# Patient Record
Sex: MALE | Race: WHITE | NOT HISPANIC OR LATINO | Employment: OTHER | URBAN - METROPOLITAN AREA
[De-identification: names, ages, dates, MRNs, and addresses within clinical notes are randomized per-mention and may not be internally consistent; named-entity substitution may affect disease eponyms.]

---

## 2017-07-05 ENCOUNTER — HOSPITAL ENCOUNTER (INPATIENT)
Facility: HOSPITAL | Age: 52
LOS: 6 days | Discharge: HOME | DRG: 885 | End: 2017-07-11
Attending: PSYCHIATRY & NEUROLOGY | Admitting: PSYCHIATRY & NEUROLOGY
Payer: MEDICARE

## 2017-07-05 DIAGNOSIS — R45.851 SUICIDAL IDEATION: Primary | ICD-10-CM

## 2017-07-05 DIAGNOSIS — F31.62 BIPOLAR DISORDER, CURRENT EPISODE MIXED, MODERATE (HCC): ICD-10-CM

## 2017-07-05 DIAGNOSIS — K21.9 GERD (GASTROESOPHAGEAL REFLUX DISEASE): ICD-10-CM

## 2017-07-05 DIAGNOSIS — F41.9 ANXIETY: ICD-10-CM

## 2017-07-05 DIAGNOSIS — R45.850 HOMICIDAL IDEATION: ICD-10-CM

## 2017-07-05 DIAGNOSIS — G47.00 INSOMNIA: ICD-10-CM

## 2017-07-05 DIAGNOSIS — E78.1 HYPERTRIGLYCERIDEMIA: ICD-10-CM

## 2017-07-05 LAB
AMPHETAMINES SERPL QL SCN: NEGATIVE
ANION GAP SERPL CALCULATED.3IONS-SCNC: 10 MMOL/L (ref 4–13)
APAP SERPL-MCNC: <2 UG/ML (ref 10–30)
BARBITURATES UR QL: NEGATIVE
BASOPHILS # BLD AUTO: 0.06 THOUSANDS/ΜL (ref 0–0.1)
BASOPHILS NFR BLD AUTO: 1 % (ref 0–1)
BENZODIAZ UR QL: NEGATIVE
BUN SERPL-MCNC: 14 MG/DL (ref 5–25)
CALCIUM SERPL-MCNC: 9 MG/DL (ref 8.3–10.1)
CHLORIDE SERPL-SCNC: 108 MMOL/L (ref 100–108)
CO2 SERPL-SCNC: 26 MMOL/L (ref 21–32)
COCAINE UR QL: NEGATIVE
CREAT SERPL-MCNC: 0.93 MG/DL (ref 0.6–1.3)
EOSINOPHIL # BLD AUTO: 0.28 THOUSAND/ΜL (ref 0–0.61)
EOSINOPHIL NFR BLD AUTO: 3 % (ref 0–6)
ERYTHROCYTE [DISTWIDTH] IN BLOOD BY AUTOMATED COUNT: 13.7 % (ref 11.6–15.1)
ETHANOL EXG-MCNC: 0 MG/DL
ETHANOL SERPL-MCNC: <3 MG/DL (ref 0–3)
GFR SERPL CREATININE-BSD FRML MDRD: >60 ML/MIN/1.73SQ M
GLUCOSE SERPL-MCNC: 137 MG/DL (ref 65–140)
HCT VFR BLD AUTO: 44.3 % (ref 36.5–49.3)
HGB BLD-MCNC: 14.5 G/DL (ref 12–17)
LYMPHOCYTES # BLD AUTO: 2.05 THOUSANDS/ΜL (ref 0.6–4.47)
LYMPHOCYTES NFR BLD AUTO: 19 % (ref 14–44)
MCH RBC QN AUTO: 30.7 PG (ref 26.8–34.3)
MCHC RBC AUTO-ENTMCNC: 32.7 G/DL (ref 31.4–37.4)
MCV RBC AUTO: 94 FL (ref 82–98)
METHADONE UR QL: NEGATIVE
MONOCYTES # BLD AUTO: 0.64 THOUSAND/ΜL (ref 0.17–1.22)
MONOCYTES NFR BLD AUTO: 6 % (ref 4–12)
NEUTROPHILS # BLD AUTO: 7.92 THOUSANDS/ΜL (ref 1.85–7.62)
NEUTS SEG NFR BLD AUTO: 71 % (ref 43–75)
OPIATES UR QL SCN: NEGATIVE
PCP UR QL: NEGATIVE
PLATELET # BLD AUTO: 309 THOUSANDS/UL (ref 149–390)
PMV BLD AUTO: 9.8 FL (ref 8.9–12.7)
POTASSIUM SERPL-SCNC: 3.8 MMOL/L (ref 3.5–5.3)
RBC # BLD AUTO: 4.72 MILLION/UL (ref 3.88–5.62)
SALICYLATES SERPL-MCNC: <3 MG/DL (ref 3–20)
SODIUM SERPL-SCNC: 144 MMOL/L (ref 136–145)
THC UR QL: NEGATIVE
WBC # BLD AUTO: 10.95 THOUSAND/UL (ref 4.31–10.16)

## 2017-07-05 PROCEDURE — 80329 ANALGESICS NON-OPIOID 1 OR 2: CPT | Performed by: PHYSICIAN ASSISTANT

## 2017-07-05 PROCEDURE — 36415 COLL VENOUS BLD VENIPUNCTURE: CPT | Performed by: PHYSICIAN ASSISTANT

## 2017-07-05 PROCEDURE — 85025 COMPLETE CBC W/AUTO DIFF WBC: CPT | Performed by: PHYSICIAN ASSISTANT

## 2017-07-05 PROCEDURE — 80307 DRUG TEST PRSMV CHEM ANLYZR: CPT | Performed by: PHYSICIAN ASSISTANT

## 2017-07-05 PROCEDURE — 82075 ASSAY OF BREATH ETHANOL: CPT | Performed by: PHYSICIAN ASSISTANT

## 2017-07-05 PROCEDURE — 99285 EMERGENCY DEPT VISIT HI MDM: CPT

## 2017-07-05 PROCEDURE — 80320 DRUG SCREEN QUANTALCOHOLS: CPT | Performed by: PHYSICIAN ASSISTANT

## 2017-07-05 PROCEDURE — 80048 BASIC METABOLIC PNL TOTAL CA: CPT | Performed by: PHYSICIAN ASSISTANT

## 2017-07-05 RX ORDER — MAGNESIUM HYDROXIDE/ALUMINUM HYDROXICE/SIMETHICONE 120; 1200; 1200 MG/30ML; MG/30ML; MG/30ML
30 SUSPENSION ORAL EVERY 4 HOURS PRN
Status: DISCONTINUED | OUTPATIENT
Start: 2017-07-05 | End: 2017-07-11 | Stop reason: HOSPADM

## 2017-07-05 RX ORDER — BENZTROPINE MESYLATE 1 MG/1
1 TABLET ORAL EVERY 6 HOURS PRN
Status: DISCONTINUED | OUTPATIENT
Start: 2017-07-05 | End: 2017-07-11 | Stop reason: HOSPADM

## 2017-07-05 RX ORDER — TRAZODONE HYDROCHLORIDE 100 MG/1
100 TABLET ORAL
COMMUNITY
End: 2017-07-11 | Stop reason: HOSPADM

## 2017-07-05 RX ORDER — OLANZAPINE 10 MG/1
10 INJECTION, POWDER, LYOPHILIZED, FOR SOLUTION INTRAMUSCULAR 2 TIMES DAILY PRN
Status: DISCONTINUED | OUTPATIENT
Start: 2017-07-05 | End: 2017-07-11 | Stop reason: HOSPADM

## 2017-07-05 RX ORDER — OLANZAPINE 10 MG/1
10 TABLET ORAL
Status: DISCONTINUED | OUTPATIENT
Start: 2017-07-05 | End: 2017-07-11 | Stop reason: HOSPADM

## 2017-07-05 RX ORDER — LORAZEPAM 1 MG/1
1 TABLET ORAL EVERY 4 HOURS PRN
Status: DISCONTINUED | OUTPATIENT
Start: 2017-07-05 | End: 2017-07-11 | Stop reason: HOSPADM

## 2017-07-05 RX ORDER — TRAZODONE HYDROCHLORIDE 50 MG/1
50 TABLET ORAL
Status: DISCONTINUED | OUTPATIENT
Start: 2017-07-05 | End: 2017-07-11 | Stop reason: HOSPADM

## 2017-07-05 RX ORDER — ACETAMINOPHEN 325 MG/1
650 TABLET ORAL EVERY 6 HOURS PRN
Status: DISCONTINUED | OUTPATIENT
Start: 2017-07-05 | End: 2017-07-11 | Stop reason: HOSPADM

## 2017-07-05 RX ORDER — OLANZAPINE 7.5 MG/1
7.5 TABLET ORAL
COMMUNITY
End: 2017-07-11 | Stop reason: HOSPADM

## 2017-07-05 RX ORDER — BENZTROPINE MESYLATE 1 MG/ML
1 INJECTION INTRAMUSCULAR; INTRAVENOUS EVERY 6 HOURS PRN
Status: DISCONTINUED | OUTPATIENT
Start: 2017-07-05 | End: 2017-07-11 | Stop reason: HOSPADM

## 2017-07-05 RX ORDER — RISPERIDONE 1 MG/1
1 TABLET, ORALLY DISINTEGRATING ORAL
Status: DISCONTINUED | OUTPATIENT
Start: 2017-07-05 | End: 2017-07-11 | Stop reason: HOSPADM

## 2017-07-05 RX ADMIN — OLANZAPINE 10 MG: 10 TABLET, FILM COATED ORAL at 22:23

## 2017-07-06 LAB
ALBUMIN SERPL BCP-MCNC: 3.2 G/DL (ref 3.5–5)
ALP SERPL-CCNC: 54 U/L (ref 46–116)
ALT SERPL W P-5'-P-CCNC: 15 U/L (ref 12–78)
ANION GAP SERPL CALCULATED.3IONS-SCNC: 8 MMOL/L (ref 4–13)
AST SERPL W P-5'-P-CCNC: 14 U/L (ref 5–45)
BILIRUB SERPL-MCNC: 1 MG/DL (ref 0.2–1)
BUN SERPL-MCNC: 9 MG/DL (ref 5–25)
CALCIUM SERPL-MCNC: 8.8 MG/DL (ref 8.3–10.1)
CHLORIDE SERPL-SCNC: 108 MMOL/L (ref 100–108)
CO2 SERPL-SCNC: 26 MMOL/L (ref 21–32)
CREAT SERPL-MCNC: 0.82 MG/DL (ref 0.6–1.3)
GFR SERPL CREATININE-BSD FRML MDRD: >60 ML/MIN/1.73SQ M
GLUCOSE SERPL-MCNC: 90 MG/DL (ref 65–140)
POTASSIUM SERPL-SCNC: 4.2 MMOL/L (ref 3.5–5.3)
PROT SERPL-MCNC: 6.5 G/DL (ref 6.4–8.2)
SODIUM SERPL-SCNC: 142 MMOL/L (ref 136–145)

## 2017-07-06 PROCEDURE — 80053 COMPREHEN METABOLIC PANEL: CPT | Performed by: PSYCHIATRY & NEUROLOGY

## 2017-07-06 RX ORDER — PANTOPRAZOLE SODIUM 40 MG/1
40 TABLET, DELAYED RELEASE ORAL DAILY
Status: DISCONTINUED | OUTPATIENT
Start: 2017-07-06 | End: 2017-07-11 | Stop reason: HOSPADM

## 2017-07-06 RX ORDER — DIPHENHYDRAMINE HCL 25 MG
25 TABLET ORAL EVERY 6 HOURS PRN
Status: DISCONTINUED | OUTPATIENT
Start: 2017-07-06 | End: 2017-07-11 | Stop reason: HOSPADM

## 2017-07-06 RX ORDER — ASPIRIN 81 MG/1
81 TABLET ORAL DAILY
Status: DISCONTINUED | OUTPATIENT
Start: 2017-07-06 | End: 2017-07-11 | Stop reason: HOSPADM

## 2017-07-06 RX ORDER — LAMOTRIGINE 100 MG/1
300 TABLET ORAL DAILY
Status: DISCONTINUED | OUTPATIENT
Start: 2017-07-06 | End: 2017-07-11 | Stop reason: HOSPADM

## 2017-07-06 RX ORDER — TRAZODONE HYDROCHLORIDE 100 MG/1
100 TABLET ORAL
Status: DISCONTINUED | OUTPATIENT
Start: 2017-07-06 | End: 2017-07-07

## 2017-07-06 RX ORDER — ATORVASTATIN CALCIUM 20 MG/1
20 TABLET, FILM COATED ORAL DAILY
Status: DISCONTINUED | OUTPATIENT
Start: 2017-07-06 | End: 2017-07-11 | Stop reason: HOSPADM

## 2017-07-06 RX ORDER — CLONAZEPAM 0.5 MG/1
0.5 TABLET ORAL 3 TIMES DAILY
Status: DISCONTINUED | OUTPATIENT
Start: 2017-07-06 | End: 2017-07-11 | Stop reason: HOSPADM

## 2017-07-06 RX ADMIN — OLANZAPINE 10 MG: 10 TABLET, FILM COATED ORAL at 20:55

## 2017-07-06 RX ADMIN — CLONAZEPAM 0.5 MG: 0.5 TABLET ORAL at 20:54

## 2017-07-06 RX ADMIN — CLONAZEPAM 0.5 MG: 0.5 TABLET ORAL at 17:48

## 2017-07-06 RX ADMIN — PANTOPRAZOLE SODIUM 40 MG: 40 TABLET, DELAYED RELEASE ORAL at 13:35

## 2017-07-06 RX ADMIN — LAMOTRIGINE 300 MG: 100 TABLET ORAL at 13:35

## 2017-07-06 RX ADMIN — TRAZODONE HYDROCHLORIDE 100 MG: 100 TABLET ORAL at 20:56

## 2017-07-06 RX ADMIN — ASPIRIN 81 MG: 81 TABLET, COATED ORAL at 13:35

## 2017-07-06 RX ADMIN — CLONAZEPAM 0.5 MG: 0.5 TABLET ORAL at 13:35

## 2017-07-06 RX ADMIN — BISACODYL 5 MG: 5 TABLET, COATED ORAL at 20:53

## 2017-07-06 RX ADMIN — ATORVASTATIN CALCIUM 20 MG: 20 TABLET, FILM COATED ORAL at 13:35

## 2017-07-07 RX ORDER — DOCUSATE SODIUM 100 MG/1
100 CAPSULE, LIQUID FILLED ORAL 2 TIMES DAILY
Status: DISCONTINUED | OUTPATIENT
Start: 2017-07-07 | End: 2017-07-11 | Stop reason: HOSPADM

## 2017-07-07 RX ORDER — TRAZODONE HYDROCHLORIDE 100 MG/1
200 TABLET ORAL
Status: DISCONTINUED | OUTPATIENT
Start: 2017-07-07 | End: 2017-07-11 | Stop reason: HOSPADM

## 2017-07-07 RX ADMIN — LAMOTRIGINE 300 MG: 100 TABLET ORAL at 08:27

## 2017-07-07 RX ADMIN — CLONAZEPAM 0.5 MG: 0.5 TABLET ORAL at 08:27

## 2017-07-07 RX ADMIN — ATORVASTATIN CALCIUM 20 MG: 20 TABLET, FILM COATED ORAL at 08:27

## 2017-07-07 RX ADMIN — OLANZAPINE 10 MG: 10 TABLET, FILM COATED ORAL at 21:03

## 2017-07-07 RX ADMIN — PANTOPRAZOLE SODIUM 40 MG: 40 TABLET, DELAYED RELEASE ORAL at 08:27

## 2017-07-07 RX ADMIN — DOCUSATE SODIUM 100 MG: 100 CAPSULE, LIQUID FILLED ORAL at 17:21

## 2017-07-07 RX ADMIN — ASPIRIN 81 MG: 81 TABLET, COATED ORAL at 08:27

## 2017-07-07 RX ADMIN — CLONAZEPAM 0.5 MG: 0.5 TABLET ORAL at 21:03

## 2017-07-07 RX ADMIN — CLONAZEPAM 0.5 MG: 0.5 TABLET ORAL at 17:21

## 2017-07-07 RX ADMIN — DOCUSATE SODIUM 100 MG: 100 CAPSULE, LIQUID FILLED ORAL at 14:31

## 2017-07-07 RX ADMIN — TRAZODONE HYDROCHLORIDE 200 MG: 100 TABLET ORAL at 21:03

## 2017-07-08 PROCEDURE — 0HBRXZZ EXCISION OF TOE NAIL, EXTERNAL APPROACH: ICD-10-PCS | Performed by: PODIATRIST

## 2017-07-08 RX ADMIN — ATORVASTATIN CALCIUM 20 MG: 20 TABLET, FILM COATED ORAL at 08:57

## 2017-07-08 RX ADMIN — PANTOPRAZOLE SODIUM 40 MG: 40 TABLET, DELAYED RELEASE ORAL at 08:57

## 2017-07-08 RX ADMIN — CLONAZEPAM 0.5 MG: 0.5 TABLET ORAL at 21:15

## 2017-07-08 RX ADMIN — LAMOTRIGINE 300 MG: 100 TABLET ORAL at 08:57

## 2017-07-08 RX ADMIN — CLONAZEPAM 0.5 MG: 0.5 TABLET ORAL at 16:02

## 2017-07-08 RX ADMIN — DOCUSATE SODIUM 100 MG: 100 CAPSULE, LIQUID FILLED ORAL at 17:09

## 2017-07-08 RX ADMIN — OLANZAPINE 10 MG: 10 TABLET, FILM COATED ORAL at 21:15

## 2017-07-08 RX ADMIN — ASPIRIN 81 MG: 81 TABLET, COATED ORAL at 08:57

## 2017-07-08 RX ADMIN — CLONAZEPAM 0.5 MG: 0.5 TABLET ORAL at 08:57

## 2017-07-08 RX ADMIN — TRAZODONE HYDROCHLORIDE 200 MG: 100 TABLET ORAL at 21:14

## 2017-07-08 RX ADMIN — DOCUSATE SODIUM 100 MG: 100 CAPSULE, LIQUID FILLED ORAL at 08:57

## 2017-07-09 RX ADMIN — CLONAZEPAM 0.5 MG: 0.5 TABLET ORAL at 16:06

## 2017-07-09 RX ADMIN — ATORVASTATIN CALCIUM 20 MG: 20 TABLET, FILM COATED ORAL at 08:46

## 2017-07-09 RX ADMIN — ASPIRIN 81 MG: 81 TABLET, COATED ORAL at 08:46

## 2017-07-09 RX ADMIN — LAMOTRIGINE 300 MG: 100 TABLET ORAL at 08:46

## 2017-07-09 RX ADMIN — CLONAZEPAM 0.5 MG: 0.5 TABLET ORAL at 21:01

## 2017-07-09 RX ADMIN — TRAZODONE HYDROCHLORIDE 200 MG: 100 TABLET ORAL at 21:02

## 2017-07-09 RX ADMIN — DOCUSATE SODIUM 100 MG: 100 CAPSULE, LIQUID FILLED ORAL at 08:46

## 2017-07-09 RX ADMIN — OLANZAPINE 10 MG: 10 TABLET, FILM COATED ORAL at 21:01

## 2017-07-09 RX ADMIN — DOCUSATE SODIUM 100 MG: 100 CAPSULE, LIQUID FILLED ORAL at 17:29

## 2017-07-09 RX ADMIN — PANTOPRAZOLE SODIUM 40 MG: 40 TABLET, DELAYED RELEASE ORAL at 08:46

## 2017-07-09 RX ADMIN — CLONAZEPAM 0.5 MG: 0.5 TABLET ORAL at 08:46

## 2017-07-10 RX ADMIN — ATORVASTATIN CALCIUM 20 MG: 20 TABLET, FILM COATED ORAL at 09:08

## 2017-07-10 RX ADMIN — TRAZODONE HYDROCHLORIDE 200 MG: 100 TABLET ORAL at 21:01

## 2017-07-10 RX ADMIN — ASPIRIN 81 MG: 81 TABLET, COATED ORAL at 09:09

## 2017-07-10 RX ADMIN — CLONAZEPAM 0.5 MG: 0.5 TABLET ORAL at 15:59

## 2017-07-10 RX ADMIN — DOCUSATE SODIUM 100 MG: 100 CAPSULE, LIQUID FILLED ORAL at 17:38

## 2017-07-10 RX ADMIN — CLONAZEPAM 0.5 MG: 0.5 TABLET ORAL at 09:09

## 2017-07-10 RX ADMIN — OLANZAPINE 10 MG: 10 TABLET, FILM COATED ORAL at 21:01

## 2017-07-10 RX ADMIN — LAMOTRIGINE 300 MG: 100 TABLET ORAL at 09:09

## 2017-07-10 RX ADMIN — DOCUSATE SODIUM 100 MG: 100 CAPSULE, LIQUID FILLED ORAL at 09:09

## 2017-07-10 RX ADMIN — CLONAZEPAM 0.5 MG: 0.5 TABLET ORAL at 21:01

## 2017-07-10 RX ADMIN — PANTOPRAZOLE SODIUM 40 MG: 40 TABLET, DELAYED RELEASE ORAL at 09:09

## 2017-07-11 VITALS
SYSTOLIC BLOOD PRESSURE: 102 MMHG | TEMPERATURE: 97.3 F | BODY MASS INDEX: 23.15 KG/M2 | OXYGEN SATURATION: 99 % | HEIGHT: 71 IN | WEIGHT: 165.34 LBS | RESPIRATION RATE: 17 BRPM | DIASTOLIC BLOOD PRESSURE: 63 MMHG | HEART RATE: 83 BPM

## 2017-07-11 RX ORDER — CLONAZEPAM 0.5 MG/1
0.5 TABLET ORAL 3 TIMES DAILY
Qty: 21 TABLET | Refills: 0 | Status: SHIPPED | OUTPATIENT
Start: 2017-07-11 | End: 2017-07-24 | Stop reason: ALTCHOICE

## 2017-07-11 RX ORDER — PANTOPRAZOLE SODIUM 40 MG/1
40 TABLET, DELAYED RELEASE ORAL DAILY
Qty: 30 TABLET | Refills: 0 | Status: SHIPPED | OUTPATIENT
Start: 2017-07-11 | End: 2017-07-24 | Stop reason: ALTCHOICE

## 2017-07-11 RX ORDER — ATORVASTATIN CALCIUM 20 MG/1
20 TABLET, FILM COATED ORAL
Qty: 30 TABLET | Refills: 0 | Status: SHIPPED | OUTPATIENT
Start: 2017-07-11 | End: 2017-07-24 | Stop reason: ALTCHOICE

## 2017-07-11 RX ORDER — LAMOTRIGINE 150 MG/1
300 TABLET ORAL DAILY
Qty: 14 TABLET | Refills: 0 | Status: SHIPPED | OUTPATIENT
Start: 2017-07-11 | End: 2018-12-19 | Stop reason: HOSPADM

## 2017-07-11 RX ORDER — OLANZAPINE 10 MG/1
10 TABLET ORAL
Qty: 7 TABLET | Refills: 0 | Status: SHIPPED | OUTPATIENT
Start: 2017-07-11 | End: 2018-12-19 | Stop reason: HOSPADM

## 2017-07-11 RX ORDER — TRAZODONE HYDROCHLORIDE 100 MG/1
200 TABLET ORAL
Qty: 14 TABLET | Refills: 0 | Status: SHIPPED | OUTPATIENT
Start: 2017-07-11 | End: 2018-12-05

## 2017-07-11 RX ADMIN — PANTOPRAZOLE SODIUM 40 MG: 40 TABLET, DELAYED RELEASE ORAL at 08:47

## 2017-07-11 RX ADMIN — LAMOTRIGINE 300 MG: 100 TABLET ORAL at 08:47

## 2017-07-11 RX ADMIN — ASPIRIN 81 MG: 81 TABLET, COATED ORAL at 08:47

## 2017-07-11 RX ADMIN — CLONAZEPAM 0.5 MG: 0.5 TABLET ORAL at 08:48

## 2017-07-11 RX ADMIN — ATORVASTATIN CALCIUM 20 MG: 20 TABLET, FILM COATED ORAL at 08:47

## 2017-07-11 RX ADMIN — DOCUSATE SODIUM 100 MG: 100 CAPSULE, LIQUID FILLED ORAL at 08:48

## 2017-07-12 ENCOUNTER — HOSPITAL ENCOUNTER (INPATIENT)
Facility: HOSPITAL | Age: 52
LOS: 1 days | DRG: 885 | End: 2017-07-13
Attending: PSYCHIATRY & NEUROLOGY | Admitting: PSYCHIATRY & NEUROLOGY
Payer: MEDICARE

## 2017-07-12 DIAGNOSIS — F32.A DEPRESSION: Primary | ICD-10-CM

## 2017-07-12 DIAGNOSIS — R45.851 SUICIDAL THOUGHTS: ICD-10-CM

## 2017-07-12 LAB
ALBUMIN SERPL BCP-MCNC: 3.7 G/DL (ref 3.5–5)
ALP SERPL-CCNC: 74 U/L (ref 46–116)
ALT SERPL W P-5'-P-CCNC: 19 U/L (ref 12–78)
AMPHETAMINES SERPL QL SCN: NEGATIVE
ANION GAP SERPL CALCULATED.3IONS-SCNC: 8 MMOL/L (ref 4–13)
AST SERPL W P-5'-P-CCNC: 16 U/L (ref 5–45)
BARBITURATES UR QL: NEGATIVE
BASOPHILS # BLD AUTO: 0.06 THOUSANDS/ΜL (ref 0–0.1)
BASOPHILS NFR BLD AUTO: 1 % (ref 0–1)
BENZODIAZ UR QL: NEGATIVE
BILIRUB SERPL-MCNC: 0.6 MG/DL (ref 0.2–1)
BUN SERPL-MCNC: 16 MG/DL (ref 5–25)
CALCIUM SERPL-MCNC: 8.7 MG/DL (ref 8.3–10.1)
CHLORIDE SERPL-SCNC: 105 MMOL/L (ref 100–108)
CO2 SERPL-SCNC: 29 MMOL/L (ref 21–32)
COCAINE UR QL: NEGATIVE
CREAT SERPL-MCNC: 1.13 MG/DL (ref 0.6–1.3)
EOSINOPHIL # BLD AUTO: 0.14 THOUSAND/ΜL (ref 0–0.61)
EOSINOPHIL NFR BLD AUTO: 2 % (ref 0–6)
ERYTHROCYTE [DISTWIDTH] IN BLOOD BY AUTOMATED COUNT: 13.2 % (ref 11.6–15.1)
ETHANOL EXG-MCNC: 0 MG/DL
GFR SERPL CREATININE-BSD FRML MDRD: >60 ML/MIN/1.73SQ M
GLUCOSE SERPL-MCNC: 109 MG/DL (ref 65–140)
HCT VFR BLD AUTO: 44.9 % (ref 36.5–49.3)
HGB BLD-MCNC: 14.8 G/DL (ref 12–17)
LYMPHOCYTES # BLD AUTO: 1.9 THOUSANDS/ΜL (ref 0.6–4.47)
LYMPHOCYTES NFR BLD AUTO: 22 % (ref 14–44)
MCH RBC QN AUTO: 31.4 PG (ref 26.8–34.3)
MCHC RBC AUTO-ENTMCNC: 33 G/DL (ref 31.4–37.4)
MCV RBC AUTO: 95 FL (ref 82–98)
METHADONE UR QL: NEGATIVE
MONOCYTES # BLD AUTO: 0.54 THOUSAND/ΜL (ref 0.17–1.22)
MONOCYTES NFR BLD AUTO: 6 % (ref 4–12)
NEUTROPHILS # BLD AUTO: 6.21 THOUSANDS/ΜL (ref 1.85–7.62)
NEUTS SEG NFR BLD AUTO: 69 % (ref 43–75)
OPIATES UR QL SCN: NEGATIVE
PCP UR QL: NEGATIVE
PLATELET # BLD AUTO: 249 THOUSANDS/UL (ref 149–390)
PMV BLD AUTO: 9.6 FL (ref 8.9–12.7)
POTASSIUM SERPL-SCNC: 3.8 MMOL/L (ref 3.5–5.3)
PROT SERPL-MCNC: 7.3 G/DL (ref 6.4–8.2)
RBC # BLD AUTO: 4.72 MILLION/UL (ref 3.88–5.62)
SODIUM SERPL-SCNC: 142 MMOL/L (ref 136–145)
THC UR QL: NEGATIVE
WBC # BLD AUTO: 8.85 THOUSAND/UL (ref 4.31–10.16)

## 2017-07-12 PROCEDURE — 36415 COLL VENOUS BLD VENIPUNCTURE: CPT | Performed by: PHYSICIAN ASSISTANT

## 2017-07-12 PROCEDURE — 99285 EMERGENCY DEPT VISIT HI MDM: CPT

## 2017-07-12 PROCEDURE — 80307 DRUG TEST PRSMV CHEM ANLYZR: CPT | Performed by: PHYSICIAN ASSISTANT

## 2017-07-12 PROCEDURE — 80053 COMPREHEN METABOLIC PANEL: CPT | Performed by: PHYSICIAN ASSISTANT

## 2017-07-12 PROCEDURE — 85025 COMPLETE CBC W/AUTO DIFF WBC: CPT | Performed by: PHYSICIAN ASSISTANT

## 2017-07-12 PROCEDURE — 82075 ASSAY OF BREATH ETHANOL: CPT | Performed by: PHYSICIAN ASSISTANT

## 2017-07-12 RX ORDER — CLONAZEPAM 0.5 MG/1
0.5 TABLET ORAL 3 TIMES DAILY
Status: DISCONTINUED | OUTPATIENT
Start: 2017-07-12 | End: 2017-07-13 | Stop reason: HOSPADM

## 2017-07-12 RX ORDER — ACETAMINOPHEN 325 MG/1
650 TABLET ORAL EVERY 6 HOURS PRN
Status: DISCONTINUED | OUTPATIENT
Start: 2017-07-12 | End: 2017-07-13 | Stop reason: HOSPADM

## 2017-07-12 RX ORDER — RISPERIDONE 1 MG/1
1 TABLET, ORALLY DISINTEGRATING ORAL
Status: DISCONTINUED | OUTPATIENT
Start: 2017-07-12 | End: 2017-07-13 | Stop reason: HOSPADM

## 2017-07-12 RX ORDER — ATORVASTATIN CALCIUM 20 MG/1
20 TABLET, FILM COATED ORAL
Status: DISCONTINUED | OUTPATIENT
Start: 2017-07-13 | End: 2017-07-13 | Stop reason: HOSPADM

## 2017-07-12 RX ORDER — OLANZAPINE 10 MG/1
10 TABLET ORAL
Status: DISCONTINUED | OUTPATIENT
Start: 2017-07-12 | End: 2017-07-13 | Stop reason: HOSPADM

## 2017-07-12 RX ORDER — LORAZEPAM 1 MG/1
1 TABLET ORAL EVERY 4 HOURS PRN
Status: DISCONTINUED | OUTPATIENT
Start: 2017-07-12 | End: 2017-07-13 | Stop reason: HOSPADM

## 2017-07-12 RX ORDER — MAGNESIUM HYDROXIDE/ALUMINUM HYDROXICE/SIMETHICONE 120; 1200; 1200 MG/30ML; MG/30ML; MG/30ML
30 SUSPENSION ORAL EVERY 4 HOURS PRN
Status: DISCONTINUED | OUTPATIENT
Start: 2017-07-12 | End: 2017-07-13 | Stop reason: HOSPADM

## 2017-07-12 RX ORDER — LAMOTRIGINE 100 MG/1
300 TABLET ORAL DAILY
Status: DISCONTINUED | OUTPATIENT
Start: 2017-07-13 | End: 2017-07-13 | Stop reason: HOSPADM

## 2017-07-12 RX ORDER — ASPIRIN 81 MG/1
81 TABLET ORAL DAILY
Status: DISCONTINUED | OUTPATIENT
Start: 2017-07-13 | End: 2017-07-13 | Stop reason: HOSPADM

## 2017-07-12 RX ORDER — BENZTROPINE MESYLATE 1 MG/1
1 TABLET ORAL EVERY 6 HOURS PRN
Status: DISCONTINUED | OUTPATIENT
Start: 2017-07-12 | End: 2017-07-13 | Stop reason: HOSPADM

## 2017-07-12 RX ORDER — BENZTROPINE MESYLATE 1 MG/ML
1 INJECTION INTRAMUSCULAR; INTRAVENOUS EVERY 6 HOURS PRN
Status: DISCONTINUED | OUTPATIENT
Start: 2017-07-12 | End: 2017-07-13 | Stop reason: HOSPADM

## 2017-07-12 RX ORDER — TRAZODONE HYDROCHLORIDE 50 MG/1
50 TABLET ORAL
Status: DISCONTINUED | OUTPATIENT
Start: 2017-07-12 | End: 2017-07-13 | Stop reason: HOSPADM

## 2017-07-12 RX ORDER — PANTOPRAZOLE SODIUM 40 MG/1
40 TABLET, DELAYED RELEASE ORAL DAILY
Status: DISCONTINUED | OUTPATIENT
Start: 2017-07-13 | End: 2017-07-13 | Stop reason: HOSPADM

## 2017-07-12 RX ORDER — OLANZAPINE 10 MG/1
10 INJECTION, POWDER, LYOPHILIZED, FOR SOLUTION INTRAMUSCULAR 2 TIMES DAILY PRN
Status: DISCONTINUED | OUTPATIENT
Start: 2017-07-12 | End: 2017-07-13 | Stop reason: HOSPADM

## 2017-07-12 RX ORDER — DOCUSATE SODIUM 100 MG/1
100 CAPSULE, LIQUID FILLED ORAL 2 TIMES DAILY
Status: DISCONTINUED | OUTPATIENT
Start: 2017-07-12 | End: 2017-07-13 | Stop reason: HOSPADM

## 2017-07-12 RX ADMIN — CLONAZEPAM 0.5 MG: 0.5 TABLET ORAL at 21:27

## 2017-07-12 RX ADMIN — DOCUSATE SODIUM 100 MG: 100 CAPSULE, LIQUID FILLED ORAL at 18:52

## 2017-07-12 RX ADMIN — TRAZODONE HYDROCHLORIDE 175 MG: 150 TABLET ORAL at 21:27

## 2017-07-12 RX ADMIN — LORAZEPAM 1 MG: 1 TABLET ORAL at 18:52

## 2017-07-12 RX ADMIN — OLANZAPINE 10 MG: 10 TABLET, FILM COATED ORAL at 21:27

## 2017-07-13 VITALS
SYSTOLIC BLOOD PRESSURE: 106 MMHG | HEIGHT: 71 IN | OXYGEN SATURATION: 98 % | RESPIRATION RATE: 17 BRPM | BODY MASS INDEX: 23.36 KG/M2 | WEIGHT: 166.89 LBS | HEART RATE: 84 BPM | TEMPERATURE: 97.3 F | DIASTOLIC BLOOD PRESSURE: 74 MMHG

## 2017-07-13 PROBLEM — F31.60 BIPOLAR DISORDER, CURRENT EPISODE MIXED (HCC): Chronic | Status: ACTIVE | Noted: 2017-07-13

## 2017-07-13 LAB
ALBUMIN SERPL BCP-MCNC: 3.2 G/DL (ref 3.5–5)
ALP SERPL-CCNC: 58 U/L (ref 46–116)
ALT SERPL W P-5'-P-CCNC: 16 U/L (ref 12–78)
ANION GAP SERPL CALCULATED.3IONS-SCNC: 6 MMOL/L (ref 4–13)
AST SERPL W P-5'-P-CCNC: 14 U/L (ref 5–45)
ATRIAL RATE: 70 BPM
BILIRUB SERPL-MCNC: 0.8 MG/DL (ref 0.2–1)
BUN SERPL-MCNC: 12 MG/DL (ref 5–25)
CALCIUM SERPL-MCNC: 8.8 MG/DL (ref 8.3–10.1)
CHLORIDE SERPL-SCNC: 108 MMOL/L (ref 100–108)
CO2 SERPL-SCNC: 29 MMOL/L (ref 21–32)
CREAT SERPL-MCNC: 0.92 MG/DL (ref 0.6–1.3)
GFR SERPL CREATININE-BSD FRML MDRD: >60 ML/MIN/1.73SQ M
GLUCOSE P FAST SERPL-MCNC: 86 MG/DL (ref 65–99)
GLUCOSE SERPL-MCNC: 86 MG/DL (ref 65–140)
P AXIS: 70 DEGREES
POTASSIUM SERPL-SCNC: 4.3 MMOL/L (ref 3.5–5.3)
PR INTERVAL: 148 MS
PROT SERPL-MCNC: 6.4 G/DL (ref 6.4–8.2)
QRS AXIS: 45 DEGREES
QRSD INTERVAL: 92 MS
QT INTERVAL: 400 MS
QTC INTERVAL: 432 MS
SODIUM SERPL-SCNC: 143 MMOL/L (ref 136–145)
T WAVE AXIS: 82 DEGREES
VENTRICULAR RATE: 70 BPM

## 2017-07-13 PROCEDURE — 93005 ELECTROCARDIOGRAM TRACING: CPT | Performed by: PHYSICIAN ASSISTANT

## 2017-07-13 PROCEDURE — 80053 COMPREHEN METABOLIC PANEL: CPT | Performed by: PSYCHIATRY & NEUROLOGY

## 2017-07-13 RX ORDER — TRAZODONE HYDROCHLORIDE 100 MG/1
200 TABLET ORAL
Status: DISCONTINUED | OUTPATIENT
Start: 2017-07-13 | End: 2017-07-13 | Stop reason: HOSPADM

## 2017-07-13 RX ADMIN — ATORVASTATIN CALCIUM 20 MG: 20 TABLET, FILM COATED ORAL at 08:57

## 2017-07-13 RX ADMIN — CLONAZEPAM 0.5 MG: 0.5 TABLET ORAL at 08:57

## 2017-07-13 RX ADMIN — PANTOPRAZOLE SODIUM 40 MG: 40 TABLET, DELAYED RELEASE ORAL at 08:57

## 2017-07-13 RX ADMIN — ASPIRIN 81 MG: 81 TABLET, COATED ORAL at 08:57

## 2017-07-13 RX ADMIN — LORAZEPAM 1 MG: 1 TABLET ORAL at 14:47

## 2017-07-13 RX ADMIN — DOCUSATE SODIUM 100 MG: 100 CAPSULE, LIQUID FILLED ORAL at 17:45

## 2017-07-13 RX ADMIN — LAMOTRIGINE 300 MG: 100 TABLET ORAL at 08:57

## 2017-07-13 RX ADMIN — CLONAZEPAM 0.5 MG: 0.5 TABLET ORAL at 16:03

## 2017-07-13 RX ADMIN — DOCUSATE SODIUM 100 MG: 100 CAPSULE, LIQUID FILLED ORAL at 08:57

## 2017-07-24 ENCOUNTER — HOSPITAL ENCOUNTER (EMERGENCY)
Facility: HOSPITAL | Age: 52
End: 2017-07-25
Attending: EMERGENCY MEDICINE | Admitting: EMERGENCY MEDICINE
Payer: MEDICARE

## 2017-07-24 DIAGNOSIS — R45.851 SUICIDAL IDEATION: Primary | ICD-10-CM

## 2017-07-24 LAB
AMPHETAMINES SERPL QL SCN: NEGATIVE
ANION GAP SERPL CALCULATED.3IONS-SCNC: 11 MMOL/L (ref 4–13)
APAP SERPL-MCNC: <3 UG/ML (ref 10–30)
BARBITURATES UR QL: NEGATIVE
BASOPHILS # BLD AUTO: 0.03 THOUSANDS/ΜL (ref 0–0.1)
BASOPHILS NFR BLD AUTO: 0 % (ref 0–1)
BENZODIAZ UR QL: NEGATIVE
BUN SERPL-MCNC: 8 MG/DL (ref 5–25)
CALCIUM SERPL-MCNC: 8.9 MG/DL (ref 8.3–10.1)
CHLORIDE SERPL-SCNC: 104 MMOL/L (ref 100–108)
CO2 SERPL-SCNC: 23 MMOL/L (ref 21–32)
COCAINE UR QL: NEGATIVE
CREAT SERPL-MCNC: 0.8 MG/DL (ref 0.6–1.3)
EOSINOPHIL # BLD AUTO: 0.05 THOUSAND/ΜL (ref 0–0.61)
EOSINOPHIL NFR BLD AUTO: 1 % (ref 0–6)
ERYTHROCYTE [DISTWIDTH] IN BLOOD BY AUTOMATED COUNT: 13.4 % (ref 11.6–15.1)
ETHANOL EXG-MCNC: 0 MG/DL
ETHANOL SERPL-MCNC: <3 MG/DL (ref 0–3)
GFR SERPL CREATININE-BSD FRML MDRD: 103 ML/MIN/1.73SQ M
GLUCOSE SERPL-MCNC: 112 MG/DL (ref 65–140)
HCT VFR BLD AUTO: 45.4 % (ref 36.5–49.3)
HGB BLD-MCNC: 14.9 G/DL (ref 12–17)
LYMPHOCYTES # BLD AUTO: 1.29 THOUSANDS/ΜL (ref 0.6–4.47)
LYMPHOCYTES NFR BLD AUTO: 16 % (ref 14–44)
MCH RBC QN AUTO: 30.4 PG (ref 26.8–34.3)
MCHC RBC AUTO-ENTMCNC: 32.8 G/DL (ref 31.4–37.4)
MCV RBC AUTO: 93 FL (ref 82–98)
METHADONE UR QL: NEGATIVE
MONOCYTES # BLD AUTO: 0.6 THOUSAND/ΜL (ref 0.17–1.22)
MONOCYTES NFR BLD AUTO: 7 % (ref 4–12)
NEUTROPHILS # BLD AUTO: 6.36 THOUSANDS/ΜL (ref 1.85–7.62)
NEUTS SEG NFR BLD AUTO: 76 % (ref 43–75)
OPIATES UR QL SCN: NEGATIVE
PCP UR QL: NEGATIVE
PLATELET # BLD AUTO: 281 THOUSANDS/UL (ref 149–390)
PMV BLD AUTO: 10.1 FL (ref 8.9–12.7)
POTASSIUM SERPL-SCNC: 4 MMOL/L (ref 3.5–5.3)
RBC # BLD AUTO: 4.9 MILLION/UL (ref 3.88–5.62)
SALICYLATES SERPL-MCNC: <3 MG/DL (ref 3–20)
SODIUM SERPL-SCNC: 138 MMOL/L (ref 136–145)
THC UR QL: NEGATIVE
WBC # BLD AUTO: 8.33 THOUSAND/UL (ref 4.31–10.16)

## 2017-07-24 PROCEDURE — 80307 DRUG TEST PRSMV CHEM ANLYZR: CPT | Performed by: PHYSICIAN ASSISTANT

## 2017-07-24 PROCEDURE — 80048 BASIC METABOLIC PNL TOTAL CA: CPT | Performed by: PHYSICIAN ASSISTANT

## 2017-07-24 PROCEDURE — 36415 COLL VENOUS BLD VENIPUNCTURE: CPT | Performed by: PHYSICIAN ASSISTANT

## 2017-07-24 PROCEDURE — 82075 ASSAY OF BREATH ETHANOL: CPT | Performed by: PHYSICIAN ASSISTANT

## 2017-07-24 PROCEDURE — 85025 COMPLETE CBC W/AUTO DIFF WBC: CPT | Performed by: PHYSICIAN ASSISTANT

## 2017-07-24 PROCEDURE — 80320 DRUG SCREEN QUANTALCOHOLS: CPT | Performed by: PHYSICIAN ASSISTANT

## 2017-07-24 PROCEDURE — 80329 ANALGESICS NON-OPIOID 1 OR 2: CPT | Performed by: PHYSICIAN ASSISTANT

## 2017-07-24 RX ORDER — DOCUSATE SODIUM 100 MG/1
100 CAPSULE, LIQUID FILLED ORAL 3 TIMES DAILY
Status: ON HOLD | COMMUNITY
End: 2018-12-18

## 2017-07-24 RX ORDER — LORAZEPAM 1 MG/1
1 TABLET ORAL EVERY 6 HOURS PRN
COMMUNITY
End: 2018-12-19 | Stop reason: HOSPADM

## 2017-07-25 VITALS
TEMPERATURE: 97.3 F | OXYGEN SATURATION: 99 % | HEIGHT: 71 IN | HEART RATE: 71 BPM | WEIGHT: 170 LBS | RESPIRATION RATE: 20 BRPM | SYSTOLIC BLOOD PRESSURE: 159 MMHG | BODY MASS INDEX: 23.8 KG/M2 | DIASTOLIC BLOOD PRESSURE: 98 MMHG

## 2017-07-25 LAB
SPECIMEN SOURCE: NORMAL
TROPONIN I BLD-MCNC: 0 NG/ML (ref 0–0.08)

## 2017-07-25 PROCEDURE — 93005 ELECTROCARDIOGRAM TRACING: CPT | Performed by: EMERGENCY MEDICINE

## 2017-07-25 PROCEDURE — 99285 EMERGENCY DEPT VISIT HI MDM: CPT

## 2017-07-25 PROCEDURE — 84484 ASSAY OF TROPONIN QUANT: CPT

## 2017-07-25 RX ORDER — LORAZEPAM 1 MG/1
1 TABLET ORAL ONCE
Status: COMPLETED | OUTPATIENT
Start: 2017-07-25 | End: 2017-07-25

## 2017-07-25 RX ADMIN — LORAZEPAM 1 MG: 1 TABLET ORAL at 18:36

## 2017-07-26 LAB
ATRIAL RATE: 61 BPM
P AXIS: 72 DEGREES
PR INTERVAL: 142 MS
QRS AXIS: 268 DEGREES
QRSD INTERVAL: 90 MS
QT INTERVAL: 446 MS
QTC INTERVAL: 448 MS
T WAVE AXIS: 88 DEGREES
VENTRICULAR RATE: 61 BPM

## 2018-12-05 ENCOUNTER — HOSPITAL ENCOUNTER (INPATIENT)
Facility: HOSPITAL | Age: 53
LOS: 13 days | Discharge: HOME | DRG: 885 | End: 2018-12-19
Attending: EMERGENCY MEDICINE | Admitting: PSYCHIATRY & NEUROLOGY
Payer: MEDICARE

## 2018-12-05 DIAGNOSIS — F31.4 BIPOLAR 1 DISORDER, DEPRESSED, SEVERE (HCC): Primary | Chronic | ICD-10-CM

## 2018-12-05 DIAGNOSIS — E78.49 OTHER HYPERLIPIDEMIA: ICD-10-CM

## 2018-12-05 DIAGNOSIS — F31.4 BIPOLAR DISORDER, CURRENT EPISODE DEPRESSED, SEVERE, WITHOUT PSYCHOTIC FEATURES (HCC): ICD-10-CM

## 2018-12-05 DIAGNOSIS — K59.00 CONSTIPATION: ICD-10-CM

## 2018-12-05 DIAGNOSIS — R45.851 SUICIDAL IDEATIONS: ICD-10-CM

## 2018-12-05 LAB
AMPHETAMINES SERPL QL SCN: NEGATIVE
ANION GAP SERPL CALCULATED.3IONS-SCNC: 6 MMOL/L (ref 5–14)
BARBITURATES UR QL: NEGATIVE
BASOPHILS # BLD AUTO: 0.1 THOUSANDS/ΜL (ref 0–0.1)
BASOPHILS NFR BLD AUTO: 1 % (ref 0–1)
BENZODIAZ UR QL: NEGATIVE
BUN SERPL-MCNC: 11 MG/DL (ref 5–25)
CALCIUM SERPL-MCNC: 9.1 MG/DL (ref 8.4–10.2)
CHLORIDE SERPL-SCNC: 105 MMOL/L (ref 97–108)
CO2 SERPL-SCNC: 28 MMOL/L (ref 22–30)
COCAINE UR QL: NEGATIVE
CREAT SERPL-MCNC: 0.75 MG/DL (ref 0.7–1.5)
EOSINOPHIL # BLD AUTO: 0.1 THOUSAND/ΜL (ref 0–0.4)
EOSINOPHIL NFR BLD AUTO: 2 % (ref 0–6)
ERYTHROCYTE [DISTWIDTH] IN BLOOD BY AUTOMATED COUNT: 13.4 %
ETHANOL SERPL-MCNC: <10 MG/DL (ref 0–10)
GFR SERPL CREATININE-BSD FRML MDRD: 104 ML/MIN/1.73SQ M
GLUCOSE SERPL-MCNC: 118 MG/DL (ref 70–99)
HCT VFR BLD AUTO: 43 % (ref 41–53)
HGB BLD-MCNC: 14 G/DL (ref 13.5–17.5)
LYMPHOCYTES # BLD AUTO: 1.4 THOUSANDS/ΜL (ref 0.5–4)
LYMPHOCYTES NFR BLD AUTO: 20 % (ref 20–50)
MCH RBC QN AUTO: 31.5 PG (ref 26–34)
MCHC RBC AUTO-ENTMCNC: 32.5 G/DL (ref 31–36)
MCV RBC AUTO: 97 FL (ref 80–100)
METHADONE UR QL: NEGATIVE
MONOCYTES # BLD AUTO: 0.5 THOUSAND/ΜL (ref 0.2–0.9)
MONOCYTES NFR BLD AUTO: 7 % (ref 1–10)
NEUTROPHILS # BLD AUTO: 4.7 THOUSANDS/ΜL (ref 1.8–7.8)
NEUTS SEG NFR BLD AUTO: 70 % (ref 45–65)
OPIATES UR QL SCN: NEGATIVE
PCP UR QL: NEGATIVE
PLATELET # BLD AUTO: 274 THOUSANDS/UL (ref 150–450)
PMV BLD AUTO: 8.3 FL (ref 8.9–12.7)
POTASSIUM SERPL-SCNC: 4.3 MMOL/L (ref 3.6–5)
RBC # BLD AUTO: 4.44 MILLION/UL (ref 4.5–5.9)
SODIUM SERPL-SCNC: 139 MMOL/L (ref 137–147)
THC UR QL: NEGATIVE
WBC # BLD AUTO: 6.8 THOUSAND/UL (ref 4.5–11)

## 2018-12-05 PROCEDURE — 85025 COMPLETE CBC W/AUTO DIFF WBC: CPT | Performed by: EMERGENCY MEDICINE

## 2018-12-05 PROCEDURE — 80320 DRUG SCREEN QUANTALCOHOLS: CPT | Performed by: EMERGENCY MEDICINE

## 2018-12-05 PROCEDURE — 36415 COLL VENOUS BLD VENIPUNCTURE: CPT | Performed by: EMERGENCY MEDICINE

## 2018-12-05 PROCEDURE — 80307 DRUG TEST PRSMV CHEM ANLYZR: CPT | Performed by: EMERGENCY MEDICINE

## 2018-12-05 PROCEDURE — 99285 EMERGENCY DEPT VISIT HI MDM: CPT

## 2018-12-05 PROCEDURE — 80048 BASIC METABOLIC PNL TOTAL CA: CPT | Performed by: EMERGENCY MEDICINE

## 2018-12-05 RX ORDER — MELATONIN
1000 DAILY
Status: ON HOLD | COMMUNITY
End: 2020-08-12 | Stop reason: ALTCHOICE

## 2018-12-05 RX ORDER — NICOTINE 21 MG/24HR
21 PATCH, TRANSDERMAL 24 HOURS TRANSDERMAL ONCE
Status: DISCONTINUED | OUTPATIENT
Start: 2018-12-05 | End: 2018-12-06 | Stop reason: SDUPTHER

## 2018-12-05 RX ORDER — OLANZAPINE 5 MG/1
5 TABLET ORAL DAILY
COMMUNITY
End: 2018-12-19 | Stop reason: HOSPADM

## 2018-12-05 RX ORDER — BENZTROPINE MESYLATE 0.5 MG/1
0.5 TABLET ORAL 2 TIMES DAILY
COMMUNITY
End: 2018-12-19 | Stop reason: HOSPADM

## 2018-12-05 RX ORDER — LORAZEPAM 0.5 MG/1
1 TABLET ORAL ONCE
Status: COMPLETED | OUTPATIENT
Start: 2018-12-05 | End: 2018-12-05

## 2018-12-05 RX ORDER — ATORVASTATIN CALCIUM 20 MG/1
80 TABLET, FILM COATED ORAL
Status: ON HOLD | COMMUNITY
End: 2018-12-18

## 2018-12-05 RX ORDER — CLONAZEPAM 1 MG/1
1 TABLET ORAL
COMMUNITY
End: 2018-12-19 | Stop reason: HOSPADM

## 2018-12-05 RX ORDER — BUPROPION HYDROCHLORIDE 150 MG/1
300 TABLET ORAL EVERY MORNING
COMMUNITY
End: 2018-12-19 | Stop reason: HOSPADM

## 2018-12-05 RX ORDER — CLONAZEPAM 0.5 MG/1
0.5 TABLET ORAL 2 TIMES DAILY
Status: ON HOLD | COMMUNITY
End: 2018-12-18

## 2018-12-05 RX ORDER — SERTRALINE HYDROCHLORIDE 100 MG/1
50 TABLET, FILM COATED ORAL DAILY
COMMUNITY
End: 2018-12-19 | Stop reason: HOSPADM

## 2018-12-05 RX ORDER — MIRTAZAPINE 45 MG/1
45 TABLET, FILM COATED ORAL
COMMUNITY
End: 2018-12-19 | Stop reason: HOSPADM

## 2018-12-05 RX ADMIN — LORAZEPAM 1 MG: 0.5 TABLET ORAL at 23:52

## 2018-12-05 RX ADMIN — NICOTINE 21 MG: 21 PATCH, EXTENDED RELEASE TRANSDERMAL at 23:53

## 2018-12-05 NOTE — ED PROVIDER NOTES
History  Chief Complaint   Patient presents with    Psychiatric Evaluation     Patient reports having SI on Sunday and was going to walk in front of a car on [address] -  stopped him  Also was having HI towards others living in the facility he lives in  Patient denies SI HI [facility] at this time  Reports med change approx 2 weeks ago, does not recall what med  Reports he's "a little" depressed  A history of  Patient is a 16-year-old male with depression and bipolar who presents with a 3 day history of variable depression with SI the patient had earlier this week  Patient did have a plan to step in front of traffic  Patient has history of previous attempts  Denies any homicidal ideations no auditory/visual hallucinations  Asking today to sinus up in the hospital to have his meds adjusted  States his last admission was a year ago  Cannot cite any specific triggers for his recent depression  States he is compliant with medications denies any self medication with illicit drugs or alcohol  Prior to Admission Medications   Prescriptions Last Dose Informant Patient Reported? Taking?    Aspirin (ASPIR-81 PO)   Yes Yes   Sig: Take 81 mg by mouth   LORazepam (ATIVAN) 1 mg tablet   Yes No   Sig: Take 1 mg by mouth every 6 (six) hours as needed for anxiety     OLANZapine (ZyPREXA) 10 mg tablet   No No   Sig: Take 1 tablet by mouth daily at bedtime   Patient taking differently: Take 15 mg by mouth daily at bedtime     OLANZapine (ZyPREXA) 5 mg tablet   Yes Yes   Sig: Take 5 mg by mouth daily   atorvastatin (LIPITOR) 20 mg tablet   Yes No   Sig: Take 80 mg by mouth   benztropine (COGENTIN) 0 5 mg tablet   Yes Yes   Sig: Take 0 5 mg by mouth 2 (two) times a day   buPROPion (WELLBUTRIN XL) 150 mg 24 hr tablet   Yes Yes   Sig: Take 300 mg by mouth every morning   cholecalciferol (VITAMIN D3) 1,000 units tablet   Yes Yes   Sig: Take 1,000 Units by mouth daily   clonazePAM (KlonoPIN) 0 5 mg tablet   Yes Yes   Sig: Take 0 5 mg by mouth 2 (two) times a day   clonazePAM (KlonoPIN) 1 mg tablet   Yes Yes   Sig: Take 1 mg by mouth daily at bedtime   docusate sodium (COLACE) 100 mg capsule   Yes No   Sig: Take 100 mg by mouth 3 (three) times a day   lamoTRIgine (LaMICtal) 150 MG tablet   No No   Sig: Take 2 tablets by mouth daily At 9AM   Patient taking differently: Take 150 mg by mouth 2 (two) times a day At 9AM    mirtazapine (REMERON) 45 MG tablet   Yes Yes   Sig: Take 45 mg by mouth daily at bedtime   sertraline (ZOLOFT) 100 mg tablet   Yes Yes   Sig: Take 50 mg by mouth daily      Facility-Administered Medications: None       Past Medical History:   Diagnosis Date    Anxiety     Bipolar 1 disorder (HCC)     Chronic pain disorder     [facility]     Depression     Myocardial infarction ([facility] )     Psychiatric illness     [facility]2015    CVA x3  Pt states he is unaware of when he had first two CVA    Suicide attempt [facility])        History reviewed  No pertinent surgical history      Family History   Problem Relation Age of Onset    No Known Problems Mother     No Known Problems Father     No Known Problems Sister     No Known Problems Brother     No Known Problems Maternal Aunt     No Known Problems Paternal Aunt     No Known Problems Maternal Uncle     No Known Problems Paternal Uncle     No Known Problems Maternal Grandfather     No Known Problems Maternal Grandmother     No Known Problems Paternal Grandfather     No Known Problems Paternal Grandmother     No Known Problems Cousin     ADD / ADHD Neg Hx     Alcohol abuse Neg Hx     Anxiety disorder Neg Hx     Bipolar disorder Neg Hx     Completed Suicide  Neg Hx     Dementia Neg Hx     Depression Neg Hx     Drug abuse Neg Hx     OCD Neg Hx     Psychiatric Illness Neg Hx     Psychosis Neg Hx     Schizoaffective Disorder  Neg Hx     Schizophrenia Neg Hx     Self-Injury Neg Hx     Suicide Attempts Neg Hx      I have reviewed and agree with the history as documented  Social History   Substance Use Topics    Smoking status: Current Every Day Smoker     Packs/day: 0 50     Years: 15 00     Types: Cigarettes    Smokeless tobacco: Current User      Comment: Wishes to quit    Alcohol use No      Comment: pt denies        Review of Systems   Constitutional: Negative  HENT: Negative  Eyes: Negative  Respiratory: Negative  Negative for shortness of breath  Cardiovascular: Negative  Negative for chest pain  Gastrointestinal: Negative  Negative for abdominal pain, diarrhea, nausea and vomiting  Endocrine: Negative  Genitourinary: Negative  Musculoskeletal: Negative  Skin: Negative  Allergic/Immunologic: Negative  Neurological: Negative  Hematological: Negative  Psychiatric/Behavioral: Positive for dysphoric mood, sleep disturbance and suicidal ideas  Negative for behavioral problems and decreased concentration  All other systems reviewed and are negative  Physical Exam  Physical Exam   Constitutional: He is oriented to person, place, and time  He appears well-developed and well-nourished  HENT:   Head: Normocephalic and atraumatic  Right Ear: External ear normal    Left Ear: External ear normal    Nose: Nose normal    Mouth/Throat: Oropharynx is clear and moist    Eyes: Pupils are equal, round, and reactive to light  Conjunctivae and EOM are normal    Neck: Normal range of motion  Neck supple  Cardiovascular: Normal rate, regular rhythm, normal heart sounds and intact distal pulses  Pulmonary/Chest: Effort normal and breath sounds normal    Abdominal: Soft  Bowel sounds are normal    Musculoskeletal: Normal range of motion  Neurological: He is alert and oriented to person, place, and time  Skin: Skin is warm and dry  Capillary refill takes less than 2 seconds  Psychiatric: His speech is normal  Judgment normal  His affect is blunt  He is slowed   He is not actively hallucinating  Cognition and memory are impaired  He exhibits a depressed mood  He expresses suicidal ideation  He expresses suicidal plans  He is inattentive  Nursing note and vitals reviewed        Vital Signs  ED Triage Vitals [12/05/18 1519]   Temperature Pulse Respirations Blood Pressure SpO2   97 7 °F (36 5 °C) 74 18 116/81 95 %      Temp Source Heart Rate Source Patient Position - Orthostatic VS BP Location FiO2 (%)   Tympanic Monitor Sitting Left arm --      Pain Score       No Pain           Vitals:    12/06/18 2133 12/07/18 0737 12/07/18 1500 12/07/18 2102   BP: 117/80 128/67 142/91 138/92   Pulse: 75 70 91 92   Patient Position - Orthostatic VS: Sitting Lying Lying Lying       Visual Acuity      ED Medications  Medications   LORazepam (ATIVAN) tablet 1 mg (1 mg Oral Given 12/7/18 1723)   LORazepam (ATIVAN) 2 mg/mL injection 2 mg (not administered)   traZODone (DESYREL) tablet 50 mg (not administered)   nicotine (NICODERM CQ) 21 mg/24 hr TD 24 hr patch 1 patch (1 patch Transdermal Not Given 12/7/18 1724)   magnesium hydroxide (MILK OF MAGNESIA) 400 mg/5 mL oral suspension 30 mL (not administered)   aluminum-magnesium hydroxide-simethicone (MYLANTA) 200-200-20 mg/5 mL oral suspension 30 mL (not administered)   acetaminophen (TYLENOL) tablet 650 mg (not administered)   benztropine (COGENTIN) tablet 1 mg (not administered)   benztropine (COGENTIN) injection 1 mg (not administered)   OLANZapine (ZyPREXA) tablet 5 mg (not administered)   OLANZapine (ZyPREXA) IM injection 5 mg (not administered)   aspirin (ECOTRIN LOW STRENGTH) EC tablet 81 mg (81 mg Oral Given 12/7/18 0955)   atorvastatin (LIPITOR) tablet 20 mg (20 mg Oral Given 12/7/18 1723)   acetaminophen (TYLENOL) tablet 975 mg (not administered)   acetaminophen (TYLENOL) tablet 650 mg (not administered)   lithium carbonate (LITHOBID) CR tablet 300 mg (300 mg Oral Given 12/7/18 2046)   LORazepam (ATIVAN) tablet 1 mg (1 mg Oral Given 12/5/18 2352) LORazepam (ATIVAN) tablet 1 5 mg (1 5 mg Oral Given 12/7/18 1324)       Diagnostic Studies  Results Reviewed     Procedure Component Value Units Date/Time    Rapid drug screen, urine [[id number]]  (Normal) Collected:  12/05/18 1550    Lab Status:  Final result Specimen:  Urine from Urine, Clean Catch Updated:  12/05/18 1630     Amph/Meth UR Negative     Barbiturate Ur Negative     Benzodiazepine Urine Negative     Cocaine Urine Negative     Methadone Urine Negative     Opiate Urine Negative     PCP Ur Negative     THC Urine Negative    Narrative:         FOR MEDICAL PURPOSES ONLY  IF CONFIRMATION NEEDED PLEASE CONTACT THE LAB WITHIN 5 DAYS      Drug Screen Cutoff Levels:  AMPHETAMINE/METHAMPHETAMINES  1000 ng/mL  BARBITURATES     200 ng/mL  BENZODIAZEPINES     200 ng/mL  COCAINE      300 ng/mL  METHADONE      300 ng/mL  OPIATES      300 ng/mL  PHENCYCLIDINE     25 ng/mL  THC       50 ng/mL    CBC and differential [[id number]]  (Abnormal) Collected:  12/05/18 1550    Lab Status:  Final result Specimen:  Blood from Arm, Right Updated:  12/05/18 1624     WBC 6 80 Thousand/uL      RBC 4 44 (L) Million/uL      Hemoglobin 14 0 g/dL      Hematocrit 43 0 %      MCV 97 fL      MCH 31 5 pg      MCHC 32 5 g/dL      RDW 13 4 %      MPV 8 3 (L) fL      Platelets 224 Thousands/uL      Neutrophils Relative 70 (H) %      Lymphocytes Relative 20 %      Monocytes Relative 7 %      Eosinophils Relative 2 %      Basophils Relative 1 %      Neutrophils Absolute 4 70 Thousands/µL      Lymphocytes Absolute 1 40 Thousands/µL      Monocytes Absolute 0 50 Thousand/µL      Eosinophils Absolute 0 10 Thousand/µL      Basophils Absolute 0 10 Thousands/µL     Ethanol [[id number]]  (Normal) Collected:  12/05/18 1550    Lab Status:  Final result Specimen:  Blood from Arm, Right Updated:  12/05/18 1613     Ethanol Lvl <10 mg/dL     Basic metabolic panel [[id number]]  (Abnormal) Collected:  12/05/18 1550    Lab Status:  Final result Specimen:  Blood from Arm, Right Updated:  12/05/18 1613     Sodium 139 mmol/L      Potassium 4 3 mmol/L      Chloride 105 mmol/L      CO2 28 mmol/L      ANION GAP 6 mmol/L      BUN 11 mg/dL      Creatinine 0 75 mg/dL      Glucose 118 (H) mg/dL      Calcium 9 1 mg/dL      eGFR 104 ml/min/1 73sq m     Narrative:         National Kidney Disease Education Program recommendations are as follows:  GFR calculation is accurate only with a steady state creatinine  Chronic Kidney disease less than 60 ml/min/1 73 sq  meters  Kidney failure less than 15 ml/min/1 73 sq  meters  No orders to display              Procedures  Procedures       Phone Contacts  ED Phone Contact    ED Course                               MDM  CritCare Time    Disposition  Final diagnoses:   Bipolar disorder, current episode depressed, severe, without psychotic features ([facility] )     Time reflects when diagnosis was documented in both MDM as applicable and the Disposition within this note     Time User Action Codes Description Comment    12/5/2018  4:46 PM Elsie Sharma Add [F31 4] Bipolar disorder, current episode depressed, severe, without psychotic features ([facility] )     12/6/2018 12:53 AM Meryl MUSTAFA Add [R45 851] Suicidal ideations     12/6/2018 12:53 AM Tawanna Centeno Modify [X26 829] Suicidal ideations       ED Disposition     ED Disposition Condition Comment    Transfer to Another  Medical [place] should be transferred out to [facility] [place] OBRIEN Documentation      Most Recent Value   Accepting Physician  Dr Ivan Martin Name, Karen Carver   Sending MD  Dr Ilana Rock      RN Documentation      [facility] [address] [facility] Name, Karen Carver      Follow-up Information    None         Current Discharge Medication List      CONTINUE these medications which have NOT CHANGED    Details   Aspirin (ASPIR-81 PO) Take 81 mg by mouth benztropine (COGENTIN) 0 5 mg tablet Take 0 5 mg by mouth 2 (two) times a day      buPROPion (WELLBUTRIN XL) 150 mg 24 hr tablet Take 300 mg by mouth every morning      cholecalciferol (VITAMIN D3) 1,000 units tablet Take 1,000 Units by mouth daily      !! clonazePAM (KlonoPIN) 0 5 mg tablet Take 0 5 mg by mouth 2 (two) times a day      !! clonazePAM (KlonoPIN) 1 mg tablet Take 1 mg by mouth daily at bedtime      mirtazapine (REMERON) 45 MG tablet Take 45 mg by mouth daily at bedtime      !! OLANZapine (ZyPREXA) 5 mg tablet Take 5 mg by mouth daily      sertraline (ZOLOFT) 100 mg tablet Take 50 mg by mouth daily      atorvastatin (LIPITOR) 20 mg tablet Take 80 mg by mouth      docusate sodium (COLACE) 100 mg capsule Take 100 mg by mouth 3 (three) times a day      lamoTRIgine (LaMICtal) 150 MG tablet Take 2 tablets by mouth daily At 9AM  Qty: 14 tablet, Refills: 0    Associated Diagnoses: Bipolar disorder, current episode mixed, moderate (HCC)      LORazepam (ATIVAN) 1 mg tablet Take 1 mg by mouth every 6 (six) hours as needed for anxiety        !! OLANZapine (ZyPREXA) 10 mg tablet Take 1 tablet by mouth daily at bedtime  Qty: 7 tablet, Refills: 0    Associated Diagnoses: Bipolar disorder, current episode mixed, moderate (HCC)       ! ! - Potential duplicate medications found  Please discuss with provider  No discharge procedures on file      ED Provider  Electronically Signed by           Kiran Jones MD  12/08/18 8750

## 2018-12-06 PROBLEM — R45.851 SUICIDAL IDEATIONS: Status: ACTIVE | Noted: 2018-12-06

## 2018-12-06 PROBLEM — F31.9 AFFECTIVE PSYCHOSIS, BIPOLAR (HCC): Status: ACTIVE | Noted: 2018-12-06

## 2018-12-06 PROBLEM — F32.9 MAJOR DEPRESSION: Status: ACTIVE | Noted: 2018-12-06

## 2018-12-06 PROBLEM — F31.4 BIPOLAR 1 DISORDER, DEPRESSED, SEVERE (HCC): Chronic | Status: ACTIVE | Noted: 2017-07-13

## 2018-12-06 PROCEDURE — 99252 IP/OBS CONSLTJ NEW/EST SF 35: CPT | Performed by: INTERNAL MEDICINE

## 2018-12-06 PROCEDURE — 99223 1ST HOSP IP/OBS HIGH 75: CPT | Performed by: PSYCHIATRY & NEUROLOGY

## 2018-12-06 RX ORDER — BENZTROPINE MESYLATE 1 MG/1
1 TABLET ORAL EVERY 6 HOURS PRN
Status: DISCONTINUED | OUTPATIENT
Start: 2018-12-06 | End: 2018-12-19 | Stop reason: HOSPADM

## 2018-12-06 RX ORDER — BENZTROPINE MESYLATE 1 MG/ML
1 INJECTION INTRAMUSCULAR; INTRAVENOUS EVERY 6 HOURS PRN
Status: DISCONTINUED | OUTPATIENT
Start: 2018-12-06 | End: 2018-12-19 | Stop reason: HOSPADM

## 2018-12-06 RX ORDER — OLANZAPINE 10 MG/1
5 INJECTION, POWDER, LYOPHILIZED, FOR SOLUTION INTRAMUSCULAR EVERY 6 HOURS PRN
Status: DISCONTINUED | OUTPATIENT
Start: 2018-12-06 | End: 2018-12-19 | Stop reason: HOSPADM

## 2018-12-06 RX ORDER — LORAZEPAM 1 MG/1
1 TABLET ORAL EVERY 4 HOURS PRN
Status: DISCONTINUED | OUTPATIENT
Start: 2018-12-06 | End: 2018-12-19 | Stop reason: HOSPADM

## 2018-12-06 RX ORDER — ACETAMINOPHEN 325 MG/1
650 TABLET ORAL EVERY 4 HOURS PRN
Status: DISCONTINUED | OUTPATIENT
Start: 2018-12-06 | End: 2018-12-19 | Stop reason: HOSPADM

## 2018-12-06 RX ORDER — MAGNESIUM HYDROXIDE/ALUMINUM HYDROXICE/SIMETHICONE 120; 1200; 1200 MG/30ML; MG/30ML; MG/30ML
30 SUSPENSION ORAL EVERY 4 HOURS PRN
Status: DISCONTINUED | OUTPATIENT
Start: 2018-12-06 | End: 2018-12-19 | Stop reason: HOSPADM

## 2018-12-06 RX ORDER — OLANZAPINE 5 MG/1
5 TABLET ORAL EVERY 6 HOURS PRN
Status: DISCONTINUED | OUTPATIENT
Start: 2018-12-06 | End: 2018-12-19 | Stop reason: HOSPADM

## 2018-12-06 RX ORDER — ATORVASTATIN CALCIUM 20 MG/1
20 TABLET, FILM COATED ORAL
Status: DISCONTINUED | OUTPATIENT
Start: 2018-12-06 | End: 2018-12-19 | Stop reason: HOSPADM

## 2018-12-06 RX ORDER — ACETAMINOPHEN 325 MG/1
975 TABLET ORAL EVERY 6 HOURS PRN
Status: DISCONTINUED | OUTPATIENT
Start: 2018-12-06 | End: 2018-12-19 | Stop reason: HOSPADM

## 2018-12-06 RX ORDER — LORAZEPAM 2 MG/ML
2 INJECTION INTRAMUSCULAR EVERY 4 HOURS PRN
Status: DISCONTINUED | OUTPATIENT
Start: 2018-12-06 | End: 2018-12-19 | Stop reason: HOSPADM

## 2018-12-06 RX ORDER — ACETAMINOPHEN 325 MG/1
650 TABLET ORAL EVERY 6 HOURS PRN
Status: DISCONTINUED | OUTPATIENT
Start: 2018-12-06 | End: 2018-12-19 | Stop reason: HOSPADM

## 2018-12-06 RX ORDER — TRAZODONE HYDROCHLORIDE 50 MG/1
50 TABLET ORAL
Status: DISCONTINUED | OUTPATIENT
Start: 2018-12-06 | End: 2018-12-19 | Stop reason: HOSPADM

## 2018-12-06 RX ORDER — NICOTINE 21 MG/24HR
1 PATCH, TRANSDERMAL 24 HOURS TRANSDERMAL DAILY
Status: DISCONTINUED | OUTPATIENT
Start: 2018-12-06 | End: 2018-12-09

## 2018-12-06 RX ORDER — ASPIRIN 81 MG/1
81 TABLET ORAL DAILY
Status: DISCONTINUED | OUTPATIENT
Start: 2018-12-06 | End: 2018-12-19 | Stop reason: HOSPADM

## 2018-12-06 RX ORDER — LITHIUM CARBONATE 300 MG/1
300 TABLET, FILM COATED, EXTENDED RELEASE ORAL EVERY 12 HOURS SCHEDULED
Status: DISCONTINUED | OUTPATIENT
Start: 2018-12-06 | End: 2018-12-08

## 2018-12-06 RX ADMIN — LITHIUM CARBONATE EXTENDED-RELEASE TABLET 300 MG: 300 TABLET, FILM COATED, EXTENDED RELEASE ORAL at 12:21

## 2018-12-06 RX ADMIN — NICOTINE 1 PATCH: 21 PATCH, EXTENDED RELEASE TRANSDERMAL at 08:55

## 2018-12-06 RX ADMIN — ATORVASTATIN CALCIUM 20 MG: 20 TABLET, FILM COATED ORAL at 16:44

## 2018-12-06 RX ADMIN — LITHIUM CARBONATE EXTENDED-RELEASE TABLET 300 MG: 300 TABLET, FILM COATED, EXTENDED RELEASE ORAL at 21:20

## 2018-12-06 RX ADMIN — ASPIRIN 81 MG: 81 TABLET, COATED ORAL at 08:55

## 2018-12-06 NOTE — ED CARE HANDOFF
Emergency Department Sign Out Note        Sign out and transfer of care from Dr Waqar Bolivar  See Separate Emergency Department note  The patient, Latasha Dyer, was evaluated by the previous provider for psychiatric evaluation  Workup Completed:  Physical exam and laboratory studies    ED Course / Workup Pending (followup): This is a 68-year-old gentleman with a history of depression and bipolar disorder who has a three day history of worsening depression along with suicidal ideation  Current plan was to step in front of moving vehicles  Patient has reported that he is compliant with medications and denies alcohol or illicit drug use  His physical exam was positive for dysphoric mood, sleep disturbance and suicidal ideation  His affect was blunted and slowed  Patient has been fully evaluated by Dr Waqar Bolivar and has been medically cleared for crisis evaluation  Procedures  MDM  CritCare Time      Disposition  Final diagnoses:   Bipolar disorder, current episode depressed, severe, without psychotic features ([facility] )     Time reflects when diagnosis was documented in both MDM as applicable and the Disposition within this note     Time User Action Codes Description Comment    12/5/2018  4:46 PM Loreta Moralez [F31 4] Bipolar disorder, current episode depressed, severe, without psychotic features [facility])       ED Disposition     None      MD Documentation      Most Recent Value   Sending MD Dr Andi John    None       Patient's Medications   Discharge Prescriptions    No medications on file     No discharge procedures on file         ED Provider  Electronically Signed by     Carlos Corral DO  12/05/18 8448

## 2018-12-06 NOTE — CONSULTS
ConsultDominick Franco 1965, 46 y o  male MRN: [id number]    Unit/Bed#: [facility] 871-09 Encounter: [id number]    Primary Care Provider: Anayeli Wray PA-C   Date and time admitted to hospital: 12/5/2018  3:14 PM      Consults    Affective psychosis, bipolar ([facility] )   Assessment & Plan    Resume psychotropic medications     Major depression   Assessment & Plan    Resume psychotropic medication  Patient admitted to inpatient psych  Continue care per primary team     * Suicidal ideations   Assessment & Plan    Maintain suicide precautions       No active medical issues  Will sign off at this time and will be available p r n  Marco Padilla Please call us for any additional questions  Total Time for Visit, including Counseling / Coordination of Care: 20 minutes  Greater than 50% of this total time spent on direct patient counseling and coordination of care  Reason for consultation:   Medical management    History of Present Illness:    Bunny Lebron is a 46 y o  male who is admitted to inpatient psych for depression and suicidal ideations  Patient has a history of strokes in the past with no residual deficits which he does not remember when they occurred  Patient denies any chest pain, shortness of breath, nausea, vomiting, diarrhea  Review of Systems:    12 point review of systems is grossly negative  Past Medical and Surgical History:     Past Medical History:   Diagnosis Date    Anxiety     Bipolar 1 disorder ([facility] )     Chronic pain disorder     CVA (cerebral vascular accident) ([facility] )     Depression     Psychiatric illness     Stroke ([place] [address] ) 12/2015    CVA x3  Pt states he is unaware of when he had first two CVA    Suicide attempt [facility])        History reviewed  No pertinent surgical history  Meds/Allergies:    Prior to Admission medications    Medication Sig Start Date End Date Taking?  Authorizing Provider   Aspirin (ASPIR-81 PO) Take 81 mg by mouth 9/19/16  Yes Historical Provider, MD garcíatropine (COGENTIN) 0 5 mg tablet Take 0 5 mg by mouth 2 (two) times a day   Yes Historical Provider, MD   buPROPion (WELLBUTRIN XL) 150 mg 24 hr tablet Take 300 mg by mouth every morning   Yes Historical Provider, MD   cholecalciferol (VITAMIN D3) 1,000 units tablet Take 1,000 Units by mouth daily   Yes Historical Provider, MD   clonazePAM (KlonoPIN) 0 5 mg tablet Take 0 5 mg by mouth 2 (two) times a day   Yes Historical Provider, MD   clonazePAM (KlonoPIN) 1 mg tablet Take 1 mg by mouth daily at bedtime   Yes Historical Provider, MD   mirtazapine (REMERON) 45 MG tablet Take 45 mg by mouth daily at bedtime   Yes Historical Provider, MD   OLANZapine (ZyPREXA) 5 mg tablet Take 5 mg by mouth daily   Yes Historical Provider, MD   sertraline (ZOLOFT) 100 mg tablet Take 50 mg by mouth daily   Yes Historical Provider, MD   atorvastatin (LIPITOR) 20 mg tablet Take 80 mg by mouth    Historical Provider, MD   docusate sodium (COLACE) 100 mg capsule Take 100 mg by mouth 3 (three) times a day    Historical Provider, MD   lamoTRIgine (LaMICtal) 150 MG tablet Take 2 tablets by mouth daily At 9AM  Patient taking differently: Take 150 mg by mouth 2 (two) times a day At [facility]  7/11/17   Fei Ascencio   LORazepam (ATIVAN) 1 mg tablet Take 1 mg by mouth every 6 (six) hours as needed for anxiety      Historical Provider, MD   OLANZapine (ZyPREXA) 10 mg tablet Take 1 tablet by mouth daily at bedtime  Patient taking differently: Take 15 mg by mouth daily at bedtime   7/11/17   Jeff Dubon     I have reviewed home medications using allscripts  Allergies: Allergies   Allergen Reactions    Haldol [Haloperidol] Other (See Comments)     Muscle/jaw tightness  Difficulty swallowing    Prolixin [Fluphenazine] Other (See Comments)     Muscle/jaw tightness  Difficulty swallowing      Thorazine [Chlorpromazine] Other (See Comments)     Muscle/jaw tightness  Difficulty swallowing           Social History:     Marital Status: Single     Substance Use History:   History   Alcohol Use No     History   Smoking Status    Current Every Day Smoker    Packs/day: 0 50    Years: 15 00    Types: Cigarettes   Smokeless Tobacco    Not on file     Comment: Does not wish to quit     History   Drug Use No     Comment: pt denies       Family History:    History reviewed  No pertinent family history  Physical Exam:     Vitals:   Blood Pressure: 118/73 (12/06/18 0100)  Pulse: 65 (12/06/18 0100)  Temperature: 97 8 °F (36 6 °C) (12/06/18 0100)  Temp Source: Oral (12/06/18 0100)  Respirations: 18 (12/06/18 0100)  Height: 5' 11" (180 3 cm) (12/06/18 0100)  Weight - Scale: 79 5 kg (175 lb 4 3 oz) (12/06/18 0100)  SpO2: 100 % (12/05/18 2348)    General:  No apparent distress  Comfortable  HEENT:  EOMI  Mucous membranes moist   Cardiovascular:  S1-S2  Pulmonary: Clear to auscultation b/l  No rales, rhonchi   Abdomen: soft, NT/ND  BS +ve  Extremities: FROM  No cyanosis, edema  Skin: warm, dry, intact  Neurological: Alert and oriented x 3  No focal deficits        Additional Data:     Lab Results: I have personally reviewed pertinent reports  Results from last 7 days  Lab Units 12/05/18  1550   WBC Thousand/uL 6 80   HEMOGLOBIN g/dL 14 0   HEMATOCRIT % 43 0   PLATELETS Thousands/uL 274   NEUTROS PCT % 70*   LYMPHS PCT % 20   MONOS PCT % 7   EOS PCT % 2       Results from last 7 days  Lab Units 12/05/18  1550   POTASSIUM mmol/L 4 3   CHLORIDE mmol/L 105   CO2 mmol/L 28   BUN mg/dL 11   CREATININE mg/dL 0 75   CALCIUM mg/dL 9 1           Imaging: I have personally reviewed pertinent reports  Allscripts / Epic Records Reviewed: Yes     ** Please Note: This note has been constructed using a voice recognition system   **

## 2018-12-06 NOTE — PLAN OF CARE
Problem: Ineffective Coping  Goal: Participates in unit activities  Interventions:  - Provide therapeutic environment   - Provide required programming   - Redirect inappropriate behaviors    Outcome: Progressing  Pt too fatigued to engage in self assessment interview this morning  Remained in bed to catch up on sleep all morning yet was cooperative to complete interview from his bed this afternoon  Pt reports that he was depressed due to all of the people where he lives and hopes to one day have a job at a fast food place and live in a group home with just a small amount of people

## 2018-12-06 NOTE — CMS CERTIFICATION NOTE
Certification: Based upon physical, mental and social evaluations, I certify that inpatient psychiatric services are medically necessary for this patient for a duration of 7 midnights for the treatment of bipolar depression  Available alternative community resources do not meet the patient's mental health care needs  I further attest that an established written individualized plan of care has been implemented and is outlined in the patient's medical records
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
## 2018-12-06 NOTE — ED NOTES
Pt given snack and gingerale which he tolerated well  Pt made comfortable with no other needs at this time  All safety precautions are in place       Faith Beltran  12/05/18 1973

## 2018-12-06 NOTE — CONSULTS
Telepsychiatry Telephone Admission Note    I was consulted by phone to review the case of this 49yo man who was medically cleared and admitted for suicidal ideation  PMH significant for s/p CVA, pain  Allergy to Haldol, Prolixin, Thorazine  AVSS, reassuring admission labs  UDS/BAL negative  Impression:  Depressive disorder, unspecified    Plan:   --Admit to psychiatry  --Suicide precautions  --Routine admission orders placed

## 2018-12-06 NOTE — ED NOTES
Pt presented to the ED from the [address] today due to worsening SI since Sunday  Pt reported SI that he left the personal care facility in order to run into traffic on [address]  Pt was found and brought back to facility  Pt reports the SI has continued since then, but is unable to identify a stressor to current episode, but depression has been worsening over the past year  Pt was being isolative and did not tell anyone about the SI until Sunday  Pt is becoming more forgetful and losing track of time, which is a new onset  Pt denies HI/AH/VH at this time  Pt has been med compliant, but feels that an adjustment is needed at this time  Pt reported his wife took his children away 12 years ago to [place], which caused his depression initially  Pt has a support system including his siblings and children  He has been at [facility] ~2 months  Pt attends all of his scheduled OP appointments  At times Pt has disorganized thoughts and will talk about different topics from different dates/times    Pt is willing to sign a 201 for inpatient treatment due to no longer feeling safe in the community

## 2018-12-06 NOTE — PROGRESS NOTES
Pt-201- presented to ED from [place] with SI/HI which has worsened since Sunday  On Sunday pt was found by police on side of [address] standing in rain water  Pt had thoughts of running in front of cars to kill himself  He had eloped from [facility] and was returned to home  Pt reported to ED staff that he had HI towards other residents at the home  Reports to writer that there are" too many people there", and denied threatening any one in particular  Reports long history of depression-12 yrs ago wife left him for another man and moved to [place] with their children  Reports depression recently following a change in his medications, but cannot recall which one  Denies SI/HI/AH/VH presently and is able to contract for safety while on unit  Pt was pleasant, cooperative, A&Ox4 during interview  Admissions  in multiple psych units  Was in [facility] in July    [facility] notified of need for H&P

## 2018-12-06 NOTE — H&P
Psychiatric Evaluation - Behavioral Health     Identification Data:Eleazar Benítez 46 y o  male MRN: [id number]  Unit/Bed#: [place] [id number] Encounter: [id number]    Chief Complaint:   Problems with sleep and being unhappy about the way his life is  History of present illness:    Patient is a 46years old gentleman who was found by the police near a major highway, contemplating suicide by walking into traffic  Apparently he got out of his group home because he was happy with some of his peers  He had been thinking about this for several weeks prior to this admission  He had had problems with sleep in the form of difficulty falling asleep but did not report any changes in his appetite or weight  He had low energy and had lost interest in doing things  He did not report any manic symptoms, guilt feelings or hopelessness  Psychiatric Review Of Systems:  Change in sleep: yes  Appetite changes: no  Weight changes: no  Change in energy/anergy: yes  Change in interest/pleasure/anhedonia: yes  Somatic symptoms: no  Anxiety/panic: no  Manic symptoms: no  Guilt feelings:no  Hopeless: no  Self injurious behavior/risky behavior: yes, poor safety awareness and walking in a dangerous traffic zone  Historical Information     Past Psychiatric History:     Patient has a long history of psychiatric illness diagnosed with bipolar disorder  He has had a number of suicide attempts including overdosing and self electrocution and carbon monoxide poisoning  He has had several hospitalizations including [facility] in [address] [place] [address]   Two admissions in 2016 three months apart and two admissions in July 2017 one week apart at [facility]   He endorses several episodes of hyperactivity, elevated mood decreased need for sleep and a retic behavior resulting in hospitalization  He reports history of good response to lithium therapy    He had a course of [address] during one of his depressive episodes which was ineffective  Substance Abuse History: There is no documented or reported history of substance abuse except for marijuana approximately two years ago  Family Psychiatric History:     He reports that his sister has been diagnosed with bipolar disorder  Social History:  Developmental:  Uneventful  Education: high school diploma/GED  Marital history:   Living arrangement, social support:  He lives in a group home  Occupational History: on permanent disability-[employer] peace time , honorable discharge  Access to firearms: no    Traumatic History:   Abuse:none is reported  Other Traumatic Events:  Divorce and separation from his children    Past Medical History:   Diagnosis Date    Anxiety     Bipolar 1 disorder ([facility] )     Chronic pain disorder     CVA (cerebral vascular accident) ([facility] )     Depression     Myocardial infarction ([facility] )     Psychiatric illness     Stroke ([facility] ) 12/2015    CVA x3  Pt states he is unaware of when he had first two CVA    Suicide attempt [facility])        Medical Review Of Systems:  Pertinent items are noted in HPI  Meds/Allergies   all current active meds have been reviewed  Allergies   Allergen Reactions    Haldol [Haloperidol] Other (See Comments)     Muscle/jaw tightness  Difficulty swallowing    Prolixin [Fluphenazine] Other (See Comments)     Muscle/jaw tightness  Difficulty swallowing      Thorazine [Chlorpromazine] Other (See Comments)     Muscle/jaw tightness  Difficulty swallowing  Objective      Mental Status Evaluation:  Appearance:  {Good eye contact and disheveled   Behavior:  calm, cooperative and friendly   Speech:   Language Normal rate and Normal volume  No overt abnormality   Mood:  depressed   Affect:    Thought process blunted and constricted  Goal directed and coherent   Associations: Tightly connected   Thought Content:  Does not verbalize delusional material   Perceptual Disturbances: Denies hallucinations and does not appear to be responding to internal stimuli   Risk Potential: No suicidal or homicidal ideation   Orientation  Oriented x 3   Memory Not tested at this time   Attention/Concentration attention span appeared shorter than expected for age   Rascon Keas of knowledge Diminished   Insight:  Good insight   Judgment: Limited   Gait/Station: normal gait/station and normal balance   Motor Activity: No abnormal movement noted         Lab Results: I have personally reviewed pertinent lab results  Imaging Studies:  No recent data-MRI of brain 9/2017  Chronic infarcts in bilateral frontal and parietal lobes, medial left occipital  lobe, and left cerebellar hemisphere  EKG, Pathology, and Other Studies:   No recent studies  Code Status:Full code    Patient Strengths/Assets: ability for insight, cooperative    Patient Barriers/Limitations: difficulty adapting, limited family ties    Assessment/Plan     Principal Problem:    Bipolar 1 disorder, depressed, severe ([facility] [facility] )    Plan:  Renal function appears to be normal   Due to history of good response to lithium, we will start the patient on lithium  Risks, benefits and possible side effects of Medications:   Risks, benefits, and possible side effects of medications explained to patient and patient verbalizes understanding

## 2018-12-06 NOTE — PROGRESS NOTES
Pt calm, pleasant and cooperative with care  Pt denies all s/s and is medication compliant  Pt stated he wanted to sleep in since he was admitted to the unit in the middle of the night  He did not attend am group  Will continue to monitor

## 2018-12-06 NOTE — TREATMENT PLAN
TREATMENT PLAN REVIEW - [address] 46 y o  1965 male MRN: [id number]    [address] Room / Bed: Justina Rodriguez/[facility] 954-62 Encounter: [id number]          Admit Date/Time:  12/5/2018  3:14 PM    Treatment Team: Attending Provider: Kathy Garcia MD; Registered Nurse: Elder Salazar RN; Patient Care Technician: Malika Grady; Patient Care Technician: Shelby Ramirez;  : Celestina Mcclelland; Registered Nurse: Kina Lloyd, JULIANO; Nurse Practitioner: ERNIE Myers; Occupational Therapy Assistant: MARLA Suarez    Diagnosis: Principal Problem:    Bipolar 1 disorder, depressed, severe ([facility] )      Patient Strengths/Assets: ability for insight, cooperative    Patient Barriers/Limitations: lack of financial means, lack of social/family support    Short Term Goals: decrease in depressive symptoms    Long Term Goals: improvement in depression    Progress Towards Goals: starting psychiatric medications as prescribed    Recommended Treatment: medication management, patient medication education, group therapy, milieu therapy, continued Behavioral Health psychiatric evaluation/assessment process    Treatment Frequency: daily medication monitoring, group and milieu therapy daily, monitoring through interdisciplinary rounds, monitoring through weekly patient care conferences    Expected Discharge Date:  7    Discharge Plan: return to previous living arrangement    Treatment Plan Created/Updated By: Kathy Garcia MD

## 2018-12-06 NOTE — PLAN OF CARE
ANXIETY     Will report anxiety at manageable levels Progressing        BEHAVIOR     Pt/Family maintain appropriate behavior and adhere to behavioral management agreement, if implemented Progressing        DEPRESSION     Will be euthymic at discharge Progressing        SELF HARM/SUICIDALITY     Will have no self-injury during hospital stay Progressing        SUBSTANCE USE/ABUSE     By discharge, will develop insight into their chemical dependency and sustain motivation to continue in recovery Progressing

## 2018-12-06 NOTE — CASE MANAGEMENT
Spoke with Pt's sister, Claribel Bauer [phone] regarding pt's admission and tx  She wishes that he would be transferred to [facility] because he's connected there  CM stated that pt was offered that option because the [facility] has an available bed but Pt declined and wants to remain at [facility] for inpatient tx  CM will ask pt again to see if he reconsiders since the [facility] is closer for his family to come and visit him  Claribel Bauer states that Pt has had several strokes and hasn't been seen by a neurologist  She feels that he needs to have a neurology consult  Pt has a son in [place] but he's not a "good person" according to Claribel Bauer  Claribel Bauer is pt's only real support and she has health problems that she's dealing with so she's not able to get around much like she used to

## 2018-12-06 NOTE — ED NOTES
Patient is accepted at [facility]  Patient is accepted by Dr Laura Perez per Ty Welch    Patient may go to the floor after report is given  Nurse report is to be called to [phone] prior to patient transfer

## 2018-12-07 PROCEDURE — 99232 SBSQ HOSP IP/OBS MODERATE 35: CPT | Performed by: NURSE PRACTITIONER

## 2018-12-07 RX ADMIN — LORAZEPAM 1.5 MG: 1 TABLET ORAL at 13:24

## 2018-12-07 RX ADMIN — LITHIUM CARBONATE EXTENDED-RELEASE TABLET 300 MG: 300 TABLET, FILM COATED, EXTENDED RELEASE ORAL at 09:56

## 2018-12-07 RX ADMIN — LORAZEPAM 1 MG: 1 TABLET ORAL at 09:57

## 2018-12-07 RX ADMIN — LITHIUM CARBONATE EXTENDED-RELEASE TABLET 300 MG: 300 TABLET, FILM COATED, EXTENDED RELEASE ORAL at 20:46

## 2018-12-07 RX ADMIN — LORAZEPAM 1 MG: 1 TABLET ORAL at 17:23

## 2018-12-07 RX ADMIN — ASPIRIN 81 MG: 81 TABLET, COATED ORAL at 09:55

## 2018-12-07 RX ADMIN — ATORVASTATIN CALCIUM 20 MG: 20 TABLET, FILM COATED ORAL at 17:23

## 2018-12-07 NOTE — CASE MANAGEMENT
Attempted to meet with pt to have him sign VA letter stating that he's declining a transfer to [facility]  Pt was sitting on the floor in the seclusion room, legs tucked up and rocking slightly  CM asked how he was doing and feeling and he said he just needed some quiet time  CM will follow-up at a later time with [place] letter       Pt was out of seclusion room and able to sign VA form and IMM letter around 3pm

## 2018-12-07 NOTE — PROGRESS NOTES
Progress Note - Behavioral Health   Verenice Restrepo 46 y o  male MRN: [id number]  Unit/Bed#[facility] 948-28 Encounter: [id number]    The patient was seen for continuing care and reviewed with treatment team   Staff reports that the patient has been cooperative with care and medication compliant  This morning the patient complained of increased anxiety  He was given p r n  Ativan  The patient reports his depression and anxiety are currently both 8/10  He felt that he was not able to get out of his bed today because he said "everything feels worse" today  He said he has been having suicidal thoughts at times  He had a plan to elope from the unit and walk down the stairs but he did not know what he would do after that  He does contract for safety on the unit, he will tell his nurses if he continues to have these thoughts and that he will not hurt himself or try to leave the unit  No HI  He stated he did not sleep at all last night due to racing thoughts  He said his appetite was decreased and he skipped breakfast this morning  He is tolerating medications with no side effects  Mental Status Evaluation:  Appearance:  Good eye contact and disheveled   Behavior:  calm and cooperative   Mood:  anxious and depressed   Affect: constricted   Speech: Sparse   Thought Process:  Goal directed and coherent   Thought Content:  Does not verbalize delusional material   Perceptual Disturbances: Denies hallucinations and does not appear to be responding to internal stimuli   Risk Potential: Suicidal Ideations without plan   Attention/Concentration [place] than expected   Orientation:   Oriented x3   Gait/Station: Not observed   Motor Activity: No abnormal movement noted     Progress Toward Goals:  No change    Assessment/Plan    Principal Problem:    Bipolar 1 disorder, depressed, severe (HCC)      Recommended Treatment:      Continue lithium 300 mg q 12 hours  We will check a level Monday      Continue with pharmacotherapy, group therapy, milieu therapy and occupational therapy  The patient will be maintained on the following medications:    Current Facility-Administered Medications:  acetaminophen 650 mg Oral Q6H PRN Prashant Later, MD   acetaminophen 650 mg Oral Q4H PRN ERNIE Miles   acetaminophen 975 mg Oral Q6H PRN ERNIE Miles   aluminum-magnesium hydroxide-simethicone 30 mL Oral Q4H PRN Prashant Later, MD   aspirin 81 mg Oral Daily Dasha Jacobs MD   atorvastatin 20 mg Oral Daily With Dinner Dasha Plunkett MD   benztropine 1 mg Intramuscular Q6H PRN Prashant Later, MD   benztropine 1 mg Oral Q6H PRN Prashant Later, MD   lithium carbonate 300 mg Oral Q12H [address] Marlena Kim MD   LORazepam 2 mg Intramuscular Q4H PRN Prashant Later, MD   LORazepam 1 mg Oral Q4H PRN Prashant Later, MD   magnesium hydroxide 30 mL Oral Daily PRN Prashant Later, MD   nicotine 1 patch Transdermal Daily Prashant Later, MD   OLANZapine 5 mg Intramuscular Q6H PRN Prashant Later, MD   OLANZapine 5 mg Oral Q6H PRN Prashant Later, MD   traZODone 50 mg Oral HS PRN Prashant Later, MD       Risks, benefits and possible side effects of Medications:   Patient does not verbalize understanding at this time and will require further explanation

## 2018-12-07 NOTE — PLAN OF CARE
ANXIETY     Will report anxiety at manageable levels Progressing        BEHAVIOR     Pt/Family maintain appropriate behavior and adhere to behavioral management agreement, if implemented Progressing        DEPRESSION     Will be euthymic at discharge Progressing        Ineffective Coping     Participates in unit activities Progressing        SELF HARM/SUICIDALITY     Will have no self-injury during hospital stay Progressing        SUBSTANCE USE/ABUSE     By discharge, will develop insight into their chemical dependency and sustain motivation to continue in recovery Progressing

## 2018-12-07 NOTE — CASE MANAGEMENT
Pt approached CM in the case and asked if he could go into a quiet room because he didn't feel well  When asked what he meant by not feeling well, he said he felt like he was going to come out of his skin  Pt was visually anxious and fidgety  CM connected with a MHT to coordinate seclusion room observation  Pt went back to his room and laid in bed under the covers  When CM approached pt in his room and asked if he still wanted to go to the quiet room, he said no and said that he would be okay  He said that he just needed some alone time  CM offered glass of water or assistance from nurse and he declined both

## 2018-12-07 NOTE — PLAN OF CARE
Problem: Ineffective Coping  Goal: Participates in unit activities  Interventions:  - Provide therapeutic environment   - Provide required programming   - Redirect inappropriate behaviors    Outcome: Not Progressing  Refusing to join groups,requesting to be isolated

## 2018-12-07 NOTE — PROGRESS NOTES
Pt reports minimal effectiveness from PRN Ativan  Pt continues to rest in bed  Will continue to monitor

## 2018-12-07 NOTE — PROGRESS NOTES
Pt cooperative with care and medication compliant  Out for meal and attended evening group  Denies all sx  Will continue to monitor

## 2018-12-07 NOTE — CASE MANAGEMENT
Left  for pt's sister, Tiarra [phone] stating that pt has chosen to remain at [facility] instead of transferring to LT[facility], [facility] gave Tiarra the number for the patient phones in the hallway as well as visiting hours/info so she can speak/visit with her brother if she'd like to

## 2018-12-07 NOTE — PROGRESS NOTES
Pt compliant with treatment and medication  Pt seclusive to room and asked to spend time in unlocked seclusion room for "quiet time "  Pt reports moderate to severe anxiety but can contract for safety, currently denies SI and HI  Will continue to monitor

## 2018-12-08 LAB — LITHIUM SERPL-SCNC: 0.4 MMOL/L (ref 0.6–1.2)

## 2018-12-08 PROCEDURE — 80178 ASSAY OF LITHIUM: CPT | Performed by: PSYCHIATRY & NEUROLOGY

## 2018-12-08 PROCEDURE — 99232 SBSQ HOSP IP/OBS MODERATE 35: CPT | Performed by: PSYCHIATRY & NEUROLOGY

## 2018-12-08 RX ORDER — LITHIUM CARBONATE 450 MG
450 TABLET, EXTENDED RELEASE ORAL EVERY 12 HOURS SCHEDULED
Status: DISCONTINUED | OUTPATIENT
Start: 2018-12-08 | End: 2018-12-19 | Stop reason: HOSPADM

## 2018-12-08 RX ADMIN — LITHIUM CARBONATE 450 MG: 450 TABLET, EXTENDED RELEASE ORAL at 21:38

## 2018-12-08 RX ADMIN — LITHIUM CARBONATE EXTENDED-RELEASE TABLET 300 MG: 300 TABLET, FILM COATED, EXTENDED RELEASE ORAL at 09:09

## 2018-12-08 RX ADMIN — ASPIRIN 81 MG: 81 TABLET, COATED ORAL at 09:09

## 2018-12-08 RX ADMIN — ATORVASTATIN CALCIUM 20 MG: 20 TABLET, FILM COATED ORAL at 17:28

## 2018-12-08 NOTE — PROGRESS NOTES
Pt withdrawn to his ropom all evening  Medication compliant  Pt reports not feeling stable and is waiting for medications to take effect  Remained calm and cooperative  reports anxiety is managable at present  Denies SI/HI at present and is able to contract for safety  Denied need for PRN medication

## 2018-12-08 NOTE — PROGRESS NOTES
Call from [address] at [employer]  Asked to speak to Janae ÁLVAREZ  Told her that Donte Castro would be in Monday

## 2018-12-08 NOTE — PROGRESS NOTES
Progress Note - [facility] 46 y o  male MRN: [id number]  Unit/Bed#Arsenio [facility] 549-76 Encounter: [id number]    The patient was seen for continuing care and reviewed with treatment team   Does not offer any complaints and reports that his mood has improved since he has been hospitalized  Lithium level is 0 4  Has been sleeping and eating well    Mental Status Evaluation:  Appearance:  Good eye contact and disheveled   Behavior:  calm, cooperative and friendly   Mood:  improving   Thought Content:  Does not verbalize delusional material   Perceptual Disturbances: Denies hallucinations and does not appear to be responding to internal stimuli   Risk Potential: No suicidal or homicidal ideation   Orientation:            Assessment/Plan    Principal Problem:    Bipolar 1 disorder, depressed, severe ([facility] [address] )      Recommended Treatment:  The patient reports that his lithium maintenance dose has always been 900 mg daily  We will increase lithium and recheck the level in a few days  Continue with pharmacotherapy, group therapy, milieu therapy and occupational therapy  The patient will be maintained on the following medications:    Current Facility-Administered Medications:  acetaminophen 650 mg Oral Q6H PRN Henry Olivo MD   acetaminophen 650 mg Oral Q4H PRN ERNIE Steele   acetaminophen 975 mg Oral Q6H PRN ERNIE Steele   aluminum-magnesium hydroxide-simethicone 30 mL Oral Q4H PRN Henry Olivo MD   aspirin 81 mg Oral Daily Dasha Fischer MD   atorvastatin 20 mg Oral Daily With Dinner Dasha Plunkett MD   benztropine 1 mg Intramuscular Q6H PRN Henry Olivo MD   benztropine 1 mg Oral Q6H PRN Henry Olivo MD   lithium carbonate 450 mg Oral Q12H [facility] MD Yvette   LORazepam 2 mg Intramuscular Q4H PRN Henry Olivo MD   LORazepam 1 mg Oral Q4H PRN Henry Olivo MD   magnesium hydroxide 30 mL Oral Daily PRN Henry Olivo MD   nicotine 1 patch Transdermal Daily Henry Olivo MD OLANZapine 5 mg Intramuscular Q6H PRN Sarai Johnson MD   OLANZapine 5 mg Oral Q6H PRN Sarai Johnson MD   traZODone 50 mg Oral HS PRN Sarai Johnson MD

## 2018-12-08 NOTE — PROGRESS NOTES
Patient reported to this writer having some anxiety and depression  He states that he is worried about his children  He denies SI and expresses optimism about his treatment plan  Patient is calm and cooperative  Patient has been mostly withdrawn to his room  Patient is disheveled and malodorous but agreed to shower after dinner

## 2018-12-09 PROCEDURE — 99232 SBSQ HOSP IP/OBS MODERATE 35: CPT | Performed by: PSYCHIATRY & NEUROLOGY

## 2018-12-09 RX ADMIN — ASPIRIN 81 MG: 81 TABLET, COATED ORAL at 09:02

## 2018-12-09 RX ADMIN — LITHIUM CARBONATE 450 MG: 450 TABLET, EXTENDED RELEASE ORAL at 09:02

## 2018-12-09 RX ADMIN — BENZTROPINE MESYLATE 1 MG: 1 TABLET ORAL at 13:38

## 2018-12-09 RX ADMIN — OLANZAPINE 5 MG: 5 TABLET, FILM COATED ORAL at 13:38

## 2018-12-09 RX ADMIN — LITHIUM CARBONATE 450 MG: 450 TABLET, EXTENDED RELEASE ORAL at 22:07

## 2018-12-09 RX ADMIN — LORAZEPAM 1 MG: 1 TABLET ORAL at 14:49

## 2018-12-09 RX ADMIN — ATORVASTATIN CALCIUM 20 MG: 20 TABLET, FILM COATED ORAL at 17:28

## 2018-12-09 NOTE — PROGRESS NOTES
Patient is asking to go to the "quiet room" he states "I feel like I am going to freak out'"  Patient is pacing, body is tense and breathing heavily  Patient was offered PRN medication, assistance in relaxation, talking with staff members and refused  Patient states that he feels like he is going to elope, he states he feels angry  Patient states that he wants to hurt himself, and was unable to contract for safety  Patient was encouraged to take PRN PO Zyprexa and PRN PO Cogentin  Patient agreed to take these medications  A staff member was assigned to observe the patient continually

## 2018-12-09 NOTE — RESTRAINT FACE TO FACE
Restraint Face to Face   Radha Bell 46 y o  male MRN: [id number]  Unit/Bed#: Claudeen Cullens 417-01 Encounter: [id number]      Physical Evaluation:patient is anxious and has thoughts of running away and putting himself in oncoming traffic and dying  denies any chest pain or sob  Purpose for Restraints/ Seclusion High risk for self harm and flight  Patient's reaction to the interventioncalming  Patient's medical conditionstable  Patient's Behavioral conditionimproving  Restraints to be Continued  Patient is in room seclusion presently and wants to stay there as it is calming for him

## 2018-12-09 NOTE — PROGRESS NOTES
Pt was calm and cooperative with care  Pt was medication compliant  Pt denied all symptoms  Pt remained seclusive to his room most of the day, only coming out for meals  Will continue to monitor

## 2018-12-09 NOTE — PROGRESS NOTES
Patient remains in seclusion reports limited effectiveness of PRN PO Zyprexa and PRN PO Cogentin  Patient was given PRN Ativan

## 2018-12-09 NOTE — PROGRESS NOTES
Despite receiving PRN Zyprexa the patient continued to escalate  Pacing, tense pushing on doors hovering near fire exits  Threatening to elope to harm himself  This behavior continued to escalate despite the presence of two security staff members  Patient was placed in locked seclusion he was somewhat cooperative with this but at one point began to push past security

## 2018-12-09 NOTE — PLAN OF CARE
ANXIETY     Will report anxiety at manageable levels Not Progressing        DEPRESSION     Will be euthymic at discharge Not Progressing        Patient is depressed and anxious, has not eaten today and is withdrawn to his room     BEHAVIOR     Pt/Family maintain appropriate behavior and adhere to behavioral management agreement, if implemented Progressing        SELF HARM/SUICIDALITY     Will have no self-injury during hospital stay Progressing        SUBSTANCE USE/ABUSE     By discharge, will develop insight into their chemical dependency and sustain motivation to continue in recovery Progressing

## 2018-12-09 NOTE — PROGRESS NOTES
Patient was continual reassessed and gradually appeared calmer often this patient would ask to remain in seclusion  Patient just reassessed and requested to leave and return to his room  Patient was allowed to return to his room he is being monitored on continual observation
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
## 2018-12-09 NOTE — PROGRESS NOTES
Progress Note - Behavioral Health   Robel Blackwood 46 y o  male MRN: [id number]  Unit/Bed#Elmira Steiner 166-32 Encounter: [id number]    The patient was seen for continuing care and reviewed with treatment team   Offers no complaints  He has remained compliant with medications  Last night he had some anxiety but does not know what he was anxious about but he feels better this morning  Adequate sleep and appetite    Mental Status Evaluation:  Appearance:  Good eye contact and disheveled   Behavior:  calm and cooperative   Mood:  improving   Thought Content:  Does not verbalize delusional material   Perceptual Disturbances: Denies hallucinations and does not appear to be responding to internal stimuli   Risk Potential: No suicidal or homicidal ideation   Orientation:            Assessment/Plan    Principal Problem:    Bipolar 1 disorder, depressed, severe ([facility] [address] )      Recommended Treatment: We will check lithium level in two days Continue with pharmacotherapy, group therapy, milieu therapy and occupational therapy  The patient will be maintained on the following medications:    Current Facility-Administered Medications:  acetaminophen 650 mg Oral Q6H PRN Sharon Farmer MD   acetaminophen 650 mg Oral Q4H PRN ERNIE Glover   acetaminophen 975 mg Oral Q6H PRN ERNIE Glover   aluminum-magnesium hydroxide-simethicone 30 mL Oral Q4H PRN Sharon Farmer MD   aspirin 81 mg Oral Daily Dasha uW MD   atorvastatin 20 mg Oral Daily With Dinner Dasha Plunkett MD   benztropine 1 mg Intramuscular Q6H PRN Sharon Farmer MD   benztropine 1 mg Oral Q6H PRN Sharon Farmer MD   lithium carbonate 450 mg Oral Q12H [address] Laz Fall MD   LORazepam 2 mg Intramuscular Q4H PRN Sharon Farmer MD   LORazepam 1 mg Oral Q4H PRN Sharon Farmer MD   magnesium hydroxide 30 mL Oral Daily PRN Sharon Farmer MD   nicotine 1 patch Transdermal Daily Sharon Farmer MD   OLANZapine 5 mg Intramuscular Q6H PRN Sharon Farmer MD   OLANZapine 5 mg Oral Q6H PRN Norberto Stephens MD   traZODone 50 mg Oral HS PRN Norberto Stephens MD

## 2018-12-09 NOTE — PROGRESS NOTES
Pt reports some anxiety but appears overall calm and cooperative  He has been OOB at times but seclusive to self  Denies SI currently  Medication compliant as well  Will monitor

## 2018-12-10 PROCEDURE — 99232 SBSQ HOSP IP/OBS MODERATE 35: CPT | Performed by: PSYCHIATRY & NEUROLOGY

## 2018-12-10 RX ADMIN — ATORVASTATIN CALCIUM 20 MG: 20 TABLET, FILM COATED ORAL at 17:45

## 2018-12-10 RX ADMIN — LITHIUM CARBONATE 450 MG: 450 TABLET, EXTENDED RELEASE ORAL at 09:03

## 2018-12-10 RX ADMIN — ASPIRIN 81 MG: 81 TABLET, COATED ORAL at 09:03

## 2018-12-10 RX ADMIN — LITHIUM CARBONATE 450 MG: 450 TABLET, EXTENDED RELEASE ORAL at 21:43

## 2018-12-10 NOTE — PROGRESS NOTES
Progress Note - [facility] 46 y o  male MRN: [id number]  Unit/Bed#Benson Puente 829-67 Encounter: [id number]    The patient was seen for continuing care and reviewed with treatment team   Last night the patient attempted to leave the unit and was uncooperative and required placement in seclusion  He was pacing and appeared to be angry and agitated and was talking about eloping and harming himself  He requested to be placed in seclusion  P r n  Doses of Zyprexa were not initially effective but he later calmed down  Mental Status Evaluation:  Appearance:  Disheveled with marginal hygiene   Behavior:   Calm and cooperative but superficial   Mood:  Euthymic with a blunted affect   Thought Content:  Does not verbalize delusional material   Perceptual Disturbances: Denies hallucinations and does not appear to be responding to internal stimuli   Risk Potential: No suicidal or homicidal ideation   Orientation:            Assessment/Plan    Principal Problem:    Bipolar 1 disorder, depressed, severe ([facility] [address] )      Recommended Treatment: We will check a lithium level in a few days Continue with pharmacotherapy, group therapy, milieu therapy and occupational therapy  The patient will be maintained on the following medications:    Current Facility-Administered Medications:  acetaminophen 650 mg Oral Q6H PRN Trung Paez MD   acetaminophen 650 mg Oral Q4H PRN Pal Alert, CRNP   acetaminophen 975 mg Oral Q6H PRN Pal Alert, CRNP   aluminum-magnesium hydroxide-simethicone 30 mL Oral Q4H PRN Trung Paez MD   aspirin 81 mg Oral Daily Dasha Rosa MD   atorvastatin 20 mg Oral Daily With Dinner Dasha Plunkett MD   benztropine 1 mg Intramuscular Q6H PRN Trung Paez MD   benztropine 1 mg Oral Q6H PRN Trung Paez MD   lithium carbonate 450 mg Oral Q12H [facility], MD   LORazepam 2 mg Intramuscular Q4H PRN Trung Paez MD   LORazepam 1 mg Oral Q4H PRN Trung Paez MD   magnesium hydroxide 30 mL Oral Daily PRN Zeeshan Saeed MD   OLANZapine 5 mg Intramuscular Q6H PRN Zeeshan Saeed MD   OLANZapine 5 mg Oral Q6H PRN Zeeshan Saeed MD   traZODone 50 mg Oral HS PRN Zeeshan Saeed MD

## 2018-12-10 NOTE — PLAN OF CARE
ANXIETY     Will report anxiety at manageable levels Not Progressing        DEPRESSION     Will be euthymic at discharge Not Progressing          BEHAVIOR     Pt/Family maintain appropriate behavior and adhere to behavioral management agreement, if implemented Progressing        SELF HARM/SUICIDALITY     Will have no self-injury during hospital stay Progressing        SUBSTANCE USE/ABUSE     By discharge, will develop insight into their chemical dependency and sustain motivation to continue in recovery Progressing

## 2018-12-10 NOTE — PROGRESS NOTES
Pt appearing calmer this evening and more pleasant  Still has c/o of anxiety but has not verbalized a desire to elope unit or thoughts of SI  Currently denies SI  He was maintained on 1:1 since change of shift on a continual basis  He was medication compliant @   Will monitor

## 2018-12-10 NOTE — PROGRESS NOTES
Progress Note - [facility] 46 y o  male MRN: [id number]  Unit/Bed#Sharad Martins 570-61 Encounter: [id number]    The patient was seen for continuing care and reviewed with treatment team  Staff reports patient was having thoughts to elope over the weekend d/t becoming upset and having increased anxiety  Patient requested to be placed in seclusion room and given PRN medications over the weekend to assist with reducing his anxiety  Patient has been in his bed the entire morning with his blanket pulled over his head  He was pleasant and cooperative upon approach  He denies any depression or anxiety at this time but appears mildly anxious  Patient reports his energy level and appetite as decreased  He stated his appetite is more decreased in the evenings but did not elaborate further  When asked if he thought it was from his anxiety, patient stated "maybe"  Patient did state he ate breakfast this morning  He currently denies any pain or medication side effects  Denies any SI, HI, AVs  Denies any thoughts to elope at this time  Patient reported good sleep  Mental Status Evaluation:  Appearance:  Good eye contact and disheveled   Behavior:  calm and cooperative   Mood:  anxious and dysphoric   Affect: constricted   Speech: Normal rate and Normal volume   Thought Process:  Goal directed and coherent   Thought Content:  Does not verbalize delusional material   Perceptual Disturbances: Denies hallucinations and does not appear to be responding to internal stimuli   Risk Potential: No suicidal or homicidal ideation   Attention/Concentration [place] than expected   Orientation: Oriented x3   Gait/Station: Not observed   Motor Activity: No abnormal movement noted     Progress Toward Goals: No change    Assessment/Plan    Principal Problem:    Bipolar 1 disorder, depressed, severe ([facility] [address] )      Recommended Treatment: Continue with pharmacotherapy, group therapy, milieu therapy and occupational therapy  The patient will be maintained on the following medications:    Current Facility-Administered Medications:  acetaminophen 650 mg Oral Q6H PRN Mary Carlin MD   acetaminophen 650 mg Oral Q4H PRN Helane Corpus, CRNP   acetaminophen 975 mg Oral Q6H PRN Helane Corpus, CRNP   aluminum-magnesium hydroxide-simethicone 30 mL Oral Q4H PRN Mary Carlin MD   aspirin 81 mg Oral Daily Dasha Lee MD   atorvastatin 20 mg Oral Daily With Dinner Dasha Plunkett MD   benztropine 1 mg Intramuscular Q6H PRN Mary Carlin MD   benztropine 1 mg Oral Q6H PRN Mary Carlin MD   lithium carbonate 450 mg Oral Q12H [address] Petr Esparza MD   LORazepam 2 mg Intramuscular Q4H PRN Mary Carlin MD   LORazepam 1 mg Oral Q4H PRN Mary Carlin MD   magnesium hydroxide 30 mL Oral Daily PRN Mary Carlin MD   OLANZapine 5 mg Intramuscular Q6H PRN Mary Carlin MD   OLANZapine 5 mg Oral Q6H PRN Mary Carlin MD   traZODone 50 mg Oral HS PRN Mary Carlin MD       Risks, benefits and possible side effects of Medications:   Patient does not verbalize understanding at this time and will require further explanation

## 2018-12-10 NOTE — PLAN OF CARE
Problem: Ineffective Coping  Goal: Participates in unit activities  Interventions:  - Provide therapeutic environment   - Provide required programming   - Redirect inappropriate behaviors    Outcome: Not Progressing  Isolative not attending groups

## 2018-12-11 PROBLEM — I25.2 HISTORY OF MI (MYOCARDIAL INFARCTION): Status: ACTIVE | Noted: 2018-12-11

## 2018-12-11 PROBLEM — E78.49 OTHER HYPERLIPIDEMIA: Status: ACTIVE | Noted: 2018-12-11

## 2018-12-11 PROBLEM — Z00.8 ENCOUNTER FOR MEDICAL ASSESSMENT: Status: ACTIVE | Noted: 2018-12-11

## 2018-12-11 PROBLEM — Z86.73 HISTORY OF CVA (CEREBROVASCULAR ACCIDENT): Status: ACTIVE | Noted: 2018-12-11

## 2018-12-11 PROCEDURE — 99232 SBSQ HOSP IP/OBS MODERATE 35: CPT | Performed by: NURSE PRACTITIONER

## 2018-12-11 PROCEDURE — 99231 SBSQ HOSP IP/OBS SF/LOW 25: CPT | Performed by: NURSE PRACTITIONER

## 2018-12-11 RX ADMIN — ENOXAPARIN SODIUM 40 MG: 40 INJECTION SUBCUTANEOUS at 18:07

## 2018-12-11 RX ADMIN — ASPIRIN 81 MG: 81 TABLET, COATED ORAL at 10:01

## 2018-12-11 RX ADMIN — BENZTROPINE MESYLATE 1 MG: 1 TABLET ORAL at 18:08

## 2018-12-11 RX ADMIN — ATORVASTATIN CALCIUM 20 MG: 20 TABLET, FILM COATED ORAL at 18:07

## 2018-12-11 RX ADMIN — LITHIUM CARBONATE 450 MG: 450 TABLET, EXTENDED RELEASE ORAL at 10:01

## 2018-12-11 RX ADMIN — LITHIUM CARBONATE 450 MG: 450 TABLET, EXTENDED RELEASE ORAL at 21:32

## 2018-12-11 NOTE — CASE MANAGEMENT
Spoke with Francoise Hartmann, pt's care coordinator @[facility] [phone] regarding pt's tx and d/c plan  Transport might be able to be arranged by them depending on the day  They will need scripts for all psych meds and a signed med list as well the day before pt's d/c

## 2018-12-11 NOTE — PROGRESS NOTES
Progress Note Katia Number [id number], 46 y o  male MRN: [id number]    Unit/Bed#: [facility] 775-54 Encounter: [id number]    Primary Care Provider: Rossana Urbina PA-C   Date and time admitted to hospital: 12/5/2018  3:14 PM        * Bipolar 1 disorder, depressed, severe ([address] )   Assessment & Plan    Management per psych  He was in his bed sleeping this morning, stating he did not want to get up for breakfast  He denied all medical complaints  Encounter for medical assessment   Assessment & Plan    Pt with multiple CVA's and MI in the past  Labs and vital signs reviewed  Will check TSH, Vitamin D, and lipid panel in the AM given depression and cardiac hx  He currently denies all medical complaints  Will remain available as needed  Other hyperlipidemia   Assessment & Plan    Continue statin and check lipid panel in AM       History of CVA (cerebrovascular accident)   Assessment & Plan    Unsure of deficits, as pt is sleepy and not willing to wake up and cooperate with exam  Will continue aspirin and lipitor  Will check lipid panel in AM       History of MI (myocardial infarction)   Assessment & Plan    VS have been stable  He is on aspirin and statin  No C/O CP or SOB  Will check EKG as last one appears to be in July  VTE Pharmacologic Prophylaxis:   Pharmacologic: Enoxaparin (Lovenox)  Mechanical VTE Prophylaxis in Place: No    Patient Centered Rounds: I have performed bedside rounds with nursing staff today  Discussions with Specialists or Other Care Team Provider:     Education and Discussions with Family / Patient: Patient not willing to have discussion  Answered questions and asked if he could go back to sleep  Time Spent for Care: 20 minutes  More than 50% of total time spent on counseling and coordination of care as described above      Current Length of Stay: 5 day(s)    Current Patient Status: Inpatient Psych   Certification Statement: The patient will continue to require additional inpatient hospital stay due to psychiatric illness    Discharge Plan: Per primary team when stable  Code Status: Level 1 - Full Code      Subjective:   Patient denies chest pain, shortness of breath, nausea, vomiting, diarrhea, constipation, dysuria, dizziness, and lightheadedness  States he just wants to sleep  Objective:     Vitals:   Temp (24hrs), Av 7 °F (36 5 °C), Min:97 °F (36 1 °C), Max:98 2 °F (36 8 °C)    Temp:  [97 °F (36 1 °C)-98 2 °F (36 8 °C)] 97 °F (36 1 °C)  HR:  [57-79] 57  Resp:  [16-18] 18  BP: (101-119)/(66-81) 104/66  SpO2:  [96 %-100 %] 96 %  Body mass index is 24 44 kg/m²  Input and Output Summary (last 24 hours):     No intake or output data in the 24 hours ending 18 1457    Physical Exam:     Physical Exam   Constitutional: He appears well-developed and well-nourished  No distress  HENT:   Head: Normocephalic and atraumatic  Eyes: Right eye exhibits no discharge  Left eye exhibits no discharge  Neck: No JVD present  Cardiovascular: Normal rate, regular rhythm, normal heart sounds and intact distal pulses  Exam reveals no gallop and no friction rub  No murmur heard  Pulmonary/Chest: Effort normal and breath sounds normal  No stridor  No respiratory distress  He has no wheezes  He has no rales  Abdominal: Soft  Bowel sounds are normal  He exhibits no distension  There is no tenderness  There is no rebound and no guarding  Musculoskeletal: He exhibits no edema  Neurological: He is alert  Person and place - followed commands, speech clear  No facial asymmetry  Skin: Skin is warm and dry  He is not diaphoretic     Psychiatric:   Flat affect - sleepy         Additional Data:     Labs:      Results from last 7 days  Lab Units 18  1550   WBC Thousand/uL 6 80   HEMOGLOBIN g/dL 14 0   HEMATOCRIT % 43 0   PLATELETS Thousands/uL 274   NEUTROS PCT % 70*   LYMPHS PCT % 20   MONOS PCT % 7   EOS PCT % 2       Results from last 7 days  Lab Units 12/05/18  1550   SODIUM mmol/L 139   POTASSIUM mmol/L 4 3   CHLORIDE mmol/L 105   CO2 mmol/L 28   BUN mg/dL 11   CREATININE mg/dL 0 75   ANION GAP mmol/L 6   CALCIUM mg/dL 9 1   GLUCOSE RANDOM mg/dL 118*                           * I Have Reviewed All Lab Data Listed Above  * Additional Pertinent Lab Tests Reviewed: Most recent labs reviewed    Imaging:    Imaging Reports Reviewed Today Include: none  Imaging Personally Reviewed by Myself Includes:  none    Recent Cultures (last 7 days):           Last 24 Hours Medication List:     Current Facility-Administered Medications:  acetaminophen 650 mg Oral Q6H PRN Kirk Kruse MD   acetaminophen 650 mg Oral Q4H PRN ERNIE Myers   acetaminophen 975 mg Oral Q6H PRN ERNIE Myers   aluminum-magnesium hydroxide-simethicone 30 mL Oral Q4H PRN Kirk Kruse MD   aspirin 81 mg Oral Daily Bilal RAINER Goodman MD   atorvastatin 20 mg Oral Daily With Dinner Dasha Plunkett MD   benztropine 1 mg Intramuscular Q6H PRCARRIE Kruse MD   benztropine 1 mg Oral Q6H PRCARRIE Kruse MD   lithium carbonate 450 mg Oral Q12H [address] Kathy Garcia MD   LORazepam 2 mg Intramuscular Q4H PRCARRIE Kruse MD   LORazepam 1 mg Oral Q4H PRN Kirk Kruse MD   magnesium hydroxide 30 mL Oral Daily PRN Kirk Kruse MD   OLANZapine 5 mg Intramuscular Q6H PRCARRIE Kruse MD   OLANZapine 5 mg Oral Q6H PRCARRIE Kruse MD   traZODone 50 mg Oral HS PRCARRIE Kruse MD        Today, Patient Was Seen By: Frazier Gosselin, CRNP

## 2018-12-11 NOTE — PROGRESS NOTES
Pt quiet and seclusive to self  OOB at times in dayroom or ambulating about the unit  Reports depression and and anxiety as 50/50  Currently denies SI  Pt cooperative and medication compliant  Does appear mildly anxious  Will monitor

## 2018-12-11 NOTE — PLAN OF CARE
Problem: Ineffective Coping  Goal: Participates in unit activities  Interventions:  - Provide therapeutic environment   - Provide required programming   - Redirect inappropriate behaviors    Outcome: Not Progressing  Not participating in any groups today  Isolative to his room

## 2018-12-11 NOTE — ASSESSMENT & PLAN NOTE
VS have been stable  He is on aspirin and statin  No C/O CP or SOB  Will check EKG as last one appears to be in July

## 2018-12-11 NOTE — DISCHARGE INSTR - OTHER ORDERS
You are being discharged to: [facility], [address] [place] [address]    Triggers you have identified during your hospitalization that led to your suicidal ideation and distressed mood, etc  include: current housing and desire for employment  Coping skills you have identified during your hospitalization include: taking walks  If you are unable to deal with your distressed mood alone, please contact your Care Coordinator, Radha Bee, at [facility], [phone]  If that is not effective and you continue to have suicidal ideation or a distressed mood, please contact 911 and go to the nearest [address] [place] [address] Crisis Intervention: [phone]  *National Suicide Prevention Lifeline:  8-570.875.9428  *Peer Support Talk Line (Seven days a week, 1:00 PM  9:00 PM) Call: 945.716.1658 or Text: [address] on [address] [facility] ([facility]) [employer]: 742.579.7499/Website: www octavia org  *Substance Abuse and [address] [facility]) [employer], which is a confidential, free, 24-hour-a-day, 365-day-a-year, information service for individuals and family members facing mental health and/or substance use disorders  This service provides referrals to local treatment facilities, support groups, and community-based organizations  Callers can also order free publications and other information  Call 4-286.687.5981/Website: www [facility] gov  *[facility] 2-1-1: This is a toll free, confidential, 24-hour-a-day service which connects you to a community  in your area who can help you find services and resources that are available to you locally and provide critical services that can improve and save lives    Call: 211  /Website: [email or web address]://berlinfotopedia[employer]sein [place]/

## 2018-12-11 NOTE — ASSESSMENT & PLAN NOTE
Unsure of deficits, as pt is sleepy and not willing to wake up and cooperate with exam  Will continue aspirin and lipitor   Will check lipid panel in AM

## 2018-12-11 NOTE — PROGRESS NOTES
Pt has been seclusive to self this shift, out for lunch and dinner but resorts back to his room  Pt received PRN Cogentin @ 1800 for hand tremors  Pt has been medication compliant and cooperative with care  Will continue to monitor

## 2018-12-11 NOTE — ASSESSMENT & PLAN NOTE
Management per psych  He was in his bed sleeping this morning, stating he did not want to get up for breakfast  He denied all medical complaints

## 2018-12-11 NOTE — ASSESSMENT & PLAN NOTE
Pt with multiple CVA's and MI in the past  Labs and vital signs reviewed  Will check TSH, Vitamin D, and lipid panel in the AM given depression and cardiac hx  He currently denies all medical complaints  Will remain available as needed

## 2018-12-12 LAB
CHOLEST SERPL-MCNC: 137 MG/DL
HDLC SERPL-MCNC: 43 MG/DL (ref 40–59)
LDLC SERPL CALC-MCNC: 73 MG/DL
NONHDLC SERPL-MCNC: 94 MG/DL
PLATELET # BLD AUTO: 338 THOUSANDS/UL (ref 150–450)
TRIGL SERPL-MCNC: 107 MG/DL
TROPONIN I SERPL-MCNC: 0.02 NG/ML (ref 0–0.03)
TSH SERPL DL<=0.05 MIU/L-ACNC: 1.54 UIU/ML (ref 0.47–4.68)

## 2018-12-12 PROCEDURE — 93005 ELECTROCARDIOGRAM TRACING: CPT

## 2018-12-12 PROCEDURE — 84484 ASSAY OF TROPONIN QUANT: CPT | Performed by: INTERNAL MEDICINE

## 2018-12-12 PROCEDURE — 99232 SBSQ HOSP IP/OBS MODERATE 35: CPT | Performed by: NURSE PRACTITIONER

## 2018-12-12 PROCEDURE — 80061 LIPID PANEL: CPT | Performed by: NURSE PRACTITIONER

## 2018-12-12 PROCEDURE — 84443 ASSAY THYROID STIM HORMONE: CPT | Performed by: NURSE PRACTITIONER

## 2018-12-12 PROCEDURE — 82306 VITAMIN D 25 HYDROXY: CPT | Performed by: NURSE PRACTITIONER

## 2018-12-12 PROCEDURE — 85049 AUTOMATED PLATELET COUNT: CPT | Performed by: NURSE PRACTITIONER

## 2018-12-12 RX ORDER — CLONAZEPAM 0.5 MG/1
0.5 TABLET ORAL 2 TIMES DAILY
Status: DISCONTINUED | OUTPATIENT
Start: 2018-12-12 | End: 2018-12-19 | Stop reason: HOSPADM

## 2018-12-12 RX ADMIN — LORAZEPAM 1 MG: 1 TABLET ORAL at 15:51

## 2018-12-12 RX ADMIN — ENOXAPARIN SODIUM 40 MG: 40 INJECTION SUBCUTANEOUS at 09:20

## 2018-12-12 RX ADMIN — LITHIUM CARBONATE 450 MG: 450 TABLET, EXTENDED RELEASE ORAL at 21:26

## 2018-12-12 RX ADMIN — ATORVASTATIN CALCIUM 20 MG: 20 TABLET, FILM COATED ORAL at 17:18

## 2018-12-12 RX ADMIN — ASPIRIN 81 MG: 81 TABLET, COATED ORAL at 09:20

## 2018-12-12 RX ADMIN — LITHIUM CARBONATE 450 MG: 450 TABLET, EXTENDED RELEASE ORAL at 09:20

## 2018-12-12 RX ADMIN — CLONAZEPAM 0.5 MG: 0.5 TABLET ORAL at 17:18

## 2018-12-12 NOTE — PLAN OF CARE
Problem: Ineffective Coping  Goal: Participates in unit activities  Interventions:  - Provide therapeutic environment   - Provide required programming   - Redirect inappropriate behaviors    Outcome: Not Progressing  Pt has been too anxious to engage in structured groups

## 2018-12-12 NOTE — PROGRESS NOTES
Patient calm and cooperative, seclusive to his room this shift  Compliant with medications  Patient reports some depression, denies anxiety, SI and HI  Will continue to monitor

## 2018-12-12 NOTE — PROGRESS NOTES
Pt is calm, cooperative with care and medication compliant  Pt denies all s/s including SI and HI  Pt is seclusive to self and room  Pt did come out for meal  Will continue to monitor

## 2018-12-12 NOTE — PROGRESS NOTES
Progress Note - Behavioral Health   Ranjit Ramírez 46 y o  male MRN: [id number]  Unit/Bed#Juan Alfaro 766-39 Encounter: [id number]    The patient was seen for continuing care and reviewed with treatment team  Patient has been isolative and in his bed the majority of the day  Patient stated he is experiencing increased anxiety today d/t the "momo screaming in the case"  Patient reported poor sleep d/t "the noise in the case all morning"  He rates his depression as 7/10 with moderate anxiety noted  Patient stated he is feeling more depressed today d/t the holiday season and missing his children  Stated he has not seen them in about 8 years d/t conflict w/their mother  He reported a low energy level d/t not receiving adequate sleep  Denies any SI, HI, AVs at this time  Denies any pain  Patient noted to have a resting tremor and was placed on Klonopin 0 5mg, BID  Mental Status Evaluation:  Appearance:  Marginal/poor hygiene, Good eye contact and disheveled   Behavior:  cooperative   Mood:  anxious and depressed   Affect: constricted   Speech: Normal rate and Normal volume   Thought Process:  Circumstantial   Thought Content:  Does not verbalize delusional material   Perceptual Disturbances: Denies hallucinations and does not appear to be responding to internal stimuli   Risk Potential: No suicidal or homicidal ideation   Attention/Concentration attention span appeared shorter than expected for age   Orientation:   Oriented to person, place   Gait/Station: Not observed   Motor Activity: Resting tremor, generalized     Progress Toward Goals: No change    Assessment/Plan    Principal Problem:    Bipolar 1 disorder, depressed, severe (HCC)  Active Problems:    Encounter for medical assessment    History of MI (myocardial infarction)    History of CVA (cerebrovascular accident)    Other hyperlipidemia      Recommended Treatment: Continue with pharmacotherapy, group therapy, milieu therapy and occupational therapy  Klonopin 0 5mg, BID added for resting tremors    The patient will be maintained on the following medications:    Current Facility-Administered Medications:  acetaminophen 650 mg Oral Q6H PRN Fletcher Peabody, MD   acetaminophen 650 mg Oral Q4H PRN ERNIE Nielson   acetaminophen 975 mg Oral Q6H PRN Guillory EricERNIE   aluminum-magnesium hydroxide-simethicone 30 mL Oral Q4H PRN Fletcher Peabody, MD   aspirin 81 mg Oral Daily Dasha Yanes MD   atorvastatin 20 mg Oral Daily With Dinner Dasha Plunkett MD   benztropine 1 mg Intramuscular Q6H PRN Fletcher Peabody, MD   benztropine 1 mg Oral Q6H PRN Fletcher Peabody, MD   clonazePAM 0 5 mg Oral BID ERNIE Buitrago   enoxaparin 40 mg Subcutaneous Q24H [address] Mariela CiminiERNIE lopes   lithium carbonate 450 mg Oral Q12H [address] Dalila Devine MD   LORazepam 2 mg Intramuscular Q4H PRN Fletcher Peabody, MD   LORazepam 1 mg Oral Q4H PRN Fletcher Peabody, MD   magnesium hydroxide 30 mL Oral Daily PRN Fletcher Peabody, MD   OLANZapine 5 mg Intramuscular Q6H PRN Fletcher Peabody, MD   OLANZapine 5 mg Oral Q6H PRN Fletcher Peabody, MD   traZODone 50 mg Oral HS PRN Fletcher Peabody, MD       Risks, benefits and possible side effects of Medications:   Patient does not verbalize understanding at this time and will require further explanation         ERNIE Buitrago

## 2018-12-13 LAB
25(OH)D3 SERPL-MCNC: 32.5 NG/ML (ref 30–100)
ATRIAL RATE: 66 BPM
LITHIUM SERPL-SCNC: 0.7 MMOL/L (ref 0.6–1.2)
P AXIS: 67 DEGREES
PR INTERVAL: 156 MS
QRS AXIS: 27 DEGREES
QRSD INTERVAL: 84 MS
QT INTERVAL: 426 MS
QTC INTERVAL: 446 MS
T WAVE AXIS: 91 DEGREES
VENTRICULAR RATE: 66 BPM

## 2018-12-13 PROCEDURE — 80178 ASSAY OF LITHIUM: CPT | Performed by: NURSE PRACTITIONER

## 2018-12-13 PROCEDURE — 93010 ELECTROCARDIOGRAM REPORT: CPT | Performed by: INTERNAL MEDICINE

## 2018-12-13 PROCEDURE — 99232 SBSQ HOSP IP/OBS MODERATE 35: CPT | Performed by: NURSE PRACTITIONER

## 2018-12-13 RX ADMIN — ENOXAPARIN SODIUM 40 MG: 40 INJECTION SUBCUTANEOUS at 08:56

## 2018-12-13 RX ADMIN — CLONAZEPAM 0.5 MG: 0.5 TABLET ORAL at 08:56

## 2018-12-13 RX ADMIN — LORAZEPAM 1 MG: 1 TABLET ORAL at 21:40

## 2018-12-13 RX ADMIN — ASPIRIN 81 MG: 81 TABLET, COATED ORAL at 08:56

## 2018-12-13 RX ADMIN — CLONAZEPAM 0.5 MG: 0.5 TABLET ORAL at 17:46

## 2018-12-13 RX ADMIN — LITHIUM CARBONATE 450 MG: 450 TABLET, EXTENDED RELEASE ORAL at 08:56

## 2018-12-13 RX ADMIN — LITHIUM CARBONATE 450 MG: 450 TABLET, EXTENDED RELEASE ORAL at 21:15

## 2018-12-13 RX ADMIN — ATORVASTATIN CALCIUM 20 MG: 20 TABLET, FILM COATED ORAL at 17:46

## 2018-12-13 NOTE — CASE MANAGEMENT
Spoke with Ramone Mcnair, pt's care coordinator @[facility] [phone]  CM stated that pt is still presenting very anxious and isolative  CM went over med changes as well  Juana Talavera requested a Tues/Wed d/c if he's stable because they will have ample staffing on those days and it will make for a smooth transition back for the pt  She also stated that the [facility] fills pt's prescriptions so scripts will need to be faxed to the [facility] prior to d/c

## 2018-12-13 NOTE — PROGRESS NOTES
Pt is anxious this evening  Pt states he is anxious about his discharge  Pt seen in the milieu at times but mostly seclusive to room  Pt is medication compliant

## 2018-12-13 NOTE — PROGRESS NOTES
Pt is calm, cooperative with care and medication compliant  Pt denies all s/s including SI and HI  Pt is seclusive to self when out for meals, and is seclusive to room  Will continue to monitor

## 2018-12-13 NOTE — PROGRESS NOTES
Progress Note - Behavioral Health   Mikel Hernandez 46 y o  male MRN: [id number]  Unit/Bed#Rik Fuller 762-45 Encounter: [id number]    The patient was seen for continuing care and reviewed with treatment team   Staff reports that the patient has been cooperative and medication compliant  He comes out of his room for meals but is seclusive to himself and then returns to his room  Yesterday he had an abnormal EKG but his troponins were normal     Patient is currently lying in his bed  He said everything is okay  Remains very scant in conversation  Does state his anxiety is less with the addition of the Klonopin  Also notes that he is less tremulous  He said he just wants to get out of here so he can go outside  He said he is sleeping fine  He said he skipped breakfast because he just did not want to get up but he is still having an appetite  He is not having any suicidal thoughts  He is looking forward to being able to go back to the [facility] after discharge  Mental Status Evaluation:  Appearance:  Good eye contact and disheveled   Behavior:  cooperative   Mood:  improving   Affect: blunted   Speech: Sparse   Thought Process:  Poverty of thoughts   Thought Content:  Does not verbalize delusional material   Perceptual Disturbances: Denies hallucinations and does not appear to be responding to internal stimuli   Risk Potential: No suicidal or homicidal ideation   Attention/Concentration [place] than expected   Orientation:   Oriented x3   Gait/Station: Not observed   Motor Activity: No abnormal movement noted     Progress Toward Goals:  No change    Assessment/Plan    Principal Problem:    Bipolar 1 disorder, depressed, severe (HCC)  Active Problems:    Encounter for medical assessment    History of MI (myocardial infarction)    History of CVA (cerebrovascular accident)    Other hyperlipidemia      Recommended Treatment:      Continue lithium 450 mg q 12 hours    Current lithium level is therapeutic at 0 7     Continue clonazepam 0 5 mg b i d  Continue with pharmacotherapy, group therapy, milieu therapy and occupational therapy  The patient will be maintained on the following medications:    Current Facility-Administered Medications:  acetaminophen 650 mg Oral Q6H PRN Silvia May MD   acetaminophen 650 mg Oral Q4H PRN ERNIE Manley   acetaminophen 975 mg Oral Q6H PRN ENRIE Manley   aluminum-magnesium hydroxide-simethicone 30 mL Oral Q4H PRN Silvia May MD   aspirin 81 mg Oral Daily Dasha Edouard MD   atorvastatin 20 mg Oral Daily With Dinner Dasha Plunkett MD   benztropine 1 mg Intramuscular Q6H PRN Silvia May MD   benztropine 1 mg Oral Q6H PRN Silvia May MD   clonazePAM 0 5 mg Oral BID ERNIE Coleman   enoxaparin 40 mg Subcutaneous Q24H [address] ERNIE Bryant   lithium carbonate 450 mg Oral Q12H [address] Georgeana Carrel, MD   LORazepam 2 mg Intramuscular Q4H PRN Silvia May MD   LORazepam 1 mg Oral Q4H PRN Silvia May MD   magnesium hydroxide 30 mL Oral Daily PRN Silvia May MD   OLANZapine 5 mg Intramuscular Q6H PRN Silvia May MD   OLANZapine 5 mg Oral Q6H PRCARRIE May MD   traZODone 50 mg Oral HS PRN Silvia May MD       Risks, benefits and possible side effects of Medications:   Patient does not verbalize understanding at this time and will require further explanation

## 2018-12-13 NOTE — PLAN OF CARE
Problem: Ineffective Coping  Goal: Participates in unit activities  Interventions:  - Provide therapeutic environment   - Provide required programming   - Redirect inappropriate behaviors    Outcome: Progressing  Pt  In day room longer today,sitting with peers yet continues to report too much anxiety to attend structured groups

## 2018-12-13 NOTE — PROGRESS NOTES
Clinical Pharmacy Note: [place] Therapeutic Monitoring     Renetta Snell is a 46 y o  male who presents with  suicidal ideation  Assessment/Plan:    Lithium indication: bipolar disorder maintenance  Eleazar is currently taking 450 mg lithium carbonate tablet twice daily  Lithium concentration is 0 7 mmol/L drawn at steady state  Continue current-dose/current dosing      The pharmacist has checked for drug interactions, lithium levels, kidney function, thyroid function  YES    Pharmacy Recommendations:   Continue current regimen, may consolidate doses if it easier for patient compliance  Monitoring:    Monitor for renal and thyroid dysfunction, skin reactions (acne), weight gain, and diabetes insipidus  Certain medications increase lithium levels and should be avoided such as diuretics, NSAIDS (excluding sulindac), and ACE-I/ARBs  Medications such as theophylline and caffeine may decrease lithium levels  Patient should maintain consistent adequate hydration and consistent caffeine and sodium intake  Lithium:    0  Lab Value Date/Time   LITHIUM 0 7 12/13/2018 1023       Renal:    0  Lab Value Date/Time   BUN 11 12/05/2018 1550   BUN 9 01/02/2016 1220   CREATININE 0 75 12/05/2018 1550   CREATININE 1 01 01/02/2016 1220       Thyroid:    0  Lab Value Date/Time   [id number] 1 540 12/12/2018 2237       Weight:  Wt Readings from Last 5 Encounters:   12/06/18 79 5 kg (175 lb 4 3 oz)   07/24/17 77 1 kg (170 lb)   07/12/17 75 7 kg (166 lb 14 2 oz)   07/05/17 75 kg (165 lb 5 5 oz)   10/01/16 77 1 kg (170 lb)       Lithium Levels    Therapeutic lithium levels are 0 6-1 2 mmol/L, but target range may be 0 8-1 2 mmol/L for acute kymberly  Levels above 1 5 mmol/L indicate toxicity, although toxicity may be associated with levels within therapeutic ranges based on symptoms         Mild GI discomfort and slight tremor are typical when initiating lithium, but if these symptoms do not resolve or any other signs of toxicity occur, assess lithium levels immediately  Toxicity    Signs of toxicity include: confusion, difficulty concentrating, vomiting, diarrhea, poor coordination, tremor, abnormal heart rhythm, seizures and coma  Pharmacy will continue to follow patient with team, thank you      Electronically signed by: Teresa Frankel, Pharmacist

## 2018-12-14 RX ADMIN — ASPIRIN 81 MG: 81 TABLET, COATED ORAL at 08:54

## 2018-12-14 RX ADMIN — LITHIUM CARBONATE 450 MG: 450 TABLET, EXTENDED RELEASE ORAL at 22:21

## 2018-12-14 RX ADMIN — ENOXAPARIN SODIUM 40 MG: 40 INJECTION SUBCUTANEOUS at 08:56

## 2018-12-14 RX ADMIN — BISACODYL 5 MG: 5 TABLET, COATED ORAL at 17:37

## 2018-12-14 RX ADMIN — MAGNESIUM HYDROXIDE 30 ML: 400 SUSPENSION ORAL at 21:22

## 2018-12-14 RX ADMIN — CLONAZEPAM 0.5 MG: 0.5 TABLET ORAL at 17:37

## 2018-12-14 RX ADMIN — LITHIUM CARBONATE 450 MG: 450 TABLET, EXTENDED RELEASE ORAL at 08:54

## 2018-12-14 RX ADMIN — CLONAZEPAM 0.5 MG: 0.5 TABLET ORAL at 08:54

## 2018-12-14 RX ADMIN — ATORVASTATIN CALCIUM 20 MG: 20 TABLET, FILM COATED ORAL at 17:37

## 2018-12-14 NOTE — CASE MANAGEMENT
Spoke with Sridevi Ovalle, [facility] [phone]  Siri visited pt on the unit today  She states that pt reported low anxiety level but was visibly clenching jaw and grinding teeth  CM noticed the teeth grinding on the 1st day of pt's admission  Will check and see if this is a side effect of meds  Kenji Butts also inquired about shower schedule and stated that residents need to be able to keep up with their ADLs on their own  Kirill Mar inquired about pt's group attendance  CM will give Kenji Butts an update on Monday with regards to these areas

## 2018-12-14 NOTE — PROGRESS NOTES
Patient has been withdrawn and laying in bed for much of the day  Patient is disheveled and malodorous  Patient denies all psychiatric symptoms  Patient denies thoughts of elopment  Patient reports that he has not had a recent BM, he was given a one time does of bisacodyl which has not yet been effective  Patient is calm and cooperative

## 2018-12-14 NOTE — PLAN OF CARE
Problem: Ineffective Coping  Goal: Participates in unit activities  Interventions:  - Provide therapeutic environment   - Provide required programming   - Redirect inappropriate behaviors    Outcome: Progressing  Pt calmer in his room yet has not found a comfort level to attend structured groups yet other than meal time

## 2018-12-14 NOTE — PROGRESS NOTES
Pt is calm and cooperative  Pt reports anxiety, denies all other s/s  Pt is seen in the milieu tonight associating with peers  Pt is medication compliant

## 2018-12-15 PROCEDURE — 99231 SBSQ HOSP IP/OBS SF/LOW 25: CPT | Performed by: NURSE PRACTITIONER

## 2018-12-15 RX ORDER — DOCUSATE SODIUM 100 MG/1
100 CAPSULE, LIQUID FILLED ORAL 2 TIMES DAILY
Status: DISCONTINUED | OUTPATIENT
Start: 2018-12-15 | End: 2018-12-19 | Stop reason: HOSPADM

## 2018-12-15 RX ADMIN — CLONAZEPAM 0.5 MG: 0.5 TABLET ORAL at 08:52

## 2018-12-15 RX ADMIN — LITHIUM CARBONATE 450 MG: 450 TABLET, EXTENDED RELEASE ORAL at 21:51

## 2018-12-15 RX ADMIN — DOCUSATE SODIUM 100 MG: 100 CAPSULE, LIQUID FILLED ORAL at 17:26

## 2018-12-15 RX ADMIN — LITHIUM CARBONATE 450 MG: 450 TABLET, EXTENDED RELEASE ORAL at 08:52

## 2018-12-15 RX ADMIN — DOCUSATE SODIUM 100 MG: 100 CAPSULE, LIQUID FILLED ORAL at 12:42

## 2018-12-15 RX ADMIN — ASPIRIN 81 MG: 81 TABLET, COATED ORAL at 08:52

## 2018-12-15 RX ADMIN — ATORVASTATIN CALCIUM 20 MG: 20 TABLET, FILM COATED ORAL at 17:26

## 2018-12-15 RX ADMIN — CLONAZEPAM 0.5 MG: 0.5 TABLET ORAL at 17:26

## 2018-12-15 RX ADMIN — ENOXAPARIN SODIUM 40 MG: 40 INJECTION SUBCUTANEOUS at 08:56

## 2018-12-15 NOTE — PROGRESS NOTES
Pt reports no BM after bisacodyl  MOM given  Pt denies all s/s including SI/HI  Pt came out of room briefly, but returned to his bed quickly   Denies rthoughts of eloping

## 2018-12-15 NOTE — PROGRESS NOTES
Progress Note - Behavioral Health   Cookie Bosworth 46 y o  male MRN: [id number]  Unit/Bed#: [facility] 913-35 Encounter: [id number]    Assessment/Plan   Principal Problem:    Bipolar 1 disorder, depressed, severe ([address] )  Active Problems:    Encounter for medical assessment    History of MI (myocardial infarction)    History of CVA (cerebrovascular accident)    Other hyperlipidemia    Patient was seen for continuing care and case was reviewed with the nursing staff on examination today, the patient is seen lying in his bed  When we asked how he is doing, the patient's is okay  Mood remains depressed  He is denying any suicidal homicidal ideation  There has been no attempts at but to try to get out of the unit  No clinical concerns or complaints  Continue current medications and treatment  Sleep: hypersomnia  Appetite: normal  Medication side effects: No  ROS: no complaints    Mental Status Evaluation:  Appearance:  casually dressed and disheveled   Behavior:  evasive and guarded   Speech:  soft   Mood:  constricted and labile   Affect:  constricted   Thought Process:  circumstantial   Thought Content:  obsessions   Perceptual Disturbances: None   Risk Potential: Suicidal Ideations with plan wants to run into traffic   Sensorium:  person and place   Cognition:  grossly intact   Consciousness:  alert and awake    Attention: attention span appeared shorter than expected for age   Insight:  limited   Judgment: limited   Gait/Station: normal gait/station   Motor Activity: no abnormal movements     Progress Toward Goals: No change    Recommended Treatment: Continue with group therapy, milieu therapy and occupational therapy  Risks, benefits and possible side effects of Medications:   Patient does not verbalize understanding at this time and will require further explanation  Medications: planned medication changes: Bowel regimen      Labs: Reviewed    Counseling / Coordination of Care  Total floor / unit time spent today 25 minutes  Greater than 50% of total time was spent with the patient and / or family counseling and / or coordination of care   A description of the counseling / coordination of care: 15

## 2018-12-15 NOTE — PROGRESS NOTES
Patient has been lying in bed all day today  Patient refused breakfast  Patient denies all psychiatric symptoms  He denies having thoughts of elopement  Patient reports being constipated  He was advised to eat meals and ambulate to promote bowel motility, patient refused  Patient was given new order of scheduled colace  Patient is disheveled and malodorous, patient was offered hygiene and refused  Patient is compliant with medication

## 2018-12-16 RX ORDER — ESCITALOPRAM OXALATE 10 MG/1
5 TABLET ORAL DAILY
Status: DISCONTINUED | OUTPATIENT
Start: 2018-12-16 | End: 2018-12-19 | Stop reason: HOSPADM

## 2018-12-16 RX ADMIN — LITHIUM CARBONATE 450 MG: 450 TABLET, EXTENDED RELEASE ORAL at 21:34

## 2018-12-16 RX ADMIN — DOCUSATE SODIUM 100 MG: 100 CAPSULE, LIQUID FILLED ORAL at 08:40

## 2018-12-16 RX ADMIN — ENOXAPARIN SODIUM 40 MG: 40 INJECTION SUBCUTANEOUS at 08:41

## 2018-12-16 RX ADMIN — ATORVASTATIN CALCIUM 20 MG: 20 TABLET, FILM COATED ORAL at 16:00

## 2018-12-16 RX ADMIN — ESCITALOPRAM OXALATE 5 MG: 10 TABLET ORAL at 16:00

## 2018-12-16 RX ADMIN — CLONAZEPAM 0.5 MG: 0.5 TABLET ORAL at 17:21

## 2018-12-16 RX ADMIN — ASPIRIN 81 MG: 81 TABLET, COATED ORAL at 08:40

## 2018-12-16 RX ADMIN — CLONAZEPAM 0.5 MG: 0.5 TABLET ORAL at 08:40

## 2018-12-16 RX ADMIN — LITHIUM CARBONATE 450 MG: 450 TABLET, EXTENDED RELEASE ORAL at 08:40

## 2018-12-16 RX ADMIN — DOCUSATE SODIUM 100 MG: 100 CAPSULE, LIQUID FILLED ORAL at 17:21

## 2018-12-16 NOTE — PROGRESS NOTES
Patient has been lying in bed today  Patient no longer complains of constipation addition of colace resulted in BM yesterday  Patient denies all symptoms  He remains disheveled and isolative  He is agreeable to showering at this time and will be provided with materials to do so

## 2018-12-16 NOTE — PROGRESS NOTES
Pt remained in bed all shift  Denies SI/HI/AH depression and anxiety  Encouraged pt to ambulate and move more, but pt refuded   Medication complianr

## 2018-12-16 NOTE — PROGRESS NOTES
Progress Note - Behavioral Health     Bunny Vi 46 y o  male MRN: [id number]   Unit/Bed#Sharad Martins 358-56 Encounter: [id number]    Behavior over the last 24 hours:  Awilda Lorenzana was seen for an inpatient, follow-up psychiatric visit this date  Per nursing report, he has been isolative to room, refused breakfast, and will not get out of bed  He also refuses to shower  He has been taking his medications as prescribed  He denies any SALLY/AVH at this time but appears depressed  He does not wish to participate in interview but answers questions briefly and appropriately  ROS: c/o constipation    Mental Status Evaluation:    Appearance:  disheveled, poor hygiene   Behavior:  guarded, evasive   Speech:  soft   Mood:  dysphoric, labile   Affect:  mood-congruent   Thought Process:  circumstantial   Associations: intact associations   Thought Content:  no overt delusions   Perceptual Disturbances: none   Risk Potential: Suicidal ideation - None at present  Homicidal ideation - None  Potential for aggression - No   Sensorium:  oriented to person, place and time/date   Memory:  recent and remote memory grossly intact   Consciousness:  alert and awake   Attention: decreased concentration and decreased attention span   Insight:  poor   Judgment: poor   Gait/Station: normal gait/station and normal balance   Motor Activity: no abnormal movements     Vital signs in last 24 hours:    Temp:  [97 1 °F (36 2 °C)-97 8 °F (36 6 °C)] 97 1 °F (36 2 °C)  HR:  [59-62] 60  Resp:  [16] 16  BP: (89-95)/(49-62) 90/62    Laboratory results:  I have personally reviewed all pertinent laboratory/tests results  Progress Toward Goals: stays in the room    Assessment/Plan   Principal Problem:    Bipolar 1 disorder, depressed, severe ([address] )  Active Problems:    Encounter for medical assessment    History of MI (myocardial infarction)    History of CVA (cerebrovascular accident)    Other hyperlipidemia    Recommended Treatment:   1   Will begin Lexapro 5mg QD and continue [place] as ordered   2  Colace ordered for constipation  3  Will continue to monitor patient and adjust medications as needed  4  Discharge disposition and planning are ongoing  All current active medications have been reviewed  Encourage group therapy, milieu therapy and occupational therapy  Behavioral Health checks every 7 minutes      Current Facility-Administered Medications:  acetaminophen 650 mg Oral Q6H PRN Cinthya Grant MD   acetaminophen 650 mg Oral Q4H PRN ERNIE Prince   acetaminophen 975 mg Oral Q6H PRN ERNIE Prince   aluminum-magnesium hydroxide-simethicone 30 mL Oral Q4H PRN Cinthya rGant MD   aspirin 81 mg Oral Daily Dasha Venegas MD   atorvastatin 20 mg Oral Daily With Dinner Dasha Plunkett MD   benztropine 1 mg Intramuscular Q6H PRN Cinthya Grant MD   benztropine 1 mg Oral Q6H PRN Cinthya Grant MD   clonazePAM 0 5 mg Oral BID Benson Elder, ERNIE   docusate sodium 100 mg Oral BID ERNIE Elise   enoxaparin 40 mg Subcutaneous Q24H [address] Mariela Jimenezeri, CRNP   lithium carbonate 450 mg Oral Q12H [address] Jemma Mays MD   LORazepam 2 mg Intramuscular Q4H PRN Cinthya Grant MD   LORazepam 1 mg Oral Q4H PRN Cinthya Grant MD   magnesium hydroxide 30 mL Oral Daily PRN Cinthya Grant MD   OLANZapine 5 mg Intramuscular Q6H PRN Cinthya Grant MD   OLANZapine 5 mg Oral Q6H PRN Cinthya Grant MD   traZODone 50 mg Oral HS PRN Cinthya Grant MD       Risks / Benefits of Treatment:    Risks, benefits, and possible side effects of medications explained to patient and patient verbalizes understanding and agreement for treatment  Counseling / Coordination of Care:      Patient's progress discussed with staff in treatment team meeting  Medications, treatment progress and treatment plan reviewed with patient

## 2018-12-17 PROCEDURE — 99232 SBSQ HOSP IP/OBS MODERATE 35: CPT | Performed by: NURSE PRACTITIONER

## 2018-12-17 RX ADMIN — ESCITALOPRAM OXALATE 5 MG: 10 TABLET ORAL at 08:33

## 2018-12-17 RX ADMIN — CLONAZEPAM 0.5 MG: 0.5 TABLET ORAL at 08:33

## 2018-12-17 RX ADMIN — DOCUSATE SODIUM 100 MG: 100 CAPSULE, LIQUID FILLED ORAL at 17:26

## 2018-12-17 RX ADMIN — ATORVASTATIN CALCIUM 20 MG: 20 TABLET, FILM COATED ORAL at 17:26

## 2018-12-17 RX ADMIN — ASPIRIN 81 MG: 81 TABLET, COATED ORAL at 08:33

## 2018-12-17 RX ADMIN — ENOXAPARIN SODIUM 40 MG: 40 INJECTION SUBCUTANEOUS at 08:33

## 2018-12-17 RX ADMIN — CLONAZEPAM 0.5 MG: 0.5 TABLET ORAL at 17:26

## 2018-12-17 RX ADMIN — LITHIUM CARBONATE 450 MG: 450 TABLET, EXTENDED RELEASE ORAL at 21:54

## 2018-12-17 RX ADMIN — LITHIUM CARBONATE 450 MG: 450 TABLET, EXTENDED RELEASE ORAL at 08:33

## 2018-12-17 RX ADMIN — DOCUSATE SODIUM 100 MG: 100 CAPSULE, LIQUID FILLED ORAL at 08:33

## 2018-12-17 NOTE — CASE MANAGEMENT
Spoke with Breanna Roberts @[facility] regarding pt's d/c plan  Pt can return on Wednesday 12/19 and transportation will be provided for him  CM will fax his scripts to the [place] tomorrow so his meds can be filled prior to his return

## 2018-12-17 NOTE — PROGRESS NOTES
Pt was calm and cooperative with care  Pt was medication compliant  Pt denied all symptoms  Pt reported a BM yesterday  Pt remained seclusive to his room this morning  Pt appeared flat and depressed  Pt was scant in conversation  Will continue to monitor

## 2018-12-17 NOTE — PLAN OF CARE
Problem: Ineffective Coping  Goal: Participates in unit activities  Interventions:  - Provide therapeutic environment   - Provide required programming   - Redirect inappropriate behaviors    Outcome: Progressing  Pt not pacing in halls today yet isolative to his room  No group participation

## 2018-12-17 NOTE — PROGRESS NOTES
:Pt remained in bed entire shift  Continues to deny all s/s  Medication compliant  Reports feeling "okay" but still appears depressed  Is able to contract for safety

## 2018-12-17 NOTE — PROGRESS NOTES
Progress Note - [facility] 46 y o  male MRN: [id number]  Unit/Bed#Zelalem Garnica 016-79 Encounter: [id number]    The patient was seen for continuing care and reviewed with treatment team Staff reports patient has been isolative to his room and brief in conversation  Patient has been isolative to his room and in bed all morning  Patient remains brief and guarded in conversation  He currently rates his depression as 5/10 with mild anxiety noted  Denies any SI, HI, AVs  He reports poor sleep and stated he was "up and down" all night but would not elaborate as to what was keeping him awake  Patient reports only eating 2 meals most days (breakfast and lunch) and states he is not always hungry in the evening but does try to eat dinner  Patient is tolerating medications well and reports no side effects at this time  Patient denies pain  Patient noted to have mild resting tremor but states it has been much better since starting klonopin      Mental Status Evaluation:  Appearance:  Adequate hygiene and grooming and Poor eye contact   Behavior:  calm and cooperative   Mood:  anxious   Affect: mood-congruent   Speech: Soft   Thought Process:  Unable to assess due to scant verbalization   Thought Content:  Does not verbalize delusional material   Perceptual Disturbances: Denies hallucinations and does not appear to be responding to internal stimuli   Risk Potential: No suicidal or homicidal ideation   Attention/Concentration Decreased   Orientation:   Oriented x3   Gait/Station: Not observed   Motor Activity: Resting tremor, generalized     Progress Toward Goals: No change    Assessment/Plan    Principal Problem:    Bipolar 1 disorder, depressed, severe (HCC)  Active Problems:    Encounter for medical assessment    History of MI (myocardial infarction)    History of CVA (cerebrovascular accident)    Other hyperlipidemia      Recommended Treatment: Continue with pharmacotherapy, group therapy, milieu therapy and occupational therapy  No medication changes at this time  The patient will be maintained on the following medications:    Current Facility-Administered Medications:  acetaminophen 650 mg Oral Q6H PRN Sarai Johnson MD   acetaminophen 650 mg Oral Q4H PRN Bernett Course, CRNP   acetaminophen 975 mg Oral Q6H PRN Bernett Course, CRNP   aluminum-magnesium hydroxide-simethicone 30 mL Oral Q4H PRN Sarai Johnson MD   aspirin 81 mg Oral Daily Dasha Zhu MD   atorvastatin 20 mg Oral Daily With Dinner Dasha Plunkett MD   benztropine 1 mg Intramuscular Q6H PRN Sarai Johnson, MD   benztropine 1 mg Oral Q6H PRN Sarai Johnson, MD   clonazePAM 0 5 mg Oral BID ERNIE Sofia   docusate sodium 100 mg Oral BID Sun Traylor, ERNIE   enoxaparin 40 mg Subcutaneous Q24H [address] Marielamarely Tejeda, ERNIE   escitalopram 5 mg Oral Daily Margie Miller, ERNIE   lithium carbonate 450 mg Oral Q12H [facility], MD   LORazepam 2 mg Intramuscular Q4H PRN Sarai Johnson MD   LORazepam 1 mg Oral Q4H PRN Sarai Johnson, MD   magnesium hydroxide 30 mL Oral Daily PRN Sarai Johnson MD   OLANZapine 5 mg Intramuscular Q6H PRN Sarai Johnson MD   OLANZapine 5 mg Oral Q6H PRN Sarai Johnson, MD   traZODone 50 mg Oral HS PRCARRIE Johnson MD       Risks, benefits and possible side effects of Medications:   Patient does not verbalize understanding at this time and will require further explanation         ERNIE Sofia

## 2018-12-17 NOTE — CASE MANAGEMENT
Left VM for Francoise Hartmann, pt's care coordinator @[facility] [phone] to give update on pt's tx and progress regarding med compliance, hygiene and group attendance  CM is requesting a call back to discuss d/c plan

## 2018-12-18 PROCEDURE — 99231 SBSQ HOSP IP/OBS SF/LOW 25: CPT | Performed by: PSYCHIATRY & NEUROLOGY

## 2018-12-18 RX ORDER — ATORVASTATIN CALCIUM 20 MG/1
20 TABLET, FILM COATED ORAL DAILY
Qty: 21 TABLET | Refills: 0
Start: 2018-12-18 | End: 2019-09-11 | Stop reason: HOSPADM

## 2018-12-18 RX ORDER — LITHIUM CARBONATE 450 MG
450 TABLET, EXTENDED RELEASE ORAL EVERY 12 HOURS SCHEDULED
Qty: 42 TABLET | Refills: 0 | Status: SHIPPED | OUTPATIENT
Start: 2018-12-18 | End: 2020-08-10 | Stop reason: ALTCHOICE

## 2018-12-18 RX ORDER — ESCITALOPRAM OXALATE 5 MG/1
5 TABLET ORAL DAILY
Qty: 21 TABLET | Refills: 0 | Status: SHIPPED | OUTPATIENT
Start: 2018-12-19 | End: 2020-08-10 | Stop reason: ALTCHOICE

## 2018-12-18 RX ORDER — DOCUSATE SODIUM 100 MG/1
100 CAPSULE, LIQUID FILLED ORAL 2 TIMES DAILY
Qty: 10 CAPSULE | Refills: 0 | Status: ON HOLD
Start: 2018-12-18 | End: 2020-08-12 | Stop reason: ALTCHOICE

## 2018-12-18 RX ORDER — CLONAZEPAM 0.5 MG/1
0.5 TABLET ORAL 2 TIMES DAILY
Qty: 20 TABLET | Refills: 0 | Status: SHIPPED | OUTPATIENT
Start: 2018-12-18 | End: 2019-11-13 | Stop reason: ALTCHOICE

## 2018-12-18 RX ADMIN — ASPIRIN 81 MG: 81 TABLET, COATED ORAL at 09:05

## 2018-12-18 RX ADMIN — CLONAZEPAM 0.5 MG: 0.5 TABLET ORAL at 17:59

## 2018-12-18 RX ADMIN — DOCUSATE SODIUM 100 MG: 100 CAPSULE, LIQUID FILLED ORAL at 09:04

## 2018-12-18 RX ADMIN — ENOXAPARIN SODIUM 40 MG: 40 INJECTION SUBCUTANEOUS at 09:05

## 2018-12-18 RX ADMIN — LITHIUM CARBONATE 450 MG: 450 TABLET, EXTENDED RELEASE ORAL at 21:21

## 2018-12-18 RX ADMIN — DOCUSATE SODIUM 100 MG: 100 CAPSULE, LIQUID FILLED ORAL at 17:59

## 2018-12-18 RX ADMIN — ESCITALOPRAM OXALATE 5 MG: 10 TABLET ORAL at 09:05

## 2018-12-18 RX ADMIN — ATORVASTATIN CALCIUM 20 MG: 20 TABLET, FILM COATED ORAL at 17:59

## 2018-12-18 RX ADMIN — LITHIUM CARBONATE 450 MG: 450 TABLET, EXTENDED RELEASE ORAL at 09:04

## 2018-12-18 RX ADMIN — CLONAZEPAM 0.5 MG: 0.5 TABLET ORAL at 09:04

## 2018-12-18 NOTE — PROGRESS NOTES
Pt calm and cooperative  Pt seclusive to his room for the entire evening  Pt appears depressed but denies all s/s, he is scant in conversation  Pt is medication compliant

## 2018-12-18 NOTE — PLAN OF CARE
Problem: Ineffective Coping  Goal: Participates in unit activities  Interventions:  - Provide therapeutic environment   - Provide required programming   - Redirect inappropriate behaviors    Outcome: Progressing  Began to attend groups when provided with firm staff prompting  He continues to report to be a loner  Tent  discharge tomorrow

## 2018-12-18 NOTE — PLAN OF CARE
Problem: DISCHARGE PLANNING  Goal: Discharge to home or other facility with appropriate resources  INTERVENTIONS:  - Identify barriers to discharge w/patient and caregiver  - Arrange for needed discharge resources and transportation as appropriate  - Identify discharge learning needs (meds, wound care, etc )  - Arrange for interpretive services to assist at discharge as needed  - Refer to Case Management Department for coordinating discharge planning if the patient needs post-hospital services based on physician/advanced practitioner order or complex needs related to functional status, cognitive ability, or social support system   Outcome: Completed Date Met: 12/18/18  Return to residential program was coordinated  Pt's care coordinator at [facility] is arranging follow-up appointments with psychiatrist and therapist  Transportation arranged through [employer]  Discharge planning discussed with pt, pt's sister and care coordinator at [employer]

## 2018-12-18 NOTE — CASE MANAGEMENT
Left  for Emigdio Massey, pt's care coordinator @[facility] [phone] to let her know that notify scripts/med list were faxed to the [address]  CM also faxed her a copy of paperwork and needs a call back with  time tomorrow and aftercare appts

## 2018-12-18 NOTE — DISCHARGE INSTR - APPOINTMENTS
Your Care Coordinator at [facility] will schedule your follow-up appointment with Dr Shara Brewer and Shahida Nix

## 2018-12-18 NOTE — PLAN OF CARE
ANXIETY     Will report anxiety at manageable levels Progressing        BEHAVIOR     Pt/Family maintain appropriate behavior and adhere to behavioral management agreement, if implemented Progressing        DEPRESSION     Will be euthymic at discharge [address] Discharge to home or other facility with appropriate resources Progressing        Ineffective Coping     Participates in unit activities Progressing        SELF HARM/SUICIDALITY     Will have no self-injury during hospital stay Progressing        SUBSTANCE USE/ABUSE     By discharge, will develop insight into their chemical dependency and sustain motivation to continue in recovery Progressing

## 2018-12-18 NOTE — PROGRESS NOTES
Pt calm and pleasant on approach; remains medication compliant and denied all symptoms at this time  Pt refused breakfast this morning, states he wasn't hungry and he remains isolative  Pt had to prompted to get OOB, attend groups and he was reluctant; but with encouragement he attended am group  Will continue to monitor

## 2018-12-18 NOTE — PROGRESS NOTES
Progress Note - Behavioral Health   Monalisa Arellano 46 y o  male MRN: @MRN   Unit/Bed#[facility] 562-59 Encounter: [id number]        The patient was seen for continuing care and reviewed with staff  Report from staff regarding this patient received and discussed, and records reviewed prior to seeing this patient   Pt felt tired today, not significantly depressed  Sleep is improved  Appetite normal    Medication side effects:no complaints  ROS: No     Mental Status Evaluation:    Appearance:  dressed in hospital attire   Behavior:  calm   Mood:  improved, anxious   Affect: less constricted    Speech:  normal rate and volume, normal pitch, fluent   Language: appropriate   Thought Process:  concrete   Associations: concrete associations   Thought Content:  poverty of thought   Perceptual Disturbances: no auditory hallucinations, no visual hallucinations, denies auditory hallucinations when asked, does not appear responding to internal stimuli   Risk Potential: Suicidal ideation - None, contracts for safety on the unit  Homicidal ideation - None  Potential for aggression - No   Sensorium:  oriented to person, place and time   Memory:  long term memory grossly intact   Consciousness:  alert and awake   Attention: attention span and concentration are normal   Fund of Knowledge: awareness of current events appropriate   Insight:  improving   Judgment: improved   Muscle Tone: normal   Gait/Station: normal gait/station and normal balance   Motor Activity: no abnormal movements         Laboratory results:  I have personally reviewed all pertinent laboratory results      Progress Toward Goals: improving progressively    Assessment/Plan   Principal Problem:    Bipolar 1 disorder, depressed, severe (HCC)  Active Problems:    Encounter for medical assessment    History of MI (myocardial infarction)    History of CVA (cerebrovascular accident)    Other hyperlipidemia      Recommended Treatment:   Will continue the present medications  The patient condition significantly improved  No changes and the patient is ready to be discharged supportive environment  Planned medication and treatment changes: All current active medications have been reviewed  Continue treatment with group therapy, milieu therapy, occupational therapy and medication management  Risks / Benefits of Treatment:    Risks, benefits, and possible side effects of medications explained to patient and patient verbalizes understanding and agreement for treatment  Counseling / Coordination of Care:    Patient's progress discussed with staff in treatment team meeting  Medication changes reviewed with staff in treatment team meeting  ** Please Note: This note has been constructed using a voice recognition system   **

## 2018-12-19 VITALS
RESPIRATION RATE: 15 BRPM | SYSTOLIC BLOOD PRESSURE: 95 MMHG | DIASTOLIC BLOOD PRESSURE: 57 MMHG | TEMPERATURE: 96 F | WEIGHT: 175.27 LBS | OXYGEN SATURATION: 98 % | HEIGHT: 71 IN | BODY MASS INDEX: 24.54 KG/M2 | HEART RATE: 61 BPM

## 2018-12-19 PROCEDURE — 99239 HOSP IP/OBS DSCHRG MGMT >30: CPT | Performed by: NURSE PRACTITIONER

## 2018-12-19 RX ADMIN — LITHIUM CARBONATE 450 MG: 450 TABLET, EXTENDED RELEASE ORAL at 08:50

## 2018-12-19 RX ADMIN — ASPIRIN 81 MG: 81 TABLET, COATED ORAL at 08:56

## 2018-12-19 RX ADMIN — ESCITALOPRAM OXALATE 5 MG: 10 TABLET ORAL at 08:51

## 2018-12-19 RX ADMIN — ENOXAPARIN SODIUM 40 MG: 40 INJECTION SUBCUTANEOUS at 08:49

## 2018-12-19 RX ADMIN — CLONAZEPAM 0.5 MG: 0.5 TABLET ORAL at 08:50

## 2018-12-19 RX ADMIN — DOCUSATE SODIUM 100 MG: 100 CAPSULE, LIQUID FILLED ORAL at 08:50

## 2018-12-19 NOTE — DISCHARGE SUMMARY
Discharge Summary - [facility] 46 y o  male MRN: [id number]  Unit/Bed#: [facility] 702-07 Encounter: [id number]     Admission Date: 12/5/2018         Discharge Date: 12/19/2018    Attending Psychiatrist: Leanna Walter MD    Reason for Admission/HPI:  The patient is a 58-year-old  male who was brought in by police and admitted on a voluntary 201 commitment basis  The [place] by walking into traffic  Apparently he left his group home because he was not happy with some of his peers  He was also experiencing low energy, loss of interest in doing things and changes in sleep        Meds/Allergies     current meds:   Current Facility-Administered Medications   Medication Dose Route Frequency    acetaminophen (TYLENOL) tablet 650 mg  650 mg Oral Q6H PRN    acetaminophen (TYLENOL) tablet 650 mg  650 mg Oral Q4H PRN    acetaminophen (TYLENOL) tablet 975 mg  975 mg Oral Q6H PRN    aluminum-magnesium hydroxide-simethicone (MYLANTA) 200-200-20 mg/5 mL oral suspension 30 mL  30 mL Oral Q4H PRN    aspirin (ECOTRIN LOW STRENGTH) EC tablet 81 mg  81 mg Oral Daily    atorvastatin (LIPITOR) tablet 20 mg  20 mg Oral Daily With Dinner    benztropine (COGENTIN) injection 1 mg  1 mg Intramuscular Q6H PRN    benztropine (COGENTIN) tablet 1 mg  1 mg Oral Q6H PRN    clonazePAM (KlonoPIN) tablet 0 5 mg  0 5 mg Oral BID    docusate sodium (COLACE) capsule 100 mg  100 mg Oral BID    enoxaparin (LOVENOX) subcutaneous injection 40 mg  40 mg Subcutaneous Q24H ULISES    escitalopram (LEXAPRO) tablet 5 mg  5 mg Oral Daily    lithium carbonate (LITHOBID) CR tablet 450 mg  450 mg Oral Q12H ULISES    LORazepam (ATIVAN) 2 mg/mL injection 2 mg  2 mg Intramuscular Q4H PRN    LORazepam (ATIVAN) tablet 1 mg  1 mg Oral Q4H PRN    magnesium hydroxide (MILK OF MAGNESIA) 400 mg/5 mL oral suspension 30 mL  30 mL Oral Daily PRN    OLANZapine (ZyPREXA) IM injection 5 mg  5 mg Intramuscular Q6H PRN    OLANZapine (ZyPREXA) tablet 5 mg  5 mg Oral Q6H PRN    traZODone (DESYREL) tablet 50 mg  50 mg Oral HS PRN       Allergies   Allergen Reactions    Haldol [Haloperidol] Other (See Comments)     Muscle/jaw tightness  Difficulty swallowing    Prolixin [Fluphenazine] Other (See Comments)     Muscle/jaw tightness  Difficulty swallowing      Thorazine [Chlorpromazine] Other (See Comments)     Muscle/jaw tightness  Difficulty swallowing  Objective     Vital signs in last 24 hours:  Temp:  [96 °F (35 6 °C)-98 1 °F (36 7 °C)] 96 °F (35 6 °C)  HR:  [57-61] 61  Resp:  [15-18] 15  BP: ()/(51-74) 95/57    No intake or output data in the 24 hours ending 12/19/18 99 Smart[employer] Course: The patient was admitted to the inpatient psychiatric unit and started on every 15 minutes precautions  During the hospitalization the patient was attending individual therapy, group therapy, milieu therapy and occupational therapy  Psychiatric medications were titrated over the hospital stay  To address depressive symptoms, mood instability, impulsivity and anxiety symptoms the patient was started on antidepressant Lexapro, mood stabilizer Lithium and anxiolytic medication Klonopin  Medication doses were titrated during the hospital course  Lithium level was therapeutic at 0 7 at discharge  Prior to beginning of treatment medications risks and benefits and possible side effects including risk of kidney impairment related to treatment with Lithium, risk of suicidality and serotonin syndrome related to treatment with antidepressants and risks of dependence, sedation and respiratory depression related to treatment with benzodiazepine medications were reviewed with the patient  The patient verbalized understanding and agreement for treatment  Upon admission the patient had increased anxiety and was frequently requesting to place himself in seclusion due to overstimulation  He was seclusive to his room and had poor self care  He was extremely depressed and had suicidal thoughts at times  He was not eating or sleeping well  Patient's symptoms improved gradually over the hospital course  Patient developed a slight tremor on lithium which was well controlled with the addition of Klonopin  This also assisted in reducing his anxiety to a manageable level  The patient took a shower spontaneously and was less seclusive  Although he still did not attend groups regularly he was coming out of his room more  At the end of treatment the patient was doing well  He expressed that he was happy he was being discharged  He still had a little anxiety related to going to a new place however he was feeling optimistic about it  He no longer had any thoughts to harm himself  Mood was stable at the time of discharge  The patient denied suicidal ideation, intent or plan at the time of discharge and denied homicidal ideation, intent or plan at the time of discharge  There was no overt psychosis at the time of discharge  Sleep and appetite were improved  The patient was tolerating medications and was not reporting any significant side effects at the time of discharge  Since the patient was doing well at the end of the hospitalization, treatment team felt that the patient could be safely discharged to outpatient care  The outpatient follow up was arranged by the unit  upon discharge      Mental Status at Time of Discharge:   Appearance:  Poor eye contact and disheveled   Behavior:  calm   Speech:   Language: Sparse  No overt abnormality   Mood:  improving   Affect:   Associations: constricted  Tightly connected   Thought Process:  Poverty of thoughts   Thought Content:  Does not verbalize delusional material   Perceptual Disturbances: Denies hallucinations and does not appear to be responding to internal stimuli     Risk Potential: No suicidal or homicidal ideation   Orientation Language Oriented x 3  No overt abnormality   Memory  Fund of knowledge Not tested  Not assessed   Attention/Concentration [place] than expected   Insight:  Improving   Judgment: Improving   Gait/Station: Not observed   Motor Activity: No abnormal movement noted       Admission Diagnosis:  Principal Problem:    Bipolar 1 disorder, depressed, severe ([facility] [address] )  Active Problems:    Encounter for medical assessment    History of MI (myocardial infarction)    History of CVA (cerebrovascular accident)    Other hyperlipidemia      Discharge Diagnosis:     Principal Problem:    Bipolar 1 disorder, depressed, severe ([facility] [address] )  Active Problems:    Encounter for medical assessment    History of MI (myocardial infarction)    History of CVA (cerebrovascular accident)    Other hyperlipidemia  Resolved Problems:    * No resolved hospital problems   *      Lab results:    Admission on 12/05/2018   Component Date Value    Sodium 12/05/2018 139     Potassium 12/05/2018 4 3     Chloride 12/05/2018 105     CO2 12/05/2018 28     ANION GAP 12/05/2018 6     BUN 12/05/2018 11     Creatinine 12/05/2018 0 75     Glucose 12/05/2018 118*    Calcium 12/05/2018 9 1     eGFR 12/05/2018 104     WBC 12/05/2018 6 80     RBC 12/05/2018 4 44*    Hemoglobin 12/05/2018 14 0     Hematocrit 12/05/2018 43 0     MCV 12/05/2018 97     MCH 12/05/2018 31 5     MCHC 12/05/2018 32 5     RDW 12/05/2018 13 4     MPV 12/05/2018 8 3*    Platelets 97/25/1860 274     Neutrophils Relative 12/05/2018 70*    Lymphocytes Relative 12/05/2018 20     Monocytes Relative 12/05/2018 7     Eosinophils Relative 12/05/2018 2     Basophils Relative 12/05/2018 1     Neutrophils Absolute 12/05/2018 4 70     Lymphocytes Absolute 12/05/2018 1 40     Monocytes Absolute 12/05/2018 0 50     Eosinophils Absolute 12/05/2018 0 10     Basophils Absolute 12/05/2018 0 10     Ethanol Lvl 12/05/2018 <10     Amph/Meth UR 12/05/2018 Negative     Barbiturate Ur 12/05/2018 Negative     Benzodiazepine Urine 12/05/2018 Negative     Cocaine Urine 12/05/2018 Negative     Methadone Urine 12/05/2018 Negative     Opiate Urine 12/05/2018 Negative     PCP Ur 12/05/2018 Negative     THC Urine 12/05/2018 Negative     Lithium Lvl 12/08/2018 0 4*    TSH 3RD GENERATON 12/12/2018 1 540     Vit D, 25-Hydroxy 12/12/2018 32 5     Cholesterol 12/12/2018 137     Triglycerides 12/12/2018 107     HDL, Direct 12/12/2018 43     LDL Calculated 12/12/2018 73     Non-HDL-Chol (CHOL-HDL) 12/12/2018 94     Platelets 33/36/0181 338     Troponin I 12/12/2018 0 02     Lithium Lvl 12/13/2018 0 7     Ventricular Rate 12/12/2018 66     Atrial Rate 12/12/2018 66     IN Interval 12/12/2018 156     QRSD Interval 12/12/2018 84     QT Interval 12/12/2018 426     QTC Interval 12/12/2018 446     P Axis 12/12/2018 67     QRS Axis 12/12/2018 27     T Wave Axis 12/12/2018 91        Discharge Medications:    See after visit summary for reconciled discharge medications provided to patient and family  Discharge instructions/Information to patient and family:     See after visit summary for information provided to patient and family  Provisions for Follow-Up Care:    See after visit summary for information related to follow-up care and any pertinent home health orders  Discharge Statement     I spent 20 minutes discharging the patient  This time was spent on the day of discharge  I had direct contact with the patient on the day of discharge  Principal Discharge DX:	Wrist pain  Secondary Diagnosis:	Closed nondisplaced fracture of scaphoid, unspecified laterality, unspecified portion of scaphoid, initial encounter

## 2018-12-19 NOTE — PLAN OF CARE
ANXIETY     Will report anxiety at manageable levels Adequate for Discharge        BEHAVIOR     Pt/Family maintain appropriate behavior and adhere to behavioral management agreement, if implemented Adequate for Discharge        DEPRESSION     Will be euthymic at discharge Adequate for Discharge        Ineffective Coping     Participates in unit activities Adequate for Discharge        SELF HARM/SUICIDALITY     Will have no self-injury during hospital stay Adequate for Discharge        SUBSTANCE USE/ABUSE     By discharge, will develop insight into their chemical dependency and sustain motivation to continue in recovery Adequate for Discharge

## 2018-12-19 NOTE — PROGRESS NOTES
Pt calm and pleasant on approach  Scant on conversation but  denies all s/s, appears depressed  Medication compliant

## 2018-12-19 NOTE — NURSING NOTE
Belongings returned to patient  AVS and scripts sent to receiving facility  Security returned belongings to patient  AVS and scripts also sent along with patient

## 2018-12-19 NOTE — PROGRESS NOTES
Pt is calm, cooperative with care and medication compliant  Pt denies all s/s but states " I have some anxiety"  Pt comes out for meals, but is seclusive to self  Pt is currently resting in bed  Will continue to monitor

## 2018-12-19 NOTE — DISCHARGE INSTR - LAB
If you smoke, use tobacco or nicotine, and/or are exposed to second hand smoke, you are encouraged to stop to improve your health    If you need help quitting, please talk to your health care provider or call:  · Justin Self ([phone])  · [facility] ([phone])   · [address] [place] [address] ([phone])

## 2018-12-19 NOTE — PLAN OF CARE
Problem: Ineffective Coping  Goal: Participates in unit activities  Interventions:  - Provide therapeutic environment   - Provide required programming   - Redirect inappropriate behaviors    Outcome: Completed Date Met: 12/19/18

## 2019-07-01 ENCOUNTER — ANESTHESIA EVENT (OUTPATIENT)
Dept: GASTROENTEROLOGY | Facility: HOSPITAL | Age: 54
End: 2019-07-01

## 2019-07-02 ENCOUNTER — HOSPITAL ENCOUNTER (OUTPATIENT)
Dept: GASTROENTEROLOGY | Facility: HOSPITAL | Age: 54
Setting detail: OUTPATIENT SURGERY
Discharge: HOME | End: 2019-07-02
Attending: INTERNAL MEDICINE | Admitting: INTERNAL MEDICINE
Payer: MEDICARE

## 2019-07-02 ENCOUNTER — ANESTHESIA (OUTPATIENT)
Dept: GASTROENTEROLOGY | Facility: HOSPITAL | Age: 54
End: 2019-07-02

## 2019-07-02 VITALS
DIASTOLIC BLOOD PRESSURE: 79 MMHG | TEMPERATURE: 99.1 F | HEART RATE: 62 BPM | RESPIRATION RATE: 16 BRPM | HEIGHT: 71 IN | SYSTOLIC BLOOD PRESSURE: 116 MMHG | OXYGEN SATURATION: 99 % | BODY MASS INDEX: 24.5 KG/M2 | WEIGHT: 175 LBS

## 2019-07-02 DIAGNOSIS — R10.32 LEFT LOWER QUADRANT PAIN: ICD-10-CM

## 2019-07-02 DIAGNOSIS — Z80.0 FAMILY HISTORY OF MALIGNANT NEOPLASM OF DIGESTIVE ORGAN: ICD-10-CM

## 2019-07-02 DIAGNOSIS — R19.4 CHANGE IN BOWEL HABITS: ICD-10-CM

## 2019-07-02 DIAGNOSIS — R19.7 DIARRHEA: ICD-10-CM

## 2019-07-02 DIAGNOSIS — R10.31 RIGHT LOWER QUADRANT PAIN: ICD-10-CM

## 2019-07-02 PROCEDURE — 88305 TISSUE EXAM BY PATHOLOGIST: CPT | Performed by: PATHOLOGY

## 2019-07-02 RX ORDER — SODIUM CHLORIDE 9 MG/ML
INJECTION, SOLUTION INTRAVENOUS CONTINUOUS PRN
Status: DISCONTINUED | OUTPATIENT
Start: 2019-07-02 | End: 2019-07-02 | Stop reason: SURG

## 2019-07-02 RX ORDER — SODIUM CHLORIDE 9 MG/ML
125 INJECTION, SOLUTION INTRAVENOUS CONTINUOUS
Status: DISCONTINUED | OUTPATIENT
Start: 2019-07-02 | End: 2019-07-06 | Stop reason: HOSPADM

## 2019-07-02 RX ORDER — PROPOFOL 10 MG/ML
INJECTION, EMULSION INTRAVENOUS AS NEEDED
Status: DISCONTINUED | OUTPATIENT
Start: 2019-07-02 | End: 2019-07-02 | Stop reason: SURG

## 2019-07-02 RX ADMIN — PROPOFOL 50 MG: 10 INJECTION, EMULSION INTRAVENOUS at 15:49

## 2019-07-02 RX ADMIN — PROPOFOL 50 MG: 10 INJECTION, EMULSION INTRAVENOUS at 15:54

## 2019-07-02 RX ADMIN — PROPOFOL 50 MG: 10 INJECTION, EMULSION INTRAVENOUS at 16:02

## 2019-07-02 RX ADMIN — SODIUM CHLORIDE 125 ML/HR: 0.9 INJECTION, SOLUTION INTRAVENOUS at 15:08

## 2019-07-02 RX ADMIN — PROPOFOL 100 MG: 10 INJECTION, EMULSION INTRAVENOUS at 15:47

## 2019-07-02 RX ADMIN — SODIUM CHLORIDE: 0.9 INJECTION, SOLUTION INTRAVENOUS at 15:39

## 2019-07-02 RX ADMIN — LIDOCAINE HYDROCHLORIDE 100 MG: 20 INJECTION, SOLUTION INTRAVENOUS at 15:47

## 2019-07-02 RX ADMIN — PROPOFOL 50 MG: 10 INJECTION, EMULSION INTRAVENOUS at 15:51

## 2019-07-02 RX ADMIN — PROPOFOL 50 MG: 10 INJECTION, EMULSION INTRAVENOUS at 15:58

## 2019-07-02 NOTE — DISCHARGE INSTRUCTIONS
Colonoscopy   WHAT YOU NEED TO KNOW:   A colonoscopy is a procedure to examine the inside of your colon (intestine) with a scope  Polyps or tissue growths may have been removed during your colonoscopy  It is normal to feel bloated and to have some abdominal discomfort  You should be passing gas  If you have hemorrhoids or you had polyps removed, you may have a small amount of bleeding  DISCHARGE INSTRUCTIONS:   Seek care immediately if:   · You have a large amount of bright red blood in your bowel movements  · Your abdomen is hard and firm and you have severe pain  · You have sudden trouble breathing  Contact your healthcare provider if:   · You develop a rash or hives  · You have a fever within 24 hours of your procedure  · You have not had a bowel movement for 3 days after your procedure  · You have questions or concerns about your condition or care  Activity:   · Do not lift, strain, or run  for 3 days after your procedure  · Rest after your procedure  You have been given medicine to relax you  Do not  drive or make important decisions until the day after your procedure  Return to your normal activity as directed  · Relieve gas and discomfort from bloating  by lying on your right side with a heating pad on your abdomen  You may need to take short walks to help the gas move out  Eat small meals until bloating is relieved  If you had polyps removed: For 7 days after your procedure:  · Do not  take aspirin  · Do not  go on long car rides  Help prevent constipation:   · Eat a variety of healthy foods  Healthy foods include fruit, vegetables, whole-grain breads, low-fat dairy products, beans, lean meat, and fish  Ask if you need to be on a special diet  Your healthcare provider may recommend that you eat high-fiber foods such as cooked beans  Fiber helps you have regular bowel movements  · Drink liquids as directed    Adults should drink between 9 and 13 eight-ounce cups of liquid every day  Ask what amount is best for you  For most people, good liquids to drink are water, juice, and milk  · Exercise as directed  Talk to your healthcare provider about the best exercise plan for you  Exercise can help prevent constipation, decrease your blood pressure and improve your health  Follow up with your healthcare provider as directed:  Write down your questions so you remember to ask them during your visits  © 2017 [employer] Information is for End User's use only and may not be sold, redistributed or otherwise used for commercial purposes  All illustrations and images included in CareNotes® are the copyrighted property of [employer]  or Charli Calvo  The above information is an  only  It is not intended as medical advice for individual conditions or treatments  Talk to your doctor, nurse or pharmacist before following any medical regimen to see if it is safe and effective for you

## 2019-07-02 NOTE — ANESTHESIA PREPROCEDURE EVALUATION
Review of Systems/Medical History  Patient summary reviewed  Chart reviewed  No history of anesthetic complications     Cardiovascular  Hyperlipidemia, Past MI > 6 months,    Pulmonary  Smoker cigarette smoker  ,        GI/Hepatic    Bowel prep            Endo/Other     GYN       Hematology  Negative hematology ROS      Musculoskeletal  Negative musculoskeletal ROS        Neurology    CVA ,    Psychology   Psychiatric history, Anxiety, Depression , bipolar disorder and being treated for depression,   Chronic pain,            Physical Exam    Airway    Mallampati score: II  TM Distance: >3 FB  Neck ROM: full     Dental   No notable dental hx     Cardiovascular  Rhythm: regular, Rate: normal, Cardiovascular exam normal    Pulmonary  Pulmonary exam normal Breath sounds clear to auscultation,     Other Findings        Anesthesia Plan  ASA Score- 3     Anesthesia Type- IV sedation with anesthesia with ASA Monitors  Additional Monitors:   Airway Plan:         Plan Factors-Patient not instructed to abstain from smoking on day of procedure  Patient did not smoke on day of surgery  Induction- intravenous  Postoperative Plan-     Informed Consent- Anesthetic plan and risks discussed with patient  I personally reviewed this patient with the CRNA  Discussed and agreed on the Anesthesia Plan with the CRNA  Rigoberto Shannon

## 2019-08-08 ENCOUNTER — OFFICE VISIT (OUTPATIENT)
Dept: URGENT CARE | Facility: CLINIC | Age: 54
End: 2019-08-08
Payer: MEDICARE

## 2019-08-08 VITALS
RESPIRATION RATE: 14 BRPM | SYSTOLIC BLOOD PRESSURE: 110 MMHG | TEMPERATURE: 98.7 F | DIASTOLIC BLOOD PRESSURE: 90 MMHG | BODY MASS INDEX: 24.36 KG/M2 | HEART RATE: 73 BPM | HEIGHT: 71 IN | WEIGHT: 174 LBS

## 2019-08-08 DIAGNOSIS — K59.00 CONSTIPATION, UNSPECIFIED CONSTIPATION TYPE: Primary | ICD-10-CM

## 2019-08-08 PROCEDURE — G0463 HOSPITAL OUTPT CLINIC VISIT: HCPCS | Performed by: PHYSICIAN ASSISTANT

## 2019-08-08 PROCEDURE — 99213 OFFICE O/P EST LOW 20 MIN: CPT | Performed by: PHYSICIAN ASSISTANT

## 2019-08-09 NOTE — PATIENT INSTRUCTIONS
Constipation   AMBULATORY CARE:   Constipation  is when you have hard, dry bowel movements, or you go longer than usual between bowel movements  Constipation may be caused by a lack of water or high-fiber foods  Medicines used to treat pain or depression, or a lack of physical activity may also cause constipation  Common symptoms include the following:   · Trouble pushing out your bowel movement    · Pain or bleeding during your bowel movement    · A feeling that you did not finish having your bowel movement    · Nausea    · Bloating    · Headache  Seek immediate care for the following symptoms:   · Blood in your bowel movement    · A fever and abdominal pain with the constipation  Contact your healthcare provider if:   · Your constipation gets worse  · You start to vomit  · You have questions or concerns about your condition or care  Medicines:   · Medicine or a fiber supplement  may help make your bowel movement softer  A laxative may help relax and loosen your intestines to help you have a bowel movement  You may also be given medicine to increase fluid in your intestines  The fluid may help move bowel movements through your intestines  · Take your medicine as directed  Contact your healthcare provider if you think your medicine is not helping or if you have side effects  Tell him of her if you are allergic to any medicine  Keep a list of the medicines, vitamins, and herbs you take  Include the amounts, and when and why you take them  Bring the list or the pill bottles to follow-up visits  Carry your medicine list with you in case of an emergency  Manage or prevent constipation:   · Drink liquids as directed  You may need to drink extra liquids to help soften and move your bowels  Ask how much liquid to drink each day and which liquids are best for you  · Eat high-fiber foods  This may help decrease constipation by adding bulk to your bowel movements   High-fiber foods include fruit, vegetables, whole-grain breads and cereals, and beans  Your healthcare provider or dietitian can help you create a high-fiber meal plan  · Exercise regularly  Regular physical activity can help stimulate your intestines  Ask which exercises are best for you  · Schedule a time each day to have a bowel movement  This may help train your body to have regular bowel movements  Bend forward while you are on the toilet to help move the bowel movement out  Sit on the toilet for at least 10 minutes, even if you do not have a bowel movement  Follow up with your healthcare provider as directed:  Write down your questions so you remember to ask them during your visits  © 2017 [facility] Information is for End User's use only and may not be sold, redistributed or otherwise used for commercial purposes  All illustrations and images included in CareNotes® are the copyrighted property of [employer]  or Charli Calvo  The above information is an  only  It is not intended as medical advice for individual conditions or treatments  Talk to your doctor, nurse or pharmacist before following any medical regimen to see if it is safe and effective for you

## 2019-09-05 ENCOUNTER — HOSPITAL ENCOUNTER (INPATIENT)
Facility: HOSPITAL | Age: 54
LOS: 2 days | DRG: 281 | End: 2019-09-07
Attending: EMERGENCY MEDICINE | Admitting: INTERNAL MEDICINE
Payer: MEDICARE

## 2019-09-05 ENCOUNTER — APPOINTMENT (EMERGENCY)
Dept: RADIOLOGY | Facility: HOSPITAL | Age: 54
DRG: 281 | End: 2019-09-05
Payer: MEDICARE

## 2019-09-05 DIAGNOSIS — R45.851 SUICIDAL IDEATIONS: Primary | ICD-10-CM

## 2019-09-05 DIAGNOSIS — R77.8 ELEVATED TROPONIN: ICD-10-CM

## 2019-09-05 PROBLEM — R79.89 ELEVATED TROPONIN: Status: ACTIVE | Noted: 2019-09-05

## 2019-09-05 PROBLEM — F31.4 BIPOLAR 1 DISORDER, DEPRESSED, SEVERE (HCC): Status: ACTIVE | Noted: 2017-07-13

## 2019-09-05 PROBLEM — Z72.0 TOBACCO ABUSE: Status: ACTIVE | Noted: 2019-09-05

## 2019-09-05 LAB
ALBUMIN SERPL BCP-MCNC: 4 G/DL (ref 3.5–5)
ALP SERPL-CCNC: 60 U/L (ref 46–116)
ALT SERPL W P-5'-P-CCNC: 26 U/L (ref 12–78)
AMPHETAMINES SERPL QL SCN: NEGATIVE
ANION GAP SERPL CALCULATED.3IONS-SCNC: 7 MMOL/L (ref 4–13)
APAP SERPL-MCNC: <2 UG/ML (ref 10–20)
APTT PPP: 30 SECONDS (ref 23–37)
AST SERPL W P-5'-P-CCNC: 27 U/L (ref 5–45)
BARBITURATES UR QL: NEGATIVE
BASOPHILS # BLD AUTO: 0.08 THOUSANDS/ΜL (ref 0–0.1)
BASOPHILS NFR BLD AUTO: 1 % (ref 0–1)
BENZODIAZ UR QL: NEGATIVE
BILIRUB SERPL-MCNC: 1.6 MG/DL (ref 0.2–1)
BUN SERPL-MCNC: 9 MG/DL (ref 5–25)
CALCIUM SERPL-MCNC: 9.6 MG/DL (ref 8.3–10.1)
CHLORIDE SERPL-SCNC: 104 MMOL/L (ref 100–108)
CO2 SERPL-SCNC: 28 MMOL/L (ref 21–32)
COCAINE UR QL: NEGATIVE
CREAT SERPL-MCNC: 0.98 MG/DL (ref 0.6–1.3)
EOSINOPHIL # BLD AUTO: 0.12 THOUSAND/ΜL (ref 0–0.61)
EOSINOPHIL NFR BLD AUTO: 1 % (ref 0–6)
ERYTHROCYTE [DISTWIDTH] IN BLOOD BY AUTOMATED COUNT: 12.9 % (ref 11.6–15.1)
ETHANOL SERPL-MCNC: <3 MG/DL (ref 0–3)
GFR SERPL CREATININE-BSD FRML MDRD: 86 ML/MIN/1.73SQ M
GLUCOSE SERPL-MCNC: 88 MG/DL (ref 65–140)
HCT VFR BLD AUTO: 50.8 % (ref 36.5–49.3)
HGB BLD-MCNC: 16.6 G/DL (ref 12–17)
IMM GRANULOCYTES # BLD AUTO: 0.03 THOUSAND/UL (ref 0–0.2)
IMM GRANULOCYTES NFR BLD AUTO: 0 % (ref 0–2)
INR PPP: 1.05 (ref 0.84–1.19)
LITHIUM SERPL-SCNC: 0.7 MMOL/L (ref 0.5–1)
LYMPHOCYTES # BLD AUTO: 1.96 THOUSANDS/ΜL (ref 0.6–4.47)
LYMPHOCYTES NFR BLD AUTO: 18 % (ref 14–44)
MCH RBC QN AUTO: 31 PG (ref 26.8–34.3)
MCHC RBC AUTO-ENTMCNC: 32.7 G/DL (ref 31.4–37.4)
MCV RBC AUTO: 95 FL (ref 82–98)
METHADONE UR QL: NEGATIVE
MONOCYTES # BLD AUTO: 0.7 THOUSAND/ΜL (ref 0.17–1.22)
MONOCYTES NFR BLD AUTO: 7 % (ref 4–12)
NEUTROPHILS # BLD AUTO: 7.88 THOUSANDS/ΜL (ref 1.85–7.62)
NEUTS SEG NFR BLD AUTO: 73 % (ref 43–75)
NRBC BLD AUTO-RTO: 0 /100 WBCS
OPIATES UR QL SCN: NEGATIVE
PCP UR QL: NEGATIVE
PLATELET # BLD AUTO: 287 THOUSANDS/UL (ref 149–390)
PMV BLD AUTO: 10.5 FL (ref 8.9–12.7)
POTASSIUM SERPL-SCNC: 4.4 MMOL/L (ref 3.5–5.3)
PROT SERPL-MCNC: 7 G/DL (ref 6.4–8.2)
PROTHROMBIN TIME: 13.4 SECONDS (ref 11.6–14.5)
RBC # BLD AUTO: 5.35 MILLION/UL (ref 3.88–5.62)
SALICYLATES SERPL-MCNC: <3 MG/DL (ref 3–20)
SODIUM SERPL-SCNC: 139 MMOL/L (ref 136–145)
THC UR QL: NEGATIVE
TROPONIN I SERPL-MCNC: 0.27 NG/ML
TROPONIN I SERPL-MCNC: 0.29 NG/ML
TROPONIN I SERPL-MCNC: 0.3 NG/ML
TSH SERPL DL<=0.05 MIU/L-ACNC: 0.83 UIU/ML (ref 0.36–3.74)
WBC # BLD AUTO: 10.77 THOUSAND/UL (ref 4.31–10.16)

## 2019-09-05 PROCEDURE — 99223 1ST HOSP IP/OBS HIGH 75: CPT | Performed by: INTERNAL MEDICINE

## 2019-09-05 PROCEDURE — 99285 EMERGENCY DEPT VISIT HI MDM: CPT

## 2019-09-05 PROCEDURE — 71046 X-RAY EXAM CHEST 2 VIEWS: CPT

## 2019-09-05 PROCEDURE — 36415 COLL VENOUS BLD VENIPUNCTURE: CPT | Performed by: EMERGENCY MEDICINE

## 2019-09-05 PROCEDURE — 85610 PROTHROMBIN TIME: CPT | Performed by: INTERNAL MEDICINE

## 2019-09-05 PROCEDURE — 84484 ASSAY OF TROPONIN QUANT: CPT | Performed by: EMERGENCY MEDICINE

## 2019-09-05 PROCEDURE — 85730 THROMBOPLASTIN TIME PARTIAL: CPT | Performed by: INTERNAL MEDICINE

## 2019-09-05 PROCEDURE — 80178 ASSAY OF LITHIUM: CPT | Performed by: EMERGENCY MEDICINE

## 2019-09-05 PROCEDURE — 84484 ASSAY OF TROPONIN QUANT: CPT | Performed by: INTERNAL MEDICINE

## 2019-09-05 PROCEDURE — 80307 DRUG TEST PRSMV CHEM ANLYZR: CPT | Performed by: EMERGENCY MEDICINE

## 2019-09-05 PROCEDURE — 87081 CULTURE SCREEN ONLY: CPT | Performed by: EMERGENCY MEDICINE

## 2019-09-05 PROCEDURE — 80320 DRUG SCREEN QUANTALCOHOLS: CPT | Performed by: EMERGENCY MEDICINE

## 2019-09-05 PROCEDURE — 99285 EMERGENCY DEPT VISIT HI MDM: CPT | Performed by: EMERGENCY MEDICINE

## 2019-09-05 PROCEDURE — 80329 ANALGESICS NON-OPIOID 1 OR 2: CPT | Performed by: EMERGENCY MEDICINE

## 2019-09-05 PROCEDURE — 80053 COMPREHEN METABOLIC PANEL: CPT | Performed by: EMERGENCY MEDICINE

## 2019-09-05 PROCEDURE — 93005 ELECTROCARDIOGRAM TRACING: CPT

## 2019-09-05 PROCEDURE — 85025 COMPLETE CBC W/AUTO DIFF WBC: CPT | Performed by: EMERGENCY MEDICINE

## 2019-09-05 PROCEDURE — 84443 ASSAY THYROID STIM HORMONE: CPT | Performed by: EMERGENCY MEDICINE

## 2019-09-05 RX ORDER — HEPARIN SODIUM 1000 [USP'U]/ML
4000 INJECTION, SOLUTION INTRAVENOUS; SUBCUTANEOUS ONCE
Status: COMPLETED | OUTPATIENT
Start: 2019-09-05 | End: 2019-09-05

## 2019-09-05 RX ORDER — NICOTINE 21 MG/24HR
1 PATCH, TRANSDERMAL 24 HOURS TRANSDERMAL DAILY
Status: DISCONTINUED | OUTPATIENT
Start: 2019-09-06 | End: 2019-09-07 | Stop reason: HOSPADM

## 2019-09-05 RX ORDER — SENNOSIDES 8.6 MG
1 TABLET ORAL ONCE
Status: COMPLETED | OUTPATIENT
Start: 2019-09-05 | End: 2019-09-05

## 2019-09-05 RX ORDER — DOCUSATE SODIUM 100 MG/1
100 CAPSULE, LIQUID FILLED ORAL 2 TIMES DAILY
Status: DISCONTINUED | OUTPATIENT
Start: 2019-09-05 | End: 2019-09-07 | Stop reason: HOSPADM

## 2019-09-05 RX ORDER — CLONAZEPAM 0.5 MG/1
0.5 TABLET ORAL 2 TIMES DAILY
Status: DISCONTINUED | OUTPATIENT
Start: 2019-09-05 | End: 2019-09-07 | Stop reason: HOSPADM

## 2019-09-05 RX ORDER — ACETAMINOPHEN 325 MG/1
650 TABLET ORAL EVERY 6 HOURS PRN
Status: DISCONTINUED | OUTPATIENT
Start: 2019-09-05 | End: 2019-09-07 | Stop reason: HOSPADM

## 2019-09-05 RX ORDER — HEPARIN SODIUM 10000 [USP'U]/100ML
3-20 INJECTION, SOLUTION INTRAVENOUS
Status: DISCONTINUED | OUTPATIENT
Start: 2019-09-05 | End: 2019-09-07 | Stop reason: HOSPADM

## 2019-09-05 RX ORDER — ONDANSETRON 2 MG/ML
4 INJECTION INTRAMUSCULAR; INTRAVENOUS EVERY 6 HOURS PRN
Status: DISCONTINUED | OUTPATIENT
Start: 2019-09-05 | End: 2019-09-07 | Stop reason: HOSPADM

## 2019-09-05 RX ORDER — LITHIUM CARBONATE 150 MG/1
150 CAPSULE ORAL
COMMUNITY
End: 2019-09-05

## 2019-09-05 RX ORDER — HEPARIN SODIUM 1000 [USP'U]/ML
2000 INJECTION, SOLUTION INTRAVENOUS; SUBCUTANEOUS AS NEEDED
Status: DISCONTINUED | OUTPATIENT
Start: 2019-09-05 | End: 2019-09-07 | Stop reason: HOSPADM

## 2019-09-05 RX ORDER — ATORVASTATIN CALCIUM 20 MG/1
20 TABLET, FILM COATED ORAL DAILY
Status: DISCONTINUED | OUTPATIENT
Start: 2019-09-06 | End: 2019-09-07 | Stop reason: HOSPADM

## 2019-09-05 RX ORDER — SODIUM CHLORIDE 9 MG/ML
100 INJECTION, SOLUTION INTRAVENOUS CONTINUOUS
Status: DISCONTINUED | OUTPATIENT
Start: 2019-09-05 | End: 2019-09-07

## 2019-09-05 RX ORDER — ESCITALOPRAM OXALATE 5 MG/1
5 TABLET ORAL DAILY
Status: DISCONTINUED | OUTPATIENT
Start: 2019-09-06 | End: 2019-09-07 | Stop reason: HOSPADM

## 2019-09-05 RX ORDER — NITROGLYCERIN 0.4 MG/1
0.4 TABLET SUBLINGUAL
Status: DISCONTINUED | OUTPATIENT
Start: 2019-09-05 | End: 2019-09-07 | Stop reason: HOSPADM

## 2019-09-05 RX ORDER — ASPIRIN 81 MG/1
324 TABLET, CHEWABLE ORAL ONCE
Status: COMPLETED | OUTPATIENT
Start: 2019-09-05 | End: 2019-09-05

## 2019-09-05 RX ORDER — LITHIUM CARBONATE 450 MG
450 TABLET, EXTENDED RELEASE ORAL EVERY 12 HOURS SCHEDULED
Status: DISCONTINUED | OUTPATIENT
Start: 2019-09-05 | End: 2019-09-07 | Stop reason: HOSPADM

## 2019-09-05 RX ORDER — HEPARIN SODIUM 1000 [USP'U]/ML
4000 INJECTION, SOLUTION INTRAVENOUS; SUBCUTANEOUS AS NEEDED
Status: DISCONTINUED | OUTPATIENT
Start: 2019-09-05 | End: 2019-09-07 | Stop reason: HOSPADM

## 2019-09-05 RX ADMIN — CLONAZEPAM 0.5 MG: 0.5 TABLET ORAL at 21:18

## 2019-09-05 RX ADMIN — ASPIRIN 81 MG 324 MG: 81 TABLET ORAL at 18:12

## 2019-09-05 RX ADMIN — SENNOSIDES 8.6 MG: 8.6 TABLET, FILM COATED ORAL at 16:14

## 2019-09-05 RX ADMIN — SODIUM CHLORIDE 100 ML/HR: 0.9 INJECTION, SOLUTION INTRAVENOUS at 21:19

## 2019-09-05 RX ADMIN — HEPARIN SODIUM AND DEXTROSE 12 UNITS/KG/HR: 10000; 5 INJECTION INTRAVENOUS at 21:19

## 2019-09-05 RX ADMIN — DOCUSATE SODIUM 100 MG: 100 CAPSULE, LIQUID FILLED ORAL at 21:18

## 2019-09-05 RX ADMIN — HEPARIN SODIUM 4000 UNITS: 1000 INJECTION, SOLUTION INTRAVENOUS; SUBCUTANEOUS at 21:19

## 2019-09-05 RX ADMIN — LITHIUM CARBONATE 450 MG: 450 TABLET, EXTENDED RELEASE ORAL at 21:18

## 2019-09-05 NOTE — ASSESSMENT & PLAN NOTE
Likely non STEMI type 2  Serial troponins, EKG and echocardiogram  Will start on heparin drip  Follow as per protocol with PTT  Will start on On aspirin, Lipitor and Lopressor  Cardiology consult  TSH, lipid profile  Oxygen supplementation and bronchodilators p r n  Nitro p r n  Monitor on telemetry

## 2019-09-05 NOTE — PROGRESS NOTES
Assessment/Plan    Constipation, unspecified constipation type [K59 00]  1  Constipation, unspecified constipation type           Subjective:     Patient ID: Jason Collado is a 53 y o  male  Reason For Visit / Chief Complaint  Chief Complaint   Patient presents with    Constipation     x1 week, has taken prune juice without relief, O2-97%         60-year-old male presents the clinic with complaint of last bowel movement being 1 week ago  Patient states that he has been taking prune juice without improvement in symptoms  Patient is here with a  who states that patient has been eating normally, denies patient having any fevers, chills, nausea, vomiting, decreased appetite, diarrhea  Patient states that he does feel slight discomfort in his abdomen but states that he has been eating normally and did have breakfast this morning   and patient denies any new foods, medications, decreased fluids   also states that patient has not tried any other the home treatments for constipation except for drinking some prune juice  Patient denies any blood in stool and states that he has had very small hard BMs but not a normal bowel movement  Past Medical History:   Diagnosis Date    Anxiety     Bipolar 1 disorder ([place] [address] )     Bowel habit changes     Chronic pain disorder     CVA (cerebral vascular accident) ([place] [address] )     Depression     Family hx of colon cancer     father and paternal uncle    Myocardial infarction [facility])     Psychiatric illness     Stroke ([place] [address] ) 12/2015    CVA x3   Pt states he is unaware of when he had first two CVA    Suicide attempt [facility])        Past Surgical History:   Procedure Laterality Date    COLONOSCOPY         Family History   Problem Relation Age of Onset    No Known Problems Mother     No Known Problems Father     No Known Problems Sister     No Known Problems Brother     No Known Problems Maternal Aunt     No Known Problems Paternal Aunt  No Known Problems Maternal Uncle     No Known Problems Paternal Uncle     No Known Problems Maternal Grandfather     No Known Problems Maternal Grandmother     No Known Problems Paternal Grandfather     No Known Problems Paternal Grandmother     No Known Problems Cousin     ADD / ADHD Neg Hx     Alcohol abuse Neg Hx     Anxiety disorder Neg Hx     Bipolar disorder Neg Hx     Completed Suicide  Neg Hx     Dementia Neg Hx     Depression Neg Hx     Drug abuse Neg Hx     OCD Neg Hx     Psychiatric Illness Neg Hx     Psychosis Neg Hx     Schizoaffective Disorder  Neg Hx     Schizophrenia Neg Hx     Self-Injury Neg Hx     Suicide Attempts Neg Hx        Review of Systems   Constitutional: Negative for chills and fever  Gastrointestinal: Positive for abdominal pain and constipation  Negative for blood in stool, diarrhea, nausea, rectal pain and vomiting  Objective:    /90   Pulse 73   Temp 98 7 °F (37 1 °C)   Resp 14   Ht 5' 11" (1 803 m)   Wt 78 9 kg (174 lb)   BMI 24 27 kg/m²     Physical Exam   Constitutional: He is oriented to person, place, and time  Vital signs are normal  He appears well-developed and well-nourished  No distress  HENT:   Head: Normocephalic and atraumatic  Eyes: Conjunctivae are normal    Cardiovascular: Normal rate, regular rhythm and normal heart sounds  Pulmonary/Chest: Effort normal and breath sounds normal    Abdominal: Soft  Bowel sounds are normal  There is no tenderness  Musculoskeletal: Normal range of motion  Neurological: He is alert and oriented to person, place, and time  Skin: Skin is warm and dry  He is not diaphoretic  Nursing note and vitals reviewed

## 2019-09-05 NOTE — H&P
H&P- David Rios 1965, 53 y o  male MRN: [id number]    Unit/Bed#: ED 02 Encounter: [id number]    Primary Care Provider: Juana Dolan PA-C   Date and time admitted to hospital: 9/5/2019  3:42 PM        * Elevated troponin  Assessment & Plan  Likely non STEMI type 2  Serial troponins, EKG and echocardiogram  Will start on heparin drip  Follow as per protocol with PTT  Will start on On aspirin, Lipitor and Lopressor  Cardiology consult  TSH, lipid profile  Oxygen supplementation and bronchodilators p r n  Nitro p r n  Monitor on telemetry  Tobacco abuse  Assessment & Plan  Smoking cessation counseling given  On nicotine patch  Other hyperlipidemia  Assessment & Plan  On statin    Suicidal ideation  Assessment & Plan  Will place on one-to-one observation  Psychiatry consult  Patient will need inpatient psychiatry admission was medically cleared    Bipolar 1 disorder, depressed, severe ([facility] [address] )  Assessment & Plan  Continue on lithium, Klonopin and Lexapro    Anticipated length of stay greater than 2 midnights    Chief Complaint   Patient presents with    Psychiatric Evaluation     To ED with c/o worsening depression over several days  Staff reports a medication change yesterday  Staff reports that patient stated he wanted to hit his head on a pole outside his residence         HPI:  David Rios is a 53 y o  male with history of bipolar disorder, tobacco abuse, hyperlipidemia presented to the emergency department with complain of suicidal thoughts/ideation  Patient states that he has been having over the last couple of weeks thoughts about hurting himself and today he was thinking about jumping over the rail on a 2nd story building or hitting his head against cement pole to kill himself  Patient [facility] at [facility]  Patient was brought to ER by the staff  Patient denies any new stressors in his life  Patient has history of smoking  He denies any delusions or hallucinations    Patient admits to having constipation and his last bowel movement was a week ago and took Senokot yesterday  In ER during routine medical clearance patient was found to have abnormal EKG and troponin came back at 0 30  ER provider spoke to cardiologist who recommended medical admission and patient does not need cardiac intervention  Cardiologist recommended to trend troponins  Patient denies having any chest pain, diaphoresis, nausea, vomiting, palpitation, shortness of breath, cough, abdominal pain or any other complaints  Historical Information   Past Medical History:   Diagnosis Date    Anxiety     Bipolar 1 disorder ([place] [address] )     Bowel habit changes     Chronic pain disorder     CVA (cerebral vascular accident) ([place] [address] )     Depression     Family hx of colon cancer     father and paternal uncle    Myocardial infarction [facility])     Psychiatric illness     Stroke ([place] [address] ) 12/2015    CVA x3   Pt states he is unaware of when he had first two CVA    Suicide attempt [facility])      Past Surgical History:   Procedure Laterality Date    COLONOSCOPY       Social History   Social History     Substance and Sexual Activity   Alcohol Use No    Comment: pt denies     Social History     Substance and Sexual Activity   Drug Use No    Comment: pt denies     Social History     Tobacco Use   Smoking Status Current Every Day Smoker    Packs/day: 0 50    Years: 15 00    Pack years: 7 50    Types: Cigarettes   Smokeless Tobacco Current User   Tobacco Comment    Wishes to quit     Family History   Problem Relation Age of Onset    No Known Problems Mother     No Known Problems Father     No Known Problems Sister     No Known Problems Brother     No Known Problems Maternal Aunt     No Known Problems Paternal Aunt     No Known Problems Maternal Uncle     No Known Problems Paternal Uncle     No Known Problems Maternal Grandfather     No Known Problems Maternal Grandmother     No Known Problems Paternal Grandfather     No Known Problems Paternal Grandmother     No Known Problems Cousin     ADD / ADHD Neg Hx     Alcohol abuse Neg Hx     Anxiety disorder Neg Hx     Bipolar disorder Neg Hx     Completed Suicide  Neg Hx     Dementia Neg Hx     Depression Neg Hx     Drug abuse Neg Hx     OCD Neg Hx     Psychiatric Illness Neg Hx     Psychosis Neg Hx     Schizoaffective Disorder  Neg Hx     Schizophrenia Neg Hx     Self-Injury Neg Hx     Suicide Attempts Neg Hx        Meds/Allergies   Allergies   Allergen Reactions    Haldol [Haloperidol] Other (See Comments)     Muscle/jaw tightness  Difficulty swallowing    Prolixin [Fluphenazine] Other (See Comments)     Muscle/jaw tightness  Difficulty swallowing      Thorazine [Chlorpromazine] Other (See Comments)     Muscle/jaw tightness  Difficulty swallowing  Meds:  No current facility-administered medications for this encounter       Current Outpatient Medications:     lithium carbonate 150 mg capsule, Take 150 mg by mouth daily at bedtime, Disp: , Rfl:     Aspirin (ASPIR-81 PO), Take 81 mg by mouth, Disp: , Rfl:     atorvastatin (LIPITOR) 20 mg tablet, Take 1 tablet (20 mg total) by mouth daily, Disp: 21 tablet, Rfl: 0    cholecalciferol (VITAMIN D3) 1,000 units tablet, Take 1,000 Units by mouth daily, Disp: , Rfl:     clonazePAM (KlonoPIN) 0 5 mg tablet, Take 1 tablet (0 5 mg total) by mouth 2 (two) times a day for 10 days, Disp: 20 tablet, Rfl: 0    docusate sodium (COLACE) 100 mg capsule, Take 1 capsule (100 mg total) by mouth 2 (two) times a day, Disp: 10 capsule, Rfl: 0    escitalopram (LEXAPRO) 5 mg tablet, Take 1 tablet (5 mg total) by mouth daily (Patient taking differently: Take 15 mg by mouth daily ), Disp: 21 tablet, Rfl: 0    lithium carbonate (LITHOBID) 450 mg CR tablet, Take 1 tablet (450 mg total) by mouth every 12 (twelve) hours, Disp: 42 tablet, Rfl: 0      (Not in a hospital admission)      Review of Systems   Constitutional: Positive for activity change  HENT: Negative  Eyes: Negative  Respiratory: Negative  Cardiovascular: Negative  Gastrointestinal: Negative  Endocrine: Negative  Genitourinary: Negative  Musculoskeletal: Negative  Skin: Negative  Allergic/Immunologic: Negative  Neurological: Negative  Hematological: Negative  Psychiatric/Behavioral: Positive for suicidal ideas  The patient is nervous/anxious  Current Vitals:   Blood Pressure: 136/79 (09/05/19 1802)  Pulse: (!) 53 (09/05/19 1802)  Temperature: (!) 97 2 °F (36 2 °C) (09/05/19 1802)  Temp Source: Temporal (09/05/19 1802)  Respirations: 20 (09/05/19 1802)  Weight - Scale: 78 9 kg (173 lb 15 1 oz) (09/05/19 1549)  SpO2: 98 % (09/05/19 1802)  SPO2 RA Rest      ED from 9/5/2019 in [address] [place] [address] Emergency Department   SpO2  98 %   SpO2 Activity  At Rest   O2 Device  None (Room air)   O2 Flow Rate  --        No intake or output data in the 24 hours ending 09/05/19 1847  Body mass index is 24 26 kg/m²  Physical Exam   Constitutional: He is oriented to person, place, and time  He appears well-developed and well-nourished  No distress  HENT:   Head: Normocephalic and atraumatic  Mouth/Throat: Oropharynx is clear and moist    Eyes: Pupils are equal, round, and reactive to light  Conjunctivae and EOM are normal  No scleral icterus  Neck: Normal range of motion  Neck supple  No JVD present  No thyromegaly present  Cardiovascular: Normal rate, regular rhythm, normal heart sounds and intact distal pulses  No murmur heard  Pulmonary/Chest: Effort normal and breath sounds normal  No respiratory distress  He has no wheezes  He has no rales  He exhibits no tenderness  Abdominal: Soft  Bowel sounds are normal  He exhibits no mass  There is no tenderness  There is no rebound and no guarding  Musculoskeletal: Normal range of motion  He exhibits no edema, tenderness or deformity  Lymphadenopathy:     He has no cervical adenopathy  Neurological: He is alert and oriented to person, place, and time  No cranial nerve deficit  No focal deficits   Skin: Skin is warm  No rash noted  No erythema  No pallor  Psychiatric:   Suicidal ideation   Nursing note and vitals reviewed        Lab Results:   CBC:   Lab Results   Component Value Date    WBC 10 77 (H) 09/05/2019    HGB 16 6 09/05/2019    HCT 50 8 (H) 09/05/2019    MCV 95 09/05/2019     09/05/2019    MCH 31 0 09/05/2019    MCHC 32 7 09/05/2019    RDW 12 9 09/05/2019    MPV 10 5 09/05/2019    NRBC 0 09/05/2019     CMP:  Lab Results   Component Value Date     01/02/2016     09/05/2019     01/02/2016    CO2 28 09/05/2019    CO2 24 10/01/2016    ANIONGAP 11 01/02/2016    BUN 9 09/05/2019    BUN 9 01/02/2016    CREATININE 0 98 09/05/2019    CREATININE 1 01 01/02/2016    GLUCOSE 99 10/01/2016    GLUCOSE 91 01/02/2016    CALCIUM 9 6 09/05/2019    CALCIUM 9 1 01/02/2016    AST 27 09/05/2019    AST 14 01/02/2016    ALT 26 09/05/2019    ALT 18 01/02/2016    ALKPHOS 60 09/05/2019    ALKPHOS 63 01/02/2016    PROT 7 6 01/02/2016    BILITOT 0 60 01/02/2016    EGFR 86 09/05/2019    EGFR >60 0 10/01/2016     Lab Results   Component Value Date    TROPONINI 0 30 (H) 09/05/2019     Coagulation:   Lab Results   Component Value Date    INR 1 07 10/01/2016    Urinalysis:  Lab Results   Component Value Date    COLORU Yellow 10/01/2016    COLORU Yellow 12/20/2015    CLARITYU Clear 10/01/2016    CLARITYU Slightly Cloudy 12/20/2015    SPECGRAV 1 010 10/01/2016    SPECGRAV 1 015 12/20/2015    PHUR 7 0 10/01/2016    PHUR 7 0 12/20/2015    LEUKOCYTESUR Negative 10/01/2016    LEUKOCYTESUR Negative 12/20/2015    NITRITE Negative 10/01/2016    NITRITE Negative 12/20/2015    PROTEINUA Negative 12/20/2015    GLUCOSEU Negative 10/01/2016    GLUCOSEU Negative 12/20/2015    KETONESU 15 (1+) (A) 10/01/2016    KETONESU Negative 12/20/2015    BILIRUBINUR Negative 10/01/2016    BILIRUBINUR Negative 12/20/2015 BLOODU Trace-Intact (A) 10/01/2016    BLOODU Negative 12/20/2015      Amylase: No results found for: AMYLASE  Lipase: No results found for: LIPASE     Imaging: No results found  EKG, Pathology, and Other Studies: I have personally reviewed the results  VTE Pharmacologic Prophylaxis: Heparin drip  VTE Mechanical Prophylaxis: sequential compression device    Code Status: Prior    Counseling / Coordination of Care  Total floor / unit time spent today 75 minutes  Greater than 50% of total time was spent with the patient and / or family counseling and / or coordination of care       "This note has been constructed using a voice recognition system"      Fausto Triplett MD  9/5/2019, 6:47 PM

## 2019-09-05 NOTE — ED PROVIDER NOTES
History  Chief Complaint   Patient presents with    Psychiatric Evaluation     To ED with c/o worsening depression over several days  Staff reports a medication change yesterday  Staff reports that patient stated he wanted to hit his head on a pole outside his residence      Patient complains of depression with suicidal thoughts over last couple weeks even today thinking about jumping over rail on second story building or hitting head against cement pole to kill himself  Patient states no real new stressors just his life is like this  Patient denies hallucinations  He admits to constipation last BM 1 week ago and took sennokot yesterday  Prior to Admission Medications   Prescriptions Last Dose Informant Patient Reported? Taking? Aspirin (ASPIR-81 PO)   Yes No   Sig: Take 81 mg by mouth   atorvastatin (LIPITOR) 20 mg tablet   No No   Sig: Take 1 tablet (20 mg total) by mouth daily   cholecalciferol (VITAMIN D3) 1,000 units tablet   Yes No   Sig: Take 1,000 Units by mouth daily   clonazePAM (KlonoPIN) 0 5 mg tablet   No No   Sig: Take 1 tablet (0 5 mg total) by mouth 2 (two) times a day for 10 days   docusate sodium (COLACE) 100 mg capsule   No No   Sig: Take 1 capsule (100 mg total) by mouth 2 (two) times a day   escitalopram (LEXAPRO) 5 mg tablet  Self No No   Sig: Take 1 tablet (5 mg total) by mouth daily   Patient taking differently: Take 15 mg by mouth daily    lithium carbonate (LITHOBID) 450 mg CR tablet   No No   Sig: Take 1 tablet (450 mg total) by mouth every 12 (twelve) hours      Facility-Administered Medications: None       Past Medical History:   Diagnosis Date    Anxiety     Bipolar 1 disorder (HCC)     Bowel habit changes     Chronic pain disorder     [facility]cere[facility]) [facility] [address] [facility]     Depression     Family hx of colon cancer     father and paternal uncle    [facility] infarction [facility])     Psychiatric illness     [facility]    CVA x3   Pt states he is unaware of when he had first two CVA    Suicide attempt [facility])        Past Surgical History:   Procedure Laterality Date    COLONOSCOPY         Family History   Problem Relation Age of Onset    No Known Problems Mother     No Known Problems Father     No Known Problems Sister     No Known Problems Brother     No Known Problems Maternal Aunt     No Known Problems Paternal Aunt     No Known Problems Maternal Uncle     No Known Problems Paternal Uncle     No Known Problems Maternal Grandfather     No Known Problems Maternal Grandmother     No Known Problems Paternal Grandfather     No Known Problems Paternal Grandmother     No Known Problems Cousin     ADD / ADHD Neg Hx     Alcohol abuse Neg Hx     Anxiety disorder Neg Hx     Bipolar disorder Neg Hx     Completed Suicide  Neg Hx     Dementia Neg Hx     Depression Neg Hx     Drug abuse Neg Hx     OCD Neg Hx     Psychiatric Illness Neg Hx     Psychosis Neg Hx     Schizoaffective Disorder  Neg Hx     Schizophrenia Neg Hx     Self-Injury Neg Hx     Suicide Attempts Neg Hx      I have reviewed and agree with the history as documented  Social History     Tobacco Use    Smoking status: Current Every Day Smoker     Packs/day: 0 50     Years: 15 00     Pack years: 7 50     Types: Cigarettes    Smokeless tobacco: Current User    Tobacco comment: Wishes to quit   Substance Use Topics    Alcohol use: No     Comment: pt denies    Drug use: No     Comment: pt denies        Review of Systems   All other systems reviewed and are negative  Physical Exam  Physical Exam   Constitutional: He is oriented to person, place, and time  He appears well-developed and well-nourished  HENT:   Mouth/Throat: Oropharynx is clear and moist    Eyes: Pupils are equal, round, and reactive to light  Conjunctivae and EOM are normal    Neck: Normal range of motion  Neck supple  No spinous process tenderness present     Cardiovascular: Normal rate, regular rhythm, normal heart sounds and intact distal pulses  Pulmonary/Chest: Effort normal and breath sounds normal  No respiratory distress  He has no wheezes  Abdominal: Soft  Bowel sounds are normal  He exhibits no distension  There is no tenderness  Musculoskeletal: Normal range of motion  Neurological: He is alert and oriented to person, place, and time  He has normal strength  No sensory deficit  GCS eye subscore is 4  GCS verbal subscore is 5  GCS motor subscore is 6  Skin: Skin is warm and dry  No rash noted  Psychiatric: He has a normal mood and affect  His speech is normal  He expresses suicidal ideation  He expresses suicidal plans  He expresses no homicidal plans  Nursing note and vitals reviewed        Vital Signs  ED Triage Vitals [09/05/19 1549]   Temperature Pulse Respirations Blood Pressure SpO2   98 °F (36 7 °C) 82 16 122/83 96 %      Temp Source Heart Rate Source Patient Position - Orthostatic VS BP Location FiO2 (%)   Tympanic Monitor Sitting Right arm --      Pain Score       No Pain           Vitals:    09/05/19 1802 09/05/19 1900 09/05/19 2055 09/05/19 2300   BP: 136/79 136/84  123/74   Pulse: (!) 53 57 62 (!) 49   Patient Position - Orthostatic VS: Sitting Lying  Lying         Visual Acuity      ED Medications  Medications   atorvastatin (LIPITOR) tablet 20 mg (has no administration in time range)   clonazePAM (KlonoPIN) tablet 0 5 mg (0 5 mg Oral Given 9/5/19 2118)   docusate sodium (COLACE) capsule 100 mg (100 mg Oral Given 9/5/19 2118)   escitalopram (LEXAPRO) tablet 5 mg (has no administration in time range)   lithium carbonate (LITHOBID) CR tablet 450 mg (450 mg Oral Given 9/5/19 2118)   sodium chloride 0 9 % infusion (100 mL/hr Intravenous New Bag 9/6/19 0637)   nitroglycerin (NITROSTAT) SL tablet 0 4 mg (has no administration in time range)   nicotine (NICODERM CQ) 21 mg/24 hr TD 24 hr patch 1 patch (has no administration in time range)   ondansetron (ZOFRAN) injection 4 mg (has no administration in time range)   acetaminophen (TYLENOL) tablet 650 mg (has no administration in time range)   metoprolol tartrate (LOPRESSOR) tablet 12 5 mg (has no administration in time range)   heparin (porcine) 25,000 units in 250 mL infusion (premix) (12 Units/kg/hr × 75 kg (Order-Specific) Intravenous Handoff 9/5/19 2320)   heparin (porcine) injection 4,000 Units (has no administration in time range)   heparin (porcine) injection 2,000 Units (has no administration in time range)   senna (SENOKOT) tablet 8 6 mg (8 6 mg Oral Given 9/5/19 1614)   aspirin chewable tablet 324 mg (324 mg Oral Given 9/5/19 1812)   heparin (porcine) injection 4,000 Units (4,000 Units Intravenous Given 9/5/19 2119)       Diagnostic Studies  Results Reviewed     Procedure Component Value Units Date/Time    Troponin I [[id number]]     Lab Status:  No result Specimen:  Blood     MRSA culture [[id number]] Collected:  09/05/19 1854    Lab Status: In process Specimen:  Nares from Nose Updated:  09/05/19 1911    Troponin I [[id number]]  (Abnormal) Collected:  09/05/19 1826    Lab Status:  Final result Specimen:  Blood from Arm, Right Updated:  09/05/19 1906     Troponin I 0 27 ng/mL     Acetaminophen level-If concentration is detectable, please discuss with medical  on call  [[id number]]  (Abnormal) Collected:  09/05/19 1625    Lab Status:  Final result Specimen:  Blood from Arm, Left Updated:  09/05/19 1714     Acetaminophen Level <2 0 ug/mL     Ethanol [[id number]]  (Normal) Collected:  09/05/19 1625    Lab Status:  Final result Specimen:  Blood from Arm, Left Updated:  09/05/19 1713     Ethanol Lvl <3 mg/dL     TSH [[id number]]  (Normal) Collected:  09/05/19 1625    Lab Status:  Final result Specimen:  Blood from Arm, Left Updated:  09/05/19 1713     TSH 3RD GENERATON 0 835 uIU/mL     Narrative:       Patients undergoing fluorescein dye angiography may retain small amounts of fluorescein in the body for 48-72 hours post procedure  Samples containing fluorescein can produce falsely depressed TSH values  If the patient had this procedure,a specimen should be resubmitted post fluorescein clearance        Salicylate level [[id number]]  (Abnormal) Collected:  09/05/19 1625    Lab Status:  Final result Specimen:  Blood from Arm, Left Updated:  58/24/01 4683     Salicylate Lvl <3 mg/dL     Troponin I [[id number]]  (Abnormal) Collected:  09/05/19 1629    Lab Status:  Final result Specimen:  Blood from Arm, Left Updated:  09/05/19 1706     Troponin I 0 30 ng/mL     Comprehensive metabolic panel [[id number]]  (Abnormal) Collected:  09/05/19 1625    Lab Status:  Final result Specimen:  Blood from Arm, Left Updated:  09/05/19 1703     Sodium 139 mmol/L      Potassium 4 4 mmol/L      Chloride 104 mmol/L      CO2 28 mmol/L      ANION GAP 7 mmol/L      BUN 9 mg/dL      Creatinine 0 98 mg/dL      Glucose 88 mg/dL      Calcium 9 6 mg/dL      AST 27 U/L      ALT 26 U/L      Alkaline Phosphatase 60 U/L      Total Protein 7 0 g/dL      Albumin 4 0 g/dL      Total Bilirubin 1 60 mg/dL      eGFR 86 ml/min/1 73sq m     Narrative:       [place] guidelines for Chronic Kidney Disease (CKD):     Stage 1 with normal or high GFR (GFR > 90 mL/min/1 73 square meters)    Stage 2 Mild CKD (GFR = 60-89 mL/min/1 73 square meters)    Stage 3A Moderate CKD (GFR = 45-59 mL/min/1 73 square meters)    Stage 3B Moderate CKD (GFR = 30-44 mL/min/1 73 square meters)    Stage 4 Severe CKD (GFR = 15-29 mL/min/1 73 square meters)    Stage 5 End Stage CKD (GFR <15 mL/min/1 73 square meters)  Note: GFR calculation is accurate only with a steady state creatinine    Lithium level [[id number]]  (Normal) Collected:  09/05/19 1625    Lab Status:  Final result Specimen:  Blood from Arm, Left Updated:  09/05/19 1659     Lithium Lvl 0 7 mmol/L     Rapid drug screen, urine [[id number]]  (Normal) Collected:  09/05/19 1606    Lab Status:  Final result Specimen:  Urine, Clean Catch Updated:  09/05/19 1654     Amph/Meth UR Negative     Barbiturate Ur Negative     Benzodiazepine Urine Negative     Cocaine Urine Negative     Methadone Urine Negative     Opiate Urine Negative     PCP Ur Negative     THC Urine Negative    Narrative:       FOR MEDICAL PURPOSES ONLY  IF CONFIRMATION NEEDED PLEASE CONTACT THE LAB WITHIN 5 DAYS  Drug Screen Cutoff Levels:  AMPHETAMINE/METHAMPHETAMINES  1000 ng/mL  BARBITURATES     200 ng/mL  BENZODIAZEPINES     200 ng/mL  COCAINE      300 ng/mL  METHADONE      300 ng/mL  OPIATES      300 ng/mL  PHENCYCLIDINE     25 ng/mL  THC       50 ng/mL      CBC and differential [[id number]]  (Abnormal) Collected:  09/05/19 1625    Lab Status:  Final result Specimen:  Blood from Arm, Left Updated:  09/05/19 1646     WBC 10 77 Thousand/uL      RBC 5 35 Million/uL      Hemoglobin 16 6 g/dL      Hematocrit 50 8 %      MCV 95 fL      MCH 31 0 pg      MCHC 32 7 g/dL      RDW 12 9 %      MPV 10 5 fL      Platelets 616 Thousands/uL      nRBC 0 /100 WBCs      Neutrophils Relative 73 %      Immat GRANS % 0 %      Lymphocytes Relative 18 %      Monocytes Relative 7 %      Eosinophils Relative 1 %      Basophils Relative 1 %      Neutrophils Absolute 7 88 Thousands/µL      Immature Grans Absolute 0 03 Thousand/uL      Lymphocytes Absolute 1 96 Thousands/µL      Monocytes Absolute 0 70 Thousand/µL      Eosinophils Absolute 0 12 Thousand/µL      Basophils Absolute 0 08 Thousands/µL                  XR chest pa & lateral    (Results Pending)              Procedures  Procedures       ED Course  ED Course as of Sep 06 0735   Thu Sep 05, 2019   [address] Cardiology Jhoan recommends admit and heparin if troponin increases  Aiden recommends repeat trop now if significantly higher consider transfer for cath      1830 Was able to pull ekg from 2017 - similar to EKG today                                    MDM  Number of Diagnoses or Management Options  Elevated troponin: new and requires workup  Suicidal ideations: new and requires workup     Amount and/or Complexity of Data Reviewed  Clinical lab tests: ordered and reviewed  Discuss the patient with other providers: yes    Patient Progress  Patient progress: improved      Disposition  Final diagnoses:   Suicidal ideations   Elevated troponin     Time reflects when diagnosis was documented in both MDM as applicable and the Disposition within this note     Time User Action Codes Description Comment    9/5/2019  6:46 PM Oneontalittle Bautista Add [J11 [id number]] Suicidal ideations     9/5/2019  6:46 PM Lynn Bautista Add [R74 [id number]] Elevated troponin       ED Disposition     ED Disposition Condition Date/Time Comment    Admit Stable Thu Sep 5, 2019  6:46 PM Case was discussed with Annie Lara** and the patient's admission status was agreed to be Admission Status: inpatient status to the service of Dr Homero White*           Follow-up Information    None         Current Discharge Medication List      CONTINUE these medications which have NOT CHANGED    Details   Aspirin (ASPIR-81 PO) Take 81 mg by mouth      atorvastatin (LIPITOR) 20 mg tablet Take 1 tablet (20 mg total) by mouth daily  Qty: 21 tablet, Refills: 0    Associated Diagnoses: Other hyperlipidemia      cholecalciferol (VITAMIN D3) 1,000 units tablet Take 1,000 Units by mouth daily      clonazePAM (KlonoPIN) 0 5 mg tablet Take 1 tablet (0 5 mg total) by mouth 2 (two) times a day for 10 days  Qty: 20 tablet, Refills: 0    Associated Diagnoses: Bipolar 1 disorder, depressed, severe (HCC)      docusate sodium (COLACE) 100 mg capsule Take 1 capsule (100 mg total) by mouth 2 (two) times a day  Qty: 10 capsule, Refills: 0    Associated Diagnoses: Constipation      escitalopram (LEXAPRO) 5 mg tablet Take 1 tablet (5 mg total) by mouth daily  Qty: 21 tablet, Refills: 0    Associated Diagnoses: Bipolar 1 disorder, depressed, severe (HCC)      lithium carbonate (LITHOBID) 450 mg CR tablet Take 1 tablet (450 mg total) by mouth every 12 (twelve) hours  Qty: 42 tablet, Refills: 0    Associated Diagnoses: Bipolar 1 disorder, depressed, severe ([facility]           No discharge procedures on file      ED Provider  Electronically Signed by           Dalila Bird DO  09/06/19 4800

## 2019-09-05 NOTE — ASSESSMENT & PLAN NOTE
Will place on one-to-one observation  Psychiatry consult    Patient will need inpatient psychiatry admission was medically cleared

## 2019-09-06 ENCOUNTER — APPOINTMENT (INPATIENT)
Dept: NON INVASIVE DIAGNOSTICS | Facility: HOSPITAL | Age: 54
DRG: 281 | End: 2019-09-06
Payer: MEDICARE

## 2019-09-06 LAB
ALBUMIN SERPL BCP-MCNC: 3.2 G/DL (ref 3.5–5)
ALP SERPL-CCNC: 57 U/L (ref 46–116)
ALT SERPL W P-5'-P-CCNC: 28 U/L (ref 12–78)
ANION GAP SERPL CALCULATED.3IONS-SCNC: 8 MMOL/L (ref 4–13)
APTT PPP: 44 SECONDS (ref 23–37)
APTT PPP: 61 SECONDS (ref 23–37)
APTT PPP: 63 SECONDS (ref 23–37)
APTT PPP: 63 SECONDS (ref 23–37)
AST SERPL W P-5'-P-CCNC: 31 U/L (ref 5–45)
ATRIAL RATE: 52 BPM
ATRIAL RATE: 53 BPM
BASOPHILS # BLD AUTO: 0.09 THOUSANDS/ΜL (ref 0–0.1)
BASOPHILS NFR BLD AUTO: 1 % (ref 0–1)
BILIRUB SERPL-MCNC: 1 MG/DL (ref 0.2–1)
BUN SERPL-MCNC: 9 MG/DL (ref 5–25)
CALCIUM SERPL-MCNC: 8.3 MG/DL (ref 8.3–10.1)
CHLORIDE SERPL-SCNC: 109 MMOL/L (ref 100–108)
CHOLEST SERPL-MCNC: 119 MG/DL (ref 50–200)
CO2 SERPL-SCNC: 25 MMOL/L (ref 21–32)
CREAT SERPL-MCNC: 0.81 MG/DL (ref 0.6–1.3)
EOSINOPHIL # BLD AUTO: 0.32 THOUSAND/ΜL (ref 0–0.61)
EOSINOPHIL NFR BLD AUTO: 3 % (ref 0–6)
ERYTHROCYTE [DISTWIDTH] IN BLOOD BY AUTOMATED COUNT: 12.8 % (ref 11.6–15.1)
GFR SERPL CREATININE-BSD FRML MDRD: 100 ML/MIN/1.73SQ M
GLUCOSE SERPL-MCNC: 97 MG/DL (ref 65–140)
HCT VFR BLD AUTO: 48.3 % (ref 36.5–49.3)
HDLC SERPL-MCNC: 38 MG/DL (ref 40–60)
HGB BLD-MCNC: 16.1 G/DL (ref 12–17)
IMM GRANULOCYTES # BLD AUTO: 0.02 THOUSAND/UL (ref 0–0.2)
IMM GRANULOCYTES NFR BLD AUTO: 0 % (ref 0–2)
LDLC SERPL CALC-MCNC: 65 MG/DL (ref 0–100)
LYMPHOCYTES # BLD AUTO: 2.26 THOUSANDS/ΜL (ref 0.6–4.47)
LYMPHOCYTES NFR BLD AUTO: 24 % (ref 14–44)
MAGNESIUM SERPL-MCNC: 2 MG/DL (ref 1.6–2.6)
MCH RBC QN AUTO: 31.6 PG (ref 26.8–34.3)
MCHC RBC AUTO-ENTMCNC: 33.3 G/DL (ref 31.4–37.4)
MCV RBC AUTO: 95 FL (ref 82–98)
MONOCYTES # BLD AUTO: 0.7 THOUSAND/ΜL (ref 0.17–1.22)
MONOCYTES NFR BLD AUTO: 7 % (ref 4–12)
NEUTROPHILS # BLD AUTO: 6.15 THOUSANDS/ΜL (ref 1.85–7.62)
NEUTS SEG NFR BLD AUTO: 65 % (ref 43–75)
NONHDLC SERPL-MCNC: 81 MG/DL
NRBC BLD AUTO-RTO: 0 /100 WBCS
P AXIS: 65 DEGREES
P AXIS: 74 DEGREES
PHOSPHATE SERPL-MCNC: 3.9 MG/DL (ref 2.7–4.5)
PLATELET # BLD AUTO: 241 THOUSANDS/UL (ref 149–390)
PMV BLD AUTO: 10.3 FL (ref 8.9–12.7)
POTASSIUM SERPL-SCNC: 3.9 MMOL/L (ref 3.5–5.3)
PR INTERVAL: 146 MS
PR INTERVAL: 146 MS
PROT SERPL-MCNC: 6.3 G/DL (ref 6.4–8.2)
QRS AXIS: 86 DEGREES
QRS AXIS: 88 DEGREES
QRSD INTERVAL: 102 MS
QRSD INTERVAL: 92 MS
QT INTERVAL: 422 MS
QT INTERVAL: 438 MS
QTC INTERVAL: 410 MS
QTC INTERVAL: 438 MS
RBC # BLD AUTO: 5.1 MILLION/UL (ref 3.88–5.62)
SODIUM SERPL-SCNC: 142 MMOL/L (ref 136–145)
T WAVE AXIS: 93 DEGREES
T WAVE AXIS: 98 DEGREES
TRIGL SERPL-MCNC: 80 MG/DL
TROPONIN I SERPL-MCNC: 0.16 NG/ML
TSH SERPL DL<=0.05 MIU/L-ACNC: 1.35 UIU/ML (ref 0.36–3.74)
VENTRICULAR RATE: 53 BPM
VENTRICULAR RATE: 65 BPM
WBC # BLD AUTO: 9.54 THOUSAND/UL (ref 4.31–10.16)

## 2019-09-06 PROCEDURE — 99222 1ST HOSP IP/OBS MODERATE 55: CPT | Performed by: INTERNAL MEDICINE

## 2019-09-06 PROCEDURE — 80053 COMPREHEN METABOLIC PANEL: CPT | Performed by: INTERNAL MEDICINE

## 2019-09-06 PROCEDURE — 84100 ASSAY OF PHOSPHORUS: CPT | Performed by: INTERNAL MEDICINE

## 2019-09-06 PROCEDURE — 84443 ASSAY THYROID STIM HORMONE: CPT | Performed by: INTERNAL MEDICINE

## 2019-09-06 PROCEDURE — 85730 THROMBOPLASTIN TIME PARTIAL: CPT | Performed by: INTERNAL MEDICINE

## 2019-09-06 PROCEDURE — 93306 TTE W/DOPPLER COMPLETE: CPT

## 2019-09-06 PROCEDURE — 84484 ASSAY OF TROPONIN QUANT: CPT | Performed by: INTERNAL MEDICINE

## 2019-09-06 PROCEDURE — 99232 SBSQ HOSP IP/OBS MODERATE 35: CPT | Performed by: INTERNAL MEDICINE

## 2019-09-06 PROCEDURE — 99222 1ST HOSP IP/OBS MODERATE 55: CPT | Performed by: PSYCHIATRY & NEUROLOGY

## 2019-09-06 PROCEDURE — 80061 LIPID PANEL: CPT | Performed by: INTERNAL MEDICINE

## 2019-09-06 PROCEDURE — 85025 COMPLETE CBC W/AUTO DIFF WBC: CPT | Performed by: INTERNAL MEDICINE

## 2019-09-06 PROCEDURE — 83735 ASSAY OF MAGNESIUM: CPT | Performed by: INTERNAL MEDICINE

## 2019-09-06 PROCEDURE — 93010 ELECTROCARDIOGRAM REPORT: CPT | Performed by: INTERNAL MEDICINE

## 2019-09-06 PROCEDURE — 93306 TTE W/DOPPLER COMPLETE: CPT | Performed by: INTERNAL MEDICINE

## 2019-09-06 RX ORDER — ASPIRIN 81 MG/1
81 TABLET ORAL DAILY
Status: DISCONTINUED | OUTPATIENT
Start: 2019-09-07 | End: 2019-09-07 | Stop reason: HOSPADM

## 2019-09-06 RX ORDER — CLOPIDOGREL BISULFATE 75 MG/1
300 TABLET ORAL ONCE
Status: COMPLETED | OUTPATIENT
Start: 2019-09-06 | End: 2019-09-06

## 2019-09-06 RX ORDER — CLOPIDOGREL BISULFATE 75 MG/1
75 TABLET ORAL DAILY
Status: DISCONTINUED | OUTPATIENT
Start: 2019-09-07 | End: 2019-09-07 | Stop reason: HOSPADM

## 2019-09-06 RX ADMIN — METOPROLOL TARTRATE 12.5 MG: 25 TABLET ORAL at 21:21

## 2019-09-06 RX ADMIN — DOCUSATE SODIUM 100 MG: 100 CAPSULE, LIQUID FILLED ORAL at 17:38

## 2019-09-06 RX ADMIN — LITHIUM CARBONATE 450 MG: 450 TABLET, EXTENDED RELEASE ORAL at 08:57

## 2019-09-06 RX ADMIN — CLOPIDOGREL BISULFATE 300 MG: 75 TABLET ORAL at 21:21

## 2019-09-06 RX ADMIN — DOCUSATE SODIUM 100 MG: 100 CAPSULE, LIQUID FILLED ORAL at 08:55

## 2019-09-06 RX ADMIN — ATORVASTATIN CALCIUM 20 MG: 20 TABLET, FILM COATED ORAL at 08:55

## 2019-09-06 RX ADMIN — CLONAZEPAM 0.5 MG: 0.5 TABLET ORAL at 17:37

## 2019-09-06 RX ADMIN — HEPARIN SODIUM 2000 UNITS: 1000 INJECTION, SOLUTION INTRAVENOUS; SUBCUTANEOUS at 11:09

## 2019-09-06 RX ADMIN — CLONAZEPAM 0.5 MG: 0.5 TABLET ORAL at 08:55

## 2019-09-06 RX ADMIN — SODIUM CHLORIDE 100 ML/HR: 0.9 INJECTION, SOLUTION INTRAVENOUS at 16:15

## 2019-09-06 RX ADMIN — SODIUM CHLORIDE 100 ML/HR: 0.9 INJECTION, SOLUTION INTRAVENOUS at 06:37

## 2019-09-06 RX ADMIN — HEPARIN SODIUM AND DEXTROSE 14 UNITS/KG/HR: 10000; 5 INJECTION INTRAVENOUS at 21:27

## 2019-09-06 RX ADMIN — NICOTINE 1 PATCH: 21 PATCH, EXTENDED RELEASE TRANSDERMAL at 08:56

## 2019-09-06 RX ADMIN — ESCITALOPRAM OXALATE 5 MG: 5 TABLET, FILM COATED ORAL at 08:55

## 2019-09-06 NOTE — CONSULTS
Consultation - Cardiology   Kirsten Kim 53 y o  male MRN: [id number]  Unit/Bed#: [address] [place] [address] [id number] Encounter: [id number]    Inpatient consult to Cardiology  Consult performed by: Nawaf Jarrell MD  Consult ordered by: Tyler Ohara MD          History of Present Illness   Physician Requesting Consult: Tyler Ohara MD  Reason for Consult / Principal Problem: Elevated troponin  HPI: Kirsten Kim is a 53y o  year old male who has a history of bipolar disorder, tobacco abuse, and dyslipidemia, who does not have any cardiac history, presented to [facility] with suicidal ideation  He denied any chest pain or palpitations, but does have chronic dyspnea  Upon arrival to the ED from his group home, he was scheduled to be transferred to inpatient psychiatry  However, his resting ECG demonstrated deep anterior TW inversions  A troponin drawn at that time was 0 3 (subsequently decreased to 0 16)  His ECG changes have been since 12/18  An echocardiogram demonstrated apical akinesis  He was started on a heparin gtt  He has never had an echo in the past, and last stress test was 10 years ago  Of note, he is a significant smoker  Review of Systems   Constitution: Negative for chills, diaphoresis, fever and malaise/fatigue  HENT: Negative  Eyes: Negative  Cardiovascular: Positive for dyspnea on exertion  Negative for chest pain, leg swelling and palpitations  Respiratory: Negative for cough, shortness of breath, snoring and wheezing  Endocrine: Negative  Hematologic/Lymphatic: Negative  Skin: Negative for rash  Musculoskeletal: Negative  Gastrointestinal: Negative for abdominal pain, constipation and diarrhea  Genitourinary: Negative for bladder incontinence, dysuria and frequency  Neurological: Negative for dizziness and light-headedness  Psychiatric/Behavioral: Positive for suicidal ideas  The patient is nervous/anxious      All other systems reviewed and are negative  Historical Information   Past Medical History:   Diagnosis Date    Anxiety     Bipolar 1 disorder ([facility] )     Bowel habit changes     Chronic pain disorder     CVA (cerebral vascular accident) ([place] [address] )     Depression     Family hx of colon cancer     father and paternal uncle    Myocardial infarction [facility])     Psychiatric illness     Stroke ([place] [address] ) 12/2015    CVA x3   Pt states he is unaware of when he had first two CVA    Suicide attempt [facility])      Past Surgical History:   Procedure Laterality Date    COLONOSCOPY       Social History     Substance and Sexual Activity   Alcohol Use No    Comment: pt denies     Social History     Substance and Sexual Activity   Drug Use No    Comment: pt denies     Social History     Tobacco Use   Smoking Status Current Every Day Smoker    Packs/day: 0 50    Years: 15 00    Pack years: 7 50    Types: Cigarettes   Smokeless Tobacco Current User   Tobacco Comment    Wishes to quit     Family History: non-contributory    Meds/Allergies     Current Facility-Administered Medications:  acetaminophen 650 mg Oral Q6H PRN Zaira Sierra MD    atorvastatin 20 mg Oral Daily Zaira Sierra MD    clonazePAM 0 5 mg Oral BID Zaira Sierra MD    docusate sodium 100 mg Oral BID Zaira Sierra MD    escitalopram 5 mg Oral Daily Zaira Sierra MD    heparin (porcine) 3-20 Units/kg/hr (Order-Specific) Intravenous Titrated Zaira Sierra MD Last Rate: 14 Units/kg/hr (09/06/19 1110)   heparin (porcine) 2,000 Units Intravenous PRN aZira Sierra MD    heparin (porcine) 4,000 Units Intravenous PRN Zaira Sierra MD    lithium carbonate 450 mg Oral Q12H [facility] Zaira Sierra MD    metoprolol tartrate 12 5 mg Oral Daily Zaira Sierra MD    nicotine 1 patch Transdermal Daily Zaira Sierra MD    nitroglycerin 0 4 mg Sublingual Q5 Min PRN Zaira Sierra MD    ondansetron 4 mg Intravenous Q6H PRN Zaira Sierra MD    sodium chloride 100 mL/hr Intravenous Continuous Marin Norris MD Last Rate: 100 mL/hr (19 1615)       heparin (porcine) 3-20 Units/kg/hr (Order-Specific) Last Rate: 14 Units/kg/hr (19 1110)   sodium chloride 100 mL/hr Last Rate: 100 mL/hr (19 1615)     Medications Prior to Admission   Medication    Aspirin (ASPIR-81 PO)    atorvastatin (LIPITOR) 20 mg tablet    cholecalciferol (VITAMIN D3) 1,000 units tablet    clonazePAM (KlonoPIN) 0 5 mg tablet    docusate sodium (COLACE) 100 mg capsule    escitalopram (LEXAPRO) 5 mg tablet    lithium carbonate (LITHOBID) 450 mg CR tablet       Allergies   Allergen Reactions    Haldol [Haloperidol] Other (See Comments)     Muscle/jaw tightness  Difficulty swallowing    Prolixin [Fluphenazine] Other (See Comments)     Muscle/jaw tightness  Difficulty swallowing      Thorazine [Chlorpromazine] Other (See Comments)     Muscle/jaw tightness  Difficulty swallowing  Objective   Vitals: Blood pressure 110/66, pulse 57, temperature 97 6 °F (36 4 °C), temperature source Oral, resp  rate 16, height 5' 11" (1 803 m), weight 84 2 kg (185 lb 10 oz), SpO2 96 %  , Body mass index is 25 89 kg/m² , Orthostatic Blood Pressures      Most Recent Value   Blood Pressure  110/66 filed at 2019 1446   Patient Position - Orthostatic VS  Lying filed at 2019 5329        Systolic (57PQQ), TAU:230 , Min:101 , FL     Diastolic (96AOI), VJB:21, Min:66, Max:74      Intake/Output Summary (Last 24 hours) at 2019 1926  Last data filed at 2019 1246  Gross per 24 hour   Intake 1240 ml   Output --   Net 1240 ml       Weight (last 2 days)     Date/Time   Weight    19 0600   84 2 (185 63)    19 1900   84 2 (185 7)    19 1549   78 9 (173 94)              Invasive Devices     Peripheral Intravenous Line            Peripheral IV 19 Right;Medial Forearm less than 1 day                  Physical Exam   Constitutional: He is oriented to person, place, and time   He appears well-developed and well-nourished  HENT:   Head: Normocephalic and atraumatic  Neck: No JVD present  Cardiovascular: Normal rate, regular rhythm and normal heart sounds  Exam reveals no gallop and no friction rub  No murmur heard  Pulmonary/Chest: Effort normal and breath sounds normal  He has no wheezes  He has no rales  Abdominal: Soft  Bowel sounds are normal  He exhibits no distension  There is no tenderness  Musculoskeletal: He exhibits no edema  Neurological: He is alert and oriented to person, place, and time  He has normal reflexes  Skin: Skin is warm and dry  No rash noted  No erythema  Psychiatric: He has a normal mood and affect             Laboratory Results:  Results from last 7 days   Lab Units 09/06/19  1007 09/05/19  2248 09/05/19  1826   TROPONIN I ng/mL 0 16* 0 29* 0 27*       CBC with diff: Results from last 7 days   Lab Units 09/06/19  0410 09/05/19  1625   WBC Thousand/uL 9 54 10 77*   HEMOGLOBIN g/dL 16 1 16 6   HEMATOCRIT % 48 3 50 8*   MCV fL 95 95   PLATELETS Thousands/uL 241 287   MCH pg 31 6 31 0   MCHC g/dL 33 3 32 7   RDW % 12 8 12 9   MPV fL 10 3 10 5   NRBC AUTO /100 WBCs 0 0         CMP:  Results from last 7 days   Lab Units 09/06/19  0410 09/05/19  1625   POTASSIUM mmol/L 3 9 4 4   CHLORIDE mmol/L 109* 104   CO2 mmol/L 25 28   BUN mg/dL 9 9   CREATININE mg/dL 0 81 0 98   CALCIUM mg/dL 8 3 9 6   AST U/L 31 27   ALT U/L 28 26   ALK PHOS U/L 57 60   EGFR ml/min/1 73sq m 100 86         BMP:  Results from last 7 days   Lab Units 09/06/19  0410 09/05/19  1625   POTASSIUM mmol/L 3 9 4 4   CHLORIDE mmol/L 109* 104   CO2 mmol/L 25 28   BUN mg/dL 9 9   CREATININE mg/dL 0 81 0 98   CALCIUM mg/dL 8 3 9 6       BNP:     Magnesium:   Results from last 7 days   Lab Units 09/06/19  0410   MAGNESIUM mg/dL 2 0       Coags:   Results from last 7 days   Lab Units 09/06/19  1554 09/06/19  1033 09/06/19  0410 09/05/19  2017   PTT seconds 63* 44* 63* 30   INR   --   --   --  1 05 TSH:       Lipid Profile:   Results from last 7 days   Lab Units 19  0410   TRIGLYCERIDES mg/dL 80   HDL mg/dL 38*       Cardiac testing:   Results for orders placed during the hospital encounter of 19   Echo complete with contrast if indicated    Narrative [address]  [place]    Transthoracic Echocardiogram  2D, M-mode, Doppler, and Color Doppler    Study date:  06-Sep-2019    Patient: Kenia Dallas  MR number: [id number]  Account number: [de-identified]  : 1965  Age: 53 years  Gender: Male  Status: Inpatient  Location: [facility]  Height: 71 in  Weight: 184 6 lb  BP: 123/ 74 mmHg    Indications: CAD    Diagnoses: I25 10 - Atherosclerotic heart disease of native coronary artery without angina pectoris, I25 83 - Coronary atherosclerosis due to lipid rich plaque    Sonographer:  TONG Breaux  Primary Physician:  Yarelis Mcneill MD  Referring Physician:  Yarelis Mcneill MD  Group:  [facility]  Interpreting Physician:  Mendez Khalil MD    SUMMARY    LEFT VENTRICLE:  Size was normal   Systolic function was at the lower limits of normal  Ejection fraction was estimated to be 50 %  There was akinesis of the apex  Wall thickness was normal   Doppler parameters were consistent with abnormal left ventricular relaxation (grade 1 diastolic dysfunction)  RIGHT VENTRICLE:  The size was normal   Systolic function was normal     MITRAL VALVE:  There was trace regurgitation  HISTORY: PRIOR HISTORY: Smoker; Bipolar; HLD; Suicidal; MI; CVA    PROCEDURE: The study was performed in the [facility]  This was a routine study  The transthoracic approach was used  The study included complete 2D imaging, M-mode, complete spectral Doppler, and color Doppler  Echocardiographic views were limited due to decreased penetration and lung interference   This was a technically difficult study     LEFT VENTRICLE: Size was normal  Systolic function was at the lower limits of normal  Ejection fraction was estimated to be 50 %  There was akinesis of the apex  Wall thickness was normal  DOPPLER: Doppler parameters were consistent with  abnormal left ventricular relaxation (grade 1 diastolic dysfunction)  RIGHT VENTRICLE: The size was normal  Systolic function was normal  Wall thickness was normal     LEFT ATRIUM: Size was normal     RIGHT ATRIUM: Size was normal     MITRAL VALVE: Valve structure was normal  There was normal leaflet separation  DOPPLER: The transmitral velocity was within the normal range  There was no evidence for stenosis  There was trace regurgitation  AORTIC VALVE: The valve was trileaflet  Leaflets exhibited normal thickness and normal cuspal separation  DOPPLER: Transaortic velocity was within the normal range  There was no evidence for stenosis  There was no regurgitation  TRICUSPID VALVE: The valve structure was normal  There was normal leaflet separation  DOPPLER: The transtricuspid velocity was within the normal range  There was no evidence for stenosis  There was no regurgitation  The tricuspid jet  envelope definition was inadequate for estimation of RV systolic pressure  There are no indirect findings suggestive of moderate or severe pulmonary hypertension  PULMONIC VALVE: Leaflets exhibited normal thickness, no calcification, and normal cuspal separation  DOPPLER: The transpulmonic velocity was within the normal range  There was no regurgitation  PERICARDIUM: There was no pericardial effusion  The pericardium was normal in appearance  AORTA: The root exhibited normal size  SYSTEMIC VEINS: IVC: The inferior vena cava was not well visualized      SYSTEM MEASUREMENT TABLES    2D  %FS: 34 81 %  AV Diam: 3 29 cm  EDV(Teich): 149 18 ml  EF Biplane: 54 41 %  EF(Teich): 63 41 %  ESV(Teich): 54 58 ml  IVSd: 0 94 cm  LA Area: 19 32 cm2  LA Diam: 3 59 cm  LVEDV MOD A2C: 125 12 ml  LVEDV MOD A4C: 98 22 ml  LVEDV MOD BP: 118 42 ml  LVEF MOD A2C: 48 42 %  LVEF MOD A4C: 55 17 %  LVESV MOD A2C: 64 53 ml  LVESV MOD A4C: 44 03 ml  LVESV MOD BP: 53 99 ml  LVIDd: 5 53 cm  LVIDs: 3 6 cm  LVLd A2C: 10 04 cm  LVLd A4C: 8 75 cm  LVLs A2C: 9 34 cm  LVLs A4C: 8 99 cm  LVPWd: 0 91 cm  RA Area: 17 54 cm2  RVIDd: 3 03 cm  SV MOD A2C: 60 59 ml  SV MOD A4C: 54 19 ml  SV(Teich): 94 6 ml    CW  RAP: 10 mmHg  TR Vmax: 2 75 m/s  TR maxP 15 mmHg    MM  TAPSE: 2 04 cm    PW  E': 0 06 m/s  E/E': 6 71  MV A [place]: 0 5 m/s  MV [place]: 1 29 m/s2  MV DecT: 303 28 ms  MV E [place]: 0 39 m/s  MV E/A Ratio: 0 79  MV PHT: 87 95 ms  MVA By PHT: 2 5 cm2  RVSP: 40 15 mmHg    [facility] Accredited Echocardiography [facility]    Prepared and electronically signed by    Patt Simmons MD  Signed 06-Sep-2019 15:41:55           Imaging:   Xr Chest Pa & Lateral  Result Date: 2019  Impression: No acute cardiopulmonary disease  EKG reviewed personally: NSR 53 ant-septal infarct  Telemetry reviewed personally: Sinus Bradycardia    Assessment:  Principal Problem:    Elevated troponin  Active Problems:    Bipolar 1 disorder, depressed, severe (HCC)    Suicidal ideation    Other hyperlipidemia    Tobacco abuse      Impression:  1  NSTEMI - likely type 2 precipitated by stress, but given risk factors, chronic dyspnea on exertion, ECG changes, apical akinesis, and elevated troponin, would want to definitively define coronary anatomy with eye towards intervention  2  Dyslipidemia - on statin  Plan:  1  Continue heparin gtt for 24 hrs more  2  Start ASA/clopidogrel  3  Continue b-blockers/statin  4  Would transfer to either [facility] or [facility] for cath on Monday  Would transfer tomorrow to [facility] with cardiology consult  Given psychiatric history, would be beneficial for him to get settled in his new room ahead of cath  Patient is 100% agreeable to cath, with risks and benefits explained

## 2019-09-06 NOTE — ASSESSMENT & PLAN NOTE
Continue on one-to-one observation  Denies any suicidal or homicidal ideation  Psychiatry consult    Patient will need inpatient psychiatry admission when medically cleared

## 2019-09-06 NOTE — OCCUPATIONAL THERAPY NOTE
Occupational Therapy Screen    Patient Name: Moe Monae  CTDIB'E Date: 9/6/2019    OT orders received and chart reviewed  Spoke with RN Jero Shin who reports pt is ambulating to/from bathroom and at functional baseline  Pt resides in [facility] and will have facility support upon discharge  No OT needs identified at this time; please reconsult if OT needs arise during remaining hospital stay      ExCode42, OT

## 2019-09-06 NOTE — PROGRESS NOTES
Progress Note Daisy Ramirez 1965, 53 y o  male MRN: [id number]    Unit/Bed#: [address] [place] [address] Encounter: [id number]    Primary Care Provider: Suzette Padilla PA-C   Date and time admitted to hospital: 9/5/2019  3:42 PM        * Elevated troponin  Assessment & Plan  Likely non STEMI type 2  Troponin this morning is 0 29  Has peaked 0 30  Echocardiogram  Continue on heparin drip  Follow as per protocol with PTT  On aspirin, Lipitor and Lopressor  Cardiology consult  Denies any chest pain  Oxygen supplementation and bronchodilators p r n  Nitro p r n  Monitor on telemetry  Tobacco abuse  Assessment & Plan  Smoking cessation counseling given  On nicotine patch  Other hyperlipidemia  Assessment & Plan  On statin    Suicidal ideation  Assessment & Plan  Continue on one-to-one observation  Denies any suicidal or homicidal ideation  Psychiatry consult  Patient will need inpatient psychiatry admission when medically cleared    Bipolar 1 disorder, depressed, severe ([facility] )  Assessment & Plan  Continue on lithium, Klonopin and Lexapro      Labs & Imaging: I have personally reviewed pertinent reports  VTE Pharmacologic Prophylaxis: Heparin drip  VTE Mechanical Prophylaxis: sequential compression device    Code Status:   Level 1 - Full Code    Patient Centered Rounds: I have performed bedside rounds with nursing staff today  Current Length of Stay: 1 day(s)    Current Patient Status: Inpatient   Certification Statement: The patient will continue to require additional inpatient hospital stay due to see my assessment and plan  Subjective:   Patient is seen and examined at bedside  Denies any chest pain, nausea, vomiting, abdominal pain, cough, shortness of breath  Afebrile  No other complaints  Denies any suicidal ideation this morning  All other ROS are negative  Objective:    Vitals: Blood pressure 101/68, pulse 61, temperature (!) 97 3 °F (36 3 °C), temperature source Oral, resp   rate 18, height 5' 11" (1 803 m), weight 84 2 kg (185 lb 10 oz), SpO2 96 %  ,Body mass index is 25 89 kg/m²  SPO2 RA Rest      ED to Hosp-Admission (Current) from 9/5/2019 in [facility]   SpO2  96 %   SpO2 Activity  At Rest   O2 Device  None (Room air)   O2 Flow Rate  --        I&O:     Intake/Output Summary (Last 24 hours) at 9/6/2019 0833  Last data filed at 9/6/2019 5394  Gross per 24 hour   Intake 1000 ml   Output --   Net 1000 ml       Physical Exam:    General- Alert, lying comfortably in bed  Not in any acute distress  HEENT- SERG, EOM intact  Neck- Supple, No JVD  CVS- regular, S1 and S2 normal  Chest- Bilateral Air entry, No rhochi, crackles or wheezing present  Abdomen- soft, nontender, not distended, no guarding or rigidity, BS+  Extremities-  No pedal edema, No calf tenderness  CNS-   Alert, awake and orientedx3  No focal deficits present      Invasive Devices     Peripheral Intravenous Line            Peripheral IV 09/05/19 Right;Medial Forearm less than 1 day                      Social History  reviewed  Family History   Problem Relation Age of Onset    No Known Problems Mother     No Known Problems Father     No Known Problems Sister     No Known Problems Brother     No Known Problems Maternal Aunt     No Known Problems Paternal Aunt     No Known Problems Maternal Uncle     No Known Problems Paternal Uncle     No Known Problems Maternal Grandfather     No Known Problems Maternal Grandmother     No Known Problems Paternal Grandfather     No Known Problems Paternal Grandmother     No Known Problems Cousin     ADD / ADHD Neg Hx     Alcohol abuse Neg Hx     Anxiety disorder Neg Hx     Bipolar disorder Neg Hx     Completed Suicide  Neg Hx     Dementia Neg Hx     Depression Neg Hx     Drug abuse Neg Hx     OCD Neg Hx     Psychiatric Illness Neg Hx     Psychosis Neg Hx     Schizoaffective Disorder  Neg Hx     Schizophrenia Neg Hx     Self-Injury Neg Hx  Suicide Attempts Neg Hx     reviewed    Meds:  Current Facility-Administered Medications   Medication Dose Route Frequency Provider Last Rate Last Dose    acetaminophen (TYLENOL) tablet 650 mg  650 mg Oral Q6H PRN Cristina Zavaleta MD        atorvastatin (LIPITOR) tablet 20 mg  20 mg Oral Daily Cristina Zavaleta MD        clonazePAM (KlonoPIN) tablet 0 5 mg  0 5 mg Oral BID Cristina Zavaleta MD   0 5 mg at 09/05/19 2118    docusate sodium (COLACE) capsule 100 mg  100 mg Oral BID Cristina Zavaleta MD   100 mg at 09/05/19 2118    escitalopram (LEXAPRO) tablet 5 mg  5 mg Oral Daily Cristina Zavaleta MD        heparin (porcine) 25,000 units in 250 mL infusion (premix)  3-20 Units/kg/hr (Order-Specific) Intravenous Titrated Cristina Zavaleta MD 9 mL/hr at 09/05/19 2119 12 Units/kg/hr at 09/05/19 2119    heparin (porcine) injection 2,000 Units  2,000 Units Intravenous PRN Cristina Zavaleta MD        heparin (porcine) injection 4,000 Units  4,000 Units Intravenous PRN Cristina Zavaleta MD        lithium carbonate (LITHOBID) CR tablet 450 mg  450 mg Oral Q12H [address] Cristina Zavaleta MD   450 mg at 09/05/19 2118    metoprolol tartrate (LOPRESSOR) tablet 12 5 mg  12 5 mg Oral Daily Cristina Zavaleta MD        nicotine (NICODERM CQ) 21 mg/24 hr TD 24 hr patch 1 patch  1 patch Transdermal Daily Cristina Zavaleta MD        nitroglycerin (NITROSTAT) SL tablet 0 4 mg  0 4 mg Sublingual Q5 Min PRN Cristina Zavaleta MD        [facility]ron [facility] injection 4 mg  4 mg Intravenous Q6H PRN Cristina Zavaleta MD        sodium chloride 0 9 % infusion  100 mL/hr Intravenous Continuous Cristina Zavaleta  mL/hr at 09/06/19 0637 100 mL/hr at 09/06/19 2733      Medications Prior to Admission   Medication    Aspirin (ASPIR-81 PO)    atorvastatin (LIPITOR) 20 mg tablet    cholecalciferol (VITAMIN D3) 1,000 units tablet    clonazePAM (KlonoPIN) 0 5 mg tablet    docusate sodium (COLACE) 100 mg capsule    escitalopram (LEXAPRO) 5 mg tablet    lithium carbonate (LITHOBID) 450 mg CR tablet       Labs:  Results from last 7 days   Lab Units 09/06/19  0410 09/05/19  1625   WBC Thousand/uL 9 54 10 77*   HEMOGLOBIN g/dL 16 1 16 6   HEMATOCRIT % 48 3 50 8*   PLATELETS Thousands/uL 241 287   NEUTROS PCT % 65 73   LYMPHS PCT % 24 18   MONOS PCT % 7 7   EOS PCT % 3 1     Results from last 7 days   Lab Units 09/06/19  0410 09/05/19  1625   POTASSIUM mmol/L 3 9 4 4   CHLORIDE mmol/L 109* 104   CO2 mmol/L 25 28   BUN mg/dL 9 9   CREATININE mg/dL 0 81 0 98   CALCIUM mg/dL 8 3 9 6   ALK PHOS U/L 57 60   ALT U/L 28 26   AST U/L 31 27     Lab Results   Component Value Date    TROPONINI 0 29 (H) 09/05/2019    TROPONINI 0 27 (H) 09/05/2019    TROPONINI 0 30 (H) 09/05/2019     Results from last 7 days   Lab Units 09/05/19  2017   INR  1 05     Lab Results   Component Value Date    URINECX 40,000-49,000 cfu/ml Mixed Contaminants X3 10/01/2016         Imaging:  No results found for this or any previous visit  Results for orders placed during the hospital encounter of 09/05/19   XR chest pa & lateral    Narrative CHEST     INDICATION:   routine  Elevated troponin, depression    COMPARISON:  None    EXAM PERFORMED/VIEWS:  XR CHEST PA & LATERAL      FINDINGS:    Cardiomediastinal silhouette appears unremarkable  The lungs are clear  No pneumothorax or pleural effusion  Osseous structures appear within normal limits for patient age  Impression No acute cardiopulmonary disease          Workstation performed: [id number]         Last 24 Hours Medication List:     Current Facility-Administered Medications:  acetaminophen 650 mg Oral Q6H PRN Halima Colvin MD    atorvastatin 20 mg Oral Daily Halima Colvin MD    clonazePAM 0 5 mg Oral BID Halima Colvin MD    docusate sodium 100 mg Oral BID Halima Colvin MD    escitalopram 5 mg Oral Daily Halima Colvin MD    heparin (porcine) 3-20 Units/kg/hr (Order-Specific) Intravenous Titrated Louann Scheuermann, MD Last Rate: 12 Units/kg/hr (09/05/19 7603)   heparin (porcine) 2,000 Units Intravenous PRN Louann Scheuermann, MD    heparin (porcine) 4,000 Units Intravenous PRN Louann Scheuermann, MD    lithium carbonate 450 mg Oral Q12H [address] Louann Scheuermann, MD    metoprolol tartrate 12 5 mg Oral Daily Louann Scheuermann, MD    nicotine 1 patch Transdermal Daily Louann Scheuermann, MD    nitroglycerin 0 4 mg Sublingual Q5 Min PRN Louann Scheuermann, MD    ondansetron 4 mg Intravenous Q6H PRN Louann Scheuermann, MD    sodium chloride 100 mL/hr Intravenous Continuous Louann Scheuermann, MD Last Rate: 100 mL/hr (09/06/19 1324)        Today, Patient Was Seen By: Louann Scheuermann, MD    ** Please Note: Dictation voice to text software may have been used in the creation of this document   **

## 2019-09-06 NOTE — SOCIAL WORK
LOS: 1  GMLOS: 2 6  PATIENT IS NOT A MEDICARE BUNDLE OR A 30 DAY READMISSION  Met with patient  Explained role of care management  Patient is a resident of [address] [place] [address] he has been there x 1 year  Was at [facility] prior to this  He is independent adl's and ambulation, goes to DR for meals  His medications are provided through group home  He states he has a financial POA, but not a POA for healthcare or an AD  Declined information  Past services - patient states that he was admitted to [place] in [facility] about 10 years ago for SI, but no other admissions  As per medical record, patient had admissions to [facility] 5/2016 and 8/2016, to [facility] 7/5/2017 - 7/11/2017 and 7/12/2017 - 7/13/2017 for SI and then admitted to [place] in [facility] on 7/13/2017  He had an admission to [place] [address] 12/2018 for SI  Patient was admitted with SI  He states that he no longer feels that he wants to hurt himself and just wants to return to [place]  [employer]  Await psych recommendation  Will follow

## 2019-09-06 NOTE — ASSESSMENT & PLAN NOTE
Likely non STEMI type 2  Troponin this morning is 0 29  Has peaked 0 30  Echocardiogram  Continue on heparin drip  Follow as per protocol with PTT  On aspirin, Lipitor and Lopressor  Cardiology consult  Denies any chest pain  Oxygen supplementation and bronchodilators p r n  Nitro p r n  Monitor on telemetry

## 2019-09-06 NOTE — RESPIRATORY THERAPY NOTE
RT Protocol Note  Moe Monae 53 y o  male MRN: [id number]  Unit/Bed#: [address] [place] [address] 210-01 Encounter: [id number]    Assessment    Principal Problem:    Elevated troponin  Active Problems:    Bipolar 1 disorder, depressed, severe (HCC)    Suicidal ideation    Other hyperlipidemia    Tobacco abuse      Home Pulmonary Medications:   o home meds      Past Medical History:   Diagnosis Date    Anxiety     Bipolar 1 disorder (HCC)     Bowel habit changes     Chronic pain disorder     CVA (cerebral vascular accident) ([place] [address] )     Depression     Family hx of colon cancer     father and paternal uncle    Myocardial infarction [facility])     Psychiatric illness     Stroke ([place] [address] ) 12/2015    CVA x3   Pt states he is unaware of when he had first two CVA    Suicide attempt [facility])      Social History     Socioeconomic History    Marital status: Single     Spouse name: None    Number of children: None    Years of education: None    Highest education level: None   Occupational History    None   Social Needs    Financial resource strain: None    Food insecurity:     Worry: None     Inability: None    Transportation needs:     Medical: None     Non-medical: None   Tobacco Use    Smoking status: Current Every Day Smoker     Packs/day: 0 50     Years: 15 00     Pack years: 7 50     Types: Cigarettes    Smokeless tobacco: Current User    Tobacco comment: Wishes to quit   Substance and Sexual Activity    Alcohol use: No     Comment: pt denies    Drug use: No     Comment: pt denies    Sexual activity: Never   Lifestyle    Physical activity:     Days per week: None     Minutes per session: None    Stress: None   Relationships    Social connections:     Talks on phone: None     Gets together: None     Attends [employer] service: None     Active member of club or organization: None     Attends meetings of clubs or organizations: None     Relationship status: None    Intimate partner violence:     Fear of current or ex partner: None     Emotionally abused: None     Physically abused: None     Forced sexual activity: None   Other Topics Concern    None   Social History Narrative    None       Subjective         Objective    Physical Exam:   Assessment Type: Assess only  General Appearance: Alert, Awake  Respiratory Pattern: Normal  Chest Assessment: Chest expansion symmetrical, Trachea midline  Bilateral Breath Sounds: (P) Clear  Cough: (P) Dry, Non-productive    Vitals:  Blood pressure 136/84, pulse (P) 62, temperature 98 °F (36 7 °C), temperature source Oral, resp  rate (P) 16, height 5' 11" (1 803 m), weight 84 2 kg (185 lb 11 2 oz), SpO2 (P) 97 %  Imaging and other studies: I have personally reviewed pertinent reports              Plan    Respiratory Plan: Discontinue Protocol

## 2019-09-06 NOTE — PHYSICAL THERAPY NOTE
PT screen  Order rec'd chart reviewed  spoke to nurse Marc Hendrix has been ind mobile in room and no noted needs at this time   Screen only d/c PT

## 2019-09-06 NOTE — CONSULTS
Consultation - [facility] 53 y o  male MRN: [id number]  Unit/Bed#: [address] [place] [address] 210-01 Encounter: [id number]    Assessment/Plan     Assessment:  bipolar disorder    Plan:   1  Continue current dosages of lithium, lexapro and klonopin  2  Discontinue one-to-one as patient is not an immediate risk to self or others  3  Discharge patient to outpatient follow-up once medically stable  Psychiatry will sign off  Call with questions  Risks, benefits and possible side effects of Medications:   Risks, benefits, and possible side effects of medications explained to patient and patient verbalizes understanding  Chief Complaint: "I am ok now"    History of Present Illness   Physician Requesting Consult: Christiano Tabares MD  Reason for Consult / Principal Problem:  Safety assessment    Patient is a 53 y o  male presents with suicidal ideation and was admitted to medical unit for medical stabilization  Patient with history of bipolar disorder currently living at [facility]  Patient is known to me from his past admission under my care and reports currently stable on these medication  Patient did verbalized suicidal ideation in the emergency room but today he is denying any suicidal ideations to me and to the medical team   Patient remained appropriate and currently denying any symptoms of depression, kymberly or psychosis  No behavior of agitation noted and no signs of dangerousness to self or others  Patient is oriented and is not showing any signs of confusion  He agreed with plan of getting discharge to outpatient follow-up once medically stable  Also agreed with plan of discontinuing one-to-one at this point      Consults    Psychiatric Review Of Systems:  sleep: no  appetite changes: no  weight changes: no  energy/anergy: no  interest/pleasure/anhedonia: no  somatic symptoms: yes  anxiety/panic: yes  kymberly: no  guilty/hopeless: no  self injurious behavior/risky behavior: no    Historical Information   Past Psychiatric History:   History of prior inpatient psychiatric admissions  Currently in treatment with outpatient providers  Past Suicide attempts: yes  Past Violent behavior: no  Past Psychiatric medication trial:  Multiple medication trials    Substance Abuse History: denies    Family Psychiatric History: denies    Social History  Lives in a group home  Poor support  Denies any history of abuse  No legal history known    Past Medical History:   Diagnosis Date    Anxiety     Bipolar 1 disorder ([place] [address] )     Bowel habit changes     Chronic pain disorder     CVA (cerebral vascular accident) ([place] [address] )     Depression     Family hx of colon cancer     father and paternal uncle    Myocardial infarction [facility])     Psychiatric illness     Stroke ([place] [address] ) 12/2015    CVA x3  Pt states he is unaware of when he had first two CVA    Suicide attempt [facility])        Medical Review Of Systems:  Review of Systems    Meds/Allergies   all current active meds have been reviewed  Allergies   Allergen Reactions    Haldol [Haloperidol] Other (See Comments)     Muscle/jaw tightness  Difficulty swallowing    Prolixin [Fluphenazine] Other (See Comments)     Muscle/jaw tightness  Difficulty swallowing      Thorazine [Chlorpromazine] Other (See Comments)     Muscle/jaw tightness  Difficulty swallowing           Objective   Vital signs in last 24 hours:  Temp:  [97 2 °F (36 2 °C)-98 °F (36 7 °C)] 97 3 °F (36 3 °C)  HR:  [49-82] 61  Resp:  [16-20] 18  BP: (101-136)/(68-84) 101/68      Intake/Output Summary (Last 24 hours) at 9/6/2019 1051  Last data filed at 9/6/2019 5537  Gross per 24 hour   Intake 1000 ml   Output --   Net 1000 ml       Mental Status Evaluation:  Appearance:  casually dressed   Behavior:  guarded   Speech:  soft   Mood:  normal   Affect:  normal   Language: naming objects   Thought Process:  normal   Thought Content:  normal   Perceptual Disturbances: None   Risk Potential: Suicidal Ideations none, Homicidal Ideations none and Potential for Aggression No   Sensorium:  person and place   Cognition:  grossly intact   Consciousness:  awake    Attention: attention span and concentration were age appropriate   Intellect: normal   Fund of Knowledge: awareness of current events: fair   Insight:  fair   Judgment: fair   Muscle Strength and Tone: arm(s): bilateral   Gait/Station: in bed   Motor Activity: no abnormal movements     Lab Results: reviewed

## 2019-09-06 NOTE — PLAN OF CARE
Problem: PAIN - ADULT  Goal: Verbalizes/displays adequate comfort level or baseline comfort level  Description  Interventions:  - Encourage patient to monitor pain and request assistance  - Assess pain using appropriate pain scale  - Administer analgesics based on type and severity of pain and evaluate response  - Implement non-pharmacological measures as appropriate and evaluate response  - Consider cultural and social influences on pain and pain management  - Notify physician/advanced practitioner if interventions unsuccessful or patient reports new pain  Outcome: Progressing     Problem: INFECTION - ADULT  Goal: Absence or prevention of progression during hospitalization  Description  INTERVENTIONS:  - Assess and monitor for signs and symptoms of infection  - Monitor lab/diagnostic results  - Monitor all insertion sites, i e  indwelling lines, tubes, and drains  - Monitor endotracheal if appropriate and nasal secretions for changes in amount and color  - [place] appropriate cooling/warming therapies per order  - Administer medications as ordered  - Instruct and encourage patient and family to use good hand hygiene technique  - Identify and instruct in appropriate isolation precautions for identified infection/condition  Outcome: Progressing     Problem: SAFETY ADULT  Goal: Patient will remain free of falls  Description  INTERVENTIONS:  - Assess patient frequently for physical needs  -  Identify cognitive and physical deficits and behaviors that affect risk of falls    -  [place] fall precautions as indicated by assessment   - Educate patient/family on patient safety including physical limitations  - Instruct patient to call for assistance with activity based on assessment  - Modify environment to reduce risk of injury  - Consider OT/PT consult to assist with strengthening/mobility  Outcome: Progressing

## 2019-09-06 NOTE — SOCIAL WORK
Received call from Ree Collado, supervisor at [facility] and was informed that they do not accept weekend admissions  Informed MD of same

## 2019-09-06 NOTE — UTILIZATION REVIEW
Initial Clinical Review    Admission: Date/Time/Statement: Inpatient Admission Orders (From admission, onward)     Ordered        09/05/19 1846  Inpatient Admission  Once                   Orders Placed This Encounter   Procedures    Inpatient Admission     Standing Status:   Standing     Number of Occurrences:   1     Order Specific Question:   Admitting Physician     Answer:   Erin Bernabe [[id number]]     Order Specific Question:   Level of Care     Answer:   Med Surg [[id number]]     Order Specific Question:   Estimated length of stay     Answer:   More than 2 Midnights     Order Specific Question:   Certification     Answer:   I certify that inpatient services are medically necessary for this patient for a duration of greater than two midnights  See H&P and MD Progress Notes for additional information about the patient's course of treatment  ED Arrival Information     Expected Arrival Acuity Means of Arrival Escorted By Service Admission Type    - 9/5/2019 15:37 Emergent Walk-In Other (Comment) General Medicine Emergency    Arrival Complaint    suicidal        Chief Complaint   Patient presents with    Psychiatric Evaluation     To ED with c/o worsening depression over several days  Staff reports a medication change yesterday  Staff reports that patient stated he wanted to hit his head on a pole outside his residence      Assessment/Plan: 53 y o  male presented to the emergency department with complain of suicidal thoughts/ideation  Patient states that he has been having over the last couple of weeks thoughts about hurting himself and today he was thinking about jumping over the rail on a 2nd story building or hitting his head against cement pole to kill himself  Patient lives at [facility]  Patient was brought to ER by the staff  Patient denies any new stressors in his life  Patient has history of smoking  He denies any delusions or hallucinations     In ER during routine medical clearance patient was found to have abnormal EKG and troponin came back at 0 30  ER provider spoke to cardiologist who recommended medical admission and patient does not need cardiac intervention  Cardiologist recommended to trend troponins  Elevated troponin  Assessment & Plan  Likely non STEMI type 2  Serial troponins, EKG and echocardiogram  Will start on heparin drip  Follow as per protocol with PTT  Will start on On aspirin, Lipitor and Lopressor  Cardiology consult  TSH, lipid profile  Oxygen supplementation and bronchodilators p r n  Nitro p r n  Monitor on telemetry    Other hyperlipidemia  Assessment & Plan  On statin  Suicidal ideation  Assessment & Plan  Will place on one-to-one observation  Psychiatry consult    Patient will need inpatient psychiatry admission was medically cleared  Bipolar 1 disorder, depressed, severe ([facility] [address] )  Assessment & Plan  Continue on lithium, Klonopin and Lexapro     ED Triage Vitals [09/05/19 1549]   Temperature Pulse Respirations Blood Pressure SpO2   98 °F (36 7 °C) 82 16 122/83 96 %      Temp Source Heart Rate Source Patient Position - Orthostatic VS BP Location FiO2 (%)   Tympanic Monitor Sitting Right arm --      Pain Score       No Pain        Wt Readings from Last 1 Encounters:   09/06/19 84 2 kg (185 lb 10 oz)     Additional Vital Signs:   09/06/19 1446  97 6 °F (36 4 °C)  57  16  110/66  96 %  None (Room air)  Lying   09/06/19 0900  --  --  --  --  96 %  None (Room air)  --   09/06/19 0745  97 3 °F (36 3 °C)Abnormal   61  18  101/68  96 %  None (Room air)  Lying   09/05/19 2300  97 2 °F (36 2 °C)Abnormal   49Abnormal   16  123/74  98 %  None (Room air)  Lying   09/05/19 2055  --  62  16  --  97 %  --  --   09/05/19 1900  98 °F (36 7 °C)  57  16  136/84  96 %  None (Room air)  Lying   09/05/19 1802  97 2 °F (36 2 °C)Abnormal   53Abnormal   20  136/79  98 %  None (Room air)  Sitting   09/05/19 1601  --  --  --  --  --  None (Room air)  --   09/05/19 1549  98 °F (36 7 °C)  82  16 122/83  96 %  None (Room air)  Sitting      Weights (last 14 days)     Date/Time  Weight  Height   09/06/19 0600  84 2 kg (185 lb 10 oz)  --   09/05/19 1900  84 2 kg (185 lb 11 2 oz)  5' 11" (1 803 m)   09/05/19 1549  78 9 kg (173 lb 15 1 oz)  --       Pertinent Labs/Diagnostic Test Results:   9/5 EKG: Sinus bradycardia with Premature supraventricular complexes and with occasional and consecutive Premature ventricular complexes  Anterior infarct (cited on or before 13-JUL-2017)  T wave abnormality, consider lateral ischemia  Abnormal ECG  9/6 CXR; No acute cardiopulmonary disease  Results from last 7 days   Lab Units 09/06/19  0410 09/05/19  1625   WBC Thousand/uL 9 54 10 77*   HEMOGLOBIN g/dL 16 1 16 6   HEMATOCRIT % 48 3 50 8*   PLATELETS Thousands/uL 241 287   NEUTROS ABS Thousands/µL 6 15 7 88*         Results from last 7 days   Lab Units 09/06/19  0410 09/05/19  1625   SODIUM mmol/L 142 139   POTASSIUM mmol/L 3 9 4 4   CHLORIDE mmol/L 109* 104   CO2 mmol/L 25 28   ANION GAP mmol/L 8 7   BUN mg/dL 9 9   CREATININE mg/dL 0 81 0 98   EGFR ml/min/1 73sq m 100 86   CALCIUM mg/dL 8 3 9 6   MAGNESIUM mg/dL 2 0  --    PHOSPHORUS mg/dL 3 9  --      Results from last 7 days   Lab Units 09/06/19  0410 09/05/19  1625   AST U/L 31 27   ALT U/L 28 26   ALK PHOS U/L 57 60   TOTAL PROTEIN g/dL 6 3* 7 0   ALBUMIN g/dL 3 2* 4 0   TOTAL BILIRUBIN mg/dL 1 00 1 60*         Results from last 7 days   Lab Units 09/06/19  0410 09/05/19  1625   GLUCOSE RANDOM mg/dL 97 88       Results from last 7 days   Lab Units 09/06/19  1007 09/05/19  2248 09/05/19  1826 09/05/19  1629   TROPONIN I ng/mL 0 16* 0 29* 0 27* 0 30*         Results from last 7 days   Lab Units 09/06/19  1033 09/06/19  0410 09/05/19  2017   PROTIME seconds  --   --  13 4   INR   --   --  1 05   PTT seconds 44* 63* 30     Results from last 7 days   Lab Units 09/06/19  0410 09/05/19  1625   TSH 3RD GENERATON uIU/mL 1 346 0 835       Results from last 7 days   Lab Units 09/05/19  1606   AMPH/METH  Negative   BARBITURATE UR  Negative   BENZODIAZEPINE UR  Negative   COCAINE UR  Negative   METHADONE URINE  Negative   OPIATE UR  Negative   PCP UR  Negative   THC UR  Negative     Results from last 7 days   Lab Units 09/05/19  1625   ETHANOL LVL mg/dL <3   ACETAMINOPHEN LVL ug/mL <6 6*   SALICYLATE LVL mg/dL <3*         ED Treatment:   Medication Administration from 09/05/2019 1537 to 09/05/2019 1911       Date/Time Order Dose Route Action Action by Comments     09/05/2019 1614 senna (SENOKOT) tablet 8 6 mg 8 6 mg Oral Given Lori New RN      09/05/2019 1812 aspirin chewable tablet 324 mg 324 mg Oral Given Lori New RN         Past Medical History:   Diagnosis Date    Anxiety     Bipolar 1 disorder ([place] [address] )     Bowel habit changes     Chronic pain disorder     CVA (cerebral vascular accident) ([place] [address] )     Depression     Family hx of colon cancer     father and paternal uncle    [facility])     Psychiatric illness     Stroke ([place] [address] ) 12/2015    CVA x3   Pt states he is unaware of when he had first two CVA    Suicide attempt [facility])      Present on Admission:   Bipolar 1 disorder, depressed, severe ([facility] )   Other hyperlipidemia      Admitting Diagnosis: Elevated troponin [R74 8]  Suicidal ideations [R45 851]  Evaluation by psychiatric service required [Z00 8]  Age/Sex: 53 y o  male  Admission Orders:    Current Facility-Administered Medications:  acetaminophen 650 mg Oral Q6H PRN Linda Miller MD    atorvastatin 20 mg Oral Daily Linda Miller MD    clonazePAM 0 5 mg Oral BID Linda Miller MD    docusate sodium 100 mg Oral BID Linda Miller MD    escitalopram 5 mg Oral Daily Linda Miller MD    heparin (porcine) 3-20 Units/kg/hr (Order-Specific) Intravenous Titrated Linda Miller MD Last Rate: 14 Units/kg/hr (09/06/19 1110)   heparin (porcine) 2,000 Units Intravenous PRN Linda Miller MD    heparin (porcine) 4,000 Units Intravenous PRN Cathy Loredo MD    lithium carbonate 450 mg Oral Q12H Tammy Schofield MD    metoprolol tartrate 12 5 mg Oral Daily Cathy Loredo MD    nicotine 1 patch Transdermal Daily Cathy Loredo MD    nitroglycerin 0 4 mg Sublingual Q5 Min PRN Cathy Loredo MD    ondansetron 4 mg Intravenous Q6H PRN Cathy Loredo MD    sodium chloride 100 mL/hr Intravenous Continuous Cathy Loredo MD Last Rate: 100 mL/hr (09/06/19 0637)     48 HR TELEMETRY  UP OOB  DAILY WT  I&O  ECHO  SCD  IP CONSULT TO CARDIOLOGY  IP CONSULT TO PSYCHIATRY  IP CONSULT TO CASE MANAGEMENT    Network Utilization Review Department  Phone: [phone]; Fax [phone]  [email or web address]  ATTENTION: Please call with any questions or concerns to [phone]  and carefully listen to the prompts so that you are directed to the right person  Send all requests for admission clinical reviews, approved or denied determinations and any other requests to fax [phone]   All voicemails are confidential

## 2019-09-07 ENCOUNTER — HOSPITAL ENCOUNTER (INPATIENT)
Facility: HOSPITAL | Age: 54
LOS: 4 days | DRG: 281 | End: 2019-09-11
Attending: INTERNAL MEDICINE | Admitting: INTERNAL MEDICINE
Payer: MEDICARE

## 2019-09-07 VITALS
HEIGHT: 71 IN | WEIGHT: 192.02 LBS | TEMPERATURE: 98.6 F | OXYGEN SATURATION: 97 % | BODY MASS INDEX: 26.88 KG/M2 | SYSTOLIC BLOOD PRESSURE: 111 MMHG | HEART RATE: 58 BPM | DIASTOLIC BLOOD PRESSURE: 65 MMHG | RESPIRATION RATE: 16 BRPM

## 2019-09-07 DIAGNOSIS — I21.4 NSTEMI (NON-ST ELEVATED MYOCARDIAL INFARCTION) (HCC): Primary | ICD-10-CM

## 2019-09-07 DIAGNOSIS — K59.00 CONSTIPATION: ICD-10-CM

## 2019-09-07 LAB
ANION GAP SERPL CALCULATED.3IONS-SCNC: 7 MMOL/L (ref 4–13)
APTT PPP: 35 SECONDS (ref 23–37)
APTT PPP: 61 SECONDS (ref 23–37)
BASOPHILS # BLD AUTO: 0.09 THOUSANDS/ΜL (ref 0–0.1)
BASOPHILS NFR BLD AUTO: 1 % (ref 0–1)
BUN SERPL-MCNC: 7 MG/DL (ref 5–25)
CALCIUM SERPL-MCNC: 8.4 MG/DL (ref 8.3–10.1)
CHLORIDE SERPL-SCNC: 111 MMOL/L (ref 100–108)
CO2 SERPL-SCNC: 24 MMOL/L (ref 21–32)
CREAT SERPL-MCNC: 0.82 MG/DL (ref 0.6–1.3)
EOSINOPHIL # BLD AUTO: 0.34 THOUSAND/ΜL (ref 0–0.61)
EOSINOPHIL NFR BLD AUTO: 4 % (ref 0–6)
ERYTHROCYTE [DISTWIDTH] IN BLOOD BY AUTOMATED COUNT: 13.1 % (ref 11.6–15.1)
GFR SERPL CREATININE-BSD FRML MDRD: 100 ML/MIN/1.73SQ M
GLUCOSE SERPL-MCNC: 96 MG/DL (ref 65–140)
HCT VFR BLD AUTO: 44.4 % (ref 36.5–49.3)
HGB BLD-MCNC: 14.4 G/DL (ref 12–17)
IMM GRANULOCYTES # BLD AUTO: 0.03 THOUSAND/UL (ref 0–0.2)
IMM GRANULOCYTES NFR BLD AUTO: 0 % (ref 0–2)
LYMPHOCYTES # BLD AUTO: 2.89 THOUSANDS/ΜL (ref 0.6–4.47)
LYMPHOCYTES NFR BLD AUTO: 33 % (ref 14–44)
MCH RBC QN AUTO: 31.3 PG (ref 26.8–34.3)
MCHC RBC AUTO-ENTMCNC: 32.4 G/DL (ref 31.4–37.4)
MCV RBC AUTO: 97 FL (ref 82–98)
MONOCYTES # BLD AUTO: 0.74 THOUSAND/ΜL (ref 0.17–1.22)
MONOCYTES NFR BLD AUTO: 8 % (ref 4–12)
MRSA NOSE QL CULT: NORMAL
NEUTROPHILS # BLD AUTO: 4.81 THOUSANDS/ΜL (ref 1.85–7.62)
NEUTS SEG NFR BLD AUTO: 54 % (ref 43–75)
NRBC BLD AUTO-RTO: 0 /100 WBCS
PLATELET # BLD AUTO: 232 THOUSANDS/UL (ref 149–390)
PMV BLD AUTO: 10.6 FL (ref 8.9–12.7)
POTASSIUM SERPL-SCNC: 3.9 MMOL/L (ref 3.5–5.3)
RBC # BLD AUTO: 4.6 MILLION/UL (ref 3.88–5.62)
SODIUM SERPL-SCNC: 142 MMOL/L (ref 136–145)
WBC # BLD AUTO: 8.9 THOUSAND/UL (ref 4.31–10.16)

## 2019-09-07 PROCEDURE — 99239 HOSP IP/OBS DSCHRG MGMT >30: CPT | Performed by: INTERNAL MEDICINE

## 2019-09-07 PROCEDURE — 80048 BASIC METABOLIC PNL TOTAL CA: CPT | Performed by: INTERNAL MEDICINE

## 2019-09-07 PROCEDURE — 85025 COMPLETE CBC W/AUTO DIFF WBC: CPT | Performed by: INTERNAL MEDICINE

## 2019-09-07 PROCEDURE — 93005 ELECTROCARDIOGRAM TRACING: CPT

## 2019-09-07 PROCEDURE — 85730 THROMBOPLASTIN TIME PARTIAL: CPT | Performed by: INTERNAL MEDICINE

## 2019-09-07 PROCEDURE — 83036 HEMOGLOBIN GLYCOSYLATED A1C: CPT | Performed by: INTERNAL MEDICINE

## 2019-09-07 RX ORDER — HEPARIN SODIUM 1000 [USP'U]/ML
2000 INJECTION, SOLUTION INTRAVENOUS; SUBCUTANEOUS AS NEEDED
Status: DISCONTINUED | OUTPATIENT
Start: 2019-09-07 | End: 2019-09-09

## 2019-09-07 RX ORDER — CLOPIDOGREL BISULFATE 75 MG/1
75 TABLET ORAL DAILY
Status: DISCONTINUED | OUTPATIENT
Start: 2019-09-08 | End: 2019-09-09

## 2019-09-07 RX ORDER — ASPIRIN 81 MG/1
81 TABLET ORAL DAILY
Status: CANCELLED | OUTPATIENT
Start: 2019-09-08

## 2019-09-07 RX ORDER — HEPARIN SODIUM 1000 [USP'U]/ML
2000 INJECTION, SOLUTION INTRAVENOUS; SUBCUTANEOUS AS NEEDED
Status: CANCELLED | OUTPATIENT
Start: 2019-09-07

## 2019-09-07 RX ORDER — ATORVASTATIN CALCIUM 20 MG/1
20 TABLET, FILM COATED ORAL DAILY
Status: CANCELLED | OUTPATIENT
Start: 2019-09-08

## 2019-09-07 RX ORDER — NITROGLYCERIN 0.4 MG/1
0.4 TABLET SUBLINGUAL
Status: CANCELLED | OUTPATIENT
Start: 2019-09-07

## 2019-09-07 RX ORDER — HEPARIN SODIUM 10000 [USP'U]/100ML
10.5 INJECTION, SOLUTION INTRAVENOUS
Status: CANCELLED | OUTPATIENT
Start: 2019-09-07

## 2019-09-07 RX ORDER — CLONAZEPAM 0.5 MG/1
0.5 TABLET ORAL 2 TIMES DAILY
Status: DISCONTINUED | OUTPATIENT
Start: 2019-09-08 | End: 2019-09-11 | Stop reason: HOSPADM

## 2019-09-07 RX ORDER — LITHIUM CARBONATE 450 MG
450 TABLET, EXTENDED RELEASE ORAL EVERY 12 HOURS SCHEDULED
Status: DISCONTINUED | OUTPATIENT
Start: 2019-09-07 | End: 2019-09-11 | Stop reason: HOSPADM

## 2019-09-07 RX ORDER — CLONAZEPAM 0.5 MG/1
0.5 TABLET ORAL 2 TIMES DAILY
Status: CANCELLED | OUTPATIENT
Start: 2019-09-07

## 2019-09-07 RX ORDER — DOCUSATE SODIUM 100 MG/1
100 CAPSULE, LIQUID FILLED ORAL 2 TIMES DAILY
Status: DISCONTINUED | OUTPATIENT
Start: 2019-09-08 | End: 2019-09-11 | Stop reason: HOSPADM

## 2019-09-07 RX ORDER — DOCUSATE SODIUM 100 MG/1
100 CAPSULE, LIQUID FILLED ORAL 2 TIMES DAILY
Status: CANCELLED | OUTPATIENT
Start: 2019-09-07

## 2019-09-07 RX ORDER — ACETAMINOPHEN 325 MG/1
650 TABLET ORAL EVERY 6 HOURS PRN
Status: CANCELLED | OUTPATIENT
Start: 2019-09-07

## 2019-09-07 RX ORDER — ESCITALOPRAM OXALATE 10 MG/1
5 TABLET ORAL DAILY
Status: DISCONTINUED | OUTPATIENT
Start: 2019-09-08 | End: 2019-09-11 | Stop reason: HOSPADM

## 2019-09-07 RX ORDER — ONDANSETRON 2 MG/ML
4 INJECTION INTRAMUSCULAR; INTRAVENOUS EVERY 6 HOURS PRN
Status: DISCONTINUED | OUTPATIENT
Start: 2019-09-07 | End: 2019-09-11 | Stop reason: HOSPADM

## 2019-09-07 RX ORDER — HEPARIN SODIUM 1000 [USP'U]/ML
4000 INJECTION, SOLUTION INTRAVENOUS; SUBCUTANEOUS AS NEEDED
Status: DISCONTINUED | OUTPATIENT
Start: 2019-09-07 | End: 2019-09-09

## 2019-09-07 RX ORDER — CLOPIDOGREL BISULFATE 75 MG/1
75 TABLET ORAL DAILY
Status: CANCELLED | OUTPATIENT
Start: 2019-09-08

## 2019-09-07 RX ORDER — ACETAMINOPHEN 325 MG/1
650 TABLET ORAL EVERY 6 HOURS PRN
Status: DISCONTINUED | OUTPATIENT
Start: 2019-09-07 | End: 2019-09-11

## 2019-09-07 RX ORDER — HEPARIN SODIUM 1000 [USP'U]/ML
4000 INJECTION, SOLUTION INTRAVENOUS; SUBCUTANEOUS AS NEEDED
Status: CANCELLED | OUTPATIENT
Start: 2019-09-07

## 2019-09-07 RX ORDER — HEPARIN SODIUM 10000 [USP'U]/100ML
10.5 INJECTION, SOLUTION INTRAVENOUS
Status: DISCONTINUED | OUTPATIENT
Start: 2019-09-07 | End: 2019-09-07

## 2019-09-07 RX ORDER — NICOTINE 21 MG/24HR
1 PATCH, TRANSDERMAL 24 HOURS TRANSDERMAL DAILY
Status: CANCELLED | OUTPATIENT
Start: 2019-09-08

## 2019-09-07 RX ORDER — ASPIRIN 81 MG/1
81 TABLET ORAL DAILY
Status: DISCONTINUED | OUTPATIENT
Start: 2019-09-08 | End: 2019-09-09 | Stop reason: SDUPTHER

## 2019-09-07 RX ORDER — LITHIUM CARBONATE 450 MG
450 TABLET, EXTENDED RELEASE ORAL EVERY 12 HOURS SCHEDULED
Status: CANCELLED | OUTPATIENT
Start: 2019-09-07

## 2019-09-07 RX ORDER — NICOTINE 21 MG/24HR
1 PATCH, TRANSDERMAL 24 HOURS TRANSDERMAL DAILY
Status: DISCONTINUED | OUTPATIENT
Start: 2019-09-08 | End: 2019-09-11 | Stop reason: HOSPADM

## 2019-09-07 RX ORDER — ESCITALOPRAM OXALATE 5 MG/1
5 TABLET ORAL DAILY
Status: CANCELLED | OUTPATIENT
Start: 2019-09-08

## 2019-09-07 RX ORDER — NITROGLYCERIN 0.4 MG/1
0.4 TABLET SUBLINGUAL
Status: DISCONTINUED | OUTPATIENT
Start: 2019-09-07 | End: 2019-09-11 | Stop reason: HOSPADM

## 2019-09-07 RX ORDER — SODIUM CHLORIDE 9 MG/ML
100 INJECTION, SOLUTION INTRAVENOUS CONTINUOUS
Status: CANCELLED | OUTPATIENT
Start: 2019-09-07

## 2019-09-07 RX ORDER — ONDANSETRON 2 MG/ML
4 INJECTION INTRAMUSCULAR; INTRAVENOUS EVERY 6 HOURS PRN
Status: CANCELLED | OUTPATIENT
Start: 2019-09-07

## 2019-09-07 RX ORDER — ATORVASTATIN CALCIUM 20 MG/1
20 TABLET, FILM COATED ORAL
Status: DISCONTINUED | OUTPATIENT
Start: 2019-09-08 | End: 2019-09-09

## 2019-09-07 RX ORDER — HEPARIN SODIUM 10000 [USP'U]/100ML
12 INJECTION, SOLUTION INTRAVENOUS
Status: DISCONTINUED | OUTPATIENT
Start: 2019-09-07 | End: 2019-09-09

## 2019-09-07 RX ADMIN — HEPARIN SODIUM AND DEXTROSE 12.5 UNITS/KG/HR: 10000; 5 INJECTION INTRAVENOUS at 22:09

## 2019-09-07 RX ADMIN — METOPROLOL TARTRATE 12.5 MG: 25 TABLET ORAL at 09:41

## 2019-09-07 RX ADMIN — DOCUSATE SODIUM 100 MG: 100 CAPSULE, LIQUID FILLED ORAL at 09:41

## 2019-09-07 RX ADMIN — NICOTINE 1 PATCH: 21 PATCH, EXTENDED RELEASE TRANSDERMAL at 09:43

## 2019-09-07 RX ADMIN — CLONAZEPAM 0.5 MG: 0.5 TABLET ORAL at 09:41

## 2019-09-07 RX ADMIN — ATORVASTATIN CALCIUM 20 MG: 20 TABLET, FILM COATED ORAL at 09:40

## 2019-09-07 RX ADMIN — HEPARIN SODIUM 4000 UNITS: 1000 INJECTION, SOLUTION INTRAVENOUS; SUBCUTANEOUS at 23:05

## 2019-09-07 RX ADMIN — LITHIUM CARBONATE 450 MG: 450 TABLET ORAL at 23:08

## 2019-09-07 RX ADMIN — ASPIRIN 81 MG: 81 TABLET, COATED ORAL at 09:47

## 2019-09-07 RX ADMIN — SODIUM CHLORIDE 100 ML/HR: 0.9 INJECTION, SOLUTION INTRAVENOUS at 02:17

## 2019-09-07 RX ADMIN — ESCITALOPRAM OXALATE 5 MG: 5 TABLET, FILM COATED ORAL at 09:41

## 2019-09-07 RX ADMIN — CLOPIDOGREL BISULFATE 75 MG: 75 TABLET ORAL at 09:47

## 2019-09-07 RX ADMIN — LITHIUM CARBONATE 450 MG: 450 TABLET, EXTENDED RELEASE ORAL at 09:52

## 2019-09-07 NOTE — DISCHARGE SUMMARY
Transfer/Discharge- Joe Nava 1965, 53 y o  male MRN: [id number]    Unit/Bed#: [address] [place], [place] [id number] Encounter: [id number]    Primary Care Provider: Phu Rangel PA-C   Date and time admitted to hospital: 9/5/2019  3:42 PM        * NSTEMI (non-ST elevated myocardial infarction) [facility])  Assessment & Plan  NSTEMI type 2 likely precipitated by stress  Troponin is down to 0 16  Has peaked 0 30  Echocardiogram showed apical akinesis  Continue on heparin drip  Follow as per protocol with PTT  On aspirin, Plavix, Lipitor and Lopressor  Cardiology consult appreciated  They recommend continuing heparin drip for 24 more hours   Denies any chest pain  Oxygen supplementation and bronchodilators p r n  Nitro p r n  Monitor on telemetry  Cardiology recommended given his EKG changes, apical akinesis, elevated troponin patient should be transferred to [facility] for cardiac catheterization  Discussed with patient was in agreement with the transfer plan  Left a message for patient's sister  Tobacco abuse  Assessment & Plan  Smoking cessation counseling given  On nicotine patch  Other hyperlipidemia  Assessment & Plan  On statin    Suicidal ideation  Assessment & Plan  Denies any suicidal or homicidal ideation  Psychiatry consult appreciated  Patient is cleared by psychiatrist and one-to-one observation was discontinued  Outpatient psychiatry follow-up    Bipolar 1 disorder, depressed, severe ([facility] [address] )  Assessment & Plan  Continue on lithium, Klonopin and Lexapro      Hospital Course:     Joe Nava is a 53 y o  male patient who originally presented to the hospital on   Admission Orders (From admission, onward)     Ordered        09/05/19 1846  Inpatient Admission  Once                  due to suicidal ideation  Patient was placed on one-to-one observation  Patient was found to have troponin of 0 30 with EKG changes  Patient was started on heparin drip    Patient was also started on aspirin, Plavix, statin and Lopressor  Patient remained chest pain-free  Cardiology was consulted  Patient was seen by psychiatrist who cleared the patient and recommended outpatient psychiatry follow-up  One-to-one was discontinued  Patient denies any suicidal or homicidal ideation  As per Cardiology given patient's elevated troponin, apical akinesis on echocardiogram, EKG changes, they recommended patient to be transferred to [address] [place] [address] for cardiac catheterization  Discussed with [facility] resident on call and patient will be admitted under Dr Green service for further care and management  Patient will be discharged on heparin drip and on tele monitoring  Cardiology consult once patient gets to [facility]  Patient is hemodynamically stable for transfer  Please see above list of diagnoses and related plan for additional information  Condition at Discharge:  good      Discharge instructions/Information to patient and family:   See after visit summary for information provided to patient and family  Provisions for Follow-Up Care:  See after visit summary for information related to follow-up care and any pertinent home health orders  Disposition:     [address] Transfer to [facility]       Discharge Statement:  I spent 40 minutes discharging the patient  This time was spent on the day of discharge  I had direct contact with the patient on the day of discharge  Greater than 50% of the total time was spent examining patient, answering all patient questions, arranging and discussing plan of care with patient as well as directly providing post-discharge instructions  Additional time then spent on discharge activities  Discharge Medications:  See after visit summary for reconciled discharge medications provided to patient and family        ** Please Note: This note has been constructed using a voice recognition system **

## 2019-09-07 NOTE — DISCHARGE INSTRUCTIONS
Bipolar Disorder   WHAT YOU NEED TO KNOW:   Bipolar disorder is a long-term chemical imbalance that causes rapid changes in mood and behavior  High moods are called kymberly  Low moods are called depression  Sometimes you will feel manic and sometimes you will feel depressed  You can have alternating episodes of kymberly and depression  This is called a mixed bipolar state  DISCHARGE INSTRUCTIONS:   Call 911 if:   · You think about hurting yourself or someone else  Contact your healthcare provider or psychiatrist if:   · You are having trouble managing your bipolar disorder  · You cannot sleep, or are sleeping all the time  · You cannot eat, or are eating more than usual     · You feel dizzy or your stomach is upset  · You cannot make it to your next meeting  · You have questions or concerns about your condition or care  Medicines:   · Medicines  may be given to help keep your mood stable, or to help you sleep  Changes in medicine are often needed as your bipolar disorder changes  · Take your medicine as directed  Contact your healthcare provider if you think your medicine is not helping or if you have side effects  Tell him or her if you are allergic to any medicine  Keep a list of the medicines, vitamins, and herbs you take  Include the amounts, and when and why you take them  Bring the list or the pill bottles to follow-up visits  Carry your medicine list with you in case of an emergency  Follow up with your healthcare provider or psychiatrist as directed:  Write down your questions so you remember to ask them during your visits  Manage bipolar disorder:  Watch for triggers of bipolar disorder symptoms, such as stress  Learn new ways to relax, such as deep breathing, to manage your stress  Tell someone if you feel a manic or depressive period might be coming on  Ask a friend or family member to help watch you for bipolar symptoms   Work to develop skills that will help you manage bipolar disorder  You may need to make lifestyle changes  Ask your healthcare provider or psychiatrist for resources  For support and more information:   · [address] [facility]), [address] [facility], [address] [facility], [address]  [place] [address]   Phone: 7- [phone]  Phone: [phone]  Web Address: [place] tn  · Depression and [address] ([employer])  [address] [place] [address], [facility] , [address]  Phone: 3- [phone]  Web Address: [employer]  org   © 2017 [facility] Information is for End User's use only and may not be sold, redistributed or otherwise used for commercial purposes  All illustrations and images included in CareNotes® are the copyrighted property of A D A M , Inc  or Charli Calvo  The above information is an  only  It is not intended as medical advice for individual conditions or treatments  Talk to your doctor, nurse or pharmacist before following any medical regimen to see if it is safe and effective for you  Cigarette Smoking and Your Health   WHAT YOU NEED TO KNOW:   What are the risks to my health if I smoke tobacco?  Nicotine and other chemicals found in tobacco damage every cell in your body  Even if you are a light smoker, you have an increased risk for cancer, heart disease, and lung disease  If you are pregnant or have diabetes, smoking increases your risk for complications  What are the benefits to my health if I stop smoking? · You decrease respiratory symptoms such as coughing, wheezing, and shortness of breath  · You reduce your risk for cancers of the lung, mouth, throat, kidney, bladder, pancreas, stomach, and cervix  If you already have cancer, you increase the benefits of chemotherapy  You also reduce your risk for cancer returning or a second cancer from developing       · You reduce your risk for heart disease, blood clots, heart attack, and stroke  · You reduce your risk for lung infections, and diseases such as pneumonia, asthma, chronic bronchitis, and emphysema  · Your circulation improves  More oxygen can be delivered to your body  If you have diabetes, you lower your risk for complications, such as kidney, artery, and eye diseases  You also lower your risk for nerve damage  Nerve damage can lead to amputations, poor vision, and blindness  · You improve your body's ability to heal and to fight infections  What are the health benefits to others if I stop smoking? Tobacco is harmful to nonsmokers who breathe in your secondhand smoke  The following are ways the health of others around you may improve when you stop smoking:  · You lower the risks for lung cancer and heart disease in nonsmoking adults  · If you are pregnant, you lower the risk for miscarriage, early delivery, low birth weight, and stillbirth  You also lower your baby's risk for SIDS, obesity, developmental delay, and neurobehavioral problems, such as ADHD  · If you have children, you lower their risk for ear infections, colds, pneumonia, bronchitis, and asthma  Where can I find more information and support to stop smoking? · [employer]  Phone: 8- 176 - [phone]  Web Address: www [employer]  CARE AGREEMENT:   You have the right to help plan your care  Learn about your health condition and how it may be treated  Discuss treatment options with your caregivers to decide what care you want to receive  You always have the right to refuse treatment  The above information is an  only  It is not intended as medical advice for individual conditions or treatments  Talk to your doctor, nurse or pharmacist before following any medical regimen to see if it is safe and effective for you    © 2017 [employer] Information is for End User's use only and may not be sold, redistributed or otherwise used for commercial purposes  All illustrations and images included in CareNotes® are the copyrighted property of A D A M , Inc  or Charli Calvo  Myocardial Infarction   WHAT YOU NEED TO KNOW:   A myocardial infarction (MI) is a heart attack  A heart attack happens when the blood vessels that supply blood to your heart (coronary arteries) are blocked  This can damage your heart  It can lead to an abnormal heart rhythm, heart failure, or may become life-threatening  DISCHARGE INSTRUCTIONS:   Medicines: You may  need any of the following:  · Heart medicines  help decrease blood pressure, control your heart rate, and help your heart function better  · Nitroglycerin  opens the arteries to your heart, increases oxygen levels, and can decrease chest pain  You may get your nitroglycerin as a pill, a patch, or a paste  Ask your healthcare provider or cardiologist how to safely take this medicine  · Aspirin  helps prevent clots from forming and causing blood flow problems  If healthcare providers want you to take aspirin daily, do not take acetaminophen or ibuprofen instead  Do not take more or less aspirin than healthcare providers say to take  If you are on another blood thinner medicine, ask your healthcare provider or cardiologist before you take aspirin for any reason  · Blood thinners    help prevent blood clots  Examples of blood thinners include heparin and warfarin  Clots can cause strokes, heart attacks, and death  The following are general safety guidelines to follow while you are taking a blood thinner:    ¨ Watch for bleeding and bruising while you take blood thinners  Watch for bleeding from your gums or nose  Watch for blood in your urine and bowel movements  Use a soft washcloth on your skin, and a soft toothbrush to brush your teeth  This can keep your skin and gums from bleeding  If you shave, use an electric shaver  Do not play contact sports       ¨ Tell your dentist and other healthcare providers that you take anticoagulants  Wear a bracelet or necklace that says you take this medicine  ¨ Do not start or stop any medicines unless your healthcare provider tells you to  Many medicines cannot be used with blood thinners  ¨ Tell your healthcare provider right away if you forget to take the medicine, or if you take too much  ¨ Warfarin  is a blood thinner that you may need to take  The following are things you should be aware of if you take warfarin  § Foods and medicines can affect the amount of warfarin in your blood  Do not make major changes to your diet while you take warfarin  Warfarin works best when you eat about the same amount of vitamin K every day  Vitamin K is found in green leafy vegetables and certain other foods  Ask for more information about what to eat when you are taking warfarin  § You will need to see your healthcare provider for follow-up visits when you are on warfarin  You will need regular blood tests  These tests are used to decide how much medicine you need  · Cholesterol medicine  decreases cholesterol and the amount of plaque in your blood  · Do not take certain medicines without asking your healthcare provider first   These include NSAIDs, herbal or vitamin supplements, or hormones (estrogen or progestin)  · Take your medicine as directed  Contact your healthcare provider if you think your medicine is not helping or if you have side effects  Tell him or her if you are allergic to any medicine  Keep a list of the medicines, vitamins, and herbs you take  Include the amounts, and when and why you take them  Bring the list or the pill bottles to follow-up visits  Carry your medicine list with you in case of an emergency  Cardiac rehabilitation (rehab)  is a program run by specialists who will help you safely strengthen your heart and prevent more heart disease   The plan includes exercise, relaxation, stress management, and heart-healthy nutrition  Healthcare providers will also check to make sure any medicines you take are working  The plan may also include instructions for when you can drive, return to work, and do other normal daily activities  Follow up with your healthcare provider or cardiologist within 14 days or as directed:  Ask for information about continuing care, treatments, and home services  Write down your questions so you remember to ask them during your visits  Lifestyle changes:   · Go to cardiac rehabilitation as directed  This is a program run by specialists who will help you safely strengthen your heart and prevent more heart disease  This plan includes exercise, relaxation, stress management, and heart-healthy nutrition  Healthcare providers will also check to make sure any medicines you are taking are working  The plan may also include instructions for when you can drive, return to work, and do other normal daily activities  · Eat a heart healthy diet  Get enough calories, protein, vitamins, and minerals to help prevent poor nutrition and promote muscle strength  You may be told to eat foods low in cholesterol or sodium (salt)  You also may be told to limit saturated and trans fats  Eat foods that contain healthy fats, such as walnuts, salmon, and canola and soybean oils  Eat foods that help protect the heart, including plenty of fruits and vegetables, nuts, and sources of fiber  · Do not smoke  If you smoke, it is never too late to quit  Smoking increases your risk of another MI      · Exercise  Ask your healthcare provider or cardiologist about the best exercise plan for you  Exercise makes your heart stronger, lowers blood pressure, and helps prevent an MI  The goal is 30 to 60 minutes a day, 5 to 7 days a week  Ask your healthcare provider or cardiologist how often and how long to exercise  · Maintain a healthy weight    Ask your healthcare provider or cardiologist how much you should weigh  Ask him to help you create a weight loss plan if you are overweight  · Manage your stress  Stress may slow healing and lead to illness  Learn ways to control stress, such as relaxation, deep breathing, and music  Talk to someone about things that upset you  · Get a flu vaccine  every year as soon as it is available  The vaccine will help prevent the flu  Ask about other vaccinations you may need  Contact your healthcare provider or cardiologist if:   · You have trouble taking your heart medicine  · You have questions or concerns about your condition or care  Seek care immediately or call 911 if:   · You have any of the following signs of a heart attack:      ¨ Squeezing, pressure, or pain in your chest that lasts longer than 5 minutes or returns    ¨ Discomfort or pain in your back, neck, jaw, stomach, or arm     ¨ Trouble breathing    ¨ Nausea or vomiting    ¨ Lightheadedness or a sudden cold sweat, especially with chest pain or trouble breathing    · You are tired and cannot think clearly  · Your heart is beating faster than usual      · You are bleeding from your gums or nose  · You see blood in your urine or bowel movements  · You urinate less than usual or not at all  · You have new or increased swelling in your feet or ankles  © 2017 [address] [employer]  Information is for End User's use only and may not be sold, redistributed or otherwise used for commercial purposes  All illustrations and images included in CareNotes® are the copyrighted property of [employer]  or Charli Calvo  The above information is an  only  It is not intended as medical advice for individual conditions or treatments  Talk to your doctor, nurse or pharmacist before following any medical regimen to see if it is safe and effective for you

## 2019-09-07 NOTE — ASSESSMENT & PLAN NOTE
NSTEMI type 2 likely precipitated by stress  Troponin is down to 0 16  Has peaked 0 30  Echocardiogram showed apical akinesis  Continue on heparin drip  Follow as per protocol with PTT  On aspirin, Plavix, Lipitor and Lopressor  Cardiology consult appreciated  They recommend continuing heparin drip for 24 more hours   Denies any chest pain  Oxygen supplementation and bronchodilators p r n  Nitro p r n  Monitor on telemetry  Cardiology recommended given his EKG changes, apical akinesis, elevated troponin patient should be transferred to [place] [id number] for cardiac catheterization  Discussed with patient was in agreement with the transfer plan  Left a message for patient's sister

## 2019-09-07 NOTE — PLAN OF CARE
Problem: PAIN - ADULT  Goal: Verbalizes/displays adequate comfort level or baseline comfort level  Description  Interventions:  - Encourage patient to monitor pain and request assistance  - Assess pain using appropriate pain scale  - Administer analgesics based on type and severity of pain and evaluate response  - Implement non-pharmacological measures as appropriate and evaluate response  - Consider cultural and social influences on pain and pain management  - Notify physician/advanced practitioner if interventions unsuccessful or patient reports new pain  Outcome: Progressing     Problem: INFECTION - ADULT  Goal: Absence or prevention of progression during hospitalization  Description  INTERVENTIONS:  - Assess and monitor for signs and symptoms of infection  - Monitor lab/diagnostic results  - Monitor all insertion sites, i e  indwelling lines, tubes, and drains  - Monitor endotracheal if appropriate and nasal secretions for changes in amount and color  - [place] appropriate cooling/warming therapies per order  - Administer medications as ordered  - Instruct and encourage patient and family to use good hand hygiene technique  - Identify and instruct in appropriate isolation precautions for identified infection/condition  Outcome: Progressing  Goal: Absence of fever/infection during neutropenic period  Description  INTERVENTIONS:  - Monitor WBC    Outcome: Progressing     Problem: SAFETY ADULT  Goal: Patient will remain free of falls  Description  INTERVENTIONS:  - Assess patient frequently for physical needs  -  Identify cognitive and physical deficits and behaviors that affect risk of falls    -  [place] fall precautions as indicated by assessment   - Educate patient/family on patient safety including physical limitations  - Instruct patient to call for assistance with activity based on assessment  - Modify environment to reduce risk of injury  - Consider OT/PT consult to assist with strengthening/mobility  Outcome: Progressing  Goal: Maintain or return to baseline ADL function  Description  INTERVENTIONS:  -  Assess patient's ability to carry out ADLs; assess patient's baseline for ADL function and identify physical deficits which impact ability to perform ADLs (bathing, care of mouth/teeth, toileting, grooming, dressing, etc )  - Assess/evaluate cause of self-care deficits   - Assess range of motion  - Assess patient's mobility; develop plan if impaired  - Assess patient's need for assistive devices and provide as appropriate  - Encourage maximum independence but intervene and supervise when necessary  - Involve family in performance of ADLs  - Assess for home care needs following discharge   - Consider OT consult to assist with ADL evaluation and planning for discharge  - Provide patient education as appropriate  Outcome: Progressing  Goal: Maintain or return mobility status to optimal level  Description  INTERVENTIONS:  - Assess patient's baseline mobility status (ambulation, transfers, stairs, etc )    - Identify cognitive and physical deficits and behaviors that affect mobility  - Identify mobility aids required to assist with transfers and/or ambulation (gait belt, sit-to-stand, lift, walker, cane, etc )  - [place] fall precautions as indicated by assessment  - Record patient progress and toleration of activity level on Mobility SBAR; progress patient to next Phase/Stage  - Instruct patient to call for assistance with activity based on assessment  - Consider rehabilitation consult to assist with strengthening/weightbearing, etc   Outcome: Progressing     Problem: DISCHARGE PLANNING  Goal: Discharge to home or other facility with appropriate resources  Description  INTERVENTIONS:  - Identify barriers to discharge w/patient and caregiver  - Arrange for needed discharge resources and transportation as appropriate  - Identify discharge learning needs (meds, wound care, etc )  - Arrange for interpretive services to assist at discharge as needed  - Refer to Case Management Department for coordinating discharge planning if the patient needs post-hospital services based on physician/advanced practitioner order or complex needs related to functional status, cognitive ability, or social support system  Outcome: Progressing     Problem: Knowledge Deficit  Goal: Patient/family/caregiver demonstrates understanding of disease process, treatment plan, medications, and discharge instructions  Description  Complete learning assessment and assess knowledge base  Interventions:  - Provide teaching at level of understanding  - Provide teaching via preferred learning methods  Outcome: Progressing     Problem: Potential for Falls  Goal: Patient will remain free of falls  Description  INTERVENTIONS:  - Assess patient frequently for physical needs  -  Identify cognitive and physical deficits and behaviors that affect risk of falls    -  [place] fall precautions as indicated by assessment   - Educate patient/family on patient safety including physical limitations  - Instruct patient to call for assistance with activity based on assessment  - Modify environment to reduce risk of injury  - Consider OT/PT consult to assist with strengthening/mobility  Outcome: Progressing

## 2019-09-07 NOTE — ASSESSMENT & PLAN NOTE
Denies any suicidal or homicidal ideation  Psychiatry consult appreciated  Patient is cleared by psychiatrist and one-to-one observation was discontinued    Outpatient psychiatry follow-up

## 2019-09-07 NOTE — EMTALA/ACUTE CARE TRANSFER
[address] [facility] UNIT  [facility] 3  [id number]  Dept: [phone]      ACUTE CARE TRANSFER CONSENT    NAME Bart CUMMINGS 1965                              MRN [id number]    I have been informed of my rights regarding examination, treatment, and transfer   by Dr Devan Moser MD    Benefits:   for cardiac catheterization    Risks:   deterioration of condition during transfer      Consent for Transfer:  I acknowledge that my medical condition has been evaluated and explained to me by the treating physician or other qualified medical person and/or my attending physician, who has recommended that I be transferred to the service of    at    The above potential benefits of such transfer, the potential risks associated with such transfer, and the probable risks of not being transferred have been explained to me, and I fully understand them  The doctor has explained that, in my case, the benefits of transfer outweigh the risks  I agree to be transferred  I authorize the performance of emergency medical procedures and treatments upon me in both transit and upon arrival at the receiving facility  Additionally, I authorize the release of any and all medical records to the receiving facility and request they be transported with me, if possible  I understand that the safest mode of transportation during a medical emergency is an ambulance and that the Hospital advocates the use of this mode of transport  Risks of traveling to the receiving facility by car, including absence of medical control, life sustaining equipment, such as oxygen, and medical personnel has been explained to me and I fully understand them  (KATELIN CORRECT BOX BELOW)  [  ]  I consent to the stated transfer and to be transported by ambulance/helicopter    [  ]  I consent to the stated transfer, but refuse transportation by ambulance and accept full responsibility for my transportation by car  I understand the risks of non-ambulance transfers and I exonerate the Hospital and its staff from any deterioration in my condition that results from this refusal     X___________________________________________    DATE  19  TIME________  Signature of patient or legally responsible individual signing on patient behalf           RELATIONSHIP TO PATIENT_________________________          Provider Certification    NAME Bharati Jaramillo                                         1965                              MRN [id number]    A medical screening exam was performed on the above named patient  Based on the examination:    Condition Necessitating Transfer non STEMI type 2    Patient Condition:   stable    Reason for Transfer:   cardiac catheterization    Transfer Requirements: [address] [facility]  C[facility]ian available and qualified personnel available for treatment as acknowledged by    · Agreed to accept transfer and to provide appropriate medical treatment as acknowledged by         [facility] resident on call  · Appropriate medical records of the examination and treatment of the patient are provided at the time of transfer   [address] [facility], [address] _______  · Transfer will be performed by qualified personnel from    and appropriate transfer equipment as required, including the use of necessary and appropriate life support measures      Provider Certification: I have examined the patient and explained the following risks and benefits of being transferred/refusing transfer to the patient/family:     Explained to patient at length    Based on these reasonable risks and benefits to the patient and/or the unborn child(kartik), and based upon the information available at the time of the patients examination, I certify that the medical benefits reasonably to be expected from the provision of appropriate medical treatments at another medical facility outweigh the increasing risks, if any, to the individuals medical condition, and in the case of labor to the unborn child, from effecting the transfer      X____________________________________________ DATE 09/07/19        TIME_______      ORIGINAL - SEND TO MEDICAL RECORDS   COPY - SEND WITH PATIENT DURING TRANSFER

## 2019-09-08 LAB
APTT PPP: 58 SECONDS (ref 23–37)
APTT PPP: 65 SECONDS (ref 23–37)
APTT PPP: 94 SECONDS (ref 23–37)
ATRIAL RATE: 46 BPM
EST. AVERAGE GLUCOSE BLD GHB EST-MCNC: 105 MG/DL
HBA1C MFR BLD: 5.3 % (ref 4.2–6.3)
P AXIS: 43 DEGREES
PR INTERVAL: 142 MS
QRS AXIS: 7 DEGREES
QRSD INTERVAL: 96 MS
QT INTERVAL: 458 MS
QTC INTERVAL: 400 MS
T WAVE AXIS: 90 DEGREES
VENTRICULAR RATE: 46 BPM

## 2019-09-08 PROCEDURE — NC001 PR NO CHARGE: Performed by: INTERNAL MEDICINE

## 2019-09-08 PROCEDURE — 85730 THROMBOPLASTIN TIME PARTIAL: CPT | Performed by: INTERNAL MEDICINE

## 2019-09-08 PROCEDURE — 99232 SBSQ HOSP IP/OBS MODERATE 35: CPT | Performed by: INTERNAL MEDICINE

## 2019-09-08 PROCEDURE — 93010 ELECTROCARDIOGRAM REPORT: CPT | Performed by: INTERNAL MEDICINE

## 2019-09-08 PROCEDURE — 99223 1ST HOSP IP/OBS HIGH 75: CPT | Performed by: INTERNAL MEDICINE

## 2019-09-08 PROCEDURE — 85730 THROMBOPLASTIN TIME PARTIAL: CPT | Performed by: STUDENT IN AN ORGANIZED HEALTH CARE EDUCATION/TRAINING PROGRAM

## 2019-09-08 RX ADMIN — METOPROLOL TARTRATE 12.5 MG: 25 TABLET ORAL at 09:08

## 2019-09-08 RX ADMIN — CLOPIDOGREL BISULFATE 75 MG: 75 TABLET ORAL at 09:08

## 2019-09-08 RX ADMIN — ASPIRIN 81 MG: 81 TABLET, COATED ORAL at 09:08

## 2019-09-08 RX ADMIN — NICOTINE 1 PATCH: 21 PATCH, EXTENDED RELEASE TRANSDERMAL at 09:08

## 2019-09-08 RX ADMIN — DOCUSATE SODIUM 100 MG: 100 CAPSULE, LIQUID FILLED ORAL at 09:08

## 2019-09-08 RX ADMIN — LITHIUM CARBONATE 450 MG: 450 TABLET ORAL at 21:13

## 2019-09-08 RX ADMIN — ATORVASTATIN CALCIUM 20 MG: 20 TABLET, FILM COATED ORAL at 17:35

## 2019-09-08 RX ADMIN — CLONAZEPAM 0.5 MG: 0.5 TABLET ORAL at 17:35

## 2019-09-08 RX ADMIN — DOCUSATE SODIUM 100 MG: 100 CAPSULE, LIQUID FILLED ORAL at 17:35

## 2019-09-08 RX ADMIN — LITHIUM CARBONATE 450 MG: 450 TABLET ORAL at 09:09

## 2019-09-08 RX ADMIN — HEPARIN SODIUM AND DEXTROSE 14 UNITS/KG/HR: 10000; 5 INJECTION INTRAVENOUS at 17:41

## 2019-09-08 RX ADMIN — ESCITALOPRAM OXALATE 5 MG: 10 TABLET ORAL at 09:08

## 2019-09-08 RX ADMIN — HEPARIN SODIUM 2000 UNITS: 1000 INJECTION, SOLUTION INTRAVENOUS; SUBCUTANEOUS at 19:32

## 2019-09-08 RX ADMIN — CLONAZEPAM 0.5 MG: 0.5 TABLET ORAL at 09:08

## 2019-09-08 NOTE — PROGRESS NOTES
Cardiology Progress Note Shahnaz Hardy 53 y o  male MRN: [id number]    Unit/Bed#: [facility] 409-01 Encounter: [id number]      Assessment:  Principal Problem:    NSTEMI (non-ST elevated myocardial infarction) ([facility] 75 )  Active Problems:    Bipolar disorder, current episode mixed, moderate ([facility] )    Suicidal ideation      Plan:    Patient was transferred up here from [address] [facility] with abnormal findings of anterior T-wave inversions, ischemic cardiomyopathy and elevated troponin  Without symptoms of angina  Plan at this point is cardiac catheterization tomorrow  Continue IV heparin today, along with all other medical therapy prescribed  Subjective:   Patient seen and examined  No significant events overnight  Resting comfortably  Denies chest pain or shortness of breath  Objective:     Vitals: Blood pressure 121/72, pulse 73, temperature 98 4 °F (36 9 °C), temperature source Oral, resp  rate 19, height 5' 11" (1 803 m), weight 85 6 kg (188 lb 11 4 oz), SpO2 96 %  , Body mass index is 26 32 kg/m² ,   Orthostatic Blood Pressures      Most Recent Value   Blood Pressure  121/72 filed at 09/08/2019 0700   Patient Position - Orthostatic VS  Lying filed at 09/08/2019 0700            Intake/Output Summary (Last 24 hours) at 9/8/2019 1039  Last data filed at 9/8/2019 0700  Gross per 24 hour   Intake 780 ml   Output --   Net 780 ml       No significant arrhythmias seen on telemetry review    Sinus rhythm      Physical Exam:    GEN: Eleazar Root appears well, alert and oriented x 3, pleasant and cooperative   HEENT: pupils equal, round, and reactive to light; extraocular muscles intact  NECK: supple, no carotid bruits   HEART: regular rhythm, normal S1 and S2, no murmurs, clicks, gallops or rubs   LUNGS: clear to auscultation bilaterally; no wheezes, rales, or rhonchi   ABDOMEN: normal bowel sounds, soft, no tenderness, no distention  EXTREMITIES: peripheral pulses normal; no clubbing, cyanosis, or edema  NEURO: no focal findings   SKIN: normal without suspicious lesions on exposed skin    Medications:      Current Facility-Administered Medications:     acetaminophen (TYLENOL) tablet 650 mg, 650 mg, Oral, Q6H PRN, Kesha Ibarra MD    aspirin (ECOTRIN LOW STRENGTH) EC tablet 81 mg, 81 mg, Oral, Daily, Kesha Ibarra MD, 81 mg at 09/08/19 0908    atorvastatin (LIPITOR) tablet 20 mg, 20 mg, Oral, Daily With Dinner, Kesha Ibarra MD    clonazePAM (KlonoPIN) tablet 0 5 mg, 0 5 mg, Oral, BID, Kesha Ibarra MD, 0 5 mg at 09/08/19 0908    clopidogrel (PLAVIX) tablet 75 mg, 75 mg, Oral, Daily, Kesha Ibarra MD, 75 mg at 09/08/19 0908    docusate sodium (COLACE) capsule 100 mg, 100 mg, Oral, BID, Kesha Ibarra MD, 100 mg at 09/08/19 0908    escitalopram (LEXAPRO) tablet 5 mg, 5 mg, Oral, Daily, Kesha Ibarra MD, 5 mg at 09/08/19 0908    heparin (porcine) 25,000 units in 250 mL infusion (premix), 12 5 Units/kg/hr (Order-Specific), Intravenous, Titrated, Pardeep Diana DO, Last Rate: 11 6 mL/hr at 09/08/19 0546, 14 5 Units/kg/hr at 09/08/19 0546    heparin (porcine) injection 2,000 Units, 2,000 Units, Intravenous, PRN, Kesha Ibarra MD    heparin (porcine) injection 4,000 Units, 4,000 Units, Intravenous, PRN, Kesha Ibarra MD, 4,000 Units at 09/07/19 2305    lithium carbonate (LITHOBID) CR tablet 450 mg, 450 mg, Oral, Q12H [facility], Kesha Ibarra MD, 450 mg at 09/08/19 0909    metoprolol tartrate (LOPRESSOR) partial tablet 12 5 mg, 12 5 mg, Oral, Q12H [facility], Kesha Ibarra MD, 12 5 mg at 09/08/19 0908    nicotine (NICODERM CQ) 21 mg/24 hr TD 24 hr patch 1 patch, 1 patch, Transdermal, Daily, Kesha Ibarra MD, 1 patch at 09/08/19 0908    nitroglycerin (NITROSTAT) SL tablet 0 4 mg, 0 4 mg, Sublingual, Q5 Min PRN, Kesha Ibarra MD    ondansetron (ZOFRAN) injection 4 mg, 4 mg, Intravenous, Q6H PRN, Kesha Ibarra MD     Labs & Results:    Results from last 7 days   Lab Units 09/06/19  1007 09/05/19  2248 09/05/19  1826   TROPONIN I ng/mL 0 16* 0 29* 0 27*     Results from last 7 days   Lab Units 09/07/19 0442 09/06/19 0410 09/05/19  1625   WBC Thousand/uL 8 90 9 54 10 77*   HEMOGLOBIN g/dL 14 4 16 1 16 6   HEMATOCRIT % 44 4 48 3 50 8*   PLATELETS Thousands/uL 232 241 287     Results from last 7 days   Lab Units 09/06/19  0410   TRIGLYCERIDES mg/dL 80   HDL mg/dL 38*     Results from last 7 days   Lab Units 09/07/19  0442 09/06/19 0410 09/05/19  1625   POTASSIUM mmol/L 3 9 3 9 4 4   CHLORIDE mmol/L 111* 109* 104   CO2 mmol/L 24 25 28   BUN mg/dL 7 9 9   CREATININE mg/dL 0 82 0 81 0 98   CALCIUM mg/dL 8 4 8 3 9 6   ALK PHOS U/L  --  57 60   ALT U/L  --  28 26   AST U/L  --  31 27     Results from last 7 days   Lab Units 09/08/19 0445 09/07/19 2204 09/07/19 0442 09/05/19 2017   INR   --   --   --   --  1 05   PTT seconds 94* 35 61*   < > 30    < > = values in this interval not displayed  Results from last 7 days   Lab Units 09/06/19  0410   MAGNESIUM mg/dL 2 0         EKG personally reviewed by Miladis Hensley MD   Sinus rhythm with anterior lateral T-wave inversions, and possible prior anterior infarct  ECHO:  LEFT VENTRICLE:  Size was normal   Systolic function was at the lower limits of normal  Ejection fraction was estimated to be 50 %  There was akinesis of the apex  Wall thickness was normal   Doppler parameters were consistent with abnormal left ventricular relaxation (grade 1 diastolic dysfunction)      RIGHT VENTRICLE:  The size was normal   Systolic function was normal      MITRAL VALVE:  There was trace regurgitation  Counseling / Coordination of Care  Total floor / unit time spent today 25 minutes  Greater than 50% of total time was spent with the patient and / or family counseling and / or coordination of care

## 2019-09-08 NOTE — PROGRESS NOTES
INTERNAL MEDICINE RESIDENCY SENIOR ADMISSION NOTE     Name: Collin Duran   Age & Sex: 53 y o  male   MRN: [id number]  Unit/Bed#: [facility] 409-01   Encounter: [id number]  Primary Care Provider: Clifton Abrams PA-C    Patient seen and examined  Reviewed H&P per Dr Gusman Keto   Agree with the assessment and plan with any exception/addition as noted below: Active Problems:    Bipolar disorder, current episode mixed, moderate (HCC)    Suicidal ideation    NSTEMI (non-ST elevated myocardial infarction) [facility])      NSTEMI  Patient originally presented [address] [facility] for evaluation of suicidal ideations  Incidentally was found to have elevated troponins of 0 3  Cardio was consulted  EKG demonstrated anterior T-wave inversions EKG was similar to EKG on 12/18  Echo revealed apical akinesis, no prior echo available for comparison  Last stress test greater than 10 years ago  Patient started on heparin drip     Patient was transferred to [facility] for cardiac catheterization scheduled on Monday 09/09/2019   -continue heparin drip, nitro p r n , Plavix, Lipitor, Lopressor, aspirin  -Continue telemetry  -oxygen titrated as needed  -routine cardiology consult placed        Bipolar disorder  Suicidal ideation  Patient had initially presented to [address] [facility] for evaluation of suicidal ideation  currently denies any suicidal ideations  -continue Klonopin, Lexapro       Nicotine dependence  -continue nicotine patch 21 mg/24 hours        Code Status: Level 1 - Full Code  Admission Status: INPATIENT   Disposition: Patient requires Med/Surg with Telemetry  Expected Length of Stay:  2 nights or more

## 2019-09-08 NOTE — PLAN OF CARE
Problem: PAIN - ADULT  Goal: Verbalizes/displays adequate comfort level or baseline comfort level  Description  Interventions:  - Encourage patient to monitor pain and request assistance  - Assess pain using appropriate pain scale  - Administer analgesics based on type and severity of pain and evaluate response  - Implement non-pharmacological measures as appropriate and evaluate response  - Consider cultural and social influences on pain and pain management  - Notify physician/advanced practitioner if interventions unsuccessful or patient reports new pain  Outcome: Progressing     Problem: INFECTION - ADULT  Goal: Absence or prevention of progression during hospitalization  Description  INTERVENTIONS:  - Assess and monitor for signs and symptoms of infection  - Monitor lab/diagnostic results  - Monitor all insertion sites, i e  indwelling lines, tubes, and drains  - Monitor endotracheal if appropriate and nasal secretions for changes in amount and color  - [place] appropriate cooling/warming therapies per order  - Administer medications as ordered  - Instruct and encourage patient and family to use good hand hygiene technique  - Identify and instruct in appropriate isolation precautions for identified infection/condition  Outcome: Progressing  Goal: Absence of fever/infection during neutropenic period  Description  INTERVENTIONS:  - Monitor WBC    Outcome: Progressing     Problem: SAFETY ADULT  Goal: Patient will remain free of falls  Description  INTERVENTIONS:  - Assess patient frequently for physical needs  -  Identify cognitive and physical deficits and behaviors that affect risk of falls    -  [place] fall precautions as indicated by assessment   - Educate patient/family on patient safety including physical limitations  - Instruct patient to call for assistance with activity based on assessment  - Modify environment to reduce risk of injury  - Consider OT/PT consult to assist with strengthening/mobility  Outcome: Progressing  Goal: Maintain or return to baseline ADL function  Description  INTERVENTIONS:  -  Assess patient's ability to carry out ADLs; assess patient's baseline for ADL function and identify physical deficits which impact ability to perform ADLs (bathing, care of mouth/teeth, toileting, grooming, dressing, etc )  - Assess/evaluate cause of self-care deficits   - Assess range of motion  - Assess patient's mobility; develop plan if impaired  - Assess patient's need for assistive devices and provide as appropriate  - Encourage maximum independence but intervene and supervise when necessary  - Involve family in performance of ADLs  - Assess for home care needs following discharge   - Consider OT consult to assist with ADL evaluation and planning for discharge  - Provide patient education as appropriate  Outcome: Progressing  Goal: Maintain or return mobility status to optimal level  Description  INTERVENTIONS:  - Assess patient's baseline mobility status (ambulation, transfers, stairs, etc )    - Identify cognitive and physical deficits and behaviors that affect mobility  - Identify mobility aids required to assist with transfers and/or ambulation (gait belt, sit-to-stand, lift, walker, cane, etc )  - [place] fall precautions as indicated by assessment  - Record patient progress and toleration of activity level on Mobility SBAR; progress patient to next Phase/Stage  - Instruct patient to call for assistance with activity based on assessment  - Consider rehabilitation consult to assist with strengthening/weightbearing, etc   Outcome: Progressing     Problem: DISCHARGE PLANNING  Goal: Discharge to home or other facility with appropriate resources  Description  INTERVENTIONS:  - Identify barriers to discharge w/patient and caregiver  - Arrange for needed discharge resources and transportation as appropriate  - Identify discharge learning needs (meds, wound care, etc )  - Arrange for interpretive services to assist at discharge as needed  - Refer to Case Management Department for coordinating discharge planning if the patient needs post-hospital services based on physician/advanced practitioner order or complex needs related to functional status, cognitive ability, or social support system  Outcome: Progressing     Problem: Knowledge Deficit  Goal: Patient/family/caregiver demonstrates understanding of disease process, treatment plan, medications, and discharge instructions  Description  Complete learning assessment and assess knowledge base    Interventions:  - Provide teaching at level of understanding  - Provide teaching via preferred learning methods  Outcome: Progressing

## 2019-09-08 NOTE — H&P
INTERNAL MEDICINE HISTORY AND PHYSICAL  [facility] 409-01 SOD Team C     NAME: Eleazar Benítez  AGE: 53 y o  SEX: male  : 1965   MRN: [id number]  ENCOUNTER: [id number]    DATE: 2019  TIME: 9:12 PM    Primary Care Physician: Michael Robles PA-C  Admitting Provider: Harshal Bonner DO    Chief complaint:  NSTEMI    History of Present Illness     Radha Villaseñor is a 53 y o  male with past medical history bipolar 1 disorder, tobacco abuse presented to [facility] originally for suicidal ideation  Placed on one-to-one observation  Incidental finding of troponin 0 30 with EKG changes, sinus [employer] with PVCs, T-wave abnormalities  He was started on a heparin drip, as well as aspirin/Plavix/statin/Lopressor  The patient remained chest pain-free throughout with Cardiology consult  The patient was seen by psychiatrist who cleared patient and recommended outpatient psychiatry follow-up with one-to-one discontinued  Patient transferred to [facility] for cardiac catheterization due to elevated troponin, apical akinesis on echo, EKG changes  Discussion with patient about code level, was originally level 1 at [facility], has previously talk to to physician at [place] and elects to be level 3 DNR/DNI  Patient denies any suicidal or homicidal thoughts, has capacity for this decision and will [facility] with [facility] documentation of this  Review of Systems   Review of Systems   Constitutional: Negative for activity change, chills, diaphoresis and fever  HENT: Negative  Eyes: Negative  Respiratory: Negative for cough, chest tightness, shortness of breath and wheezing  Cardiovascular: Negative for chest pain, palpitations and leg swelling  Gastrointestinal: Negative for abdominal distention, abdominal pain, blood in stool, constipation, diarrhea, nausea and vomiting  Endocrine: Negative  Genitourinary: Negative for difficulty urinating, dysuria and hematuria  Musculoskeletal: Negative for arthralgias, back pain, gait problem and neck pain  Allergic/Immunologic: Negative  Neurological: Negative for dizziness, numbness and headaches  Hematological: Negative  Psychiatric/Behavioral: Negative for agitation, behavioral problems and confusion  Past Medical History     Past Medical History:   Diagnosis Date    Anxiety     Bipolar 1 disorder ([place] [address] )     Bowel habit changes     Chronic pain disorder     CVA (cerebral vascular accident) ([place] [address] )     Depression     Family hx of colon cancer     father and paternal uncle    Myocardial infarction [facility])     Psychiatric illness     Stroke ([place] [address] ) 12/2015    CVA x3   Pt states he is unaware of when he had first two CVA    Suicide attempt [facility])        Past Surgical History     Past Surgical History:   Procedure Laterality Date    COLONOSCOPY         Social History     Social History     Substance and Sexual Activity   Alcohol Use No    Comment: pt denies     Social History     Substance and Sexual Activity   Drug Use No    Comment: pt denies     Social History     Tobacco Use   Smoking Status Current Every Day Smoker    Packs/day: 0 50    Years: 15 00    Pack years: 7 50    Types: Cigarettes   Smokeless Tobacco Current User   Tobacco Comment    Wishes to quit       Family History     Family History   Problem Relation Age of Onset    No Known Problems Mother     No Known Problems Father     No Known Problems Sister     No Known Problems Brother     No Known Problems Maternal Aunt     No Known Problems Paternal Aunt     No Known Problems Maternal Uncle     No Known Problems Paternal Uncle     No Known Problems Maternal Grandfather     No Known Problems Maternal Grandmother     No Known Problems Paternal Grandfather     No Known Problems Paternal Grandmother     No Known Problems Cousin     ADD / ADHD Neg Hx     Alcohol abuse Neg Hx     Anxiety disorder Neg Hx     Bipolar disorder Neg Hx     Completed Suicide  Neg Hx     Dementia Neg Hx     Depression Neg Hx     Drug abuse Neg Hx     OCD Neg Hx     Psychiatric Illness Neg Hx     Psychosis Neg Hx     Schizoaffective Disorder  Neg Hx     Schizophrenia Neg Hx     Self-Injury Neg Hx     Suicide Attempts Neg Hx        Medications Prior to Admission     Prior to Admission medications    Medication Sig Start Date End Date Taking? Authorizing Provider   Aspirin (ASPIR-81 PO) Take 81 mg by mouth 9/19/16   Historical Provider, MD   atorvastatin (LIPITOR) 20 mg tablet Take 1 tablet (20 mg total) by mouth daily 12/18/18   ERNIE Whitman   cholecalciferol (VITAMIN D3) 1,000 units tablet Take 1,000 Units by mouth daily    Historical Provider, MD   clonazePAM (KlonoPIN) 0 5 mg tablet Take 1 tablet (0 5 mg total) by mouth 2 (two) times a day for 10 days 12/18/18 8/8/19  MEAGAN WhitmanNP   docusate sodium (COLACE) 100 mg capsule Take 1 capsule (100 mg total) by mouth 2 (two) times a day 12/18/18   StaceyMEAGAN Del CidNP   escitalopram (LEXAPRO) 5 mg tablet Take 1 tablet (5 mg total) by mouth daily  Patient taking differently: Take 15 mg by mouth daily  12/19/18   StaceyMEAGAN Del CidNP   lithium carbonate (LITHOBID) 450 mg CR tablet Take 1 tablet (450 mg total) by mouth every 12 (twelve) hours 12/18/18   Stacey Li CRNP       Allergies     Allergies   Allergen Reactions    Haldol [Haloperidol] Other (See Comments)     Muscle/jaw tightness  Difficulty swallowing    Prolixin [Fluphenazine] Other (See Comments)     Muscle/jaw tightness  Difficulty swallowing      Thorazine [Chlorpromazine] Other (See Comments)     Muscle/jaw tightness  Difficulty swallowing  Objective   There were no vitals filed for this visit  There is no height or weight on file to calculate BMI    No intake or output data in the 24 hours ending 09/07/19 2112  Invasive Devices     Peripheral Intravenous Line            Peripheral IV 09/05/19 Right;Medial Forearm 2 days Physical Exam  GENERAL: Appears well-developed and well-nourished  Appears in no acute distress   HEENT: Normocephalic and atraumatic  No scleral icterus  PERRLA  EOMI B/L  No oropharyngeal edema  MM moist    NECK: Neck supple with no lymphadenopathy  Trachea midline  No JVD  CARDIOVASCULAR: S1 and S2 are present  Regular rate and rhythm  No murmurs, rubs, or gallops  RESPIRATORY: CTA B/L, no rales, rhonci or wheezes  Normal respiratory expansion  ABDOMINAL: Bowel sounds present in all 4 quadrants, non-tender, soft, non-distended  No organomegaly, rebound, or guarding  EXTREMITIES: 2+ DP and PT pulses bilaterally; no cyanosis, clubbing, edema  ROM intact  PAPPAS x4   MUSCULOSKELETAL: No joint tenderness, deformity or swelling, full range of motion without pain  NEUROLOGIC: Patient is alert and oriented to person, place, and time  No sensory or motor deficits  CN 2-12 intact  Plantars downgoing bilaterally  Speech fluent  SKIN: Skin is warm and dry  No skin lesions are present  No rashes  PSYCHIATRIC: Normal mood and affect     Lab Results: I have personally reviewed pertinent reports      CBC:   Results from last 7 days   Lab Units 09/07/19  0442   WBC Thousand/uL 8 90   RBC Million/uL 4 60   HEMOGLOBIN g/dL 14 4   HEMATOCRIT % 44 4   MCV fL 97   MCH pg 31 3   MCHC g/dL 32 4   RDW % 13 1   MPV fL 10 6   PLATELETS Thousands/uL 232   NRBC AUTO /100 WBCs 0   NEUTROS PCT % 54   LYMPHS PCT % 33   MONOS PCT % 8   EOS PCT % 4   BASOS PCT % 1   NEUTROS ABS Thousands/µL 4 81   LYMPHS ABS Thousands/µL 2 89   MONOS ABS Thousand/µL 0 74   EOS ABS Thousand/µL 0 34   , Chemistry Profile:   Results from last 7 days   Lab Units 09/07/19  0442 09/06/19  0410   POTASSIUM mmol/L 3 9 3 9   CHLORIDE mmol/L 111* 109*   CO2 mmol/L 24 25   BUN mg/dL 7 9   CREATININE mg/dL 0 82 0 81   CALCIUM mg/dL 8 4 8 3   AST U/L  --  31   ALT U/L  --  28   ALK PHOS U/L  --  57   EGFR ml/min/1 73sq m 100 100   , Coagulation Studies:   Results from last 7 days   Lab Units 09/07/19  0442  09/05/19  2017   PROTIME seconds  --   --  13 4   INR   --   --  1 05   PTT seconds 61*   < > 30    < > = values in this interval not displayed  , Cardiac Studies:   Results from last 7 days   Lab Units 09/06/19  1007   TROPONIN I ng/mL 0 16*   , Additional Labs:   Results from last 7 days   Lab Units 09/06/19  0410   MAGNESIUM mg/dL 2 0   PHOSPHORUS mg/dL 3 9   , iSTAT CHEM 8:   Results from last 7 days   Lab Units 09/07/19  0442   ANION GAP mmol/L 7   EGFR ml/min/1 73sq m 100   HEMOGLOBIN g/dL 14 4   , ABG:   , Toxicology:   Results from last 7 days   Lab Units 09/05/19  1625 09/05/19  1606   BARBITURATE UR   --  Negative   BENZODIAZEPINE UR   --  Negative   COCAINE UR   --  Negative   OPIATE UR   --  Negative   PCP UR   --  Negative   THC UR   --  Negative   ETHANOL LVL mg/dL <3  --    ACETAMINOPHEN LVL ug/mL <9 1*  --    SALICYLATE LVL mg/dL <3*  --    , Last A1C/Lipid Panel/Thyroid Panel:   Lab Results   Component Value Date    HGBA1C 5 7 08/25/2016    TRIG 80 09/06/2019    TRIG 107 12/12/2018    HDL 38 (L) 09/06/2019    HDL 43 12/12/2018    LDLCALC 65 09/06/2019    LDLCALC 73 12/12/2018    [id number] 1 346 09/06/2019       Imaging: I have personally reviewed pertinent films in PACS  Xr Chest Pa & Lateral    Result Date: 9/6/2019  Narrative: CHEST INDICATION:   routine  Elevated troponin, depression COMPARISON:  None EXAM PERFORMED/VIEWS:  XR CHEST PA & LATERAL FINDINGS: Cardiomediastinal silhouette appears unremarkable  The lungs are clear  No pneumothorax or pleural effusion  Osseous structures appear within normal limits for patient age  Impression: No acute cardiopulmonary disease  Workstation performed: [id number]       Urinalysis:       Invalid input(s): URIBILINOGEN     Urine Micro:        EKG, Pathology, and Other Studies: I have personally reviewed pertinent reports        Medications given in Emergency Department       Assessment and Plan     Problem List     Bipolar disorder, current episode mixed, moderate (HCC)    Dental abscess    Bipolar 1 disorder, depressed, severe ([facility] )    Suicidal ideation    Major depression    Affective psychosis, bipolar ([facility] )    Encounter for medical assessment    History of MI (myocardial infarction)    History of CVA (cerebrovascular accident)    Other hyperlipidemia    NSTEMI (non-ST elevated myocardial infarction) ([facility] )    Tobacco abuse        NSTEMI:  NSTEMI type 2 likely, troponin peaked at 0 30, down trending at 0 16, echo showed apical akinesis, denying chest pain, EKG T-wave abnormalities, sinus bradycardia  -continue heparin drip  -on Plavix/aspirin/Lipitor/Lopressor  -cardiology consulted  -oxygen and bronchodilators p r n   -continue to monitor on telemetry  -heart catheterization Monday tentatively    Tobacco abuse:  Smoking cessation counseling, nicotine patch    Hyperlipidemia:  On statin    Suicidal ideation:  Denies any suicidal or homicidal ideation, cleared by Psychiatry at [facility], no 1-1 observation needed at this point, monitor patient    Bipolar 1 disorder:  Continue lithium, Klonopin, Lexapro    Code Status: Level 1 - Full Code  VTE Pharmacologic Prophylaxis: Heparin   VTE Mechanical Prophylaxis: sequential compression device  Admission Status: INPATIENT     Admission Time  I spent 30 minutes admitting the patient  This involved direct patient contact where I performed a full history and physical, reviewing previous records, and reviewing laboratory and other diagnostic studies      Lisandro Lincoln, DO  Internal Medicine  PGY-1

## 2019-09-08 NOTE — SOCIAL WORK
CM met with patient and explained CM role  Patient is a resident of [address] [place] [address]  Pt is independent adl's and ambulation, and does not drive  Transportation is provided by facility  Patient's medications are provided through group home  Patients POA is his sister [facility] at [phone]  Information from [id number] [address] admission 9/6/19:    Past services - patient states that he was admitted to [place] in [place] about 10 years ago for SI, but no other admissions  As per medical record, patient had admissions to [facility] 5/2016 and 8/2016, to [facility] 7/5/2017 - 7/11/2017 and 7/12/2017 - 7/13/2017 for SI and then admitted to [facility] on 7/13/2017  He had an admission to [place] [address] 12/2018 for SI  CM reviewed d/c planning process including the following: identifying help at home, patient preference for d/c planning needs, Discharge unge, [facility], availability of treatment team to discuss questions or concerns patient and/or family may have regarding understanding medications and recognizing signs and symptoms once discharged  CM also encouraged patient to follow up with all recommended appointments after discharge  Patient advised of importance for patient and family to participate in managing patients medical well being

## 2019-09-08 NOTE — CONSULTS
Please see full dictated consultation on 9/6/2019 by Dr Frankie Heck and progress note from today by me

## 2019-09-09 ENCOUNTER — APPOINTMENT (INPATIENT)
Dept: NON INVASIVE DIAGNOSTICS | Facility: HOSPITAL | Age: 54
DRG: 281 | End: 2019-09-09
Attending: INTERNAL MEDICINE
Payer: MEDICARE

## 2019-09-09 LAB
ANION GAP SERPL CALCULATED.3IONS-SCNC: 4 MMOL/L (ref 4–13)
APTT PPP: 78 SECONDS (ref 23–37)
APTT PPP: 86 SECONDS (ref 23–37)
BASOPHILS # BLD AUTO: 0.09 THOUSANDS/ΜL (ref 0–0.1)
BASOPHILS NFR BLD AUTO: 1 % (ref 0–1)
BUN SERPL-MCNC: 9 MG/DL (ref 5–25)
CALCIUM SERPL-MCNC: 8.7 MG/DL (ref 8.3–10.1)
CHLORIDE SERPL-SCNC: 107 MMOL/L (ref 100–108)
CO2 SERPL-SCNC: 28 MMOL/L (ref 21–32)
CREAT SERPL-MCNC: 0.84 MG/DL (ref 0.6–1.3)
EOSINOPHIL # BLD AUTO: 0.33 THOUSAND/ΜL (ref 0–0.61)
EOSINOPHIL NFR BLD AUTO: 4 % (ref 0–6)
ERYTHROCYTE [DISTWIDTH] IN BLOOD BY AUTOMATED COUNT: 13 % (ref 11.6–15.1)
GFR SERPL CREATININE-BSD FRML MDRD: 99 ML/MIN/1.73SQ M
GLUCOSE SERPL-MCNC: 89 MG/DL (ref 65–140)
HCT VFR BLD AUTO: 47.9 % (ref 36.5–49.3)
HGB BLD-MCNC: 15.7 G/DL (ref 12–17)
IMM GRANULOCYTES # BLD AUTO: 0.02 THOUSAND/UL (ref 0–0.2)
IMM GRANULOCYTES NFR BLD AUTO: 0 % (ref 0–2)
KCT BLD-ACNC: 284 SEC (ref 89–137)
LYMPHOCYTES # BLD AUTO: 2.9 THOUSANDS/ΜL (ref 0.6–4.47)
LYMPHOCYTES NFR BLD AUTO: 37 % (ref 14–44)
MCH RBC QN AUTO: 31.2 PG (ref 26.8–34.3)
MCHC RBC AUTO-ENTMCNC: 32.8 G/DL (ref 31.4–37.4)
MCV RBC AUTO: 95 FL (ref 82–98)
MONOCYTES # BLD AUTO: 0.49 THOUSAND/ΜL (ref 0.17–1.22)
MONOCYTES NFR BLD AUTO: 6 % (ref 4–12)
NEUTROPHILS # BLD AUTO: 4.05 THOUSANDS/ΜL (ref 1.85–7.62)
NEUTS SEG NFR BLD AUTO: 52 % (ref 43–75)
NRBC BLD AUTO-RTO: 0 /100 WBCS
PLATELET # BLD AUTO: 246 THOUSANDS/UL (ref 149–390)
PMV BLD AUTO: 10.5 FL (ref 8.9–12.7)
POTASSIUM SERPL-SCNC: 3.8 MMOL/L (ref 3.5–5.3)
RBC # BLD AUTO: 5.03 MILLION/UL (ref 3.88–5.62)
SODIUM SERPL-SCNC: 139 MMOL/L (ref 136–145)
SPECIMEN SOURCE: ABNORMAL
WBC # BLD AUTO: 7.88 THOUSAND/UL (ref 4.31–10.16)

## 2019-09-09 PROCEDURE — 93571 IV DOP VEL&/PRESS C FLO 1ST: CPT | Performed by: INTERNAL MEDICINE

## 2019-09-09 PROCEDURE — C1887 CATHETER, GUIDING: HCPCS | Performed by: INTERNAL MEDICINE

## 2019-09-09 PROCEDURE — 93458 L HRT ARTERY/VENTRICLE ANGIO: CPT | Performed by: INTERNAL MEDICINE

## 2019-09-09 PROCEDURE — NC001 PR NO CHARGE: Performed by: INTERNAL MEDICINE

## 2019-09-09 PROCEDURE — C1894 INTRO/SHEATH, NON-LASER: HCPCS | Performed by: INTERNAL MEDICINE

## 2019-09-09 PROCEDURE — 85730 THROMBOPLASTIN TIME PARTIAL: CPT | Performed by: INTERNAL MEDICINE

## 2019-09-09 PROCEDURE — 85347 COAGULATION TIME ACTIVATED: CPT

## 2019-09-09 PROCEDURE — 85025 COMPLETE CBC W/AUTO DIFF WBC: CPT | Performed by: INTERNAL MEDICINE

## 2019-09-09 PROCEDURE — 99152 MOD SED SAME PHYS/QHP 5/>YRS: CPT | Performed by: INTERNAL MEDICINE

## 2019-09-09 PROCEDURE — C1769 GUIDE WIRE: HCPCS | Performed by: INTERNAL MEDICINE

## 2019-09-09 PROCEDURE — 4A023N7 MEASUREMENT OF CARDIAC SAMPLING AND PRESSURE, LEFT HEART, PERCUTANEOUS APPROACH: ICD-10-PCS | Performed by: INTERNAL MEDICINE

## 2019-09-09 PROCEDURE — B2151ZZ FLUOROSCOPY OF LEFT HEART USING LOW OSMOLAR CONTRAST: ICD-10-PCS | Performed by: INTERNAL MEDICINE

## 2019-09-09 PROCEDURE — B2111ZZ FLUOROSCOPY OF MULTIPLE CORONARY ARTERIES USING LOW OSMOLAR CONTRAST: ICD-10-PCS | Performed by: INTERNAL MEDICINE

## 2019-09-09 PROCEDURE — 99153 MOD SED SAME PHYS/QHP EA: CPT | Performed by: INTERNAL MEDICINE

## 2019-09-09 PROCEDURE — 80048 BASIC METABOLIC PNL TOTAL CA: CPT | Performed by: INTERNAL MEDICINE

## 2019-09-09 RX ORDER — ASPIRIN 81 MG/1
81 TABLET ORAL DAILY
Status: DISCONTINUED | OUTPATIENT
Start: 2019-09-10 | End: 2019-09-11 | Stop reason: HOSPADM

## 2019-09-09 RX ORDER — NITROGLYCERIN 20 MG/100ML
INJECTION INTRAVENOUS CODE/TRAUMA/SEDATION MEDICATION
Status: COMPLETED | OUTPATIENT
Start: 2019-09-09 | End: 2019-09-09

## 2019-09-09 RX ORDER — SODIUM CHLORIDE 9 MG/ML
INJECTION, SOLUTION INTRAVENOUS
Status: COMPLETED | OUTPATIENT
Start: 2019-09-09 | End: 2019-09-09

## 2019-09-09 RX ORDER — LIDOCAINE HYDROCHLORIDE 10 MG/ML
INJECTION, SOLUTION INFILTRATION; PERINEURAL CODE/TRAUMA/SEDATION MEDICATION
Status: COMPLETED | OUTPATIENT
Start: 2019-09-09 | End: 2019-09-09

## 2019-09-09 RX ORDER — CARVEDILOL 6.25 MG/1
6.25 TABLET ORAL 2 TIMES DAILY WITH MEALS
Status: DISCONTINUED | OUTPATIENT
Start: 2019-09-09 | End: 2019-09-09

## 2019-09-09 RX ORDER — SODIUM CHLORIDE 9 MG/ML
100 INJECTION, SOLUTION INTRAVENOUS CONTINUOUS
Status: DISPENSED | OUTPATIENT
Start: 2019-09-09 | End: 2019-09-09

## 2019-09-09 RX ORDER — HEPARIN SODIUM 1000 [USP'U]/ML
INJECTION, SOLUTION INTRAVENOUS; SUBCUTANEOUS CODE/TRAUMA/SEDATION MEDICATION
Status: COMPLETED | OUTPATIENT
Start: 2019-09-09 | End: 2019-09-09

## 2019-09-09 RX ORDER — LISINOPRIL 5 MG/1
5 TABLET ORAL DAILY
Status: DISCONTINUED | OUTPATIENT
Start: 2019-09-10 | End: 2019-09-09

## 2019-09-09 RX ORDER — FENTANYL CITRATE 50 UG/ML
INJECTION, SOLUTION INTRAMUSCULAR; INTRAVENOUS CODE/TRAUMA/SEDATION MEDICATION
Status: COMPLETED | OUTPATIENT
Start: 2019-09-09 | End: 2019-09-09

## 2019-09-09 RX ORDER — ATORVASTATIN CALCIUM 40 MG/1
40 TABLET, FILM COATED ORAL
Status: DISCONTINUED | OUTPATIENT
Start: 2019-09-09 | End: 2019-09-11 | Stop reason: HOSPADM

## 2019-09-09 RX ORDER — CARVEDILOL 3.12 MG/1
3.12 TABLET ORAL 2 TIMES DAILY WITH MEALS
Status: DISCONTINUED | OUTPATIENT
Start: 2019-09-09 | End: 2019-09-09

## 2019-09-09 RX ORDER — VERAPAMIL HYDROCHLORIDE 2.5 MG/ML
INJECTION, SOLUTION INTRAVENOUS CODE/TRAUMA/SEDATION MEDICATION
Status: COMPLETED | OUTPATIENT
Start: 2019-09-09 | End: 2019-09-09

## 2019-09-09 RX ORDER — MIDAZOLAM HYDROCHLORIDE 1 MG/ML
INJECTION INTRAMUSCULAR; INTRAVENOUS CODE/TRAUMA/SEDATION MEDICATION
Status: COMPLETED | OUTPATIENT
Start: 2019-09-09 | End: 2019-09-09

## 2019-09-09 RX ORDER — LISINOPRIL 10 MG/1
10 TABLET ORAL DAILY
Status: DISCONTINUED | OUTPATIENT
Start: 2019-09-10 | End: 2019-09-11 | Stop reason: HOSPADM

## 2019-09-09 RX ADMIN — LIDOCAINE HYDROCHLORIDE 1 ML: 10 INJECTION, SOLUTION INFILTRATION; PERINEURAL at 11:26

## 2019-09-09 RX ADMIN — LITHIUM CARBONATE 450 MG: 450 TABLET ORAL at 08:49

## 2019-09-09 RX ADMIN — VERAPAMIL HYDROCHLORIDE 2.5 MG: 2.5 INJECTION INTRAVENOUS at 11:31

## 2019-09-09 RX ADMIN — CLOPIDOGREL BISULFATE 75 MG: 75 TABLET ORAL at 08:49

## 2019-09-09 RX ADMIN — HEPARIN SODIUM 4000 UNITS: 1000 INJECTION INTRAVENOUS; SUBCUTANEOUS at 11:31

## 2019-09-09 RX ADMIN — FENTANYL CITRATE 50 MCG: 50 INJECTION, SOLUTION INTRAMUSCULAR; INTRAVENOUS at 11:24

## 2019-09-09 RX ADMIN — DOCUSATE SODIUM 100 MG: 100 CAPSULE, LIQUID FILLED ORAL at 17:04

## 2019-09-09 RX ADMIN — ASPIRIN 81 MG: 81 TABLET, COATED ORAL at 08:49

## 2019-09-09 RX ADMIN — MIDAZOLAM 2 MG: 1 INJECTION INTRAMUSCULAR; INTRAVENOUS at 11:23

## 2019-09-09 RX ADMIN — SODIUM CHLORIDE 100 ML/HR: 0.9 INJECTION, SOLUTION INTRAVENOUS at 11:18

## 2019-09-09 RX ADMIN — NITROGLYCERIN 200 MCG: 20 INJECTION INTRAVENOUS at 11:52

## 2019-09-09 RX ADMIN — HEPARIN SODIUM 5000 UNITS: 1000 INJECTION INTRAVENOUS; SUBCUTANEOUS at 11:44

## 2019-09-09 RX ADMIN — ESCITALOPRAM OXALATE 5 MG: 10 TABLET ORAL at 08:49

## 2019-09-09 RX ADMIN — CLONAZEPAM 0.5 MG: 0.5 TABLET ORAL at 17:04

## 2019-09-09 RX ADMIN — SODIUM CHLORIDE 100 ML/HR: 0.9 INJECTION, SOLUTION INTRAVENOUS at 12:30

## 2019-09-09 RX ADMIN — LITHIUM CARBONATE 450 MG: 450 TABLET ORAL at 20:05

## 2019-09-09 RX ADMIN — DOCUSATE SODIUM 100 MG: 100 CAPSULE, LIQUID FILLED ORAL at 08:49

## 2019-09-09 RX ADMIN — NICOTINE 1 PATCH: 21 PATCH, EXTENDED RELEASE TRANSDERMAL at 08:48

## 2019-09-09 RX ADMIN — NITROGLYCERIN 1 INCH: 20 OINTMENT TOPICAL at 11:52

## 2019-09-09 RX ADMIN — MIDAZOLAM 1 MG: 1 INJECTION INTRAMUSCULAR; INTRAVENOUS at 11:40

## 2019-09-09 RX ADMIN — ATORVASTATIN CALCIUM 40 MG: 40 TABLET, FILM COATED ORAL at 17:04

## 2019-09-09 RX ADMIN — NITROGLYCERIN 200 MCG: 20 INJECTION INTRAVENOUS at 11:30

## 2019-09-09 RX ADMIN — FENTANYL CITRATE 50 MCG: 50 INJECTION, SOLUTION INTRAMUSCULAR; INTRAVENOUS at 11:40

## 2019-09-09 RX ADMIN — CLONAZEPAM 0.5 MG: 0.5 TABLET ORAL at 08:49

## 2019-09-09 RX ADMIN — IOHEXOL 110 ML: 350 INJECTION, SOLUTION INTRAVENOUS at 11:59

## 2019-09-09 NOTE — SOCIAL WORK
Call received from Nikolai Matamoros at [address], they need 24-48 hr  Notice of discharge and need to come assess pt  Spoke with Nikolai Matamoros at [phone] ext [phone]

## 2019-09-09 NOTE — UTILIZATION REVIEW
Initial Clinical Review    Admission: Date/Time/Statement: Inpatient Admission Orders (From admission, onward)     Ordered        09/07/19 2111  Inpatient Admission  Once                   Orders Placed This Encounter   Procedures    Inpatient Admission     Standing Status:   Standing     Number of Occurrences:   1     Order Specific Question:   Admitting Physician     Answer:   Adis Holland [[id number]]     Order Specific Question:   Level of Care     Answer:   Med Surg [[id number]]     Order Specific Question:   Estimated length of stay     Answer:   More than 2 Midnights     Order Specific Question:   Certification     Answer:   I certify that inpatient services are medically necessary for this patient for a duration of greater than two midnights  See H&P and MD Progress Notes for additional information about the patient's course of treatment  Assessment/Plan: 53year old male, presented to the ED @ [address] [place] [address], Admitted (09/05 to 07/2019 - 2 days),  Per Physician Advisor - IP Appropriate  Transferred to [facility] via EMS, higher level of car  Admitted as Inpatient due to NSTEMI        Triage Vitals   Temperature Pulse Respirations Blood Pressure SpO2   09/07/19 2113 09/07/19 2113 09/07/19 2113 09/07/19 2113 09/07/19 2113   98 3 °F (36 8 °C) (!) 50 18 144/93 97 %      Temp Source Heart Rate Source Patient Position - Orthostatic VS BP Location FiO2 (%)   09/07/19 2113 09/07/19 2236 09/07/19 2113 09/07/19 2113 --   Oral Monitor Lying Right arm       Pain Score       09/07/19 2113       No Pain        Wt Readings from Last 1 Encounters:   09/09/19 85 1 kg (187 lb 9 8 oz)     Additional Vital Signs:   Date/Time  Temp  Pulse  Resp  BP  MAP (mmHg)  SpO2  O2 Device  Patient Position - Orthostatic VS   09/09/19 1417  --  43Abnormal   20  115/64  885  99 %  None (Room air)  Lying   09/09/19 1400  --  42Abnormal   --  106/65  --  --  --  --   09/09/19 1348  --  42Abnormal   20  108/67  84  97 %  None (Room air)  Lying 09/09/19 1328  --  42Abnormal   20  106/66  82  96 %  None (Room air)  Lying   09/09/19 1315  --  41Abnormal   --  101/64  --  --  --  --   09/09/19 1308  --  42Abnormal   20  101/63  78  97 %  None (Room air)  Lying   09/09/19 1300  --  42Abnormal   --  96/61  --  --  --  --   09/09/19 1245  --  41Abnormal   --  105/66  --  --  --  --   09/09/19 1230  --  41Abnormal   --  108/67  --  --  --  --   09/09/19 0847  --  48Abnormal   --  --  --  --  --  --   09/09/19 0828  98 °F (36 7 °C)  58  20  107/66  82  97 %  None (Room air)  Lying   09/09/19 0800  --  --  --  --  --  --  None (Room air)  --   09/09/19 0726  --  --  --  122/74   --  --  --  Lying   BP: MAP 91 at 09/09/19 0726   09/09/19 0255  97 5 °F (36 4 °C)  45Abnormal   20  90/52  --  95 %  None (Room air)  Lying   09/08/19 2321  97 5 °F (36 4 °C)  49Abnormal   20  108/61   --  96 %  None (Room air)  Lying   BP: Map79 at 09/08/19 2321 09/08/19 2113  --  48Abnormal   --  108/64   --  --  --  --   BP: MAP 81 at 09/08/19 2113 09/08/19 2000  --  --  --  --  --  --  None (Room air)  --   09/08/19 1917  97 9 °F (36 6 °C)  49Abnormal   20  111/71   --  95 %  None (Room air)  Lying   BP: Map86 at 09/08/19 1917   09/08/19 1500  97 8 °F (36 6 °C)  50Abnormal   20  106/59  75  96 %  None (Room air)  Lying   09/08/19 1159  97 9 °F (36 6 °C)  59  19  97/55  68  91 %  None (Room air)  Lying   09/08/19 0900  --  --  --  --  --  95 %  None (Room air)  --   09/08/19 0700  98 4 °F (36 9 °C)  73  19  121/72  88  96 %  None (Room air)  Lying   09/08/19 0301  98 1 °F (36 7 °C)  58  18  102/69   --  97 %  None (Room air)  Lying   BP: Map 82 at 09/08/19 0301   09/07/19 2236  97 6 °F (36 4 °C)  44Abnormal   18  113/72   --  98 %  None (Room air)  Lying   BP: Map 87 at 09/07/19 2236   09/07/19 2215  --  45Abnormal   --  116/72   --  --  --  --   BP: MAP 88 at 09/07/19 2215   09/07/19 2115  --  --  --  --  --  --  None (Room air)  --   09/07/19 2113  98 3 °F (36 8 °C)  50Abnormal 18  144/93  113  97 %  None (Room air)  Lying       Pertinent Labs/Diagnostic Test Results:   Results from last 7 days   Lab Units 09/09/19  0546 09/07/19  0442 09/06/19  0410 09/05/19  1625   WBC Thousand/uL 7 88 8 90 9 54 10 77*   HEMOGLOBIN g/dL 15 7 14 4 16 1 16 6   HEMATOCRIT % 47 9 44 4 48 3 50 8*   PLATELETS Thousands/uL 246 232 241 287   NEUTROS ABS Thousands/µL 4 05 4 81 6 15 7 88*         Results from last 7 days   Lab Units 09/09/19  0737 09/07/19 0442 09/06/19  0410 09/05/19  1625   SODIUM mmol/L 139 142 142 139   POTASSIUM mmol/L 3 8 3 9 3 9 4 4   CHLORIDE mmol/L 107 111* 109* 104   CO2 mmol/L 28 24 25 28   ANION GAP mmol/L 4 7 8 7   BUN mg/dL 9 7 9 9   CREATININE mg/dL 0 84 0 82 0 81 0 98   EGFR ml/min/1 73sq m 99 100 100 86   CALCIUM mg/dL 8 7 8 4 8 3 9 6   MAGNESIUM mg/dL  --   --  2 0  --    PHOSPHORUS mg/dL  --   --  3 9  --      Results from last 7 days   Lab Units 09/06/19  0410 09/05/19  1625   AST U/L 31 27   ALT U/L 28 26   ALK PHOS U/L 57 60   TOTAL PROTEIN g/dL 6 3* 7 0   ALBUMIN g/dL 3 2* 4 0   TOTAL BILIRUBIN mg/dL 1 00 1 60*         Results from last 7 days   Lab Units 09/09/19  0737 09/07/19 0442 09/06/19  0410 09/05/19  1625   GLUCOSE RANDOM mg/dL 89 96 97 88         Results from last 7 days   Lab Units 09/07/19  0442   HEMOGLOBIN A1C % 5 3   EAG mg/dl 105     No results found for: BETA-HYDROXYBUTYRATE                   Results from last 7 days   Lab Units 09/06/19  1007 09/05/19  2248 09/05/19  1826 09/05/19  1629   TROPONIN I ng/mL 0 16* 0 29* 0 27* 0 30*         Results from last 7 days   Lab Units 09/09/19  0938 09/09/19  0145 09/08/19  1806  09/05/19 2017   PROTIME seconds  --   --   --   --  13 4   INR   --   --   --   --  1 05   PTT seconds 86* 78* 58*   < > 30    < > = values in this interval not displayed       Results from last 7 days   Lab Units 09/06/19  0410 09/05/19  1625   TSH 3RD GENERATON uIU/mL 1 346 0 835                                                     Results from last 7 days   Lab Units 09/05/19  1606   AMPH/METH  Negative   BARBITURATE UR  Negative   BENZODIAZEPINE UR  Negative   COCAINE UR  Negative   METHADONE URINE  Negative   OPIATE UR  Negative   PCP UR  Negative   THC UR  Negative     Results from last 7 days   Lab Units 09/05/19  1625   ETHANOL LVL mg/dL <3   ACETAMINOPHEN LVL ug/mL <5 9*   SALICYLATE LVL mg/dL <3*                               ED Treatment:   Medication Administration - No Administrations Displayed (No Start Event Found)     None        Past Medical History:   Diagnosis Date    Anxiety     Bipolar 1 disorder (HCC)     Bowel habit changes     Chronic pain disorder     [facility]     Depression     Family hx of colon cancer     father and paternal uncle    [facility] infarction [facility])     Psychiatric illness     Stroke ([place] [address] ) 12/2015    CVA x3   Pt states he is unaware of when he had first two CVA    Suicide attempt ([place] [address] )      Present on Admission:   Bipolar disorder, current episode mixed, moderate ([place] [address] )   NSTEMI (non-ST elevated myocardial infarction) ([place] [address] )      Admitting Diagnosis: NSTEMI (non-ST elevated myocardial infarction) ([place] [address] ) [I21 4]  Age/Sex: 53 y o  male  Admission Orders:    Current Facility-Administered Medications:  acetaminophen 650 mg Oral Q6H PRN Phillip Angel MD    aspirin 81 mg Oral Daily Phillip Angel MD    atorvastatin 40 mg Oral Daily With [employer] M DO Lizeth    carvedilol 6 25 mg Oral BID With Meals Saturnino Nguyen DO    clonazePAM 0 5 mg Oral BID Phillip Angel MD    docusate sodium 100 mg Oral BID Phillip Angel MD    escitalopram 5 mg Oral Daily Phillip Angel MD    [START ON 9/10/2019] lisinopril 5 mg Oral Daily [facility] DO Lizeth    lithium carbonate 450 mg Oral Q12H [facility] Phillip Angel MD    nicotine 1 patch Transdermal Daily Phillip Angel MD    nitroglycerin 0 4 mg Sublingual Q5 Min PRN Phillip Angel MD    ondansetron 4 mg Intravenous Q6H PAULO Ohara MD    sodium chloride 100 mL/hr Intravenous Continuous Kera [place], DO Last Rate: 100 mL/hr (09/09/19 1230)   TELM  O2 @ 2L via NC  Chirag SCDs    IP CONSULT TO [address] [place] [address] Utilization Review Department  Phone: [phone]; Fax [phone]  [email or web address]  ATTENTION: Please call with any questions or concerns to [phone]  and carefully listen to the prompts so that you are directed to the right person  Send all requests for admission clinical reviews, approved or denied determinations and any other requests to fax [phone]   All voicemails are confidential

## 2019-09-09 NOTE — NURSING NOTE
Spoke with Dr Gurmeet Izquierdo with cardiology in regards to patients heart rate (see previous note) - provider stated they were aware of slow heart rate and that he would be by to review telemetry - no new orders were received  Will continue to monitor

## 2019-09-09 NOTE — PROGRESS NOTES
Please see resident note for further detail patient was seen with the resident high seen the patient evaluated him physical examination was done cardiac catheterization report was discussed with the patient and cardiac catheterization reports were seen coronary artery disease medical treatment discharge planning, risk factor management    Discussed with the patient please see resident note for further details

## 2019-09-09 NOTE — NURSING NOTE
Pt sinus stephen on tele at 40  Blood pressure stable 101/64  Pt reports feeling fatigued, and sleeps frequently, easily arousable though   Paged cardiology to make provider aware - awaiting return call

## 2019-09-09 NOTE — PROGRESS NOTES
SOD - Internal Medicine Progress Note  Unit/Bed#: [facility] 409-01 Encounter: [id number]  SOD Team C       Destiny Foot 53 y o  male [phone]  Hospital Stay Days: 2    Assessment and Plan      Current Problem List   Principal Problem:    NSTEMI (non-ST elevated myocardial infarction) ([address] )  Active Problems:    Bipolar disorder, current episode mixed, moderate (HCC)    Suicidal ideation    Assessment/Plan:    1)  NSTEMI type 2 Incidental finding of troponin 0 30 with EKG changes, sinus Doreene Megan with PVCs, T-wave abnormalities  He was started on a heparin drip, as well as aspirin/Plavix/statin/Lopressor  The patient reports no CP  Patient had cardiac catheterization today with impressions significant single vessel coronary artery disease  Left ventricular function is abnormal  Takotsubo cardiomyopathy   Plan  · Continue aspirin 81 mg  · Continue Atorvastatin 40 mg  · Continue lisinopril 10 mg  · Continue Coreg 3 125 mg p o  B i d     2)  Bipolar disorder, current episode mixed, moderate (HCC)    Suicidal ideation  ·            Continue Klonopin, Lexapro, Lithium       3)  Nicotine dependence  ·              Continue nicotine patch 21 mg/24hrs         Disposition:  Admitted inpatient  Subjective     Patient was seen and examined by bedside he was not in any respiratory distress  He had cardiac cath today  He denies headaches, dizziness, chest pain, palpitation, nausea, vomiting, constipation, diarrhea, numbness tingling sensation in his extremities  Vital signs were normal except a heart rate of 43 patient is still bradycardic  Blood pressure 115/64, RR 20 99% on room air   There were no acute events overnight  Objective     Vitals: Temp (24hrs), Av 7 °F (36 5 °C), Min:97 5 °F (36 4 °C), Max:98 °F (36 7 °C)  Current: Temperature: 98 °F (36 7 °C)  Patient Vitals for the past 24 hrs:   BP Temp Temp src Pulse Resp SpO2 Weight   19 1417 115/64 -- -- (!) 43 20 99 % --   19 1400 106/65 -- -- Katie Benoit 42 -- -- --   09/09/19 1348 108/67 -- -- (!) 42 20 97 % --   09/09/19 1328 106/66 -- -- (!) 42 20 96 % --   09/09/19 1315 101/64 -- -- (!) 41 -- -- --   09/09/19 1308 101/63 -- -- (!) 42 20 97 % --   09/09/19 1300 96/61 -- -- (!) 42 -- -- --   09/09/19 1245 105/66 -- -- (!) 41 -- -- --   09/09/19 1230 108/67 -- -- (!) 41 -- -- --   09/09/19 0847 -- -- -- (!) 48 -- -- --   09/09/19 0828 107/66 98 °F (36 7 °C) Oral 58 20 97 % --   09/09/19 0726 122/74 -- -- -- -- -- --   09/09/19 0600 -- -- -- -- -- -- 85 1 kg (187 lb 9 8 oz)   09/09/19 0255 90/52 97 5 °F (36 4 °C) Oral (!) 45 20 95 % --   09/08/19 2321 108/61 97 5 °F (36 4 °C) Oral (!) 49 20 96 % --   09/08/19 2113 108/64 -- -- (!) 48 -- -- --   09/08/19 1917 111/71 97 9 °F (36 6 °C) Oral (!) 49 20 95 % --    Body mass index is 26 17 kg/m²  Physical Exam:  GENERAL: NAD  HEENT:  NC/AT, PERRL, EOMI, no scleral icterus  CARDIAC:  RRR, +S1/S2, no S3/S4 heard, no m/g/r  PULMONARY:  CTA B/L, no wheezing/rales/rhonci, non-labored breathing  ABDOMEN:  Soft, NT/ND,no rebound/guarding/rigidity  Extremities:  No edema, cyanosis, or clubbing  NEUROLOGIC: Grossly intact  SKIN:  No rashes or erythema noted on exposed skin  Psych: Normal affect    Invasive Devices     Peripheral Intravenous Line            Peripheral IV 09/09/19 Dorsal (posterior); Left Forearm less than 1 day                    Labs:   Results from last 7 days   Lab Units 09/09/19  0546 09/07/19  0442 09/06/19  0410 09/05/19  1625   WBC Thousand/uL 7 88 8 90 9 54 10 77*   HEMOGLOBIN g/dL 15 7 14 4 16 1 16 6   HEMATOCRIT % 47 9 44 4 48 3 50 8*   PLATELETS Thousands/uL 246 232 241 287   NEUTROS PCT % 52 54 65 73   MONOS PCT % 6 8 7 7    Results from last 7 days   Lab Units 09/09/19  7561 09/09/19  0737 09/09/19  0145 09/08/19  1806 09/08/19  1226 09/08/19  0445 09/07/19  2204 09/07/19  0442 09/06/19  2233 09/06/19  1554 09/06/19  1033 09/06/19  1007 09/06/19  0410 09/05/19  2248 09/05/19  2017 09/05/19  1826 09/05/19  1629 09/05/19  1625   SODIUM mmol/L  --  139  --   --   --   --   --  142  --   --   --   --  142  --   --   --   --  139   POTASSIUM mmol/L  --  3 8  --   --   --   --   --  3 9  --   --   --   --  3 9  --   --   --   --  4 4   CHLORIDE mmol/L  --  107  --   --   --   --   --  111*  --   --   --   --  109*  --   --   --   --  104   CO2 mmol/L  --  28  --   --   --   --   --  24  --   --   --   --  25  --   --   --   --  28   BUN mg/dL  --  9  --   --   --   --   --  7  --   --   --   --  9  --   --   --   --  9   CREATININE mg/dL  --  0 84  --   --   --   --   --  0 82  --   --   --   --  0 81  --   --   --   --  0 98   CALCIUM mg/dL  --  8 7  --   --   --   --   --  8 4  --   --   --   --  8 3  --   --   --   --  9 6   ALK PHOS U/L  --   --   --   --   --   --   --   --   --   --   --   --  57  --   --   --   --  60   ALT U/L  --   --   --   --   --   --   --   --   --   --   --   --  28  --   --   --   --  26   AST U/L  --   --   --   --   --   --   --   --   --   --   --   --  31  --   --   --   --  27   TROPONIN I ng/mL  --   --   --   --   --   --   --   --   --   --   --  0 16*  --  0 29*  --  0 27* 0 30*  --    MAGNESIUM mg/dL  --   --   --   --   --   --   --   --   --   --   --   --  2 0  --   --   --   --   --    PHOSPHORUS mg/dL  --   --   --   --   --   --   --   --   --   --   --   --  3 9  --   --   --   --   --    INR   --   --   --   --   --   --   --   --   --   --   --   --   --   --  1 05  --   --   --    PTT seconds 86*  --  78* 58* 65* 94* 35 61* 61* 63* 44*  --  63*  --  30  --   --   --    EGFR ml/min/1 73sq m  --  99  --   --   --   --   --  100  --   --   --   --  100  --   --   --   --  86     Results from last 7 days   Lab Units 09/09/19  0938 09/09/19  0145 09/08/19  1806 09/08/19  1226 09/08/19  0445 09/07/19  2204 09/07/19  0442 09/06/19  2233 09/06/19  1554 09/06/19  1033 09/06/19  0410 09/05/19 2017   INR   --   --   --   --   --   --   --   --   --   --   --  1 05   PTT seconds 86* 78* 58* 65* 94* 35 61* 61* 63* 44* 63* 30         Results from last 7 days   Lab Units 09/06/19  1007 09/05/19  2248 09/05/19  1826 09/05/19  1629   TROPONIN I ng/mL 0 16* 0 29* 0 27* 0 30*     No results found for: PHART, ZAD1XDR, PO2ART, OUN3PGT, A1YKPSYJ, BEART, SOURCE  No components found for: HIV1X2  No results found for: HAV, HEPAIGM, HEPBIGM, HEPBCAB, HBEAG, HEPCAB  No results found for: SPEP, UPEP Lab Results   Component Value Date    HGBA1C 5 3 09/07/2019    HGBA1C 5 7 08/25/2016     No results found for: CHOL Lab Results   Component Value Date    HDL 38 (L) 09/06/2019    HDL 43 12/12/2018    HDL 39 (L) 05/26/2016    Lab Results   Component Value Date    LDLCALC 65 09/06/2019    LDLCALC 73 12/12/2018    LDLCALC 67 05/26/2016    Lab Results   Component Value Date    TRIG 80 09/06/2019    TRIG 107 12/12/2018    TRIG 83 05/26/2016     No components found for: PROCAL      Micro:      Urinalysis:  Lab Results   Component Value Date    BDZUR Negative 09/05/2019    BDZUR Negative 12/05/2018    BDZUR Negative 07/24/2017    BDZUR Negative 07/12/2017    BDZUR Negative 07/05/2017    BDZUR Negative 08/23/2016    BDZUR Negative 05/25/2016    BDZUR Negative 01/02/2016    BDZUR Negative 12/20/2015    COCAINEUR Negative 09/05/2019    COCAINEUR Negative 12/05/2018    COCAINEUR Negative 07/24/2017    COCAINEUR Negative 07/12/2017    COCAINEUR Negative 07/05/2017    COCAINEUR Negative 08/23/2016    COCAINEUR Negative 05/25/2016    COCAINEUR Negative 01/02/2016    COCAINEUR Negative 12/20/2015    OPIATEUR Negative 09/05/2019    OPIATEUR Negative 12/05/2018    OPIATEUR Negative 07/24/2017    OPIATEUR Negative 07/12/2017    OPIATEUR Negative 07/05/2017    OPIATEUR Negative 08/23/2016    OPIATEUR Negative 05/25/2016    OPIATEUR Negative 01/02/2016    OPIATEUR Negative 12/20/2015    PCPUR Negative 09/05/2019    PCPUR Negative 12/05/2018    PCPUR Negative 07/24/2017    PCPUR Negative 07/12/2017    PCPUR Negative 07/05/2017    PCPUR Negative 08/23/2016    PCPUR Negative 05/25/2016    PCPUR Negative 01/02/2016    PCPUR Negative 12/20/2015    THCUR Negative 09/05/2019    THCUR Negative 12/05/2018    THCUR Negative 07/24/2017    THCUR Negative 07/12/2017    THCUR Negative 07/05/2017    THCUR Negative 08/23/2016    THCUR Negative 05/25/2016    THCUR Negative 01/02/2016    THCUR Negative 12/20/2015    ETOH <3 09/05/2019    ETOH <10 12/05/2018    ETOH <3 07/24/2017    ETOH <3 07/05/2017    ETOH <3 0 01/02/2016    ACTMNPHEN <2 0 (L) 09/05/2019    ACTMNPHEN <3 (L) 07/24/2017    ACTMNPHEN <2 (L) 60/65/4988    SALICYLATE <3 (L) 55/26/9353    SALICYLATE <3 (L) 75/41/8105    SALICYLATE <3 (L) 89/86/9313          Invalid input(s): URIBILINOGEN        Intake and Outputs:  I/O       09/07 0701 - 09/08 0700 09/08 0701 - 09/09 0700 09/09 0701 - 09/10 0700    P  O  780 280     I V  (mL/kg)  233 3 (2 7)     Total Intake(mL/kg) 780 (9 1) 513 3 (6)     Urine (mL/kg/hr)  1675 (0 8)     Total Output  1675     Net +780 -1161 7            Unmeasured Urine Occurrence 1 x 1 x         Nutrition:  Diet Cardiovascular; Cardiac; Sodium 2 GM  Radiology Results:   No orders to display     Scheduled Medications:    aspirin 81 mg Daily   atorvastatin 40 mg Daily With Dinner   carvedilol 6 25 mg BID With Meals   clonazePAM 0 5 mg BID   docusate sodium 100 mg BID   escitalopram 5 mg Daily   [START ON 9/10/2019] lisinopril 5 mg Daily   lithium carbonate 450 mg Q12H [facility]   nicotine 1 patch Daily     PRN MEDS:    acetaminophen 650 mg Q6H PRN   nitroglycerin 0 4 mg Q5 Min PRN   ondansetron 4 mg Q6H PRN     Last 24 Hour Meds: :   Medication Administration - last 24 hours from 09/08/2019 1603 to 09/09/2019 1603       Date/Time Order Dose Route Action Action by     09/09/2019 0849 nicotine (NICODERM CQ) 21 mg/24 hr TD 24 hr patch 1 patch 1 patch Transdermal Patch Removed Suzan Omer RN     09/09/2019 0848 nicotine (NICODERM CQ) 21 mg/24 hr TD 24 hr patch 1 patch 1 patch Transdermal Medication Applied Dhaval Acuña RN     09/08/2019 1932 heparin (porcine) injection 2,000 Units 2,000 Units Intravenous Given Radha Frank RN     09/09/2019 0849 aspirin (ECOTRIN LOW STRENGTH) EC tablet 81 mg 81 mg Oral Given Suzan Omer RN     09/08/2019 1735 atorvastatin (LIPITOR) tablet 20 mg 20 mg Oral Given Addie Mirza RN     09/09/2019 0849 clonazePAM (KlonoPIN) tablet 0 5 mg 0 5 mg Oral Given Dhaval Acuña RN     09/08/2019 1735 clonazePAM (KlonoPIN) tablet 0 5 mg 0 5 mg Oral Given Addie Mirza RN     09/09/2019 0849 clopidogrel (PLAVIX) tablet 75 mg 75 mg Oral Given Suzan Omer RN     09/09/2019 0849 docusate sodium (COLACE) capsule 100 mg 100 mg Oral Given Suzan Omer RN     09/08/2019 1735 docusate sodium (COLACE) capsule 100 mg 100 mg Oral Given Addie Mirza RN     09/09/2019 0849 escitalopram (LEXAPRO) tablet 5 mg 5 mg Oral Given Suzan Omer RN     09/09/2019 0849 lithium carbonate (LITHOBID) CR tablet 450 mg 450 mg Oral Given Dhaval Acuña RN     09/08/2019 2113 lithium carbonate (LITHOBID) CR tablet 450 mg 450 mg Oral Given Merlin Sandifer, RN     09/09/2019 0847 metoprolol tartrate (LOPRESSOR) partial tablet 12 5 mg 12 5 mg Oral Not Given Dhaval Acuña RN     09/08/2019 2113 metoprolol tartrate (LOPRESSOR) partial tablet 12 5 mg 12 5 mg Oral Not Given Merlin Sandifer, RN     09/09/2019 1117 heparin (porcine) 25,000 units in 250 mL infusion (premix) 0 Units/kg/hr Intravenous Stopped Kimmie A Carlton     09/09/2019 0237 heparin (porcine) 25,000 units in 250 mL infusion (premix) 16 Units/kg/hr Intravenous Rate/Dose Change Merlin Sandifer, RN     09/08/2019 1932 heparin (porcine) 25,000 units in 250 mL infusion (premix)   Intravenous Canceled Entry Radha Frank RN     09/08/2019 1927 heparin (porcine) 25,000 units in 250 mL infusion (premix) 16 Units/kg/hr Intravenous Rate/Dose Change Addie Mirza RN     09/08/2019 1741 heparin (porcine) 25,000 units in 250 mL infusion (premix) 14 Units/kg/hr Intravenous New Bag Addie Mirza RN     09/08/2019 1739 heparin (porcine) 25,000 units in 250 mL infusion (premix) 0 Units/kg/hr Intravenous Stopped Addie Mirza RN     09/09/2019 1118 sodium chloride 0 9 % infusion 100 mL/hr Intravenous New Bag Isabel Valdez, JULIANO     09/09/2019 1140 midazolam (VERSED) injection 1 mg Intravenous Given Isabel Valdez, JULIANO     09/09/2019 1123 midazolam (VERSED) injection 2 mg Intravenous Given Isabel Valdez, JULIANO     09/09/2019 1140 fentanyl citrate (PF) 100 MCG/2ML 50 mcg Intravenous Given Isabel Valdez, RN     09/09/2019 1124 fentanyl citrate (PF) 100 MCG/2ML 50 mcg Intravenous Given Isabel Valdez RN     09/09/2019 1126 lidocaine (XYLOCAINE) 1 % injection 1 mL Infiltration Given Brian Levine DO     09/09/2019 1130 nitroGLYcerin (TRIDIL) 50 mg in 250 mL infusion (premix) 200 mcg Intra-arterial Given Brian Levine DO     09/09/2019 1144 heparin (porcine) injection 5,000 Units Intravenous Given Isabel Valdez RN     09/09/2019 1131 heparin (porcine) injection 4,000 Units Intravenous Given Isabel Valdez RN     09/09/2019 1131 verapamil (ISOPTIN) injection 2 5 mg  Given Isabel Valdez RN     09/09/2019 1152 nitroglycerin (NITRO-BID) 2 % TD ointment 1 inch Topical Given Katerin Pinto MD     09/09/2019 1152 nitroGLYcerin (TRIDIL) 50 mg in 250 mL infusion (premix) 200 mcg Other Given Katerin Pinto MD     09/09/2019 1159 iohexol (OMNIPAQUE) 350 MG/ML injection (SINGLE-DOSE) 110 mL Intravenous Given Katerin Pinto MD     09/09/2019 1230 sodium chloride 0 9 % infusion 100 mL/hr Intravenous New Bag Cristina Ventura RN        VTE Pharmacologic Prophylaxis: Heparin  VTE Mechanical Prophylaxis: sequential compression device  Marquise Claudio, DO    PLEASE NOTE:  This encounter was completed utilizing the [employer] Direct Speech Voice Recognition Software   Grammatical errors, random word insertions, pronoun errors and incomplete sentences are occasional consequences of the system due to software limitations, ambient noise and hardware issues  These may be missed by proof reading prior to affixing electronic signature  Any questions or concerns about the content, text or information contained within the body of this dictation should be directly addressed to the physician for clarification  Please do not hesitate to call me directly if you have any any questions or concerns  [phone]

## 2019-09-09 NOTE — H&P
INTERNAL MEDICINE HISTORY AND PHYSICAL  [facility] 409-01 SOD Team C     NAME: Eleazar Benítez  AGE: 53 y o  SEX: male  : 1965   MRN: [id number]  ENCOUNTER: [id number]    DATE: 2019  TIME: 7:57 AM    Primary Care Physician: Lauren Pratt PA-C  Admitting Provider: Hazel Del Real DO    Chief complaint: NSTEMI    History of Present Illness     Bharati Jaramillo is a 53 y o  male with past medical history of bipolar 1 disorder and nicotine dependence  He was originally admitted to [address] [place] [address] due to suicidal ideation and worsening depression on   Suicidal ideation is currently resolved, but pt was found to have an abnormal EKG and a troponin level of 0 30 incidentally  EKG showed sinus bradycardia with anterior T-wave inversions (cited on or before 17) and echo showed apical akinesis (no prior for comparison)  Last stress test was greater than 10 years ago  Currently diagnosed with NSTEMI type I and transferred to [facility] for cardiac catheterization  Pt had a heart attack in  and has since been taking baby aspirin  Pt's mother and father both had heart disease but he's not sure about specific details and age of onset  He does not follow a cardiologist or a PCP  He denies any SOB, chest pain, fatigue, abdominal pain, N/V  Pt was seen and examined today  Pt was alert and calm  No significant events overnight  He denies any chest pain or shortness of breath       Assessment and plan:    NSTEMI (non-ST elevated myocardial infarction) [facility])   - Cardiac catheterization scheduled today; Discontinue heparin drip 6 hrs prior to cath lab   - Continue nitro prn, Plavix, Lipitor, aspirin   - Increase lisinopril to 10mg   - Discontinue Metoprolol   - Continue telemetry   - O2 titrate as needed   - Routine cardiology consult: greatly appreciated      Bipolar disorder, current episode mixed, moderate (HCC)    Suicidal ideation   - Continue Klonopin, Lexapro, Lithium        Nicotine dependence   - Continue nicotine patch 21 mg/24hrs    Review of Systems   Review of Systems   Constitutional: Negative  Respiratory: Negative for chest tightness and shortness of breath  Cardiovascular: Negative for chest pain, palpitations and leg swelling  Neurological: Negative for weakness and headaches  Psychiatric/Behavioral: Negative for agitation and suicidal ideas  Past Medical History     Past Medical History:   Diagnosis Date    Anxiety     Bipolar 1 disorder ([place] [address] )     Bowel habit changes     Chronic pain disorder     CVA (cerebral vascular accident) ([place] [address] )     Depression     Family hx of colon cancer     father and paternal uncle    Myocardial infarction [facility])     Psychiatric illness     Stroke ([place] [address] ) 12/2015    CVA x3   Pt states he is unaware of when he had first two CVA    Suicide attempt [facility])        Past Surgical History     Past Surgical History:   Procedure Laterality Date    COLONOSCOPY         Social History     Social History     Substance and Sexual Activity   Alcohol Use Never    Alcohol/week: 0 0 standard drinks    Frequency: Never    Drinks per session: Patient refused    Binge frequency: Never    Comment: pt denies     Social History     Substance and Sexual Activity   Drug Use No    Comment: pt denies     Social History     Tobacco Use   Smoking Status Current Every Day Smoker    Packs/day: 1 00    Years: 15 00    Pack years: 15 00    Types: Cigarettes   Smokeless Tobacco Current User   Tobacco Comment    Wishes to quit       Family History     Family History   Problem Relation Age of Onset    No Known Problems Mother     No Known Problems Father     No Known Problems Sister     No Known Problems Brother     No Known Problems Maternal Aunt     No Known Problems Paternal Aunt     No Known Problems Maternal Uncle     No Known Problems Paternal Uncle     No Known Problems Maternal Grandfather     No Known Problems Maternal Grandmother     No Known Problems Paternal Grandfather     No Known Problems Paternal Grandmother     No Known Problems Cousin     ADD / ADHD Neg Hx     Alcohol abuse Neg Hx     Anxiety disorder Neg Hx     Bipolar disorder Neg Hx     Completed Suicide  Neg Hx     Dementia Neg Hx     Depression Neg Hx     Drug abuse Neg Hx     OCD Neg Hx     Psychiatric Illness Neg Hx     Psychosis Neg Hx     Schizoaffective Disorder  Neg Hx     Schizophrenia Neg Hx     Self-Injury Neg Hx     Suicide Attempts Neg Hx        Medications Prior to Admission     Prior to Admission medications    Medication Sig Start Date End Date Taking? Authorizing Provider   Aspirin (ASPIR-81 PO) Take 81 mg by mouth 9/19/16  Yes Historical Provider, MD   atorvastatin (LIPITOR) 20 mg tablet Take 1 tablet (20 mg total) by mouth daily 12/18/18  Yes ERNIE Marin   cholecalciferol (VITAMIN D3) 1,000 units tablet Take 1,000 Units by mouth daily   Yes Historical Provider, MD   docusate sodium (COLACE) 100 mg capsule Take 1 capsule (100 mg total) by mouth 2 (two) times a day 12/18/18  Yes ERNIE Marin   escitalopram (LEXAPRO) 5 mg tablet Take 1 tablet (5 mg total) by mouth daily  Patient taking differently: Take 15 mg by mouth daily  12/19/18  Yes ERNIE Marin   lithium carbonate (LITHOBID) 450 mg CR tablet Take 1 tablet (450 mg total) by mouth every 12 (twelve) hours 12/18/18  Yes ERNEI Marin   clonazePAM (KlonoPIN) 0 5 mg tablet Take 1 tablet (0 5 mg total) by mouth 2 (two) times a day for 10 days 12/18/18 8/8/19  ERNIE Marin       Allergies     Allergies   Allergen Reactions    Haldol [Haloperidol] Other (See Comments)     Muscle/jaw tightness  Difficulty swallowing    Prolixin [Fluphenazine] Other (See Comments)     Muscle/jaw tightness  Difficulty swallowing      Thorazine [Chlorpromazine] Other (See Comments)     Muscle/jaw tightness  Difficulty swallowing           Objective     Vitals:    09/08/19 2321 09/09/19 0255 09/09/19 0600 09/09/19 0726   BP: 108/61 90/52  122/74   BP Location: Right arm Right arm  Right arm   Pulse: (!) 49 (!) 45     Resp: 20 20     Temp: 97 5 °F (36 4 °C) 97 5 °F (36 4 °C)     TempSrc: Oral Oral     SpO2: 96% 95%     Weight:   85 1 kg (187 lb 9 8 oz)    Height:         Body mass index is 26 17 kg/m²  Intake/Output Summary (Last 24 hours) at 9/9/2019 0757  Last data filed at 9/8/2019 2113  Gross per 24 hour   Intake 513 32 ml   Output 1675 ml   Net -1161 68 ml     Invasive Devices     Peripheral Intravenous Line            Peripheral IV 09/09/19 Dorsal (posterior); Left Forearm less than 1 day              Physical Exam   Constitutional: He is oriented to person, place, and time  No distress  HENT:   Head: Normocephalic  Eyes: Pupils are equal, round, and reactive to light  Cardiovascular: Regular rhythm and normal heart sounds  Bradycardia present  Pulmonary/Chest: Effort normal and breath sounds normal    Neurological: He is alert and oriented to person, place, and time  Psychiatric: He has a normal mood and affect  Lab Results: I have personally reviewed pertinent reports      CBC:   Results from last 7 days   Lab Units 09/09/19  0546   WBC Thousand/uL 7 88   RBC Million/uL 5 03   HEMOGLOBIN g/dL 15 7   HEMATOCRIT % 47 9   MCV fL 95   MCH pg 31 2   MCHC g/dL 32 8   RDW % 13 0   MPV fL 10 5   PLATELETS Thousands/uL 246   NRBC AUTO /100 WBCs 0   NEUTROS PCT % 52   LYMPHS PCT % 37   MONOS PCT % 6   EOS PCT % 4   BASOS PCT % 1   NEUTROS ABS Thousands/µL 4 05   LYMPHS ABS Thousands/µL 2 90   MONOS ABS Thousand/µL 0 49   EOS ABS Thousand/µL 0 33   , Chemistry Profile:   Results from last 7 days   Lab Units 09/09/19  0737  09/06/19  0410   POTASSIUM mmol/L 3 8   < > 3 9   CHLORIDE mmol/L 107   < > 109*   CO2 mmol/L 28   < > 25   BUN mg/dL 9   < > 9   CREATININE mg/dL 0 84   < > 0 81   CALCIUM mg/dL 8 7   < > 8 3   AST U/L  --   --  31   ALT U/L  --   --  28   ALK PHOS U/L  --   --  57   EGFR ml/min/1 73sq m 99   < > 100    < > = values in this interval not displayed  , Coagulation Studies:   Results from last 7 days   Lab Units 09/09/19  0938  09/05/19 2017   PROTIME seconds  --   --  13 4   INR   --   --  1 05   PTT seconds 86*   < > 30    < > = values in this interval not displayed  , Cardiac Studies:   Results from last 7 days   Lab Units 09/06/19  1007   TROPONIN I ng/mL 0 16*   , Additional Labs:   Results from last 7 days   Lab Units 09/06/19  0410   MAGNESIUM mg/dL 2 0   PHOSPHORUS mg/dL 3 9   , Last A1C/Lipid Panel/Thyroid Panel:   Lab Results   Component Value Date    HGBA1C 5 3 09/07/2019    HGBA1C 5 7 08/25/2016    TRIG 80 09/06/2019    TRIG 107 12/12/2018    HDL 38 (L) 09/06/2019    HDL 43 12/12/2018    LDLCALC 65 09/06/2019    LDLCALC 73 12/12/2018    [id number] 1 346 09/06/2019       Imaging: I have personally reviewed pertinent films in PACS  ECG 12 Lead  Result Date: 9/05/2019    Sinus bradycardia  Low voltage QRS  Possible Inferior infarct (cited on or before 13-JUL-2017)  Cannot rule out Anterior infarct (cited on or before 13-JUL-2017)  ST & T wave abnormality, consider lateral ischemia  Abnormal ECG  When compared with ECG of 12-DEC-2018 21:38,  Premature ventricular complexes are no longer Present  Confirmed by Chema Cuello ([id number]) on 9/6/2019     Xr Chest Pa & Lateral    Result Date: 9/6/2019  Narrative: CHEST INDICATION:   routine  Elevated troponin, depression COMPARISON:  None EXAM PERFORMED/VIEWS:  XR CHEST PA & LATERAL FINDINGS: Cardiomediastinal silhouette appears unremarkable  The lungs are clear  No pneumothorax or pleural effusion  Osseous structures appear within normal limits for patient age  Impression: No acute cardiopulmonary disease   Workstation performed: [id number]     Echo complete with contrast   Result date: 9/06/2019    SUMMARY     LEFT VENTRICLE:  Size was normal   Systolic function was at the lower limits of normal  Ejection fraction was estimated to be 50 %   There was akinesis of the apex  Wall thickness was normal   Doppler parameters were consistent with abnormal left ventricular relaxation (grade 1 diastolic dysfunction)  Cardiac Cath  Result Date: 9/09/2019    IMPRESSIONS:  There is significant single vessel coronary artery disease  Left ventricular function is abnormal  Takotsubo cardiomyopathy  - Mid RCA: There was a 50 % stenosis  The lesion was ulcerated and eccentric  There was CHUN grade 3 flow through the vessel (brisk flow)  It appears amenable to percutaneous intervention  Urinalysis:       Invalid input(s): URIBILINOGEN     Urine Micro:        EKG, Pathology, and Other Studies: I have personally reviewed pertinent reports        Medications given in Emergency Department     Medication Administration - last 24 hours from 09/08/2019 0757 to 09/09/2019 0757       Date/Time Order Dose Route Action Action by     09/08/2019 0908 nicotine (NICODERM CQ) 21 mg/24 hr TD 24 hr patch 1 patch 1 patch Transdermal Medication Applied Addie Mirza RN     09/08/2019 1932 heparin (porcine) injection 2,000 Units 2,000 Units Intravenous Given Kim Patel RN     09/08/2019 0908 aspirin (ECOTRIN LOW STRENGTH) EC tablet 81 mg 81 mg Oral Given Addie Mirza RN     09/08/2019 1735 atorvastatin (LIPITOR) tablet 20 mg 20 mg Oral Given Addie Mirza RN     09/08/2019 1735 clonazePAM (KlonoPIN) tablet 0 5 mg 0 5 mg Oral Given Addie Mirza RN     09/08/2019 0908 clonazePAM (KlonoPIN) tablet 0 5 mg 0 5 mg Oral Given Addie Mirza RN     09/08/2019 0908 clopidogrel (PLAVIX) tablet 75 mg 75 mg Oral Given Addie Mirza RN     09/08/2019 1735 docusate sodium (COLACE) capsule 100 mg 100 mg Oral Given Addie Mirza RN     09/08/2019 0908 docusate sodium (COLACE) capsule 100 mg 100 mg Oral Given Addie Mirza RN     09/08/2019 0908 escitalopram (LEXAPRO) tablet 5 mg 5 mg Oral Given Kim Patel RN     09/08/2019 2113 lithium carbonate (LITHOBID) CR tablet 450 mg 450 mg Oral Given Marlys Bolden RN     09/08/2019 8715 lithium carbonate (LITHOBID) CR tablet 450 mg 450 mg Oral Given Addie Mirza RN     09/08/2019 2113 metoprolol tartrate (LOPRESSOR) partial tablet 12 5 mg 12 5 mg Oral Not Given Marlys Bolden RN     09/08/2019 1239 metoprolol tartrate (LOPRESSOR) partial tablet 12 5 mg 12 5 mg Oral Given Addie Mirza RN     09/09/2019 0237 heparin (porcine) 25,000 units in 250 mL infusion (premix) 16 Units/kg/hr Intravenous Rate/Dose Change Marlys Bolden RN     09/08/2019 1932 heparin (porcine) 25,000 units in 250 mL infusion (premix)   Intravenous Canceled Entry Monalisa Reyes RN     09/08/2019 1927 heparin (porcine) 25,000 units in 250 mL infusion (premix) 16 Units/kg/hr Intravenous Rate/Dose Change Addie Mirza RN     09/08/2019 1741 heparin (porcine) 25,000 units in 250 mL infusion (premix) 14 Units/kg/hr Intravenous New Bag Addie iMrza RN     09/08/2019 1739 heparin (porcine) 25,000 units in 250 mL infusion (premix) 0 Units/kg/hr Intravenous Stopped Addie Mirza RN     09/08/2019 1259 heparin (porcine) 25,000 units in 250 mL infusion (premix) 14 Units/kg/hr Intravenous Rate/Dose Change Addie Mirza RN          Assessment and Plan     Problem List     * (Principal) NSTEMI (non-ST elevated myocardial infarction) ([facility] )    Bipolar disorder, current episode mixed, moderate ([facility] )    Dental abscess    Bipolar 1 disorder, depressed, severe ([facility] )    Suicidal ideation    Major depression    Affective psychosis, bipolar ([facility] )    Encounter for medical assessment    History of MI (myocardial infarction)    History of CVA (cerebrovascular accident)    Other hyperlipidemia    Tobacco abuse          Code Status: Level 3 - DNAR/DNI  VTE Pharmacologic Prophylaxis: Heparin   VTE Mechanical Prophylaxis: sequential compression device  Admission Status: Danielle Agarwal 139  Internal Medicine  MS3

## 2019-09-10 ENCOUNTER — EPISODE CHANGES (OUTPATIENT)
Dept: CASE MANAGEMENT | Facility: HOSPITAL | Age: 54
End: 2019-09-10

## 2019-09-10 LAB
ANION GAP SERPL CALCULATED.3IONS-SCNC: 4 MMOL/L (ref 4–13)
BUN SERPL-MCNC: 13 MG/DL (ref 5–25)
CALCIUM SERPL-MCNC: 9.2 MG/DL (ref 8.3–10.1)
CHLORIDE SERPL-SCNC: 106 MMOL/L (ref 100–108)
CO2 SERPL-SCNC: 26 MMOL/L (ref 21–32)
CREAT SERPL-MCNC: 0.94 MG/DL (ref 0.6–1.3)
ERYTHROCYTE [DISTWIDTH] IN BLOOD BY AUTOMATED COUNT: 12.8 % (ref 11.6–15.1)
GFR SERPL CREATININE-BSD FRML MDRD: 91 ML/MIN/1.73SQ M
GLUCOSE SERPL-MCNC: 89 MG/DL (ref 65–140)
HCT VFR BLD AUTO: 49.5 % (ref 36.5–49.3)
HGB BLD-MCNC: 16.5 G/DL (ref 12–17)
MCH RBC QN AUTO: 31.9 PG (ref 26.8–34.3)
MCHC RBC AUTO-ENTMCNC: 33.3 G/DL (ref 31.4–37.4)
MCV RBC AUTO: 96 FL (ref 82–98)
PLATELET # BLD AUTO: 252 THOUSANDS/UL (ref 149–390)
PMV BLD AUTO: 10.5 FL (ref 8.9–12.7)
POTASSIUM SERPL-SCNC: 4.2 MMOL/L (ref 3.5–5.3)
RBC # BLD AUTO: 5.17 MILLION/UL (ref 3.88–5.62)
SODIUM SERPL-SCNC: 136 MMOL/L (ref 136–145)
WBC # BLD AUTO: 10.4 THOUSAND/UL (ref 4.31–10.16)

## 2019-09-10 PROCEDURE — 99232 SBSQ HOSP IP/OBS MODERATE 35: CPT | Performed by: INTERNAL MEDICINE

## 2019-09-10 PROCEDURE — NC001 PR NO CHARGE: Performed by: INTERNAL MEDICINE

## 2019-09-10 PROCEDURE — 99222 1ST HOSP IP/OBS MODERATE 55: CPT | Performed by: INTERNAL MEDICINE

## 2019-09-10 PROCEDURE — 85027 COMPLETE CBC AUTOMATED: CPT | Performed by: STUDENT IN AN ORGANIZED HEALTH CARE EDUCATION/TRAINING PROGRAM

## 2019-09-10 PROCEDURE — 80048 BASIC METABOLIC PNL TOTAL CA: CPT | Performed by: STUDENT IN AN ORGANIZED HEALTH CARE EDUCATION/TRAINING PROGRAM

## 2019-09-10 RX ORDER — CARVEDILOL 3.12 MG/1
3.12 TABLET ORAL 2 TIMES DAILY WITH MEALS
Status: DISCONTINUED | OUTPATIENT
Start: 2019-09-10 | End: 2019-09-11 | Stop reason: HOSPADM

## 2019-09-10 RX ORDER — ATORVASTATIN CALCIUM 40 MG/1
40 TABLET, FILM COATED ORAL
Qty: 90 TABLET | Refills: 1 | Status: SHIPPED | OUTPATIENT
Start: 2019-09-10 | End: 2019-09-11

## 2019-09-10 RX ORDER — LISINOPRIL 10 MG/1
10 TABLET ORAL DAILY
Qty: 90 TABLET | Refills: 1 | Status: SHIPPED | OUTPATIENT
Start: 2019-09-11 | End: 2019-09-11

## 2019-09-10 RX ORDER — CARVEDILOL 3.12 MG/1
3.12 TABLET ORAL 2 TIMES DAILY WITH MEALS
Qty: 120 TABLET | Refills: 1 | Status: SHIPPED | OUTPATIENT
Start: 2019-09-10 | End: 2019-09-11

## 2019-09-10 RX ADMIN — CARVEDILOL 3.12 MG: 3.12 TABLET, FILM COATED ORAL at 17:30

## 2019-09-10 RX ADMIN — LITHIUM CARBONATE 450 MG: 450 TABLET ORAL at 22:31

## 2019-09-10 RX ADMIN — LITHIUM CARBONATE 450 MG: 450 TABLET ORAL at 08:20

## 2019-09-10 RX ADMIN — ATORVASTATIN CALCIUM 40 MG: 40 TABLET, FILM COATED ORAL at 17:30

## 2019-09-10 RX ADMIN — DOCUSATE SODIUM 100 MG: 100 CAPSULE, LIQUID FILLED ORAL at 17:30

## 2019-09-10 RX ADMIN — CLONAZEPAM 0.5 MG: 0.5 TABLET ORAL at 17:30

## 2019-09-10 RX ADMIN — LISINOPRIL 10 MG: 10 TABLET ORAL at 08:20

## 2019-09-10 RX ADMIN — CLONAZEPAM 0.5 MG: 0.5 TABLET ORAL at 08:20

## 2019-09-10 RX ADMIN — ACETAMINOPHEN 650 MG: 325 TABLET ORAL at 08:21

## 2019-09-10 RX ADMIN — NICOTINE 1 PATCH: 21 PATCH, EXTENDED RELEASE TRANSDERMAL at 08:21

## 2019-09-10 RX ADMIN — ASPIRIN 81 MG: 81 TABLET, COATED ORAL at 08:20

## 2019-09-10 RX ADMIN — CARVEDILOL 3.12 MG: 3.12 TABLET, FILM COATED ORAL at 10:02

## 2019-09-10 RX ADMIN — ESCITALOPRAM OXALATE 5 MG: 10 TABLET ORAL at 08:21

## 2019-09-10 RX ADMIN — DOCUSATE SODIUM 100 MG: 100 CAPSULE, LIQUID FILLED ORAL at 08:20

## 2019-09-10 NOTE — PROGRESS NOTES
Progress Note - Cardiology   Hattie Rosario 53 y o  male MRN: [id number]  Unit/Bed#: [facility] 409-01 Encounter: [id number]    Assessment:  Principal Problem:    NSTEMI (non-ST elevated [facility] infarction) ([facility] )  Active Problems:    Bipolar disorder, current episode mixed, moderate ([facility] [address] )    Suicidal ideation    # Stress-induced/Takotsubo cardiomyopathy LVEF 45%; compensated  # NSTEMI type 2 2/2 above  # Nonobstructive CAD; mRCA 50% on [facility] 9/9  # Bipolar disorder with suicidal ideation  # Tobacco abuse  # Asymptomatic sinus bradycardia  -no evidence of long pauses or high grade block        Plan:  1  Restart low dose Coreg  Monitor on telemetry while admitted  If patient has slight bradycardia and is asymptomatic without pauses/AV block then would favor continuing low dose BB on discharge  2  Continue ASA, statin, lisinopril 10mg daily  3  Will plan to repeat TTE in 3 months as OP  4  Patient to follow up with [facility] in ~2 weeks  Subjective/Objective     Subjective: No events overnight  Patient had bradycardia 40s yesterday but was asymptomatic  Coreg was not given yesterday  Feels well overall  Denies CP, dizziness, palpitations, dyspnea        Objective:  Vitals: /79   Pulse 64   Temp 98 6 °F (37 °C) (Oral)   Resp 18   Ht 5' 11" (1 803 m)   Wt 85 2 kg (187 lb 13 3 oz)   SpO2 96%   BMI 26 20 kg/m²   Vitals:    09/09/19 0600 09/10/19 0600   Weight: 85 1 kg (187 lb 9 8 oz) 85 2 kg (187 lb 13 3 oz)     Orthostatic Blood Pressures      Most Recent Value   Blood Pressure  120/79 filed at 09/10/2019 0700   Patient Position - Orthostatic VS  Lying filed at 09/10/2019 0700            Intake/Output Summary (Last 24 hours) at 9/10/2019 0954  Last data filed at 9/9/2019 1801  Gross per 24 hour   Intake --   Output 2150 ml   Net -2150 ml       Physical Exam:   General appearance: alert and in no acute distress  Head: Normocephalic, without obvious abnormality, atraumatic  Neck: no JVD and supple, symmetrical, trachea midline  Lungs: clear to auscultation bilaterally, Normal air entry, Normal effort, No rales, wheezing  Heart: S1, S2 normal and no S3 or S4, No murmur, No gallop, No rub  Abdomen: soft, non-tender; no masses,  no organomegaly  Extremities: R radial access CDI without hematoma; extremities normal, atraumatic, no cyanosis, no edema  Pulses: 2+ and symmetric bilaterally  Skin: Skin color, texture, turgor normal; No rashes or lesions  Neurologic: Grossly normal, Alert and oriented      Medications:    Current Facility-Administered Medications:     acetaminophen (TYLENOL) tablet 650 mg, 650 mg, Oral, Q6H PRN, Tamara Black MD, 650 mg at 09/10/19 0821    aspirin (ECOTRIN LOW STRENGTH) EC tablet 81 mg, 81 mg, Oral, Daily, [place] Jonathan, DO, 81 mg at 09/10/19 0820    atorvastatin (LIPITOR) tablet 40 mg, 40 mg, Oral, Daily With Henry Stanley, DO, 40 mg at 09/09/19 1704    clonazePAM (KlonoPIN) tablet 0 5 mg, 0 5 mg, Oral, BID, Tamara Black MD, 0 5 mg at 09/10/19 0820    docusate sodium (COLACE) capsule 100 mg, 100 mg, Oral, BID, Tamara Black MD, 100 mg at 09/10/19 0820    escitalopram (LEXAPRO) tablet 5 mg, 5 mg, Oral, Daily, Tamara Black MD, 5 mg at 09/10/19 0821    lisinopril (ZESTRIL) tablet 10 mg, 10 mg, Oral, Daily, [place] Jonathan, DO, 10 mg at 09/10/19 0820    lithium carbonate (LITHOBID) CR tablet 450 mg, 450 mg, Oral, Q12H [address], Tamara Black MD, 450 mg at 09/10/19 0820    nicotine (NICODERM CQ) 21 mg/24 hr TD 24 hr patch 1 patch, 1 patch, Transdermal, Daily, Tamara Black MD, 1 patch at 09/10/19 0821    nitroglycerin (NITROSTAT) SL tablet 0 4 mg, 0 4 mg, Sublingual, Q5 Min PRN, Tamara Black MD    ondansetron (ZOFRAN) injection 4 mg, 4 mg, Intravenous, Q6H PRN, Tamara Black MD    Lab Results:  Results from last 7 days   Lab Units 09/06/19  1007 09/05/19  2248 09/05/19  1826   TROPONIN I ng/mL 0 16* 0 29* 0 27*     Results from last 7 days   Lab Units 09/10/19  0614 09/09/19  0546 09/07/19  0442   WBC Thousand/uL 10 40* 7 88 8 90   HEMOGLOBIN g/dL 16 5 15 7 14 4   HEMATOCRIT % 49 5* 47 9 44 4   PLATELETS Thousands/uL 252 246 232     Results from last 7 days   Lab Units 09/06/19  0410   TRIGLYCERIDES mg/dL 80   HDL mg/dL 38*     Results from last 7 days   Lab Units 09/10/19  0614 09/09/19  0737 09/07/19  0442 09/06/19  0410 09/05/19  1625   POTASSIUM mmol/L 4 2 3 8 3 9 3 9 4 4   CHLORIDE mmol/L 106 107 111* 109* 104   CO2 mmol/L 26 28 24 25 28   BUN mg/dL 13 9 7 9 9   CREATININE mg/dL 0 94 0 84 0 82 0 81 0 98   CALCIUM mg/dL 9 2 8 7 8 4 8 3 9 6   ALK PHOS U/L  --   --   --  57 60   ALT U/L  --   --   --  28 26   AST U/L  --   --   --  31 27     Results from last 7 days   Lab Units 09/09/19  0938 09/09/19  0145 09/08/19  1806  09/05/19 2017   INR   --   --   --   --  1 05   PTT seconds 86* 78* 58*   < > 30    < > = values in this interval not displayed  Results from last 7 days   Lab Units 09/06/19  0410   MAGNESIUM mg/dL 2 0       Telemetry: Personally reviewed  Imaging: Personally reviewed  EKG: Personally reviewed       Evan Stacy MD  Cardiology Fellow

## 2019-09-10 NOTE — PROGRESS NOTES
Per order the patient does not need 1:1 if cooperative and a staff member from his group home is with him  No staff member from group home is with him  Per psych note "1:1" can be discontinued  SOD contacted, order adjusted accordingly  Per SOD no 1:1 needed

## 2019-09-10 NOTE — PROGRESS NOTES
SOD - Internal Medicine Progress Note  Unit/Bed#: [facility] 409-01 Encounter: [id number]  SOD Team C       Wong Lockhart 53 y o  male [id number]  Hospital Stay Days: 3    Assessment and Plan      Current Problem List   Principal Problem:    NSTEMI (non-ST elevated myocardial infarction) ([address] )  Active Problems:    Bipolar disorder, current episode mixed, moderate (HCC)    Suicidal ideation    Assessment/Plan:    1)  NSTEMI type 2 Incidental finding of troponin 0 30 with EKG changes, sinus Zadie Fermo with PVCs, T-wave abnormalities   He was started on a heparin drip, as well as aspirin/Plavix/statin/Lopressor   The patient reports no CP  Patient had cardiac catheterization today with impressions significant single vessel coronary artery disease  Left ventricular function is abnormal  Takotsubo cardiomyopathy   Plan  · Continue aspirin 81 mg  · Continue Atorvastatin 40 mg  · Continue lisinopril 10 mg  · Continue Coreg 3 125 mg p o  B i d      2)  Bipolar disorder, current episode mixed, moderate (HCC)    Suicidal ideation  ·            Continue Klonopin, Lexapro, Lithium       3)  Nicotine dependence  ·              Continue nicotine patch 21 mg/24hrs           Disposition:  Admitted inpatient      Disposition:  Admitted inpatient  Subjective     Patient was seen and examined by bedside  Patient will continue respiratory distress  Weakness reported heart rate in the 40s overnight his Coreg was not given last night due to his bradycardia  Patient is asymptomatic  Today his vital signs are stable pressure 107/72  He denies headaches, dizziness, chest pain, palpitation, nausea, vomiting, abdominal pain, numbness tingling sensation of his extremities  Patient is scheduled for possible discharge today      Objective     Vitals: Temp (24hrs), Av °F (36 7 °C), Min:97 4 °F (36 3 °C), Max:98 6 °F (37 °C)  Current: Temperature: 98 6 °F (37 °C)  Patient Vitals for the past 24 hrs:   BP Temp Temp src Pulse Resp SpO2 Weight   09/10/19 0600 -- -- -- -- -- -- 85 2 kg (187 lb 13 3 oz)   09/10/19 0309 107/72 98 6 °F (37 °C) Oral 66 18 96 % --   09/09/19 2332 106/62 98 1 °F (36 7 °C) Oral 82 18 93 % --   09/09/19 1937 114/70 (!) 97 4 °F (36 3 °C) Oral (!) 48 18 95 % --   09/09/19 1730 116/75 -- -- (!) 46 -- -- --   09/09/19 1704 -- -- -- (!) 46 -- -- --   09/09/19 1623 132/78 -- -- (!) 43 20 96 % --   09/09/19 1417 115/64 -- -- (!) 43 20 99 % --   09/09/19 1400 106/65 -- -- (!) 42 -- -- --   09/09/19 1348 108/67 -- -- (!) 42 20 97 % --   09/09/19 1328 106/66 -- -- (!) 42 20 96 % --   09/09/19 1315 101/64 -- -- (!) 41 -- -- --   09/09/19 1308 101/63 -- -- (!) 42 20 97 % --   09/09/19 1300 96/61 -- -- (!) 42 -- -- --   09/09/19 1245 105/66 -- -- (!) 41 -- -- --   09/09/19 1230 108/67 -- -- (!) 41 -- -- --   09/09/19 0847 -- -- -- (!) 48 -- -- --   09/09/19 0828 107/66 98 °F (36 7 °C) Oral 58 20 97 % --   09/09/19 0726 122/74 -- -- -- -- -- --    Body mass index is 26 2 kg/m²  Physical Exam:  GENERAL: NAD  HEENT:  NC/AT, PERRL, EOMI, no scleral icterus  CARDIAC:  RRR, +S1/S2, no S3/S4 heard, no m/g/r  PULMONARY:  CTA B/L, no wheezing/rales/rhonci, non-labored breathing  ABDOMEN:  Soft, NT/ND,no rebound/guarding/rigidity  Extremities:  No edema, cyanosis, or clubbing  NEUROLOGIC: Grossly intact  SKIN:  No rashes or erythema noted on exposed skin  Psych: Normal affect    Invasive Devices     Peripheral Intravenous Line            Peripheral IV 09/09/19 Dorsal (posterior); Left Forearm 1 day                    Labs:   Results from last 7 days   Lab Units 09/10/19  0614 09/09/19  0546 09/07/19  0442 09/06/19  0410 09/05/19  1625   WBC Thousand/uL 10 40* 7 88 8 90 9 54 10 77*   HEMOGLOBIN g/dL 16 5 15 7 14 4 16 1 16 6   HEMATOCRIT % 49 5* 47 9 44 4 48 3 50 8*   PLATELETS Thousands/uL 252 246 232 241 287   NEUTROS PCT %  --  52 54 65 73   MONOS PCT %  --  6 8 7 7      Results from last 7 days   Lab Units 09/10/19  0614 09/09/19  5647 09/09/19  0737 09/09/19  0145 09/08/19  1806 09/08/19  1226 09/08/19  0445 09/07/19  2204 09/07/19  0442 09/06/19  2233 09/06/19  1554 09/06/19  1033 09/06/19  1007 09/06/19  0410 09/05/19 2248 09/05/19 2017 09/05/19  1826 09/05/19  1629 09/05/19  1625   SODIUM mmol/L 136  --  139  --   --   --   --   --  142  --   --   --   --  142  --   --   --   --  139   POTASSIUM mmol/L 4 2  --  3 8  --   --   --   --   --  3 9  --   --   --   --  3 9  --   --   --   --  4 4   CHLORIDE mmol/L 106  --  107  --   --   --   --   --  111*  --   --   --   --  109*  --   --   --   --  104   CO2 mmol/L 26  --  28  --   --   --   --   --  24  --   --   --   --  25  --   --   --   --  28   BUN mg/dL 13  --  9  --   --   --   --   --  7  --   --   --   --  9  --   --   --   --  9   CREATININE mg/dL 0 94  --  0 84  --   --   --   --   --  0 82  --   --   --   --  0 81  --   --   --   --  0 98   CALCIUM mg/dL 9 2  --  8 7  --   --   --   --   --  8 4  --   --   --   --  8 3  --   --   --   --  9 6   ALK PHOS U/L  --   --   --   --   --   --   --   --   --   --   --   --   --  57  --   --   --   --  60   ALT U/L  --   --   --   --   --   --   --   --   --   --   --   --   --  28  --   --   --   --  26   AST U/L  --   --   --   --   --   --   --   --   --   --   --   --   --  31  --   --   --   --  27   TROPONIN I ng/mL  --   --   --   --   --   --   --   --   --   --   --   --  0 16*  --  0 29*  --  0 27* 0 30*  --    MAGNESIUM mg/dL  --   --   --   --   --   --   --   --   --   --   --   --   --  2 0  --   --   --   --   --    PHOSPHORUS mg/dL  --   --   --   --   --   --   --   --   --   --   --   --   --  3 9  --   --   --   --   --    INR   --   --   --   --   --   --   --   --   --   --   --   --   --   --   --  1 05  --   --   --    PTT seconds  --  86*  --  78* 58* 65* 94* 35 61* 61* 63* 44*  --  63*  --  30  --   --   --    EGFR ml/min/1 73sq  91  --  99  --   --   --   --   --  100  --   --   --   --  100  --   --   --   --  86 Results from last 7 days   Lab Units 09/09/19  0938 09/09/19  0145 09/08/19  1806 09/08/19  1226 09/08/19  0445 09/07/19  2204 09/07/19  0442 09/06/19  2233 09/06/19  1554 09/06/19  1033 09/06/19  0410 09/05/19 2017   INR   --   --   --   --   --   --   --   --   --   --   --  1 05   PTT seconds 86* 78* 58* 65* 94* 35 61* 61* 63* 44* 63* 30         Results from last 7 days   Lab Units 09/06/19  1007 09/05/19  2248 09/05/19  1826 09/05/19  1629   TROPONIN I ng/mL 0 16* 0 29* 0 27* 0 30*     No results found for: PHART, RVH3ZJE, PO2ART, FRC4BLN, V6PMVHDJ, BEART, SOURCE  No components found for: HIV1X2  No results found for: HAV, HEPAIGM, HEPBIGM, HEPBCAB, HBEAG, HEPCAB  No results found for: SPEP, UPEP   Lab Results   Component Value Date    HGBA1C 5 3 09/07/2019    HGBA1C 5 7 08/25/2016     No results found for: CHOL   Lab Results   Component Value Date    HDL 38 (L) 09/06/2019    HDL 43 12/12/2018    HDL 39 (L) 05/26/2016      Lab Results   Component Value Date    LDLCALC 65 09/06/2019    LDLCALC 73 12/12/2018    LDLCALC 67 05/26/2016      Lab Results   Component Value Date    TRIG 80 09/06/2019    TRIG 107 12/12/2018    TRIG 83 05/26/2016     No components found for: PROCAL      Micro:      Urinalysis:  Lab Results   Component Value Date    BDZUR Negative 09/05/2019    BDZUR Negative 12/05/2018    BDZUR Negative 07/24/2017    BDZUR Negative 07/12/2017    BDZUR Negative 07/05/2017    BDZUR Negative 08/23/2016    BDZUR Negative 05/25/2016    BDZUR Negative 01/02/2016    BDZUR Negative 12/20/2015    COCAINEUR Negative 09/05/2019    COCAINEUR Negative 12/05/2018    COCAINEUR Negative 07/24/2017    COCAINEUR Negative 07/12/2017    COCAINEUR Negative 07/05/2017    COCAINEUR Negative 08/23/2016    COCAINEUR Negative 05/25/2016    COCAINEUR Negative 01/02/2016    COCAINEUR Negative 12/20/2015    OPIATEUR Negative 09/05/2019    OPIATEUR Negative 12/05/2018    OPIATEUR Negative 07/24/2017    OPIATEUR Negative 07/12/2017 OPIATEUR Negative 07/05/2017    OPIATEUR Negative 08/23/2016    OPIATEUR Negative 05/25/2016    OPIATEUR Negative 01/02/2016    OPIATEUR Negative 12/20/2015    PCPUR Negative 09/05/2019    PCPUR Negative 12/05/2018    PCPUR Negative 07/24/2017    PCPUR Negative 07/12/2017    PCPUR Negative 07/05/2017    PCPUR Negative 08/23/2016    PCPUR Negative 05/25/2016    PCPUR Negative 01/02/2016    PCPUR Negative 12/20/2015    THCUR Negative 09/05/2019    THCUR Negative 12/05/2018    THCUR Negative 07/24/2017    THCUR Negative 07/12/2017    THCUR Negative 07/05/2017    THCUR Negative 08/23/2016    THCUR Negative 05/25/2016    THCUR Negative 01/02/2016    THCUR Negative 12/20/2015    ETOH <3 09/05/2019    ETOH <10 12/05/2018    ETOH <3 07/24/2017    ETOH <3 07/05/2017    ETOH <3 0 01/02/2016    ACTMNPHEN <2 0 (L) 09/05/2019    ACTMNPHEN <3 (L) 07/24/2017    ACTMNPHEN <2 (L) 57/60/3571    SALICYLATE <3 (L) 73/84/8613    SALICYLATE <3 (L) 50/08/8038    SALICYLATE <3 (L) 87/73/6692          Invalid input(s): URIBILINOGEN        Intake and Outputs:  I/O       09/08 0701 - 09/09 0700 09/09 0701 - 09/10 0700 09/10 0701 - 09/11 0700    P  O  280      I V  (mL/kg) 233 3 (2 7)      Total Intake(mL/kg) 513 3 (6)      Urine (mL/kg/hr) 1675 (0 8) 2150 (1 1)     Total Output 1675 2150     Net -1161 7 -2150            Unmeasured Urine Occurrence 1 x          Nutrition:  Diet Cardiovascular; Cardiac; Sodium 2 GM  Radiology Results:   No orders to display     Scheduled Medications:    aspirin 81 mg Daily   atorvastatin 40 mg Daily With Dinner   clonazePAM 0 5 mg BID   docusate sodium 100 mg BID   escitalopram 5 mg Daily   lisinopril 10 mg Daily   lithium carbonate 450 mg Q12H [address]   nicotine 1 patch Daily     PRN MEDS:    acetaminophen 650 mg Q6H PRN   nitroglycerin 0 4 mg Q5 Min PRN   ondansetron 4 mg Q6H PRN     Last 24 Hour Meds: :   Medication Administration - last 24 hours from 09/09/2019 0712 to 09/10/2019 4755       Date/Time Order Dose Route Action Action by     09/09/2019 0849 nicotine (NICODERM CQ) 21 mg/24 hr TD 24 hr patch 1 patch 1 patch Transdermal Patch Removed Suzan Omer RN     09/09/2019 0848 nicotine (NICODERM CQ) 21 mg/24 hr TD 24 hr patch 1 patch 1 patch Transdermal Medication Applied Suzan Omer RN     09/09/2019 0849 aspirin (ECOTRIN LOW STRENGTH) EC tablet 81 mg 81 mg Oral Given Suzan Omer RN     09/09/2019 1704 clonazePAM (KlonoPIN) tablet 0 5 mg 0 5 mg Oral Given Suzan Omer RN     09/09/2019 0849 clonazePAM (KlonoPIN) tablet 0 5 mg 0 5 mg Oral Given Suzan Omer RN     09/09/2019 0849 clopidogrel (PLAVIX) tablet 75 mg 75 mg Oral Given Suzan Omer RN     09/09/2019 1704 docusate sodium (COLACE) capsule 100 mg 100 mg Oral Given Suzan Omer RN     09/09/2019 0849 docusate sodium (COLACE) capsule 100 mg 100 mg Oral Given Suzan Omer RN     09/09/2019 0849 escitalopram (LEXAPRO) tablet 5 mg 5 mg Oral Given Angie Rizzo RN     09/09/2019 2005 lithium carbonate (LITHOBID) CR tablet 450 mg 450 mg Oral Given Sujatha Matias RN     09/09/2019 0849 lithium carbonate (LITHOBID) CR tablet 450 mg 450 mg Oral Given Suzan Omer RN     09/09/2019 0847 metoprolol tartrate (LOPRESSOR) partial tablet 12 5 mg 12 5 mg Oral Not Given Angie Rizzo RN     09/09/2019 1117 heparin (porcine) 25,000 units in 250 mL infusion (premix) 0 Units/kg/hr Intravenous Stopped Kimmie A Morehead     09/09/2019 1118 sodium chloride 0 9 % infusion 100 mL/hr Intravenous New Bag Kike Mejias RN     09/09/2019 1140 midazolam (VERSED) injection 1 mg Intravenous Given Kike Mejias RN     09/09/2019 1123 midazolam (VERSED) injection 2 mg Intravenous Given Kike Mejias RN     09/09/2019 1140 fentanyl citrate (PF) 100 MCG/2ML 50 mcg Intravenous Given Kike Mejias RN     09/09/2019 1124 fentanyl citrate (PF) 100 MCG/2ML 50 mcg Intravenous Given Versa JULIANO Mejias     09/09/2019 1126 lidocaine (XYLOCAINE) 1 % injection 1 mL Infiltration Given Mardemac Bahena,      09/09/2019 1130 nitroGLYcerin (TRIDIL) 50 mg in 250 mL infusion (premix) 200 mcg Intra-arterial Given Mardemac Bahena DO     09/09/2019 1144 heparin (porcine) injection 5,000 Units Intravenous Given Cem Lion RN     09/09/2019 1131 heparin (porcine) injection 4,000 Units Intravenous Given Cem Lion RN     09/09/2019 1131 verapamil (ISOPTIN) injection 2 5 mg  Given Cem Lion RN     09/09/2019 1152 nitroglycerin (NITRO-BID) 2 % TD ointment 1 inch Topical Given Jalil Van MD     09/09/2019 1152 nitroGLYcerin (TRIDIL) 50 mg in 250 mL infusion (premix) 200 mcg Other Given Jalil Van MD     09/09/2019 1159 iohexol (OMNIPAQUE) 350 MG/ML injection (SINGLE-DOSE) 110 mL Intravenous Given Jalil Van MD     09/09/2019 1704 atorvastatin (LIPITOR) tablet 40 mg 40 mg Oral Given Beatrice Abbott RN     09/09/2019 1703 sodium chloride 0 9 % infusion 0 mL/hr Intravenous Stopped Suzan Omer RN     09/09/2019 1230 sodium chloride 0 9 % infusion 100 mL/hr Intravenous New Bag Suzan Omer RN     09/09/2019 1704 carvedilol (COREG) tablet 3 125 mg 3 125 mg Oral Not Given Beatrice Abbott RN          VTE Mechanical Prophylaxis: sequential compression device OOB as tolerated   Eva Loss, DO    PLEASE NOTE:  This encounter was completed utilizing the [employer] Direct Speech Voice Recognition Software  Grammatical errors, random word insertions, pronoun errors and incomplete sentences are occasional consequences of the system due to software limitations, ambient noise and hardware issues  These may be missed by proof reading prior to affixing electronic signature  Any questions or concerns about the content, text or information contained within the body of this dictation should be directly addressed to the physician for clarification  Please do not hesitate to call me directly if you have any any questions or concerns

## 2019-09-11 VITALS
TEMPERATURE: 97.4 F | RESPIRATION RATE: 18 BRPM | OXYGEN SATURATION: 96 % | HEIGHT: 71 IN | BODY MASS INDEX: 26.3 KG/M2 | WEIGHT: 187.83 LBS | SYSTOLIC BLOOD PRESSURE: 103 MMHG | DIASTOLIC BLOOD PRESSURE: 66 MMHG | HEART RATE: 61 BPM

## 2019-09-11 PROBLEM — I51.81 STRESS-INDUCED CARDIOMYOPATHY: Status: ACTIVE | Noted: 2019-09-11

## 2019-09-11 PROBLEM — K59.00 CONSTIPATION: Status: ACTIVE | Noted: 2019-09-11

## 2019-09-11 LAB
ANION GAP SERPL CALCULATED.3IONS-SCNC: 2 MMOL/L (ref 4–13)
BASOPHILS # BLD AUTO: 0.09 THOUSANDS/ΜL (ref 0–0.1)
BASOPHILS NFR BLD AUTO: 1 % (ref 0–1)
BUN SERPL-MCNC: 17 MG/DL (ref 5–25)
CALCIUM SERPL-MCNC: 9 MG/DL (ref 8.3–10.1)
CHLORIDE SERPL-SCNC: 104 MMOL/L (ref 100–108)
CO2 SERPL-SCNC: 27 MMOL/L (ref 21–32)
CREAT SERPL-MCNC: 0.93 MG/DL (ref 0.6–1.3)
EOSINOPHIL # BLD AUTO: 0.38 THOUSAND/ΜL (ref 0–0.61)
EOSINOPHIL NFR BLD AUTO: 4 % (ref 0–6)
ERYTHROCYTE [DISTWIDTH] IN BLOOD BY AUTOMATED COUNT: 12.7 % (ref 11.6–15.1)
GFR SERPL CREATININE-BSD FRML MDRD: 92 ML/MIN/1.73SQ M
GLUCOSE SERPL-MCNC: 86 MG/DL (ref 65–140)
HCT VFR BLD AUTO: 50.9 % (ref 36.5–49.3)
HGB BLD-MCNC: 16.7 G/DL (ref 12–17)
IMM GRANULOCYTES # BLD AUTO: 0.02 THOUSAND/UL (ref 0–0.2)
IMM GRANULOCYTES NFR BLD AUTO: 0 % (ref 0–2)
LYMPHOCYTES # BLD AUTO: 2.51 THOUSANDS/ΜL (ref 0.6–4.47)
LYMPHOCYTES NFR BLD AUTO: 26 % (ref 14–44)
MCH RBC QN AUTO: 31.3 PG (ref 26.8–34.3)
MCHC RBC AUTO-ENTMCNC: 32.8 G/DL (ref 31.4–37.4)
MCV RBC AUTO: 95 FL (ref 82–98)
MONOCYTES # BLD AUTO: 0.65 THOUSAND/ΜL (ref 0.17–1.22)
MONOCYTES NFR BLD AUTO: 7 % (ref 4–12)
NEUTROPHILS # BLD AUTO: 6 THOUSANDS/ΜL (ref 1.85–7.62)
NEUTS SEG NFR BLD AUTO: 62 % (ref 43–75)
NRBC BLD AUTO-RTO: 0 /100 WBCS
PLATELET # BLD AUTO: 261 THOUSANDS/UL (ref 149–390)
PMV BLD AUTO: 10.3 FL (ref 8.9–12.7)
POTASSIUM SERPL-SCNC: 4.1 MMOL/L (ref 3.5–5.3)
RBC # BLD AUTO: 5.34 MILLION/UL (ref 3.88–5.62)
SODIUM SERPL-SCNC: 133 MMOL/L (ref 136–145)
WBC # BLD AUTO: 9.65 THOUSAND/UL (ref 4.31–10.16)

## 2019-09-11 PROCEDURE — 80048 BASIC METABOLIC PNL TOTAL CA: CPT | Performed by: STUDENT IN AN ORGANIZED HEALTH CARE EDUCATION/TRAINING PROGRAM

## 2019-09-11 PROCEDURE — 99239 HOSP IP/OBS DSCHRG MGMT >30: CPT | Performed by: INTERNAL MEDICINE

## 2019-09-11 PROCEDURE — 85025 COMPLETE CBC W/AUTO DIFF WBC: CPT | Performed by: STUDENT IN AN ORGANIZED HEALTH CARE EDUCATION/TRAINING PROGRAM

## 2019-09-11 RX ORDER — SENNA PLUS 8.6 MG/1
1 TABLET ORAL DAILY PRN
Qty: 30 TABLET | Refills: 2 | Status: SHIPPED | OUTPATIENT
Start: 2019-09-11 | End: 2020-08-10 | Stop reason: ALTCHOICE

## 2019-09-11 RX ORDER — LISINOPRIL 10 MG/1
10 TABLET ORAL DAILY
Qty: 90 TABLET | Refills: 0 | Status: SHIPPED | OUTPATIENT
Start: 2019-09-11 | End: 2020-08-10 | Stop reason: ALTCHOICE

## 2019-09-11 RX ORDER — WHEAT DEXTRIN/ASPARTAME 3 G/6 G
1 POWDER IN PACKET (EA) ORAL DAILY
Qty: 28 EACH | Refills: 2 | Status: ON HOLD | OUTPATIENT
Start: 2019-09-11 | End: 2020-08-12 | Stop reason: ALTCHOICE

## 2019-09-11 RX ORDER — ATORVASTATIN CALCIUM 40 MG/1
40 TABLET, FILM COATED ORAL
Qty: 90 TABLET | Refills: 0 | Status: SHIPPED | OUTPATIENT
Start: 2019-09-11 | End: 2022-04-18 | Stop reason: SDUPTHER

## 2019-09-11 RX ORDER — ACETAMINOPHEN 325 MG/1
650 TABLET ORAL EVERY 6 HOURS PRN
Status: DISCONTINUED | OUTPATIENT
Start: 2019-09-11 | End: 2019-09-11 | Stop reason: HOSPADM

## 2019-09-11 RX ORDER — CARVEDILOL 3.12 MG/1
3.12 TABLET ORAL 2 TIMES DAILY WITH MEALS
Qty: 120 TABLET | Refills: 0 | Status: ON HOLD | OUTPATIENT
Start: 2019-09-11 | End: 2020-08-12 | Stop reason: ALTCHOICE

## 2019-09-11 RX ADMIN — DOCUSATE SODIUM 100 MG: 100 CAPSULE, LIQUID FILLED ORAL at 08:42

## 2019-09-11 RX ADMIN — CLONAZEPAM 0.5 MG: 0.5 TABLET ORAL at 08:42

## 2019-09-11 RX ADMIN — ESCITALOPRAM OXALATE 5 MG: 10 TABLET ORAL at 08:42

## 2019-09-11 RX ADMIN — CARVEDILOL 3.12 MG: 3.12 TABLET, FILM COATED ORAL at 08:43

## 2019-09-11 RX ADMIN — ACETAMINOPHEN 650 MG: 325 TABLET ORAL at 09:45

## 2019-09-11 RX ADMIN — NICOTINE 1 PATCH: 21 PATCH, EXTENDED RELEASE TRANSDERMAL at 08:44

## 2019-09-11 RX ADMIN — LITHIUM CARBONATE 450 MG: 450 TABLET ORAL at 09:23

## 2019-09-11 RX ADMIN — ASPIRIN 81 MG: 81 TABLET, COATED ORAL at 08:42

## 2019-09-11 NOTE — PROGRESS NOTES
Attempted to call report to [facility] group home  Called at 1151, 1210, & 1216  Left voicemail  Waiting for return phone call  1226: report called to Panchito Novak    1:13: pt belongings gathered; pt picked up  discharge materials given to staff member who picked pt up

## 2019-09-11 NOTE — DISCHARGE SUMMARY
INTERNAL MEDICINE RESIDENCY DISCHARGE SUMMARY  Bharati Jaramillo   53 y o  male  MRN: [id number]  Room/Bed: [facility] 409/[facility] [address]    Encounter: [id number]    Principal Problem:    Stress-induced cardiomyopathy  Active Problems:    Bipolar disorder, current episode mixed, moderate (HCC)    Suicidal ideation    NSTEMI (non-ST elevated myocardial infarction) ([facility] [address] )    Constipation      Assessment/Plan:     1)  NSTEMI type 2 Incidental finding of troponin 0 30 with EKG changes, sinus Red Tamara with PVCs, T-wave abnormalities   He was started on a heparin drip, as well as aspirin/Plavix/statin/Lopressor   The patient reports no CP   Patient had cardiac catheterization today with impressions significant single vessel coronary artery disease  Left ventricular function is abnormal  Takotsubo cardiomyopathy   Plan  · Continue aspirin 81 mg  · Continue Atorvastatin 40 mg  · Continue lisinopril 10 mg  · Continue Coreg 3 125 mg p o  B i d      2)   Bipolar disorder, current episode mixed, moderate (HCC)    Suicidal ideation  ·            Continue Klonopin, Lexapro, Lithium       3)  Nicotine dependence  ·              Continue nicotine patch 21 mg/24hrs    Subjective    Today patient is stable, blood pressure is 103/66 heart rate 61 respiratory rate 18 O2 sat 96%  Yarelis Goldmann   He denies headaches, dizziness, shortness of breath, chest pain, palpitation, nausea, vomiting, numbness and tingling  sensation of his extremities       DISCHARGE INFORMATION       Admitting Provider: Hazel Del Real DO  Discharge Provider: No att  providers found  Primary Care Physician at Discharge: Harris Rivas MD    Discharge To:  Group home    Admission Date: 9/7/2019     Discharge Date: 9/11/2019  1:15 PM    Primary Discharge Diagnosis  Principal Problem:    Stress-induced cardiomyopathy  Active Problems:    Bipolar disorder, current episode mixed, moderate (HCC)    Suicidal ideation    NSTEMI (non-ST elevated myocardial infarction) ([facility] [address] )    Constipation  Resolved Problems:    * No resolved hospital problems  *          Discharge Disposition: Non [facility]  Discharged With Lines: no    Test Results Pending at Discharge: none    Outpatient Follow-Up  none required  Follow up with consulting providers  none required   Active Issues Requiring Follow-up   none    Code Status: Prior  Advance Directive & Living Will: <no information>  Power of :    POLST:      Medications   Discharge Medication List as of 9/11/2019  9:12 AM        Discharge Medication List as of 9/11/2019  9:12 AM        Discharge Medication List as of 9/11/2019  9:12 AM      CONTINUE these medications which have NOT CHANGED    Details   Aspirin (ASPIR-81 PO) Take 81 mg by mouth, Starting Mon 9/19/2016, Historical Med      cholecalciferol (VITAMIN D3) 1,000 units tablet Take 1,000 Units by mouth daily, Historical Med      docusate sodium (COLACE) 100 mg capsule Take 1 capsule (100 mg total) by mouth 2 (two) times a day, Starting Tue 12/18/2018, No Print      escitalopram (LEXAPRO) 5 mg tablet Take 1 tablet (5 mg total) by mouth daily, Starting Wed 12/19/2018, Print      lithium carbonate (LITHOBID) 450 mg CR tablet Take 1 tablet (450 mg total) by mouth every 12 (twelve) hours, Starting Tue 12/18/2018, Print      clonazePAM (KlonoPIN) 0 5 mg tablet Take 1 tablet (0 5 mg total) by mouth 2 (two) times a day for 10 days, Starting Tue 12/18/2018, Until Thu 8/8/2019, Print             Allergies  Allergies   Allergen Reactions    Haldol [Haloperidol] Other (See Comments)     Muscle/jaw tightness  Difficulty swallowing    Prolixin [Fluphenazine] Other (See Comments)     Muscle/jaw tightness  Difficulty swallowing      Thorazine [Chlorpromazine] Other (See Comments)     Muscle/jaw tightness  Difficulty swallowing         Discharge Diet: regular diet  Activity restrictions: none        [id number]    Jim Sun is a 53 y o  male with past medical history of bipolar 1 disorder and nicotine dependence  He was originally admitted to [facility] due to suicidal ideation and worsening depression on 9/05  Suicidal ideation is currently resolved, but pt was found to have an abnormal EKG and a troponin level of 0 30 incidentally  EKG showed sinus bradycardia with anterior T-wave inversions (cited on or before 7/13/17) and echo showed apical akinesis (no prior for comparison)  Last stress test was greater than 10 years ago  Currently diagnosed with NSTEMI type I and transferred to [facility] for cardiac catheterization  Pt had a heart attack in 1993 and has since been taking baby aspirin  Pt's mother and father both had heart disease but he's not sure about specific details and age of onset  He does not follow a cardiologist or a PCP  Patient had cardiac catheterization  on admission showing no significant CAD  Echocardiogram showing apical regional wall motion abnormality, which is  likely consistent with takotsubo cardiomyopathy Pt  Is discharge on low-dose Coreg, and lisinopril, aspirin and statin  Outpatient follow-up with repeat echocardiogram in about 3 months  Today patient is stable, blood pressure is 103/66 heart rate 61 respiratory rate 18 O2 sat 96%           Review of Systems   Constitutional: Negative for appetite change, chills, diaphoresis, fatigue and fever  HENT: Negative for congestion and sore throat  Eyes: Negative  Respiratory: Negative for cough, chest tightness, shortness of breath, wheezing and stridor  Cardiovascular: Negative for chest pain, palpitations and leg swelling  Gastrointestinal: Negative for abdominal distention, abdominal pain, blood in stool, constipation, diarrhea, nausea and vomiting  Endocrine: Negative  Genitourinary: Negative for difficulty urinating, dysuria and urgency  Musculoskeletal: Negative  Negative for back pain     Skin: Negative for color change and pallor  Neurological: Negative for dizziness, tremors, weakness, light-headedness, numbness and headaches  Psychiatric/Behavioral: Negative for agitation, behavioral problems and confusion  Physical Exam:  GENERAL: NAD  HEENT:  NC/AT, PERRL, EOMI, no scleral icterus  CARDIAC:  RRR, +S1/S2, no S3/S4 heard, no m/g/r  PULMONARY:  CTA B/L, no wheezing/rales/rhonci, non-labored breathing  ABDOMEN:  Soft, NT/ND,no rebound/guarding/rigidity  Extremities:  No edema, cyanosis, or clubbing  NEUROLOGIC: Grossly intact  SKIN:  No rashes or erythema noted on exposed skin  Psych: Normal affect    Other Pertinent Test Results  none     Discharge Condition: stable      Discharge  Statement   I spent 45 minutes minutes discharging the patient  This time was spent on the day of discharge  I had direct contact with the patient on the day of discharge  Additional documentation is required if more than 30 minutes were spent on discharge

## 2019-09-17 ENCOUNTER — PATIENT OUTREACH (OUTPATIENT)
Dept: CASE MANAGEMENT | Facility: OTHER | Age: 54
End: 2019-09-17

## 2019-09-17 NOTE — PROGRESS NOTES
Spoke with patient's , Ramone Manley at [facility] where patient resides  Discussed hospital follow up and bundle program   Augustin  reports that patient has had no further episodes of chest pain, or difficulty breathing  He has had no issues with medications  PCP and Cardiology appointment next week  Leanna Martins provides transportation to all doctor visits     Will reach out to facility for follow up at a later date

## 2019-09-26 ENCOUNTER — OFFICE VISIT (OUTPATIENT)
Dept: CARDIOLOGY CLINIC | Facility: CLINIC | Age: 54
End: 2019-09-26
Payer: MEDICARE

## 2019-09-26 VITALS
DIASTOLIC BLOOD PRESSURE: 60 MMHG | WEIGHT: 185 LBS | HEART RATE: 60 BPM | HEIGHT: 71 IN | BODY MASS INDEX: 25.9 KG/M2 | SYSTOLIC BLOOD PRESSURE: 90 MMHG

## 2019-09-26 DIAGNOSIS — I21.4 NSTEMI (NON-ST ELEVATED MYOCARDIAL INFARCTION) (HCC): Primary | ICD-10-CM

## 2019-09-26 DIAGNOSIS — F33.9 RECURRENT MAJOR DEPRESSIVE DISORDER, REMISSION STATUS UNSPECIFIED (HCC): ICD-10-CM

## 2019-09-26 PROCEDURE — 99214 OFFICE O/P EST MOD 30 MIN: CPT | Performed by: INTERNAL MEDICINE

## 2019-09-26 NOTE — LETTER
September 26, 2019     Becky Jarquin PA-C  [address] [facility]    Patient: Little Zelaya   YOB: 1965   Date of Visit: 9/26/2019       Dear Dr Phil Berman:    Thank you for referring Randell Maged to me for evaluation  Below are my notes for this consultation  If you have questions, please do not hesitate to call me  I look forward to following your patient along with you  Sincerely,        Jared Bill MD        CC: No Recipients  Jared Bill MD  9/26/2019 11:28 AM  Sign at close encounter  Assessment/Plan:    NSTEMI (non-ST elevated myocardial infarction) [facility])  The patient had a recent event consistent with a non ST segment elevation myocardial infarction with the moderate to severe narrowing the right coronary system  No other significant obstructive disease was noted by cardiac catheterization  It was presumed that the event was stress related  Patient was then started on appropriate medications  He seemed to tolerate this regimen  I am therefore asking the patient to continue on the same medications for now  Major depression  History of bipolar disorder order with major depression  This is apparently stable  Diagnoses and all orders for this visit:    NSTEMI (non-ST elevated myocardial infarction) ([facility] [address] )    Recurrent major depressive disorder, remission status unspecified ([facility] )          Subjective:  Feels well from the cardiac standpoint  Patient ID: Little Zelaya is a 53 y o  male  The patient presented to this office for the purpose of cardiac evaluation and consultation  The patient was recently hospitalized at [facility] for major depression and suicide attempt  He was noted to have elevated troponin and his EKG was abnormal   The patient had evidence of non ST segment elevation myocardial infarction  He underwent cardiac catheterization showing evidence of apical hypokinesis    There was evidence of moderately severe narrowing in the right coronary system otherwise no other significant obstructive disease  Patient was diagnosed with stress induced myocardial infarction and cardiomyopathy  He was therefore started on beta-blockers and Ace inhibitors  He is also started on atorvastatin  Since that time patient has been feeling rather well  His depression is presumably stable  He denies any symptoms of chest pain, shortness of breath, palpitation, dizziness or lightheadedness  He has no leg edema  Patient denies any prior history of hypertension or hyperlipidemia  He continues to smoke but he does not drink alcohol  He does have a family history of early coronary disease  The following portions of the patient's history were reviewed and updated as appropriate: allergies, current medications, past family history, past medical history, past social history, past surgical history and problem list     Review of Systems   Respiratory: Negative for apnea, cough, chest tightness, shortness of breath and wheezing  Cardiovascular: Negative for chest pain, palpitations and leg swelling  Gastrointestinal: Negative for abdominal pain  Neurological: Negative for dizziness and light-headedness  Objective:  Stable cardiac-wise  BP 90/60 (BP Location: Right arm, Patient Position: Sitting)   Pulse 60   Ht 5' 11" (1 803 m)   Wt 83 9 kg (185 lb)   BMI 25 80 kg/m²           Physical Exam   Constitutional: He is oriented to person, place, and time  He appears well-developed and well-nourished  No distress  HENT:   Head: Normocephalic  Eyes: Pupils are equal, round, and reactive to light  Neck: Normal range of motion  No JVD present  No thyromegaly present  Cardiovascular: Normal rate, regular rhythm, S1 normal and S2 normal  Exam reveals no gallop and no friction rub  No murmur heard  Pulmonary/Chest: Effort normal and breath sounds normal  No respiratory distress  He has no wheezes  He has no rales   He exhibits no tenderness  Abdominal: Soft  Musculoskeletal: Normal range of motion  He exhibits no edema, tenderness or deformity  Neurological: He is alert and oriented to person, place, and time  Skin: Skin is warm and dry  He is not diaphoretic  Psychiatric: He has a normal mood and affect  Vitals reviewed

## 2019-09-26 NOTE — ASSESSMENT & PLAN NOTE
The patient had a recent event consistent with a non ST segment elevation myocardial infarction with the moderate to severe narrowing the right coronary system  No other significant obstructive disease was noted by cardiac catheterization  It was presumed that the event was stress related  Patient was then started on appropriate medications  He seemed to tolerate this regimen  I am therefore asking the patient to continue on the same medications for now

## 2019-09-26 NOTE — PROGRESS NOTES
Assessment/Plan:    NSTEMI (non-ST elevated myocardial infarction) [facility])  The patient had a recent event consistent with a non ST segment elevation myocardial infarction with the moderate to severe narrowing the right coronary system  No other significant obstructive disease was noted by cardiac catheterization  It was presumed that the event was stress related  Patient was then started on appropriate medications  He seemed to tolerate this regimen  I am therefore asking the patient to continue on the same medications for now  Major depression  History of bipolar disorder order with major depression  This is apparently stable  Diagnoses and all orders for this visit:    NSTEMI (non-ST elevated myocardial infarction) ([facility] [address] )    Recurrent major depressive disorder, remission status unspecified ([facility] )          Subjective:  Feels well from the cardiac standpoint  Patient ID: Hattie Rosario is a 53 y o  male  The patient presented to this office for the purpose of cardiac evaluation and consultation  The patient was recently hospitalized at [facility] for major depression and suicide attempt  He was noted to have elevated troponin and his EKG was abnormal   The patient had evidence of non ST segment elevation myocardial infarction  He underwent cardiac catheterization showing evidence of apical hypokinesis  There was evidence of moderately severe narrowing in the right coronary system otherwise no other significant obstructive disease  Patient was diagnosed with stress induced myocardial infarction and cardiomyopathy  He was therefore started on beta-blockers and Ace inhibitors  He is also started on atorvastatin  Since that time patient has been feeling rather well  His depression is presumably stable  He denies any symptoms of chest pain, shortness of breath, palpitation, dizziness or lightheadedness  He has no leg edema    Patient denies any prior history of hypertension or hyperlipidemia  He continues to smoke but he does not drink alcohol  He does have a family history of early coronary disease  The following portions of the patient's history were reviewed and updated as appropriate: allergies, current medications, past family history, past medical history, past social history, past surgical history and problem list     Review of Systems   Respiratory: Negative for apnea, cough, chest tightness, shortness of breath and wheezing  Cardiovascular: Negative for chest pain, palpitations and leg swelling  Gastrointestinal: Negative for abdominal pain  Neurological: Negative for dizziness and light-headedness  Objective:  Stable cardiac-wise  BP 90/60 (BP Location: Right arm, Patient Position: Sitting)   Pulse 60   Ht 5' 11" (1 803 m)   Wt 83 9 kg (185 lb)   BMI 25 80 kg/m²          Physical Exam   Constitutional: He is oriented to person, place, and time  He appears well-developed and well-nourished  No distress  HENT:   Head: Normocephalic  Eyes: Pupils are equal, round, and reactive to light  Neck: Normal range of motion  No JVD present  No thyromegaly present  Cardiovascular: Normal rate, regular rhythm, S1 normal and S2 normal  Exam reveals no gallop and no friction rub  No murmur heard  Pulmonary/Chest: Effort normal and breath sounds normal  No respiratory distress  He has no wheezes  He has no rales  He exhibits no tenderness  Abdominal: Soft  Musculoskeletal: Normal range of motion  He exhibits no edema, tenderness or deformity  Neurological: He is alert and oriented to person, place, and time  Skin: Skin is warm and dry  He is not diaphoretic  Psychiatric: He has a normal mood and affect  Vitals reviewed

## 2019-10-04 ENCOUNTER — PATIENT OUTREACH (OUTPATIENT)
Dept: CASE MANAGEMENT | Facility: OTHER | Age: 54
End: 2019-10-04

## 2019-10-04 NOTE — PROGRESS NOTES
Reached out to patient's , Tegan Riojassay to follow up, assess patient's well being and update facility on bundle episode information  Patient was seen by cardiologist on 9/26, assessment as follows;    "The patient presented to this office for the purpose of cardiac evaluation and consultation  The patient was recently hospitalized at [facility] for major depression and suicide attempt  He was noted to have elevated troponin and his EKG was abnormal   The patient had evidence of non ST segment elevation myocardial infarction  He underwent cardiac catheterization showing evidence of apical hypokinesis  There was evidence of moderately severe narrowing in the right coronary system otherwise no other significant obstructive disease  Patient was diagnosed with stress induced myocardial infarction and cardiomyopathy  He was therefore started on beta-blockers and Ace inhibitors  He is also started on atorvastatin  Since that time patient has been feeling rather well  His depression is presumably stable  He denies any symptoms of chest pain, shortness of breath, palpitation, dizziness or lightheadedness  He has no leg edema  Patient denies any prior history of hypertension or hyperlipidemia  He continues to smoke but he does not drink alcohol  He does have a family history of early coronary disease "    Left message with Naomy Pearson with this CM contact information and to return call

## 2019-10-10 NOTE — PROGRESS NOTES
Clinical Pharmacy Note: [place] [facility]     Michael Bird is a 46 y o  male who presents with suicidal ideation  Assessment/Plan:    Lithium indication: bipolar disorder maintenance  Eleazar is currently taking 450 mg lithium carbonate tablet twice daily  Lithium concentration is 0 4 mmol/L drawn on admission   Recommend to take steady state trough 12 hours after the evening dose after 5 days of therapy due on 12/13/18 with AM labs  The pharmacist has checked for drug interactions, lithium levels, kidney function, thyroid function  YES    Pharmacy Recommendations:   May order steady state lab draw for 12/13/18 with AM labs or when clinically appropriate  Monitoring:    Monitor for renal and thyroid dysfunction, skin reactions (acne), weight gain, and diabetes insipidus  Certain medications increase lithium levels and should be avoided such as diuretics, NSAIDS (excluding sulindac), and ACE-I/ARBs  Medications such as theophylline and caffeine may decrease lithium levels  Patient should maintain consistent adequate hydration and consistent caffeine and sodium intake  Lithium:    0  Lab Value Date/Time   LITHIUM 0 4 (L) 12/08/2018 0658       Renal:    0  Lab Value Date/Time   BUN 11 12/05/2018 1550   BUN 9 01/02/2016 1220   CREATININE 0 75 12/05/2018 1550   CREATININE 1 01 01/02/2016 1220       Thyroid:    0  Lab Value Date/Time   [id number] 1 129 08/25/2016 0700       Weight:  Wt Readings from Last 5 Encounters:   12/06/18 79 5 kg (175 lb 4 3 oz)   07/24/17 77 1 kg (170 lb)   07/12/17 75 7 kg (166 lb 14 2 oz)   07/05/17 75 kg (165 lb 5 5 oz)   10/01/16 77 1 kg (170 lb)       Lithium Levels    Therapeutic lithium levels are 0 6-1 2 mmol/L, but target range may be 0 8-1 2 mmol/L for acute kymberly  Levels above 1 5 mmol/L indicate toxicity, although toxicity may be associated with levels within therapeutic ranges based on symptoms         Mild GI discomfort and slight tremor are typical when initiating lithium, but if these symptoms do not resolve or any other signs of toxicity occur, assess lithium levels immediately  Toxicity    Signs of toxicity include: confusion, difficulty concentrating, vomiting, diarrhea, poor coordination, tremor, abnormal heart rhythm, seizures and coma  Pharmacy will continue to follow patient with team, thank you      Electronically signed by: Pauline Carbajal, Pharmacist 0

## 2019-11-01 ENCOUNTER — PATIENT OUTREACH (OUTPATIENT)
Dept: CASE MANAGEMENT | Facility: OTHER | Age: 54
End: 2019-11-01

## 2019-11-01 NOTE — PROGRESS NOTES
Phone call returned from Augustin, patient's nurse/ wellness  Manager  She Stated that patient is at his baseline, and varies day to day  He suffers extreme anxiety and is under care of psychiatrist     She stated that "  behavior is manageable most of the time"  He denies symptoms of chest pain and does not exhibit shortness of breath at this time  Facility provides medications, meals and transportation to medical appointments  No further questions or concerns at this time

## 2019-11-01 NOTE — PROGRESS NOTES
Attempted to reach out to Nurse Chacha Wang at [facility] where patient resides for [facility] follow up  Left voicemail with contact information and to return this CM's call

## 2019-11-13 ENCOUNTER — OFFICE VISIT (OUTPATIENT)
Dept: URGENT CARE | Facility: CLINIC | Age: 54
End: 2019-11-13
Payer: MEDICARE

## 2019-11-13 VITALS
HEART RATE: 55 BPM | DIASTOLIC BLOOD PRESSURE: 66 MMHG | HEIGHT: 71 IN | TEMPERATURE: 97.7 F | OXYGEN SATURATION: 97 % | BODY MASS INDEX: 25.9 KG/M2 | SYSTOLIC BLOOD PRESSURE: 94 MMHG | WEIGHT: 185 LBS | RESPIRATION RATE: 19 BRPM

## 2019-11-13 DIAGNOSIS — K59.00 CONSTIPATION, UNSPECIFIED CONSTIPATION TYPE: Primary | ICD-10-CM

## 2019-11-13 DIAGNOSIS — K59.03 DRUG-INDUCED CONSTIPATION: ICD-10-CM

## 2019-11-13 PROCEDURE — G0463 HOSPITAL OUTPT CLINIC VISIT: HCPCS | Performed by: NURSE PRACTITIONER

## 2019-11-13 PROCEDURE — 99213 OFFICE O/P EST LOW 20 MIN: CPT | Performed by: NURSE PRACTITIONER

## 2019-11-13 RX ORDER — POLYETHYLENE GLYCOL 3350 17 G/17G
17 POWDER, FOR SOLUTION ORAL DAILY
Qty: 14 EACH | Refills: 0 | Status: SHIPPED | OUTPATIENT
Start: 2019-11-13

## 2019-11-13 RX ORDER — POLYETHYLENE GLYCOL 3350 17 G/17G
17 POWDER, FOR SOLUTION ORAL DAILY
Qty: 14 EACH | Refills: 0 | Status: SHIPPED | OUTPATIENT
Start: 2019-11-13 | End: 2019-11-13 | Stop reason: SDUPTHER

## 2019-11-13 NOTE — PROGRESS NOTES
Assessment/Plan    Constipation, unspecified constipation type [K59 00]  1  Constipation, unspecified constipation type     2  Drug-induced constipation  polyethylene glycol (MIRALAX) 17 g packet    magnesium hydroxide (MILK OF MAGNESIA) 400 mg/5 mL oral suspension    DISCONTINUED: polyethylene glycol (MIRALAX) 17 g packet    DISCONTINUED: magnesium hydroxide (MILK OF MAGNESIA) 400 mg/5 mL oral suspension         Subjective:     Patient ID: Crescencio Varner is a 53 y o  male  Reason For Visit / Chief Complaint  Chief Complaint   Patient presents with    Abdominal Pain     *Problem - Patient claims he has been constipated for the past week and had his last bowel movement about a week ago  He is still consistantly eating and is not experiencing pain - It is more so a concern  This is a 41-year-old male patient who presents to the urgent care today  Patient is complaining of constipation for 1 week  Patient states that he has been eating normally and drinking a lot of water and has not experienced a bowel movement  Patient medications and medical history have been reviewed  Patient recently had cardiac surgery less than 2 months ago  He was on some narcotics following the surgery  Patient on a daily basis takes Colace as well as Benefiber  These have provided no relief  Patient denies fever or chills  Patient denies abdominal pain, nausea, vomiting or diarrhea  Patient denies abdominal distention  Patient denies chest pain or shortness of breath  Past Medical History:   Diagnosis Date    Anxiety     [facility] ([facility] )     Bowel habit changes     Chronic pain disorder     Coronary artery disease     [facility] )     Depression     Family hx of colon cancer     father and paternal uncle    Myocardial infarction [facility])     Psychiatric illness     Stroke ([place] [id number] ) 12/2015    CVA x3   Pt states he is unaware of when he had first two CVA    Suicide attempt [facility])        Past Surgical History:   Procedure Laterality Date    COLONOSCOPY         Family History   Problem Relation Age of Onset    No Known Problems Mother     No Known Problems Father     No Known Problems Sister     No Known Problems Brother     No Known Problems Maternal Aunt     No Known Problems Paternal Aunt     No Known Problems Maternal Uncle     No Known Problems Paternal Uncle     No Known Problems Maternal Grandfather     No Known Problems Maternal Grandmother     No Known Problems Paternal Grandfather     No Known Problems Paternal Grandmother     No Known Problems Cousin     ADD / ADHD Neg Hx     Alcohol abuse Neg Hx     Anxiety disorder Neg Hx     Bipolar disorder Neg Hx     Completed Suicide  Neg Hx     Dementia Neg Hx     Depression Neg Hx     Drug abuse Neg Hx     OCD Neg Hx     Psychiatric Illness Neg Hx     Psychosis Neg Hx     Schizoaffective Disorder  Neg Hx     Schizophrenia Neg Hx     Self-Injury Neg Hx     Suicide Attempts Neg Hx        Review of Systems   Constitutional: Negative for chills and fever  Respiratory: Negative for shortness of breath  Cardiovascular: Negative for chest pain  Gastrointestinal: Positive for constipation  Negative for abdominal distention, abdominal pain, diarrhea, nausea, rectal pain and vomiting  Genitourinary: Negative for difficulty urinating  Musculoskeletal: Negative for back pain and neck pain  Skin: Negative for color change  Neurological: Negative for headaches  Objective:    BP 94/66   Pulse 55   Temp 97 7 °F (36 5 °C) (Tympanic)   Resp 19   Ht 5' 11" (1 803 m)   Wt 83 9 kg (185 lb)   SpO2 97%   BMI 25 80 kg/m²     Physical Exam   Constitutional: He is oriented to person, place, and time  Vital signs are normal  He appears well-developed and well-nourished  He does not appear ill  HENT:   Head: Normocephalic and atraumatic     Cardiovascular: Normal rate, regular rhythm, S1 normal, S2 normal and normal heart sounds  Exam reveals no gallop, no distant heart sounds and no friction rub  No murmur heard  Pulmonary/Chest: Effort normal and breath sounds normal  No stridor  He has no decreased breath sounds  He has no wheezes  He has no rhonchi  He has no rales  Abdominal: Soft  Normal appearance  He exhibits no distension, no abdominal bruit, no ascites, no pulsatile midline mass and no mass  Bowel sounds are decreased  There is no tenderness  There is no rigidity, no rebound and no guarding  Musculoskeletal: Normal range of motion  Neurological: He is alert and oriented to person, place, and time  Skin: Skin is warm and dry  Capillary refill takes less than 2 seconds  There is pallor  Psychiatric:   Pleasant, but flat affect which is baseline  Nursing note and vitals reviewed  I just spoke to the caregivers over the phone  I updated the pharmacy and recent prescriptions per their request   While the medications are over-the-counter, as we discussed while the patient was here, the prescriptions need to be put in the computer for documentation  In addition when the patient was discharged I explained that because they reported that the Klonopin was taking differently it shows that the medication is discontinued  I reiterated over the phone that we did not discontinue the Klonopin or make any changes to his medication whatsoever, we does updated list per what we were given  Caregivers both verbalized understanding

## 2019-11-13 NOTE — PATIENT INSTRUCTIONS
We saw you today for constipation  Your not presently having any abdominal pain  In addition to her normal medications, try taking a dose of the milk of magnesia and the MiraLax  If you do not have relieve the constipation by tomorrow morning, I would go to the emergency department for further evaluation including potentially an x-ray and enema  Drink plenty of fluids  Follow up with your doctor for any concerns

## 2019-11-22 ENCOUNTER — PATIENT OUTREACH (OUTPATIENT)
Dept: CASE MANAGEMENT | Facility: OTHER | Age: 54
End: 2019-11-22

## 2019-11-22 NOTE — PROGRESS NOTES
Reviewed patient's chart  reached out to Nurse jackson at [facility] where patient resides for [facility] follow up  Patient seen on 1`1/13 at urgent Care due to constipation  Left voicemail with this CM's contact information and to return call

## 2019-12-05 ENCOUNTER — PATIENT OUTREACH (OUTPATIENT)
Dept: CASE MANAGEMENT | Facility: OTHER | Age: 54
End: 2019-12-05

## 2020-01-09 ENCOUNTER — TELEPHONE (OUTPATIENT)
Dept: CARDIOLOGY CLINIC | Facility: CLINIC | Age: 55
End: 2020-01-09

## 2020-01-09 NOTE — TELEPHONE ENCOUNTER
Kobi Deutsch NP would like Dr Alberto Douglass to call her back at [phone] regarding his psychiatric medications as he has heart problems and would like to discuss his medications with the doctor

## 2020-01-09 NOTE — TELEPHONE ENCOUNTER
Okay to try proposed medications starting with small dose and building on it  Follow-up EKG in a week or 2 would be advisable

## 2020-08-10 ENCOUNTER — HOSPITAL ENCOUNTER (EMERGENCY)
Facility: HOSPITAL | Age: 55
End: 2020-08-12
Attending: EMERGENCY MEDICINE | Admitting: EMERGENCY MEDICINE
Payer: MEDICARE

## 2020-08-10 DIAGNOSIS — R45.851 SUICIDAL IDEATIONS: ICD-10-CM

## 2020-08-10 DIAGNOSIS — F31.62 BIPOLAR DISORDER, CURRENT EPISODE MIXED, MODERATE (HCC): ICD-10-CM

## 2020-08-10 DIAGNOSIS — F32.A DEPRESSION: Primary | ICD-10-CM

## 2020-08-10 LAB
ALBUMIN SERPL BCP-MCNC: 3.8 G/DL (ref 3.5–5)
ALP SERPL-CCNC: 62 U/L (ref 46–116)
ALT SERPL W P-5'-P-CCNC: 21 U/L (ref 12–78)
AMPHETAMINES SERPL QL SCN: NEGATIVE
ANION GAP SERPL CALCULATED.3IONS-SCNC: 10 MMOL/L (ref 4–13)
AST SERPL W P-5'-P-CCNC: 20 U/L (ref 5–45)
ATRIAL RATE: 68 BPM
BARBITURATES UR QL: NEGATIVE
BASOPHILS # BLD AUTO: 0.07 THOUSANDS/ΜL (ref 0–0.1)
BASOPHILS NFR BLD AUTO: 1 % (ref 0–1)
BENZODIAZ UR QL: NEGATIVE
BILIRUB SERPL-MCNC: 1 MG/DL (ref 0.2–1)
BUN SERPL-MCNC: 9 MG/DL (ref 5–25)
CALCIUM SERPL-MCNC: 9 MG/DL (ref 8.3–10.1)
CHLORIDE SERPL-SCNC: 105 MMOL/L (ref 100–108)
CLARITY, POC: CLEAR
CO2 SERPL-SCNC: 27 MMOL/L (ref 21–32)
COCAINE UR QL: NEGATIVE
COLOR, POC: YELLOW
CREAT SERPL-MCNC: 0.93 MG/DL (ref 0.6–1.3)
EOSINOPHIL # BLD AUTO: 0.11 THOUSAND/ΜL (ref 0–0.61)
EOSINOPHIL NFR BLD AUTO: 1 % (ref 0–6)
ERYTHROCYTE [DISTWIDTH] IN BLOOD BY AUTOMATED COUNT: 12.5 % (ref 11.6–15.1)
ETHANOL EXG-MCNC: 0 MG/DL
EXT BILIRUBIN, UA: NEGATIVE
EXT BLOOD URINE: NEGATIVE
EXT GLUCOSE, UA: NEGATIVE
EXT KETONES: NEGATIVE
EXT NITRITE, UA: NEGATIVE
EXT PH, UA: 6
EXT PROTEIN, UA: NEGATIVE
EXT SPECIFIC GRAVITY, UA: 1.01
EXT UROBILINOGEN: 0.2
GFR SERPL CREATININE-BSD FRML MDRD: 91 ML/MIN/1.73SQ M
GLUCOSE SERPL-MCNC: 101 MG/DL (ref 65–140)
HCT VFR BLD AUTO: 47.5 % (ref 36.5–49.3)
HGB BLD-MCNC: 15.4 G/DL (ref 12–17)
IMM GRANULOCYTES # BLD AUTO: 0.01 THOUSAND/UL (ref 0–0.2)
IMM GRANULOCYTES NFR BLD AUTO: 0 % (ref 0–2)
LYMPHOCYTES # BLD AUTO: 1.78 THOUSANDS/ΜL (ref 0.6–4.47)
LYMPHOCYTES NFR BLD AUTO: 23 % (ref 14–44)
MCH RBC QN AUTO: 31.8 PG (ref 26.8–34.3)
MCHC RBC AUTO-ENTMCNC: 32.4 G/DL (ref 31.4–37.4)
MCV RBC AUTO: 98 FL (ref 82–98)
METHADONE UR QL: NEGATIVE
MONOCYTES # BLD AUTO: 0.6 THOUSAND/ΜL (ref 0.17–1.22)
MONOCYTES NFR BLD AUTO: 8 % (ref 4–12)
NEUTROPHILS # BLD AUTO: 5.35 THOUSANDS/ΜL (ref 1.85–7.62)
NEUTS SEG NFR BLD AUTO: 67 % (ref 43–75)
NRBC BLD AUTO-RTO: 0 /100 WBCS
OPIATES UR QL SCN: NEGATIVE
OXYCODONE+OXYMORPHONE UR QL SCN: NEGATIVE
P AXIS: 48 DEGREES
PCP UR QL: NEGATIVE
PLATELET # BLD AUTO: 256 THOUSANDS/UL (ref 149–390)
PMV BLD AUTO: 10.7 FL (ref 8.9–12.7)
POTASSIUM SERPL-SCNC: 4 MMOL/L (ref 3.5–5.3)
PR INTERVAL: 132 MS
PROT SERPL-MCNC: 7.4 G/DL (ref 6.4–8.2)
QRS AXIS: -23 DEGREES
QRSD INTERVAL: 90 MS
QT INTERVAL: 424 MS
QTC INTERVAL: 430 MS
RBC # BLD AUTO: 4.84 MILLION/UL (ref 3.88–5.62)
SARS-COV-2 RNA RESP QL NAA+PROBE: NEGATIVE
SODIUM SERPL-SCNC: 142 MMOL/L (ref 136–145)
T WAVE AXIS: 87 DEGREES
THC UR QL: NEGATIVE
TSH SERPL DL<=0.05 MIU/L-ACNC: 0.35 UIU/ML (ref 0.36–3.74)
VENTRICULAR RATE: 62 BPM
WBC # BLD AUTO: 7.92 THOUSAND/UL (ref 4.31–10.16)
WBC # BLD EST: NEGATIVE 10*3/UL

## 2020-08-10 PROCEDURE — 85025 COMPLETE CBC W/AUTO DIFF WBC: CPT | Performed by: PHYSICIAN ASSISTANT

## 2020-08-10 PROCEDURE — 99285 EMERGENCY DEPT VISIT HI MDM: CPT

## 2020-08-10 PROCEDURE — 84443 ASSAY THYROID STIM HORMONE: CPT | Performed by: PHYSICIAN ASSISTANT

## 2020-08-10 PROCEDURE — 93010 ELECTROCARDIOGRAM REPORT: CPT | Performed by: INTERNAL MEDICINE

## 2020-08-10 PROCEDURE — 80307 DRUG TEST PRSMV CHEM ANLYZR: CPT | Performed by: PHYSICIAN ASSISTANT

## 2020-08-10 PROCEDURE — 87635 SARS-COV-2 COVID-19 AMP PRB: CPT | Performed by: PHYSICIAN ASSISTANT

## 2020-08-10 PROCEDURE — 93005 ELECTROCARDIOGRAM TRACING: CPT

## 2020-08-10 PROCEDURE — 84439 ASSAY OF FREE THYROXINE: CPT | Performed by: PSYCHIATRY & NEUROLOGY

## 2020-08-10 PROCEDURE — 99285 EMERGENCY DEPT VISIT HI MDM: CPT | Performed by: PHYSICIAN ASSISTANT

## 2020-08-10 PROCEDURE — 36415 COLL VENOUS BLD VENIPUNCTURE: CPT | Performed by: PHYSICIAN ASSISTANT

## 2020-08-10 PROCEDURE — 82075 ASSAY OF BREATH ETHANOL: CPT | Performed by: PHYSICIAN ASSISTANT

## 2020-08-10 PROCEDURE — 80053 COMPREHEN METABOLIC PANEL: CPT | Performed by: PHYSICIAN ASSISTANT

## 2020-08-10 PROCEDURE — 81002 URINALYSIS NONAUTO W/O SCOPE: CPT | Performed by: PHYSICIAN ASSISTANT

## 2020-08-10 RX ORDER — MIRTAZAPINE 15 MG/1
15 TABLET, FILM COATED ORAL
COMMUNITY
Start: 2020-07-06 | End: 2020-10-27 | Stop reason: HOSPADM

## 2020-08-10 RX ORDER — CLONAZEPAM 0.5 MG/1
1 TABLET ORAL ONCE
Status: COMPLETED | OUTPATIENT
Start: 2020-08-10 | End: 2020-08-10

## 2020-08-10 RX ORDER — MIRTAZAPINE 30 MG/1
30 TABLET, FILM COATED ORAL
Status: ON HOLD | COMMUNITY
Start: 2020-07-06 | End: 2020-08-12 | Stop reason: DRUGHIGH

## 2020-08-10 RX ORDER — ATORVASTATIN CALCIUM 40 MG/1
40 TABLET, FILM COATED ORAL ONCE
Status: COMPLETED | OUTPATIENT
Start: 2020-08-10 | End: 2020-08-10

## 2020-08-10 RX ORDER — SENNOSIDES 8.6 MG
17.2 TABLET ORAL DAILY PRN
COMMUNITY
Start: 2020-07-06

## 2020-08-10 RX ORDER — MIRTAZAPINE 15 MG/1
45 TABLET, FILM COATED ORAL ONCE
Status: COMPLETED | OUTPATIENT
Start: 2020-08-10 | End: 2020-08-10

## 2020-08-10 RX ADMIN — CLONAZEPAM 1 MG: 0.5 TABLET ORAL at 21:00

## 2020-08-10 RX ADMIN — ATORVASTATIN CALCIUM 40 MG: 40 TABLET, FILM COATED ORAL at 20:54

## 2020-08-10 RX ADMIN — MIRTAZAPINE 45 MG: 15 TABLET, FILM COATED ORAL at 20:54

## 2020-08-10 NOTE — ED NOTES
BELONGINGS IN LOCKER [address], ROOM STRIPPED     Tyson Da Silva, JULIANO  08/10/20 [address]  08/10/20 1257

## 2020-08-10 NOTE — ED PROVIDER NOTES
History  Chief Complaint   Patient presents with    Psychiatric Evaluation     suicidal thoughts; paranoia; thoughts of jumping off a rail      Patient is a 64 y/o M with h/o Bipolar d/o, CAD, CVA that presents to the ED with worsening depression and SI with a plan to jump off a railing from a height  He states his depression has been worsening over the past 3 days  He talked to his psychiatrist 3 days ago, but states it didn't help  He states he was started on zoloft 1 week ago  No other medication changes  He states he didn't sleep at all last night  He has had decreased appetite and activity  He states he has had paranoid delusions that people are trying to hurt him  History provided by:  Patient  Psychiatric Evaluation   Presenting symptoms: depression, paranoid behavior and suicidal thoughts    Presenting symptoms: no hallucinations    Patient accompanied by:  Caregiver  Degree of incapacity (severity): Moderate  Onset quality:  Sudden  Duration:  3 days  Timing:  Constant  Progression:  Worsening  Chronicity:  Recurrent  Context: recent medication change (started on zoloft 1 week ago  )    Context: not alcohol use, not drug abuse and not medication    Relieved by:  Nothing  Worsened by:  Nothing  Ineffective treatments:  None tried  Associated symptoms: anhedonia, anxiety, appetite change and insomnia    Associated symptoms: no abdominal pain and no chest pain    Risk factors: hx of mental illness        Prior to Admission Medications   Prescriptions Last Dose Informant Patient Reported? Taking?    Aspirin (ASPIR-81 PO)   Yes No   Sig: Take 81 mg by mouth   atorvastatin (LIPITOR) 40 mg tablet   No No   Sig: Take 1 tablet (40 mg total) by mouth daily with dinner   cariprazine (VRAYLAR) 4 5 MG capsule   Yes Yes   Sig: Take 6 mg by mouth daily   carvedilol (COREG) 3 125 mg tablet   No No   Sig: Take 1 tablet (3 125 mg total) by mouth 2 (two) times a day with meals   cholecalciferol (VITAMIN D3) 1,000 units tablet   Yes No   Sig: Take 1,000 Units by mouth daily   docusate sodium (COLACE) 100 mg capsule   No No   Sig: Take 1 capsule (100 mg total) by mouth 2 (two) times a day   mirtazapine (REMERON) 15 mg tablet   Yes Yes   Sig: Take 15 mg by mouth   mirtazapine (REMERON) 30 mg tablet   Yes Yes   Sig: Take 30 mg by mouth   polyethylene glycol (MIRALAX) 17 g packet   No No   Sig: Take 17 g by mouth daily   senna (SENOKOT) 8 6 mg   Yes Yes   Sig: Take 17 2 mg by mouth daily   wheat dextrin (BENEFIBER)   No No   Sig: Take 1 packet by mouth daily Administer 1 tablespoon in 6 ounces of fluid daily      Facility-Administered Medications: None       Past Medical History:   Diagnosis Date    Anxiety     Bipolar 1 disorder (HCC)     Bowel habit changes     Chronic pain disorder     Coronary artery disease     [facility] accident[facility] ([facility] )     Depression     Family hx of colon cancer     father and paternal uncle    Myocardial infarction [facility])     Psychiatric disorder     Psychiatric illness     Stroke ([facility] ) 12/2015    CVA x3   Pt states he is unaware of when he had first two CVA    Suicide attempt [facility])        Past Surgical History:   Procedure Laterality Date    COLONOSCOPY         Family History   Problem Relation Age of Onset    No Known Problems Mother     No Known Problems Father     No Known Problems Sister     No Known Problems Brother     No Known Problems Maternal Aunt     No Known Problems Paternal Aunt     No Known Problems Maternal Uncle     No Known Problems Paternal Uncle     No Known Problems Maternal Grandfather     No Known Problems Maternal Grandmother     No Known Problems Paternal Grandfather     No Known Problems Paternal Grandmother     No Known Problems Cousin     ADD / ADHD Neg Hx     Alcohol abuse Neg Hx     Anxiety disorder Neg Hx     Bipolar disorder Neg Hx     Completed Suicide  Neg Hx     Dementia Neg Hx     Depression Neg Hx     Drug abuse Neg Hx     OCD Neg Hx     Psychiatric Illness Neg Hx     Psychosis Neg Hx     Schizoaffective Disorder  Neg Hx     Schizophrenia Neg Hx     Self-Injury Neg Hx     Suicide Attempts Neg Hx      I have reviewed and agree with the history as documented  E-Cigarette/Vaping    E-Cigarette Use Never User      E-Cigarette/Vaping Substances    Nicotine No     Flavoring No     Other No     Unknown No      Social History     Tobacco Use    Smoking status: Current Every Day Smoker     Packs/day: 1 00     Years: 15 00     Pack years: 15 00     Types: Cigarettes    Smokeless tobacco: Never Used    Tobacco comment: Wishes to quit   Substance Use Topics    Alcohol use: Never     Alcohol/week: 0 0 standard drinks     Frequency: Never     Drinks per session: Patient refused     Binge frequency: Never     Comment: pt denies    Drug use: No     Comment: pt denies       Review of Systems   Constitutional: Positive for activity change and appetite change  Negative for chills and fever  HENT: Negative  Eyes: Negative for visual disturbance  Respiratory: Negative for cough and shortness of breath  Cardiovascular: Negative for chest pain and leg swelling  Gastrointestinal: Negative for abdominal pain, diarrhea, nausea and vomiting  Genitourinary: Negative for dysuria  Musculoskeletal: Negative for back pain and neck pain  Skin: Negative for color change and rash  Neurological: Negative for dizziness, weakness and numbness  Psychiatric/Behavioral: Positive for paranoia, sleep disturbance and suicidal ideas  Negative for hallucinations  The patient is nervous/anxious and has insomnia  All other systems reviewed and are negative  Physical Exam  Physical Exam  Vitals signs and nursing note reviewed  Constitutional:       Appearance: Normal appearance  He is well-developed  He is not ill-appearing  HENT:      Head: Normocephalic and atraumatic        Nose: Nose normal       Mouth/Throat:      Mouth: Mucous membranes are moist       Comments: Patient has dandruff  Eyes:      General: Lids are normal       Conjunctiva/sclera: Conjunctivae normal       Pupils: Pupils are equal, round, and reactive to light  Neck:      Musculoskeletal: Normal range of motion and neck supple  Cardiovascular:      Rate and Rhythm: Normal rate and regular rhythm  Heart sounds: Normal heart sounds  No murmur  Pulmonary:      Effort: Pulmonary effort is normal       Breath sounds: Normal breath sounds  No wheezing, rhonchi or rales  Abdominal:      General: Bowel sounds are normal       Palpations: Abdomen is soft  Tenderness: There is no abdominal tenderness  Musculoskeletal: Normal range of motion  Right lower leg: No edema  Left lower leg: No edema  Skin:     General: Skin is warm and dry  Findings: No rash  Neurological:      Mental Status: He is alert and oriented to person, place, and time  GCS: GCS eye subscore is 4  GCS verbal subscore is 5  GCS motor subscore is 6  Motor: Motor function is intact  Psychiatric:         Mood and Affect: Mood is depressed  Affect is flat  Speech: Speech is delayed  Behavior: Behavior is slowed  Behavior is cooperative  Thought Content: Thought content is paranoid  Thought content includes suicidal ideation  Thought content does not include homicidal ideation  Thought content includes suicidal plan  Thought content does not include homicidal plan           Vital Signs  ED Triage Vitals   Temperature Pulse Respirations Blood Pressure SpO2   08/10/20 1230 08/10/20 1230 08/10/20 1230 08/10/20 1230 08/10/20 1230   99 3 °F (37 4 °C) 76 18 118/86 95 %      Temp Source Heart Rate Source Patient Position - Orthostatic VS BP Location FiO2 (%)   08/10/20 1230 08/10/20 1230 08/10/20 1959 08/10/20 1230 --   Temporal Monitor Lying Left arm       Pain Score       --                  Vitals:    08/10/20 1230 08/10/20 1959   BP: 118/86 114/70   Pulse: 76 62   Patient Position - Orthostatic VS:  Lying         Visual Acuity      ED Medications  Medications   atorvastatin (LIPITOR) tablet 40 mg (has no administration in time range)   clonazePAM (KlonoPIN) tablet 1 mg (has no administration in time range)   mirtazapine (REMERON) tablet 45 mg (has no administration in time range)       Diagnostic Studies  Results Reviewed     Procedure Component Value Units Date/Time    POCT urinalysis dipstick [[id number]]  (Normal) Resulted:  08/10/20 1749    Lab Status:  Edited Result - FINAL Specimen:  Urine Updated:  08/10/20 1750     Color, UA yellow     Clarity, UA clear     Glucose, UA (Ref: Negative) negative     Bilirubin, UA (Ref: Negative) negative     Ketones, UA (Ref: Negative) negative     Spec Grav, UA (Ref:1 003-1 030) 1 015     Blood, UA (Ref: Negative) negative     pH, UA (Ref: 4 5-8 0) 6 0     Protein, UA (Ref: Negative) negative     Urobilinogen, UA (Ref: 0 2- 1 0) 0 2      Leukocytes, UA (Ref: Negative) negative     Nitrite, UA (Ref: Negative) negative    [facility]ov[facility] [[id number]]  (Normal) Collected:  08/10/20 1445    Lab Status:  Final result Specimen:  Nares from Nose Updated:  08/10/20 1555     SARS-CoV-2 Negative    Narrative: The specimen collection materials, transport medium, and/or testing methodology utilized in the production of these test results have been proven to be reliable in a limited validation with an abbreviated program under the Emergency Utilization Authorization provided by the FDA  Testing reported as "Presumptive positive" will be confirmed with secondary testing with a reference laboratory to ensure result accuracy  Clinical caution and judgement should be used with the interpretation of these results with consideration of the clinical impression and other laboratory testing    Testing reported as "Positive" or "Negative" has been proven to be accurate according to standard laboratory validation requirements  All testing is performed with control materials showing appropriate reactivity at standard intervals  Rapid drug screen, urine [[id number]] Collected:  08/10/20 1530    Lab Status: In process Specimen:  Urine, Clean Catch Updated:  08/10/20 1539    TSH [[id number]]  (Abnormal) Collected:  08/10/20 1259    Lab Status:  Final result Specimen:  Blood from Arm, Right Updated:  08/10/20 1416     TSH 3RD GENERATON 0 352 uIU/mL     Narrative:       Patients undergoing fluorescein dye angiography may retain small amounts of fluorescein in the body for 48-72 hours post procedure  Samples containing fluorescein can produce falsely depressed TSH values  If the patient had this procedure,a specimen should be resubmitted post fluorescein clearance        Comprehensive metabolic panel [[id number]] Collected:  08/10/20 1259    Lab Status:  Final result Specimen:  Blood from Arm, Right Updated:  08/10/20 1408     Sodium 142 mmol/L      Potassium 4 0 mmol/L      Chloride 105 mmol/L      CO2 27 mmol/L      ANION GAP 10 mmol/L      BUN 9 mg/dL      Creatinine 0 93 mg/dL      Glucose 101 mg/dL      Calcium 9 0 mg/dL      AST 20 U/L      ALT 21 U/L      Alkaline Phosphatase 62 U/L      Total Protein 7 4 g/dL      Albumin 3 8 g/dL      Total Bilirubin 1 00 mg/dL      eGFR 91 ml/min/1 73sq m     Narrative:       [place] guidelines for Chronic Kidney Disease (CKD):     Stage 1 with normal or high GFR (GFR > 90 mL/min/1 73 square meters)    Stage 2 Mild CKD (GFR = 60-89 mL/min/1 73 square meters)    Stage 3A Moderate CKD (GFR = 45-59 mL/min/1 73 square meters)    Stage 3B Moderate CKD (GFR = 30-44 mL/min/1 73 square meters)    Stage 4 Severe CKD (GFR = 15-29 mL/min/1 73 square meters)    Stage 5 End Stage CKD (GFR <15 mL/min/1 73 square meters)  Note: GFR calculation is accurate only with a steady state creatinine    CBC and differential [[id number]] Collected:  08/10/20 8088 Lab Status:  Final result Specimen:  Blood from Arm, Right Updated:  08/10/20 1346     WBC 7 92 Thousand/uL      RBC 4 84 Million/uL      Hemoglobin 15 4 g/dL      Hematocrit 47 5 %      MCV 98 fL      MCH 31 8 pg      MCHC 32 4 g/dL      RDW 12 5 %      MPV 10 7 fL      Platelets 738 Thousands/uL      nRBC 0 /100 WBCs      Neutrophils Relative 67 %      Immat GRANS % 0 %      Lymphocytes Relative 23 %      Monocytes Relative 8 %      Eosinophils Relative 1 %      Basophils Relative 1 %      Neutrophils Absolute 5 35 Thousands/µL      Immature Grans Absolute 0 01 Thousand/uL      Lymphocytes Absolute 1 78 Thousands/µL      Monocytes Absolute 0 60 Thousand/µL      Eosinophils Absolute 0 11 Thousand/µL      Basophils Absolute 0 07 Thousands/µL     POCT alcohol breath test [[id number]]  (Normal) Resulted:  08/10/20 1256    Lab Status:  Final result Updated:  08/10/20 1256     EXTBreath Alcohol 0                 No orders to display              Procedures  ECG 12 Lead Documentation Only    Date/Time: 8/10/2020 2:52 PM  Performed by: Eric Mcnair PA-C  Authorized by: Eric Mcnair PA-C     Indications / Diagnosis:  U clearance  Patient location:  ED  Previous ECG:     Previous ECG:  Compared to current    Similarity:  No change  Rate:     ECG rate:  62  Rhythm:     Rhythm: sinus rhythm    ST segments:     ST segments:  Normal  T waves:     T waves: inverted      Inverted:  V2, V3, V4 and V5             ED Course  ED Course as of Aug 10 2101   Mon Aug 10, 2020   1750 Patient medically cleared for [facility]        2101 Care transferred to Dr Kirstie Cantrell  Patient awaiting placement at [facility]  US AUDIT      Most Recent Value   Initial Alcohol Screen: US AUDIT-C    1  How often do you have a drink containing alcohol?  0 Filed at: 08/10/2020 1233   2  How many drinks containing alcohol do you have on a typical day you are drinking? 0 Filed at: 08/10/2020 1233   3a  Male UNDER 65:  How often do you have five or more drinks on one occasion? 0 Filed at: 08/10/2020 1233   Audit-C Score  0 Filed at: 08/10/2020 1233                  HERB/DAST-10      Most Recent Value   How many times in the past year have you    Used an illegal drug or used a prescription medication for non-medical reasons? Never Filed at: 08/10/2020 1233                      Patient medically cleared for behavioral health evaluation  MDM      Disposition  Final diagnoses:   Depression   Suicidal ideations     Time reflects when diagnosis was documented in both MDM as applicable and the Disposition within this note     Time User Action Codes Description Comment    8/10/2020  8:20 PM Jimmyjyotsna Santos Add [F32 9] Depression     8/10/2020  8:20 PM Jimmy Santos Add [Y22 548] Suicidal ideations       ED Disposition     None      MD Documentation      Most Recent Value   Sending MD Thuan Mendoza      Follow-up Information    None         Patient's Medications   Discharge Prescriptions    No medications on file     No discharge procedures on file      PDMP Review     None          ED Provider  Electronically Signed by           Claudene Keener, PA-C  08/10/20 0662

## 2020-08-10 NOTE — ED NOTES
Patient reporting increased depression and si for the past 2-3 weeks  He says he is depressed by the isolation caused by the covid restrictions, as well as by a conflict with other resident and his fear that they are going to try to harm him  He reports SI, and that there is a specific railing along the top of a building near where he lives that he keeps thinking that he should jump from  He reports previously attempting suicide by overdosing  He denies hi and psychosis  He reports that his psychiatrist and therapist are arranged through [facility]

## 2020-08-11 LAB — T4 FREE SERPL-MCNC: 1.14 NG/DL (ref 0.76–1.46)

## 2020-08-11 RX ORDER — RISPERIDONE 1 MG/1
1 TABLET, ORALLY DISINTEGRATING ORAL EVERY 12 HOURS PRN
Status: CANCELLED | OUTPATIENT
Start: 2020-08-11

## 2020-08-11 RX ORDER — BENZTROPINE MESYLATE 1 MG/ML
1 INJECTION INTRAMUSCULAR; INTRAVENOUS EVERY 6 HOURS PRN
Status: CANCELLED | OUTPATIENT
Start: 2020-08-11

## 2020-08-11 RX ORDER — BENZTROPINE MESYLATE 0.5 MG/1
1 TABLET ORAL EVERY 6 HOURS PRN
Status: CANCELLED | OUTPATIENT
Start: 2020-08-11

## 2020-08-11 RX ORDER — ACETAMINOPHEN 325 MG/1
650 TABLET ORAL EVERY 6 HOURS PRN
Status: CANCELLED | OUTPATIENT
Start: 2020-08-11

## 2020-08-11 RX ORDER — LORAZEPAM 1 MG/1
1 TABLET ORAL EVERY 8 HOURS PRN
Status: CANCELLED | OUTPATIENT
Start: 2020-08-11

## 2020-08-11 NOTE — ED NOTES
Pt moved into hallway directly in front of RN  Pt has care taker at bedside  Pt has no complaints at present time  Will continue to monitor        Montana Babin RN  08/11/20 2197

## 2020-08-11 NOTE — ED NOTES
[facility] willing to review referral  Faxed referral to them  No beds in network  [facility] beds tonight at[facility]  Bed search continues

## 2020-08-11 NOTE — ED NOTES
Patient signed a voluntary admission form  He has Medicare A&B as his primary insurer, and  for [employer] as a secondary  EVS also indicates he is MA FFS through [facility] presumably since he resides there  Crisis worker called [facility] and followed automated prompts to verify patient has behavioral health inpatient coverage  The automated system did verify that coverage and prompted caller to get additional information from their website, which indicated no COB or authorization is necessary if patient has Medicare primary

## 2020-08-11 NOTE — ED NOTES
Pt resting in bed  No distress noted  Caregiver remains at bedside       Avis Doe, RN  08/11/20 0112

## 2020-08-11 NOTE — ED NOTES
Call placed to [facility] [place] [phone] and requested to have the Danielle Tapia call back regarding 302 petition  Awaiting call back

## 2020-08-11 NOTE — ED NOTES
Pt resting in bed  No distress noted  Pt denies any needs  Caregiver remains with patient at bedside         Madhu Roberts RN  08/11/20 1132

## 2020-08-11 NOTE — ED NOTES
Attempting to confirm that the patient has a legal guardian  Generic VM message left with sister, patient's Emergency contact  [facility] VM left with [facility]

## 2020-08-11 NOTE — ED NOTES
Pt remains in bed  No distress noted  Caregiver remains at bedside       Main Palomino RN  08/11/20 4846

## 2020-08-11 NOTE — ED NOTES
Pt resting in bed  No distress noted  Caregiver remains at bedside       Jeremy Vick RN  08/11/20 0022

## 2020-08-11 NOTE — ED NOTES
Pt resting in bed  No distress noted  Caregiver remains at bedside       Citlalli Looney RN  08/10/20 3115

## 2020-08-11 NOTE — ED NOTES
Received a call from Hyun at [facility] stating that they just had a walk in adult male that they are going to have to admit, so they no longer have the bed they were going to review this patient for

## 2020-08-11 NOTE — ED NOTES
Spoke with RN at [facility] who noted that they did not have a uds result in the referral packet  Explained about the sample having to be sent to [facility] to run and she said they are unable to review the referral without a uds  They will hold the referral for now, and when a result is back if a bed is still needed call them at [phone]  Have been attempting to speak with someone in admissions at [facility] to follow up on referral there, but no one is answering  Faxed referral to [facility] for am review if neither of these referrals works out

## 2020-08-11 NOTE — ED NOTES
Pt resting in bed  Denies any needs  No distress noted  Caregiver remains with patient at bedside       Avis Doe, RN  08/11/20 0001

## 2020-08-11 NOTE — ED NOTES
Confirmed with [facility] that they did receive the uds results  They will review the referral when they have time

## 2020-08-11 NOTE — ED NOTES
I spoke with Nick Solorzano from [facility] and they said patient would be to medical for their facility

## 2020-08-11 NOTE — ED NOTES
Ewelina from [facility] called Dr Linda Mccoy will review referral in am and they will call us with a decision

## 2020-08-11 NOTE — ED NOTES
Pt resting in bed  No distress noted  Caregiver remains at bedside       Yeyo Chaudhry RN  08/10/20 4576

## 2020-08-11 NOTE — ED CARE HANDOFF
Emergency Department Sign Out Note        Sign out and transfer of care from Dr Sofy Parra  See Separate Emergency Department note  The patient, Wing Grande, was evaluated by the previous provider for suicidal ideation  Workup Completed:  Patient medically cleared and signed 201 for voluntary psychiatric admission  Placement pending  ED Course / Workup Pending (followup): No acute events while being monitored in the ED  Placement still pending  Procedures  MDM    Disposition  Final diagnoses:   Depression   Suicidal ideations     Time reflects when diagnosis was documented in both MDM as applicable and the Disposition within this note     Time User Action Codes Description Comment    8/10/2020  8:20 PM Yessica Lorenzana Add [F32 9] Depression     8/10/2020  8:20 PM Yessica Lorenzana Add [I16 [id number]] Suicidal ideations       ED Disposition     None      MD Documentation      Most Recent Value   Sending MD Mejia Grande      Follow-up Information    None       Patient's Medications   Discharge Prescriptions    No medications on file     No discharge procedures on file         ED Provider  Electronically Signed by     Itzel Adams MD  08/11/20 8973

## 2020-08-11 NOTE — ED NOTES
Pt remains in bed  Denies any needs  Caregiver remains at bedside       Yeyo Chaudhry RN  08/10/20 5429

## 2020-08-11 NOTE — ED NOTES
Pt resting in bed  No distress noted  Caregiver remains with patient at bedside       Rm Mark RN  08/11/20 1672

## 2020-08-11 NOTE — ED NOTES
Pt resting in bed  Denies any needs at this time  Caregiver remains at bedside       Radha Odell, RN  08/11/20 5555

## 2020-08-12 ENCOUNTER — HOSPITAL ENCOUNTER (INPATIENT)
Facility: HOSPITAL | Age: 55
LOS: 12 days | Discharge: HOME | DRG: 881 | End: 2020-08-24
Attending: PSYCHIATRY & NEUROLOGY | Admitting: PSYCHIATRY & NEUROLOGY
Payer: COMMERCIAL

## 2020-08-12 VITALS
WEIGHT: 185 LBS | HEIGHT: 71 IN | OXYGEN SATURATION: 100 % | BODY MASS INDEX: 25.9 KG/M2 | HEART RATE: 66 BPM | DIASTOLIC BLOOD PRESSURE: 76 MMHG | TEMPERATURE: 98.3 F | SYSTOLIC BLOOD PRESSURE: 121 MMHG | RESPIRATION RATE: 18 BRPM

## 2020-08-12 DIAGNOSIS — F31.62 BIPOLAR DISORDER, CURRENT EPISODE MIXED, MODERATE (HCC): ICD-10-CM

## 2020-08-12 DIAGNOSIS — F31.30 BIPOLAR DISORDER, MOST RECENT EPISODE DEPRESSED (HCC): Primary | Chronic | ICD-10-CM

## 2020-08-12 PROBLEM — Z00.8 MEDICAL CLEARANCE FOR PSYCHIATRIC ADMISSION: Status: ACTIVE | Noted: 2020-08-12

## 2020-08-12 PROCEDURE — 99222 1ST HOSP IP/OBS MODERATE 55: CPT | Performed by: INTERNAL MEDICINE

## 2020-08-12 PROCEDURE — 99222 1ST HOSP IP/OBS MODERATE 55: CPT | Performed by: PSYCHIATRY & NEUROLOGY

## 2020-08-12 RX ORDER — ASPIRIN 81 MG/1
81 TABLET ORAL DAILY
Status: DISCONTINUED | OUTPATIENT
Start: 2020-08-12 | End: 2020-08-24 | Stop reason: HOSPADM

## 2020-08-12 RX ORDER — CLONAZEPAM 0.5 MG/1
0.5 TABLET ORAL EVERY 6 HOURS PRN
COMMUNITY
End: 2020-10-27 | Stop reason: HOSPADM

## 2020-08-12 RX ORDER — BISACODYL 10 MG
10 SUPPOSITORY, RECTAL RECTAL DAILY PRN
Status: DISCONTINUED | OUTPATIENT
Start: 2020-08-12 | End: 2020-08-24 | Stop reason: HOSPADM

## 2020-08-12 RX ORDER — RISPERIDONE 1 MG/1
1 TABLET, ORALLY DISINTEGRATING ORAL EVERY 12 HOURS PRN
Status: DISCONTINUED | OUTPATIENT
Start: 2020-08-12 | End: 2020-08-24 | Stop reason: HOSPADM

## 2020-08-12 RX ORDER — DULOXETIN HYDROCHLORIDE 60 MG/1
60 CAPSULE, DELAYED RELEASE ORAL DAILY
COMMUNITY
End: 2020-10-27 | Stop reason: HOSPADM

## 2020-08-12 RX ORDER — CLONAZEPAM 0.5 MG/1
0.5 TABLET ORAL EVERY 6 HOURS PRN
Status: DISCONTINUED | OUTPATIENT
Start: 2020-08-12 | End: 2020-08-24 | Stop reason: HOSPADM

## 2020-08-12 RX ORDER — BENZTROPINE MESYLATE 1 MG/ML
1 INJECTION INTRAMUSCULAR; INTRAVENOUS EVERY 6 HOURS PRN
Status: DISCONTINUED | OUTPATIENT
Start: 2020-08-12 | End: 2020-08-24 | Stop reason: HOSPADM

## 2020-08-12 RX ORDER — CLONAZEPAM 0.5 MG/1
0.5 TABLET ORAL EVERY MORNING
COMMUNITY
End: 2020-10-27 | Stop reason: HOSPADM

## 2020-08-12 RX ORDER — CLONAZEPAM 0.5 MG/1
0.5 TABLET ORAL DAILY
Status: DISCONTINUED | OUTPATIENT
Start: 2020-08-12 | End: 2020-08-13

## 2020-08-12 RX ORDER — ASPIRIN 81 MG/1
81 TABLET ORAL DAILY
COMMUNITY
End: 2022-04-18

## 2020-08-12 RX ORDER — POLYETHYLENE GLYCOL 3350 17 G/17G
17 POWDER, FOR SOLUTION ORAL DAILY
Status: DISCONTINUED | OUTPATIENT
Start: 2020-08-12 | End: 2020-08-24 | Stop reason: HOSPADM

## 2020-08-12 RX ORDER — BENZTROPINE MESYLATE 1 MG/1
1 TABLET ORAL EVERY 6 HOURS PRN
Status: DISCONTINUED | OUTPATIENT
Start: 2020-08-12 | End: 2020-08-24 | Stop reason: HOSPADM

## 2020-08-12 RX ORDER — CLONAZEPAM 1 MG/1
1 TABLET ORAL
Status: DISCONTINUED | OUTPATIENT
Start: 2020-08-12 | End: 2020-08-13

## 2020-08-12 RX ORDER — MIRTAZAPINE 15 MG/1
15 TABLET, FILM COATED ORAL
Status: DISCONTINUED | OUTPATIENT
Start: 2020-08-12 | End: 2020-08-24 | Stop reason: HOSPADM

## 2020-08-12 RX ORDER — PERPHENAZINE 4 MG/1
2 TABLET, FILM COATED ORAL EVERY 12 HOURS SCHEDULED
Status: DISCONTINUED | OUTPATIENT
Start: 2020-08-12 | End: 2020-08-14

## 2020-08-12 RX ORDER — DULOXETIN HYDROCHLORIDE 60 MG/1
60 CAPSULE, DELAYED RELEASE ORAL DAILY
Status: DISCONTINUED | OUTPATIENT
Start: 2020-08-12 | End: 2020-08-24 | Stop reason: HOSPADM

## 2020-08-12 RX ORDER — ACETAMINOPHEN 325 MG/1
650 TABLET ORAL EVERY 6 HOURS PRN
Status: DISCONTINUED | OUTPATIENT
Start: 2020-08-12 | End: 2020-08-24 | Stop reason: HOSPADM

## 2020-08-12 RX ORDER — CLONAZEPAM 1 MG/1
1 TABLET ORAL
COMMUNITY
End: 2020-10-27 | Stop reason: HOSPADM

## 2020-08-12 RX ORDER — ATORVASTATIN CALCIUM 40 MG/1
40 TABLET, FILM COATED ORAL
Status: DISCONTINUED | OUTPATIENT
Start: 2020-08-12 | End: 2020-08-24 | Stop reason: HOSPADM

## 2020-08-12 RX ORDER — LORAZEPAM 1 MG/1
1 TABLET ORAL EVERY 8 HOURS PRN
Status: DISCONTINUED | OUTPATIENT
Start: 2020-08-12 | End: 2020-08-12

## 2020-08-12 RX ADMIN — CLONAZEPAM 0.5 MG: 0.5 TABLET ORAL at 12:42

## 2020-08-12 RX ADMIN — MIRTAZAPINE 15 MG: 15 TABLET, FILM COATED ORAL at 21:39

## 2020-08-12 RX ADMIN — ATORVASTATIN CALCIUM 40 MG: 40 TABLET, FILM COATED ORAL at 17:35

## 2020-08-12 RX ADMIN — DULOXETINE HYDROCHLORIDE 60 MG: 60 CAPSULE, DELAYED RELEASE ORAL at 12:42

## 2020-08-12 RX ADMIN — CLONAZEPAM 1 MG: 1 TABLET ORAL at 21:40

## 2020-08-12 RX ADMIN — PERPHENAZINE 2 MG: 4 TABLET, FILM COATED ORAL at 21:39

## 2020-08-12 RX ADMIN — PERPHENAZINE 2 MG: 4 TABLET, FILM COATED ORAL at 12:43

## 2020-08-12 RX ADMIN — LORAZEPAM 1 MG: 1 TABLET ORAL at 01:51

## 2020-08-12 RX ADMIN — ASPIRIN 81 MG: 81 TABLET, COATED ORAL at 12:42

## 2020-08-12 RX ADMIN — CARIPRAZINE 6 MG: 6 CAPSULE, GELATIN COATED ORAL at 12:47

## 2020-08-12 RX ADMIN — NICOTINE 1 PATCH: 7 PATCH, EXTENDED RELEASE TRANSDERMAL at 09:01

## 2020-08-12 NOTE — ED NOTES
Pt's care taker left   He stated that there would be another care taker coming in  ED tech at bedside for 1:1     Donta Herrera RN  08/11/20 6651

## 2020-08-12 NOTE — ED NOTES
302 faxed to Lizeth Lange at intake for review and placement at [facility] when a bed is available

## 2020-08-12 NOTE — H&P
[facility]  Inpatient Geriatric Psychiatry  Psychiatrists Admission Evaluation      Patient Name: Rima Mirza  MRN: [id number]  DOS: 08/12/20     Chief Complaint:  suicidal thoughts    (Source of information:  Patient is a poor historian, chart reviewed)    HPI: Rima Mirza is a 63 y o  male resident of [facility] (assisted living facility) with self-reported prior diagnosis of bipolar disorder, history of stroke, cognitive deficits, who presented reporting suicidal thoughts in the context of stress related to [place] restrictions as well as what he believes to be other residents at the assisted living facility that have been plotting to harm him  He states today that he heard people talking about beating him about site his room  Patient reports depressed mood and anxiety related to the situation  No recent signs or symptoms suggestive of kymberly  Denies hallucinations  Patient appears to have poor recall  Slums cognitive assessment included below with mental status exam   Of note patient states he wants to go to a [place] facility  Review of Systems   Constitutional: Negative for appetite change, diaphoresis, fatigue and fever  HENT: Negative for drooling, hearing loss, rhinorrhea, sinus pain and sore throat  Eyes: Negative for photophobia, pain, discharge and redness  Respiratory: Negative for cough, chest tightness, shortness of breath and wheezing  Cardiovascular: Negative for chest pain and palpitations  Gastrointestinal: Negative for abdominal pain, diarrhea, nausea and vomiting  Endocrine: Negative for cold intolerance, polydipsia and polyphagia  Genitourinary: Negative for dysuria, flank pain, frequency and urgency  Musculoskeletal: Negative for arthralgias, back pain, gait problem and myalgias  Skin: Negative for color change, pallor and rash     Neurological: Negative for dizziness, tremors, speech difficulty, numbness and headaches  PPHx:    1  Onset/Prior Diagnoses:   - reports prior dx of bipolar d/o but all 7 prior admissions in our chart since 2015 were due to depression id patient himself says that he has mostly had manic episodes   - patient reports past manic episodes were manifested by spending sprees, decreased need for sleep and rapid speech for a few weeks at a time   - history of cognitive deficits per assisted living facility document  2  Current and past outpatient psych treatment:                - sees Dr Jaquelin Vences  3  Inpatient hospitalizations:                - numerous; last known admission was to [facility] on 04/22/2020-05/04/2020       4  Medication trials in the past (including Family Hx): - Lithium, zoloft, Lexapro, Haldol, Prolixin, Thorazine  5  Suicide attempts:                -history of overdose, carbon monoxide poisoning, self electrocution   - history of T MS which was ineffective  6   Substance use:   - marijuana    PMHx/PSHx:  Hyperlipidemia, hypertension, CAD, CVA, constipation    Medications:   Medications Prior to Admission   Medication Sig Dispense Refill Last Dose    aspirin (ECOTRIN LOW STRENGTH) 81 mg EC tablet Take 81 mg by mouth daily       clonazePAM (KlonoPIN) 0 5 mg tablet Take 0 5 mg by mouth every morning        clonazePAM (KlonoPIN) 0 5 mg tablet Take 0 5 mg by mouth every 6 (six) hours as needed for anxiety        clonazePAM (KlonoPIN) 1 mg tablet Take 1 mg by mouth daily at bedtime        DULoxetine (CYMBALTA) 60 mg delayed release capsule Take 60 mg by mouth daily       atorvastatin (LIPITOR) 40 mg tablet Take 1 tablet (40 mg total) by mouth daily with dinner 90 tablet 0     cariprazine (VRAYLAR) 6 MG capsule Take 6 mg by mouth daily        mirtazapine (REMERON) 15 mg tablet Take 15 mg by mouth       polyethylene glycol (MIRALAX) 17 g packet Take 17 g by mouth daily 14 each 0     senna (SENOKOT) 8 6 mg Take 17 2 mg by mouth daily at bedtime as needed for constipation                       Allergies: Allergies   Allergen Reactions    Haldol [Haloperidol] Other (See Comments)     Muscle/jaw tightness  Difficulty swallowing    Prolixin [Fluphenazine] Other (See Comments)     Muscle/jaw tightness  Difficulty swallowing      Thorazine [Chlorpromazine] Other (See Comments)     Muscle/jaw tightness  Difficulty swallowing  Social History:  -Domicile status: stable at an assisted living facility - has been at [facility] [address] since 2017  -Support system:  Sister Meryle Orion is involved, has a legal guardian who is not a family member    -Education/Employment:  High school grad,  of [employer] with honorable discharge, now on permanent disability   -Trauma:  Denied   -Legal:  None known  -Firearms:  Does not have access    Family history:  Father had alcohol addiction    Examination:  Physical exam performed by hospitalist has been reviewed  Vitals:    08/12/20 1513   BP:    Pulse: 68   Resp: 18   Temp: 98 2 °F (36 8 °C)   SpO2: 96%       Mental Status Exam [Per above +]  Appearance:  Disheveled, fair hygiene, no abnormal involuntary movements noted  Gait/station:  Stable  Behavior:  Calm, cooperative, oddly related  Motor activity:  Mild psychomotor retardation  Muscle strength and tone:  Intact  Speech:   Within normal limits  Language:  Intact  Mood:  Depressed  Affect:  Dysphoric, constricted, stable  Thought process:  Illogical and limited by cognitive deficits, answers appropriately  Thought content:  Paranoid delusions elicited  Risk Potential:  Reports suicidal thoughts  Perceptual disturbances:  Denies auditory or visual hallucinations  Consciousness:  Alert  Sensorium:  Loosely oriented to all domains  Memory:  Mild deficits  Intellect:  Average  Fund of knowledge:  Adequate  Cognition:  Mild to moderate   Insight:  Poor  Judgement:  Poor    SLUMS cognitive assessment: 18/30 if scored generously    Lab/work up:  CBC - within normal limits  CMP - within normal limits  TSH - 0 352  Free T4 1 14  UA - within normal limits  UDS -ve  breath alcohol 0     ASSESSMENT:     Axis I:  - Bipolar disorder, most recent episode depressed (principal admission dx)  - Psychotic disorder unspecified - possibly related to cognitive deficits versus major depressive episode with psychotic features  - Major neurocognitive disorder, vascular type, mild  Axis II:  - Deferred  Axis III:  - Hyperlipidemia, hypertension, CAD, CVA, constipation  Strengths:  - stable domicile  Weakness:  - cognitive deficits      Plan:   1  Disposition: Patient meets criteria for acute inpatient treatment due to being danger to self  Patient will be discharged when there is an absence of suicidal thoughts [id number]  2  Legal status:  302 as patient has a legal guardian  3  Psychopharmacologic interventions:  a  Continue home medications vraylar 6 mg p o  Daily, Cymbalta 60 mg p o  Daily, Klonopin 0 5 mg p o  Every morning, 1 mg p o  Q h s  And Klonopin 0 5 mg q 8 hours p r n  For anxiety  b  Had perphenazine 2 mg p o  B i d  To address new paranoia  c  Would benefit from a reduction in Klonopin use, will monitor home many doses of prns he needs on a daily basis  4  Medical comorbidities: Consult hospitalist for baseline admission assessment and follow up as needed   5  Work-up:  None  6  Other therapies: Individual/group/milieu therapy as appropriate   7  Social issues: Case management to arrange return back to home facility; patient did mention wanting to go to a [place] facility  8   Precautions: Routine q7min checks, vitals Q shift    Neptali Prado MD  08/12/20

## 2020-08-12 NOTE — NURSING NOTE
Pt visible on the unit at intervals  Dishelved and disorganized  Stated, "I am not delusional  The staff at [facility] were watching me and outside my door in my room pacing  I needed to get away from them because they were going to hurt me so I jumped out the window and ran  When one of the staff were coming in, she saw me and talked me into coming to the hospital  They were going to hurt me    I mean it  I really don't know if I am dead or alive"  Appetite 50% for meals  Did not attend any groups  Preoccupied  Medication compliant  Pt overheard telling a peer that he is in danger at [facility]  Reported depression 4/10 and anxiety 5/4  Support provided  Will continue to monitor and maintain q 7 min checks

## 2020-08-12 NOTE — PLAN OF CARE
Problem: Alteration in Thoughts and Perception  Goal: Treatment Goal: Gain control of psychotic behaviors/thinking, reduce/eliminate presenting symptoms and demonstrate improved reality functioning upon discharge  Outcome: Not Progressing  Goal: Refrain from acting on delusional thinking/internal stimuli  Description: Interventions:  - Monitor patient closely, per order   - Utilize least restrictive measures   - Set reasonable limits, give positive feedback for acceptable   - Administer medications as ordered and monitor of potential side effects  Outcome: Not Progressing  Goal: Agree to be compliant with medication regime, as prescribed and report medication side effects  Description: Interventions:  - Offer appropriate PRN medication and supervise ingestion; conduct AIMS, as needed   Outcome: Not Progressing  Goal: Recognize dysfunctional thoughts, communicate reality-based thoughts at the time of discharge  Description: Interventions:  - Provide medication and psycho-education to assist patient in compliance and developing insight into his/her illness   Outcome: Not Progressing

## 2020-08-12 NOTE — EMTALA/ACUTE CARE TRANSFER
[facility] EMERGENCY DEPARTMENT  [address] [facility] [address] [place] [facility] [address]  Dept: [phone]      WZWSAY TRANSFER CONSENT    NAME Cookie Bosworth                                         1965                              MRN [id number]    I have been informed of my rights regarding examination, treatment, and transfer   by Dr Edward Cantu DO    Benefits: Specialized equipment and/or services available at the receiving facility (Include comment)________________________    Risks:        Consent for Transfer:  I acknowledge that my medical condition has been evaluated and explained to me by the emergency department physician or other qualified medical person and/or my attending physician, who has recommended that I be transferred to the service of  Accepting Physician: Dr Frannie Lino at [address], [address] : [address], [facility]  The above potential benefits of such transfer, the potential risks associated with such transfer, and the probable risks of not being transferred have been explained to me, and I fully understand them  The doctor has explained that, in my case, the benefits of transfer outweigh the risks  I agree to be transferred  I authorize the performance of emergency medical procedures and treatments upon me in both transit and upon arrival at the receiving facility  Additionally, I authorize the release of any and all medical records to the receiving facility and request they be transported with me, if possible  I understand that the safest mode of transportation during a medical emergency is an ambulance and that the Hospital advocates the use of this mode of transport  Risks of traveling to the receiving facility by car, including absence of medical control, life sustaining equipment, such as oxygen, and medical personnel has been explained to me and I fully understand them      (KATELIN CORRECT BOX BELOW)  [  ]  I consent to the stated transfer and to be transported by ambulance/helicopter  [  ]  I consent to the stated transfer, but refuse transportation by ambulance and accept full responsibility for my transportation by car  I understand the risks of non-ambulance transfers and I exonerate the Hospital and its staff from any deterioration in my condition that results from this refusal     X___________________________________________    DATE  20  TIME________  Signature of patient or legally responsible individual signing on patient behalf           RELATIONSHIP TO PATIENT_________________________          Provider Certification    NAME Jacquie Saenz                                         1965                              MRN [id number]    A medical screening exam was performed on the above named patient  Based on the examination:    Condition Necessitating Transfer The primary encounter diagnosis was Depression  Diagnoses of Suicidal ideations and Bipolar disorder, current episode mixed, moderate ([facility] [address] ) were also pertinent to this visit      Patient Condition: The patient has been stabilized such that within reasonable medical probability, no material deterioration of the patient condition or the condition of the unborn child(kartik) is likely to result from the transfer    Reason for Transfer: Level of Care needed not available at this facility    Transfer Requirements:  [address], [facility]   · Space available and qualified personnel available for treatment as acknowledged by Regi Minor [phone]  · Agreed to accept transfer and to provide appropriate medical treatment as acknowledged by       Dr Amy Alvarez  · Appropriate medical records of the examination and treatment of the patient are provided at the time of transfer   [address] _______  · Transfer will be performed by qualified personnel from [facility]  and appropriate transfer equipment as required, including the use of necessary and appropriate life support measures  Provider Certification: I have examined the patient and explained the following risks and benefits of being transferred/refusing transfer to the patient/family:  The patient is stable for psychiatric transfer because they are medically stable, and is protected from harming him/herself or others during transport      Based on these reasonable risks and benefits to the patient and/or the unborn child(kartik), and based upon the information available at the time of the patients examination, I certify that the medical benefits reasonably to be expected from the provision of appropriate medical treatments at another medical facility outweigh the increasing risks, if any, to the individuals medical condition, and in the case of labor to the unborn child, from effecting the transfer      X____________________________________________ DATE 08/11/20        TIME_______      ORIGINAL - SEND TO MEDICAL RECORDS   COPY - SEND WITH PATIENT DURING TRANSFER

## 2020-08-12 NOTE — ED NOTES
Received call from Maynor Schreiber with [facility] who stated 36 petition must go through [facility] [place] since all behaviors occurred in [facility] [place]  Mauricio Mendieta did state if [facility] is unwilling to uphold the 302 to call back and she will take the petitioning statement, but [facility] [place] must be tried  Call was placed to [facility] ([facility] AT [place] [phone] spoke with Marisa Saldana  I explained the situation and the it is a 2 doctor 302 completed on the [facility] [place] form, Marisa Saldana stated that, that was ok and to fax the petition to [phone]  Completed 302 was faxed to [facility] [place] , Original 302 placed on patients chart

## 2020-08-12 NOTE — SOCIAL WORK
Met with pt 1:1 for individual self assessment  Pt was resting in bed and slow to respond  Pt was polite and cooperative  reviewed group schedule and acknowledged that pt participated and went to afternoon group  Pt expressed that he has an interest in attending groups  Pt expressed interest in getting showered and being orientated to unit  Pt preferred to speak further tomorrow and f/u with therepuetic needs and self assessment

## 2020-08-12 NOTE — NURSING NOTE
Patient is a 63year old male who lives at [facility] and was taken to [facility] ED due to worsening depression, suicide ideation with plan to jump off a rail from high height and paranoid delusion that people are trying to hurt him  Hx of bipolar d/o, major depression, suicide ideation and affective psychosis  Patient is admitted on an involuntary basis and was cooperative with the admission process  Patient rate his anxiety 4 on 4, depression 5 on 10, denies all other s/s  Ativan 1 mg was given at 0151  Q 7 minutes safety checks initiated

## 2020-08-12 NOTE — ASSESSMENT & PLAN NOTE
· NSTEMI x2, most recent 9/2019  · Patient states he follows closely with outpatient cardiology  · EKG on 8/10/20 shows NSR with anterior infarct cited on or before 7/13/2017  QTc 430  · Continue outpatient regimen of Lipitor 40 mg QHS, Coreg 3 125 mg BID and ASA 81 mg QD

## 2020-08-12 NOTE — TREATMENT PLAN
TREATMENT PLAN REVIEW - [address] 63 y o  1965 male MRN: [id number]    [facility] 468-01 Encounter: [id number]          Admit Date/Time:  8/12/2020 12:41 AM    Treatment Team: Attending Provider: Carlyn Garcia MD; Registered Nurse: Eliana Holbrook RN; Patient Care Assistant: Ct Calvin; Occupational Therapy Assistant: Tony Waggoner; : Natasha Carrington; Registered Nurse: Patricia Jay; Patient Care Assistant: Anjel Santos    Diagnosis: Principal Problem:    Bipolar disorder, most recent episode depressed ([facility] [address] )  Active Problems:    History of MI (myocardial infarction)    Medical clearance for psychiatric admission      Patient Strengths/Assets: compliant with medication    Patient Barriers/Limitations: impaired cognition    Short Term Goals: decrease in depressive symptoms, decrease in psychotic symptoms, decrease in suicidal thoughts    Long Term Goals: improvement in depression, free of suicidal thoughts    Progress Towards Goals: starting psychiatric medications as prescribed    Recommended Treatment: medication management, patient medication education, group therapy, milieu therapy, continued Behavioral Health psychiatric evaluation/assessment process    Treatment Frequency: daily medication monitoring, group and milieu therapy daily, monitoring through interdisciplinary rounds, monitoring through weekly patient care conferences    Expected Discharge Date:   In 7 days    Discharge Plan: discharge to home    Treatment Plan Created/Updated By: Carlyn Garcia MD

## 2020-08-12 NOTE — CONSULTS
ConsultDolly Daniels 1965, 63 y o  male MRN: [id number]    Unit/Bed#: Donta Welsh 944-29 Encounter: [id number]    Primary Care Provider: Catracho Benson PA-C   Date and time admitted to hospital: 8/12/2020 12:41 AM      Inpatient consult for Medical Clearance for [address] patient  Consult performed by: Bee Zaragoza MD  Consult ordered by: Latoya Washubrn,         Medical clearance for psychiatric admission  Assessment & Plan  · Patient is medically cleared for U treatment  Constipation  Assessment & Plan  · Reports chronic constipation  · Continue Miralax daily and add Dulcolax suppository QD PRN  History of CVA (cerebrovascular accident)  Assessment & Plan  · Patient reports history of CVA x3, most recent was 3 years ago  History of MI (myocardial infarction)  Assessment & Plan  · NSTEMI x2, most recent 9/2019  · Patient states he follows closely with outpatient cardiology  · EKG on 8/10/20 shows NSR with anterior infarct cited on or before 7/13/2017  QTc 430  · Continue outpatient regimen of Lipitor 40 mg QHS, Coreg 3 125 mg BID and ASA 81 mg QD  ECT Clearance:   History of recent seizure or stroke:  No    History of pheochromocytoma:  No   History of active bleeding (Intracranial hemorrhage, aneurysm or AVM):  No   History of metallic implants in the head or neck:  No   History of increased intracranial pressure with mass effect:  No   Current arrhythmia: No     Based on above criteria, Patient is medically cleared for ECT should it be recommended  Counseling / Coordination of Care Time: 45 minutes  Greater than 50% of total time spent on patient counseling and coordination of care  Collaboration of Care: Were Recommendations Directly Discussed with Primary Treatment Team? - Yes     History of Present Illness:    Latasha Dyer is a 63 y o  male who is originally admitted to the [facility] service due to exacerbation of psychiatric symptoms and suicidal ideations   We are consulted for medical clearance for [facility] treatment  Patient has history of CAD, CVA and MI x2  Patient is pleasant and cooperative, only complains of chronic constipation  Denies any other medical complaints  Review of Systems:    Review of Systems   Constitutional: Negative for chills and fever  Eyes: Negative for visual disturbance  Respiratory: Negative for cough, chest tightness and shortness of breath  Cardiovascular: Negative for chest pain  Gastrointestinal: Positive for constipation  Negative for abdominal pain, diarrhea, nausea and vomiting  Genitourinary: Negative for difficulty urinating, frequency and urgency  Skin: Negative for rash  Neurological: Negative for dizziness, weakness, light-headedness, numbness and headaches  Psychiatric/Behavioral: Positive for dysphoric mood  Past Medical and Surgical History:     Past Medical History:   Diagnosis Date    Anxiety     B[facility] 1 disorder [facility] )     Bowel habit changes     Chronic pain disorder     Coronary artery disease     [facility]A [facility]cerebral vascular accident) ([facility] )     Depression     Family hx of colon cancer     father and paternal uncle    Myocardial infarction [facility])     Psychiatric disorder     Psychiatric illness     Stroke ([place] [address] ) 12/2015    CVA x3  Pt states he is unaware of when he had first two CVA    Suicide attempt [facility])        Past Surgical History:   Procedure Laterality Date    COLONOSCOPY         Meds/Allergies:    all medications and allergies reviewed    Allergies: Allergies   Allergen Reactions    Haldol [Haloperidol] Other (See Comments)     Muscle/jaw tightness  Difficulty swallowing    Prolixin [Fluphenazine] Other (See Comments)     Muscle/jaw tightness  Difficulty swallowing      Thorazine [Chlorpromazine] Other (See Comments)     Muscle/jaw tightness  Difficulty swallowing           Social History:     Marital Status: Single    Substance Use History:   Social History     Substance and Sexual Activity   Alcohol Use Never    Alcohol/week: 0 0 standard drinks    Frequency: Never    Drinks per session: Patient refused    Binge frequency: Never    Comment: pt denies     Social History     Tobacco Use   Smoking Status Current Every Day Smoker    Packs/day: 1 00    Years: 15 00    Pack years: 15 00    Types: Cigarettes   Smokeless Tobacco Never Used   Tobacco Comment    Wishes to quit     Social History     Substance and Sexual Activity   Drug Use No    Comment: pt denies       Family History:    non-contributory    Physical Exam:     Vitals:   Blood Pressure: 127/77 (08/12/20 0648)  Pulse: 77 (08/12/20 0648)  Temperature: 97 8 °F (36 6 °C) (08/12/20 0648)  Temp Source: Temporal (08/12/20 0648)  Respirations: 18 (08/12/20 0648)  Height: 5' 11" (180 3 cm) (08/12/20 0042)  Weight - Scale: 77 8 kg (171 lb 9 6 oz) (08/12/20 0042)  SpO2: 97 % (08/12/20 0648)    Physical Exam  Constitutional:       General: He is not in acute distress  Appearance: Normal appearance  He is not toxic-appearing or diaphoretic  HENT:      Head: Normocephalic  Neck:      Musculoskeletal: Normal range of motion  Cardiovascular:      Rate and Rhythm: Normal rate and regular rhythm  Heart sounds: No murmur  No friction rub  No gallop  Pulmonary:      Effort: Pulmonary effort is normal  No respiratory distress  Breath sounds: Normal breath sounds  No wheezing, rhonchi or rales  Abdominal:      General: Bowel sounds are normal  There is no distension  Palpations: Abdomen is soft  Tenderness: There is no abdominal tenderness  Musculoskeletal: Normal range of motion  Skin:     General: Skin is warm and dry  Findings: No erythema  Neurological:      Mental Status: He is alert and oriented to person, place, and time  Coordination: Coordination normal    Psychiatric:         Behavior: Behavior normal            Additional Data:     Lab Results: I have personally reviewed pertinent reports  Results from last 7 days   Lab Units 08/10/20  1259   WBC Thousand/uL 7 92   HEMOGLOBIN g/dL 15 4   HEMATOCRIT % 47 5   PLATELETS Thousands/uL 256   NEUTROS PCT % 67   LYMPHS PCT % 23   MONOS PCT % 8   EOS PCT % 1     Results from last 7 days   Lab Units 08/10/20  1259   SODIUM mmol/L 142   POTASSIUM mmol/L 4 0   CHLORIDE mmol/L 105   CO2 mmol/L 27   BUN mg/dL 9   CREATININE mg/dL 0 93   ANION GAP mmol/L 10   CALCIUM mg/dL 9 0   ALBUMIN g/dL 3 8   TOTAL BILIRUBIN mg/dL 1 00   ALK PHOS U/L 62   ALT U/L 21   AST U/L 20   GLUCOSE RANDOM mg/dL 101             Lab Results   Component Value Date/Time    HGBA1C 5 2 08/03/2020 07:50 AM    HGBA1C 5 5 02/10/2020 06:50 AM    HGBA1C 5 3 09/07/2019 04:42 AM    HGBA1C 5 5 08/12/2019 08:10 AM             EKG, Pathology, and Other Studies Reviewed on Admission:   · EKG: on 8/10/20 shows NSR with anterior infarct cited on or before 7/2017  QTc 430  ** Please Note: This note has been constructed using a voice recognition system   **

## 2020-08-12 NOTE — PLAN OF CARE
Problem: Risk for Self Injury/Neglect  Goal: Treatment Goal: Remain safe during length of stay, learn and adopt new coping skills, and be free of self-injurious ideation, impulses and acts at the time of discharge  Outcome: Not Progressing  Goal: Verbalize thoughts and feelings  Description: Interventions:  - Assess and re-assess patient's lethality and potential for self-injury  - Engage patient in 1:1 interactions, daily, for a minimum of 15 minutes  - Encourage patient to express feelings, fears, frustrations, hopes  - Establish rapport/trust with patient   Outcome: Not Progressing  Goal: Refrain from harming self  Description: Interventions:  - Monitor patient closely, per order  - Develop a trusting relationship  - Supervise medication ingestion, monitor effects and side effects   Outcome: Not Progressing  Goal: Recognize maladaptive responses and adopt new coping mechanisms  Outcome: Not Progressing     Problem: Depression  Goal: Treatment Goal: Demonstrate behavioral control of depressive symptoms, verbalize feelings of improved mood/affect, and adopt new coping skills prior to discharge  Outcome: Not Progressing  Goal: Refrain from isolation  Description: Interventions:  - Develop a trusting relationship   - Encourage socialization   Outcome: Not Progressing  Goal: Refrain from self-neglect  Outcome: Not Progressing  Goal: Attend and participate in unit activities, including therapeutic, recreational, and educational groups  Description: Interventions:  - Provide therapeutic and educational activities daily, encourage attendance and participation, and document same in the medical record   Outcome: Not Progressing  Goal: Complete daily ADLs, including personal hygiene independently, as able  Description: Interventions:  - Observe, teach, and assist patient with ADLS  -  Monitor and promote a balance of rest/activity, with adequate nutrition and elimination   Outcome: Not Progressing

## 2020-08-12 NOTE — PROGRESS NOTES
Pt attended 1 group  Pt anxious and able to self reflect  Pt able to idenitfy positive coping skills and provide a list of calming, distraction, physical and processing coping skills to apply to self      08/12/20 1330   Activity/Group Checklist   Group Other (Comment)  (positive coping)   Attendance Attended   Attendance Duration (min) 46-60   Interactions Interacted appropriately   Affect/Mood Appropriate   Goals Achieved Identified feelings; Discussed coping strategies; Able to listen to others; Able to engage in interactions; Able to self-disclose

## 2020-08-12 NOTE — TREATMENT TEAM
08/12/20 2154   Team Meeting   Meeting Type Daily Rounds   Team Members Present   Team Members Present Physician;Nurse;; Other (Discipline and Name)   Physician Team Member Marlen Burnett Management Team Member Luis Manuel   Other (Discipline and Name) Matthew Luis     Reviewed case with team  Pt is a new admission  Medication to be reviewed  Pt has a legal guardian

## 2020-08-12 NOTE — ED NOTES
Pt will be transported by [facility] at 12am to [facility]  Crisis completed crisis portion of [place]  Medical necessity emailed to [phone] & faxed to [facility]

## 2020-08-12 NOTE — PROGRESS NOTES
08/12/20 0820   Team Meeting   Meeting Type Daily Rounds   Team Members Present   Team Members Present Physician;Nurse;;; Occupational Therapist   Physician Team Member Fausto Main Team Member [facility] Team Member [place] [address]   Social Work Team Member Saumya Meyer   OT Team Member Ester   Patient/Family Present   Patient Present No   Patient's Family Present No     302, new admission

## 2020-08-13 PROBLEM — F01.A0 MAJOR NEUROCOGNITIVE DISORDER, DUE TO VASCULAR DISEASE, WITHOUT BEHAVIORAL DISTURBANCE, MILD: Chronic | Status: ACTIVE | Noted: 2020-08-13

## 2020-08-13 PROBLEM — F01.50: Chronic | Status: ACTIVE | Noted: 2020-08-13

## 2020-08-13 PROCEDURE — 99233 SBSQ HOSP IP/OBS HIGH 50: CPT | Performed by: PSYCHIATRY & NEUROLOGY

## 2020-08-13 RX ORDER — CLONAZEPAM 0.5 MG/1
0.5 TABLET ORAL EVERY 12 HOURS SCHEDULED
Status: DISCONTINUED | OUTPATIENT
Start: 2020-08-13 | End: 2020-08-24 | Stop reason: HOSPADM

## 2020-08-13 RX ADMIN — CLONAZEPAM 0.5 MG: 0.5 TABLET ORAL at 21:10

## 2020-08-13 RX ADMIN — ASPIRIN 81 MG: 81 TABLET, COATED ORAL at 08:50

## 2020-08-13 RX ADMIN — CLONAZEPAM 0.5 MG: 0.5 TABLET ORAL at 08:51

## 2020-08-13 RX ADMIN — PERPHENAZINE 2 MG: 4 TABLET, FILM COATED ORAL at 21:10

## 2020-08-13 RX ADMIN — MIRTAZAPINE 15 MG: 15 TABLET, FILM COATED ORAL at 21:10

## 2020-08-13 RX ADMIN — PERPHENAZINE 2 MG: 4 TABLET, FILM COATED ORAL at 08:52

## 2020-08-13 RX ADMIN — ATORVASTATIN CALCIUM 40 MG: 40 TABLET, FILM COATED ORAL at 17:39

## 2020-08-13 RX ADMIN — CARIPRAZINE 6 MG: 6 CAPSULE, GELATIN COATED ORAL at 08:57

## 2020-08-13 RX ADMIN — NICOTINE 1 PATCH: 7 PATCH, EXTENDED RELEASE TRANSDERMAL at 08:53

## 2020-08-13 RX ADMIN — DULOXETINE HYDROCHLORIDE 60 MG: 60 CAPSULE, DELAYED RELEASE ORAL at 08:51

## 2020-08-13 NOTE — NURSING NOTE
Patient is seclusive to room and out for meals  Pt is cooperative and compliant with medications  Pt has better appetite at dinner eating 100%  Pt shows no signs of distress or agitation

## 2020-08-13 NOTE — PROGRESS NOTES
Pt did not attend groups when prompted         08/13/20 1000   Activity/Group Checklist   Group Other (Comment)  (  relapse prevention planning )   Attendance Did not attend

## 2020-08-13 NOTE — TREATMENT TEAM
08/13/20 0800   Team Meeting   Meeting Type Daily Rounds   Team Members Present   Team Members Present Physician;Nurse;; Other (Discipline and Name)   Physician Team Member Poornima Love   Nursing Team Member [facility] Management Team Member Luis Manuel   Other (Discipline and Name) Sylvia Hawley     Reviewed case with team  Pt can return to facility at D/C  2nd antipsychotic added
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
## 2020-08-13 NOTE — PROGRESS NOTES
08/13/20 0850   Team Meeting   Meeting Type Daily Rounds   Team Members Present   Team Members Present Physician;Nurse;Occupational Therapist;;   Physician Team Member [facility] Team Member [facility] Management Team Member [place] [address]   Social Work Team Member PENNY   OT Team Member Bernita Fabry   Patient/Family Present   Patient Present No   Patient's Family Present No     303 hearing tomorrow  Intake today
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
## 2020-08-13 NOTE — PLAN OF CARE
Problem: Alteration in Thoughts and Perception  Goal: Treatment Goal: Gain control of psychotic behaviors/thinking, reduce/eliminate presenting symptoms and demonstrate improved reality functioning upon discharge  Outcome: Progressing  Goal: Refrain from acting on delusional thinking/internal stimuli  Description: Interventions:  - Monitor patient closely, per order   - Utilize least restrictive measures   - Set reasonable limits, give positive feedback for acceptable   - Administer medications as ordered and monitor of potential side effects  Outcome: Progressing  Goal: Agree to be compliant with medication regime, as prescribed and report medication side effects  Description: Interventions:  - Offer appropriate PRN medication and supervise ingestion; conduct AIMS, as needed   Outcome: Progressing  Goal: Recognize dysfunctional thoughts, communicate reality-based thoughts at the time of discharge  Description: Interventions:  - Provide medication and psycho-education to assist patient in compliance and developing insight into his/her illness   Outcome: Progressing     Problem: Risk for Self Injury/Neglect  Goal: Treatment Goal: Remain safe during length of stay, learn and adopt new coping skills, and be free of self-injurious ideation, impulses and acts at the time of discharge  Outcome: Progressing  Goal: Verbalize thoughts and feelings  Description: Interventions:  - Assess and re-assess patient's lethality and potential for self-injury  - Engage patient in 1:1 interactions, daily, for a minimum of 15 minutes  - Encourage patient to express feelings, fears, frustrations, hopes  - Establish rapport/trust with patient   Outcome: Progressing  Goal: Refrain from harming self  Description: Interventions:  - Monitor patient closely, per order  - Develop a trusting relationship  - Supervise medication ingestion, monitor effects and side effects   Outcome: Progressing  Goal: Recognize maladaptive responses and adopt new coping mechanisms  Outcome: Progressing     Problem: Depression  Goal: Treatment Goal: Demonstrate behavioral control of depressive symptoms, verbalize feelings of improved mood/affect, and adopt new coping skills prior to discharge  Outcome: Progressing  Goal: Refrain from isolation  Description: Interventions:  - Develop a trusting relationship   - Encourage socialization   Outcome: Progressing  Goal: Refrain from self-neglect  Outcome: Progressing  Goal: Attend and participate in unit activities, including therapeutic, recreational, and educational groups  Description: Interventions:  - Provide therapeutic and educational activities daily, encourage attendance and participation, and document same in the medical record   Outcome: Progressing  Goal: Complete daily ADLs, including personal hygiene independently, as able  Description: Interventions:  - Observe, teach, and assist patient with ADLS  -  Monitor and promote a balance of rest/activity, with adequate nutrition and elimination   Outcome: Progressing     Problem: Nutrition/Hydration-ADULT  Goal: Nutrient/Hydration intake appropriate for improving, restoring or maintaining nutritional needs  Description: Monitor and assess patient's nutrition/hydration status for malnutrition  Collaborate with interdisciplinary team and initiate plan and interventions as ordered  Monitor patient's weight and dietary intake as ordered or per policy  Utilize nutrition screening tool and intervene as necessary  Determine patient's food preferences and provide high-protein, high-caloric foods as appropriate       INTERVENTIONS:  - Monitor oral intake, urinary output, labs, and treatment plans  - Assess nutrition and hydration status and recommend course of action  - Evaluate amount of meals eaten  - Assist patient with eating if necessary   - Allow adequate time for meals  - Recommend/ encourage appropriate diets, oral nutritional supplements, and vitamin/mineral supplements  - Order, calculate, and assess calorie counts as needed  - Recommend, monitor, and adjust tube feedings and TPN/PPN based on assessed needs  - Assess need for intravenous fluids  - Provide specific nutrition/hydration education as appropriate  - Include patient/family/caregiver in decisions related to nutrition  Outcome: Progressing
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
## 2020-08-14 PROCEDURE — 99231 SBSQ HOSP IP/OBS SF/LOW 25: CPT | Performed by: PSYCHIATRY & NEUROLOGY

## 2020-08-14 RX ORDER — PERPHENAZINE 4 MG/1
4 TABLET, FILM COATED ORAL EVERY 12 HOURS SCHEDULED
Status: DISCONTINUED | OUTPATIENT
Start: 2020-08-14 | End: 2020-08-17

## 2020-08-14 RX ADMIN — ASPIRIN 81 MG: 81 TABLET, COATED ORAL at 08:55

## 2020-08-14 RX ADMIN — CLONAZEPAM 0.5 MG: 0.5 TABLET ORAL at 08:54

## 2020-08-14 RX ADMIN — CLONAZEPAM 0.5 MG: 0.5 TABLET ORAL at 18:18

## 2020-08-14 RX ADMIN — CLONAZEPAM 0.5 MG: 0.5 TABLET ORAL at 21:14

## 2020-08-14 RX ADMIN — PERPHENAZINE 2 MG: 4 TABLET, FILM COATED ORAL at 08:54

## 2020-08-14 RX ADMIN — DULOXETINE HYDROCHLORIDE 60 MG: 60 CAPSULE, DELAYED RELEASE ORAL at 08:54

## 2020-08-14 RX ADMIN — MIRTAZAPINE 15 MG: 15 TABLET, FILM COATED ORAL at 21:14

## 2020-08-14 RX ADMIN — POLYETHYLENE GLYCOL 3350 17 G: 17 POWDER, FOR SOLUTION ORAL at 08:54

## 2020-08-14 RX ADMIN — ATORVASTATIN CALCIUM 40 MG: 40 TABLET, FILM COATED ORAL at 17:16

## 2020-08-14 RX ADMIN — CARIPRAZINE 6 MG: 6 CAPSULE, GELATIN COATED ORAL at 08:55

## 2020-08-14 RX ADMIN — PERPHENAZINE 4 MG: 4 TABLET, FILM COATED ORAL at 21:14

## 2020-08-14 RX ADMIN — NICOTINE 1 PATCH: 7 PATCH, EXTENDED RELEASE TRANSDERMAL at 08:54

## 2020-08-14 NOTE — PROGRESS NOTES
08/14/20 1726   Team Meeting   Meeting Type Tx Team Meeting   Initial Conference Date 08/14/20   Next Conference Date 09/14/20   Team Members Present   Team Members Present Physician;Nurse;   Physician Team Member [facility] Management Team Member Angela   Patient/Family Present   Patient Present Yes   Patient's Family Present No

## 2020-08-14 NOTE — TREATMENT TEAM
08/14/20 0833   Team Meeting   Meeting Type Daily Rounds   Team Members Present   Team Members Present Physician;Nurse;; Other (Discipline and Name)   Physician Team Member Zina Pitts   Nursing Team Member [facility] Management Team Member [phone]   Other (Discipline and Name) Rachana Oliva     Reviewed case with team  [phone] today

## 2020-08-14 NOTE — PROGRESS NOTES
08/14/20 0845   Team Meeting   Meeting Type Daily Rounds   Team Members Present   Team Members Present Physician;Nurse;;   Physician Team Member [facility]   Nursing Team Member [facility] Management Team Member [facility]   Social Work Team Member Camila Reece   Patient/Family Present   Patient Present No   Patient's Family Present No     303 hearing this morning  Additional 20 days   Patient requesting transfer to [facility]

## 2020-08-14 NOTE — NURSING NOTE
Pt approached nurse's station complaining of anxiety "4/4"  Pt given 0 5 mg Klonopin at 1818  Will continue to monitor

## 2020-08-14 NOTE — NURSING NOTE
Pt isolative to self in room, pleasant and cooperative on approach  Med and meal compliant  Denies pain  Rates depression as 4/10, anxiety 4/4  Offers no other complaints at this time  Will continue to monitor

## 2020-08-14 NOTE — NURSING NOTE
Patient is in bed sleeping at this time; no signs of distress or discomfort at this time  Will continue to monitor patient

## 2020-08-14 NOTE — PROGRESS NOTES
Pt did not attend groups when prompted         08/14/20 1000   Activity/Group Checklist   Group Other (Comment)  ([facility] education)   Attendance Did not attend

## 2020-08-14 NOTE — CASE MANAGEMENT
Patient had 303 hearing  Patient attended the hearing  Patient has been granted 20 days for inpatient treatment  This writer spoke with patient's guardian Kaitlynn Ratliff with [facility]  She reports patient's guardianship is Fiduciary  This writer also discussed patient's request for transfer to [facility] for treatment  This writer called patient's CM at [facility] [phone] xtn , this writer left a message  This writer spoke with Tommy Cisneros at [facility],  ([phone] [phone]), they cannot accept patient because they do not accept [facility]  This writer contacted [address] multiple times, however the calls have disconnected with each attempt

## 2020-08-14 NOTE — PROGRESS NOTES
Psychiatrist Progress Note - [facility] 63 y o  male MRN: [id number]  Unit/Bed#Asrenio Boucher 292-16 Encounter: [id number]    The patient was seen for continuing care and reviewed with treatment team  Per staff, patient has not had any agitation and that he made paranoid statements about hospital staff  Patient continues to have paranoid delusions about residents at the assisted living facility  Patient denies suicidal thoughts today  Denies hallucinations       Mental Status Evaluation:  Appearance: poor grooming, adequate hygiene; no abnormal involuntary movements noted  Behavior: calm, superficially related; no psychomotor agitation or retardation  Speech: wnl  Mood: fearful  Affect: constricted, stable  Thought process: illogical, linear  Thought content: paranoid delusions elicited; denies SI/HI  Perceptual disturbances: denies Ah/VH  Cognition: loosely oriented    Insight: poor  Judgement: poor    Vitals:    08/13/20 1456 08/13/20 2056 08/14/20 0615 08/14/20 1424   BP: 135/77 116/74 122/69 106/66   BP Location: Right arm Right arm Right arm Right arm   Pulse: 71 65 78 79   Resp: 16 17 16 15   Temp: 97 7 °F (36 5 °C) 97 6 °F (36 4 °C) (!) 96 9 °F (36 1 °C) 97 7 °F (36 5 °C)   TempSrc: Tympanic Temporal Temporal Temporal   SpO2: 96% 95% 96% 95%   Weight:       Height:           Current Facility-Administered Medications   Medication Dose Route Frequency Provider Last Rate    acetaminophen  650 mg Oral Q6H PRN Meagan Delcid DO      aspirin  81 mg Oral Daily Ayala Cope MD      atorvastatin  40 mg Oral Daily With Rosana Oakley MD      benztropine  1 mg Intramuscular Q6H PRN Camryn Delcid DO      benztropine  1 mg Oral Q6H PRN Meagan Delcid DO      bisacodyl  10 mg Rectal Daily PRN Mandy Gandara MD      cariprazine  6 mg Oral Daily Ayala Cope MD      clonazePAM  0 5 mg Oral Q6H PRN Ayala Cope MD      clonazePAM  0 5 mg Oral Q12H [address] Ayala Cope MD  DULoxetine  60 mg Oral Daily Alejandro Bundy MD      mirtazapine  15 mg Oral HS Alejandro Bundy MD      nicotine  1 patch Transdermal Daily Levern Pulse, DO      perphenazine  2 mg Oral Q12H [facility] Alejandro Bundy MD      polyethylene glycol  17 g Oral Daily Alejandro Bundy MD      risperiDONE  1 mg Oral Q12H PRN Levern Pulse, DO             Assessment/Plan    Principal Problem:    Bipolar disorder, most recent episode depressed ([facility] [address] )  Active Problems:    History of MI (myocardial infarction)    History of CVA (cerebrovascular accident)    Constipation    Medical clearance for psychiatric admission    Major neurocognitive disorder, due to vascular disease, without behavioral disturbance, mild (HCC)      Progress Toward Goals: limited    Recommended Treatment:   - increase perphenazine to 4mg po bid  - continue klonopin 0 5mg po bid, cymbalta 60mg po daily, vraylar 6mg po daily  - dual antipsychotic therapy justified as bipolar d/o has been adequately controlled with vraylar but breakthrough psychosis is causing him to have distress and at times suicidal thoughts  - patient has a legal guardian who has not yet returned calls  - Dispo: patient does not want to go to back to [facility]; wishes to be transferred to a [place] facility  - Continue with pharmacotherapy, group therapy, milieu therapy and occupational therapy  Risks, benefits and possible side effects of Medications:   Patient does not verbalize understanding at this time and will require further explanation

## 2020-08-14 NOTE — NURSING NOTE
Pt approached nurse's station and stated "the medicine is kicking in"  Pt encouraged to go lie down  Will continue to monitor

## 2020-08-15 PROCEDURE — 99232 SBSQ HOSP IP/OBS MODERATE 35: CPT | Performed by: NURSE PRACTITIONER

## 2020-08-15 RX ADMIN — CARIPRAZINE 6 MG: 6 CAPSULE, GELATIN COATED ORAL at 08:53

## 2020-08-15 RX ADMIN — CLONAZEPAM 0.5 MG: 0.5 TABLET ORAL at 21:39

## 2020-08-15 RX ADMIN — NICOTINE 1 PATCH: 7 PATCH, EXTENDED RELEASE TRANSDERMAL at 08:49

## 2020-08-15 RX ADMIN — PERPHENAZINE 4 MG: 4 TABLET, FILM COATED ORAL at 08:49

## 2020-08-15 RX ADMIN — PERPHENAZINE 4 MG: 4 TABLET, FILM COATED ORAL at 21:39

## 2020-08-15 RX ADMIN — ATORVASTATIN CALCIUM 40 MG: 40 TABLET, FILM COATED ORAL at 17:27

## 2020-08-15 RX ADMIN — ASPIRIN 81 MG: 81 TABLET, COATED ORAL at 08:47

## 2020-08-15 RX ADMIN — DULOXETINE HYDROCHLORIDE 60 MG: 60 CAPSULE, DELAYED RELEASE ORAL at 08:48

## 2020-08-15 RX ADMIN — CLONAZEPAM 0.5 MG: 0.5 TABLET ORAL at 08:48

## 2020-08-15 RX ADMIN — MIRTAZAPINE 15 MG: 15 TABLET, FILM COATED ORAL at 21:39

## 2020-08-15 NOTE — PROGRESS NOTES
Progress Note - Behavioral Health   [facility] 63 y o  male MRN: [id number]  Unit/Bed#Julia Thomas 710-44 Encounter: [id number]    The patient was seen for continuing care and reviewed with treatment team   Patient was observed lying in bed  He was pleasant and cooperative upon approach, displayed intermittent eye contact throughout the encounter but was brief in conversation and appeared somewhat paranoid  Patient also appears paranoid about staff at the nursing home where he resides  Patient was unable to rate his depression with moderate anxiety noted  He is currently denying any suicidal/homicidal ideations or auditory/visual hallucinations and did not appear to be responding to internal stimuli at time of encounter  Patient reports fluctuating sleep and energy level with adequate appetite  Patient is compliant with medications with no side effects noted at this time  Denies pain  No agitation noted      Mental Status Evaluation:  Appearance:  disheveled   Behavior:  cooperative and guarded   Mood:  anxious   Affect: constricted   Speech: Normal rate and Normal volume   Thought Process:  Goal directed and coherent   Thought Content:  Paranoid and mistrustful   Perceptual Disturbances: Denies hallucinations and does not appear to be responding to internal stimuli   Risk Potential: No suicidal or homicidal ideation   Attention/Concentration attention span and concentration were age appropriate   Orientation:   Alert and awake   Gait/Station: Not observed   Motor Activity: No abnormal movement noted     Progress Toward Goals:  No change    Assessment/Plan    Principal Problem:    Bipolar disorder, most recent episode depressed ([address] )  Active Problems:    History of MI (myocardial infarction)    History of CVA (cerebrovascular accident)    Constipation    Medical clearance for psychiatric admission    Major neurocognitive disorder, due to vascular disease, without behavioral disturbance, mild ([facility] [address] )      Recommended Treatment:   1  Continue with pharmacotherapy, group therapy, milieu therapy and occupational therapy  2  Continue with safety checks per unit protocol  3  No medication changes at this time  The patient will be maintained on the following medications:    Current Facility-Administered Medications   Medication Dose Route Frequency Provider Last Rate    acetaminophen  650 mg Oral Q6H PRN Petra Pointer Borreggine, DO      aspirin  81 mg Oral Daily Zara Bradley MD      atorvastatin  40 mg Oral Daily With Gómez Soto MD      benztropine  1 mg Intramuscular Q6H PRN Petra Pointer Borreggine, DO      benztropine  1 mg Oral Q6H PRN Petra Pointer Borreggine, DO      bisacodyl  10 mg Rectal Daily PRN Ricky Harvey MD      cariprazine  6 mg Oral Daily Zara Bradley MD      clonazePAM  0 5 mg Oral Q6H PRN Zara Bradley MD      clonazePAM  0 5 mg Oral Q12H [address] Zara Bradley MD      DULoxetine  60 mg Oral Daily Zara Bradley MD      mirtazapine  15 mg Oral HS Zara Bradley MD      nicotine  1 patch Transdermal Daily Meagan Bhavin,       perphenazine  4 mg Oral Q12H [address] Zara Bradley MD      polyethylene glycol  17 g Oral Daily Zara Bradley MD      risperiDONE  1 mg Oral Q12H PRN Priscanara Mercado DO         Risks, benefits and possible side effects of Medications:   Risks, benefits, and possible side effects of medications explained to patient and patient verbalizes understanding  Portions of the record may have been created with voice recognition software  Occasional wrong word or "sound a like" substitutions may have occurred due to the inherent limitations of voice recognition software  Read the chart carefully and recognize, using context, where substitutions have occurred

## 2020-08-15 NOTE — NURSING NOTE
Patient remains isolative to his room except for snack time  Patient laying calm and quietly in bed  encouragement given to be more interactive and visible on the unit  Patient refused  He rated his anxiety 4/4 and depression 4/10  Patient is in bed  Appears to be sleeping  No needs addressed  Monitored on q 7 minute checks

## 2020-08-15 NOTE — PLAN OF CARE
Problem: Alteration in Thoughts and Perception  Goal: Treatment Goal: Gain control of psychotic behaviors/thinking, reduce/eliminate presenting symptoms and demonstrate improved reality functioning upon discharge  Outcome: Progressing  Goal: Refrain from acting on delusional thinking/internal stimuli  Description: Interventions:  - Monitor patient closely, per order   - Utilize least restrictive measures   - Set reasonable limits, give positive feedback for acceptable   - Administer medications as ordered and monitor of potential side effects  Outcome: Progressing  Goal: Agree to be compliant with medication regime, as prescribed and report medication side effects  Description: Interventions:  - Offer appropriate PRN medication and supervise ingestion; conduct AIMS, as needed   Outcome: Progressing  Goal: Recognize dysfunctional thoughts, communicate reality-based thoughts at the time of discharge  Description: Interventions:  - Provide medication and psycho-education to assist patient in compliance and developing insight into his/her illness   Outcome: Progressing     Problem: Risk for Self Injury/Neglect  Goal: Treatment Goal: Remain safe during length of stay, learn and adopt new coping skills, and be free of self-injurious ideation, impulses and acts at the time of discharge  Outcome: Progressing  Goal: Verbalize thoughts and feelings  Description: Interventions:  - Assess and re-assess patient's lethality and potential for self-injury  - Engage patient in 1:1 interactions, daily, for a minimum of 15 minutes  - Encourage patient to express feelings, fears, frustrations, hopes  - Establish rapport/trust with patient   Outcome: Progressing  Goal: Refrain from harming self  Description: Interventions:  - Monitor patient closely, per order  - Develop a trusting relationship  - Supervise medication ingestion, monitor effects and side effects   Outcome: Progressing  Goal: Recognize maladaptive responses and adopt new coping mechanisms  Outcome: Progressing     Problem: Depression  Goal: Treatment Goal: Demonstrate behavioral control of depressive symptoms, verbalize feelings of improved mood/affect, and adopt new coping skills prior to discharge  Outcome: Progressing  Goal: Refrain from isolation  Description: Interventions:  - Develop a trusting relationship   - Encourage socialization   Outcome: Progressing  Goal: Refrain from self-neglect  Outcome: Progressing  Goal: Attend and participate in unit activities, including therapeutic, recreational, and educational groups  Description: Interventions:  - Provide therapeutic and educational activities daily, encourage attendance and participation, and document same in the medical record   Outcome: Progressing  Goal: Complete daily ADLs, including personal hygiene independently, as able  Description: Interventions:  - Observe, teach, and assist patient with ADLS  -  Monitor and promote a balance of rest/activity, with adequate nutrition and elimination   Outcome: Progressing     Problem: Nutrition/Hydration-ADULT  Goal: Nutrient/Hydration intake appropriate for improving, restoring or maintaining nutritional needs  Description: Monitor and assess patient's nutrition/hydration status for malnutrition  Collaborate with interdisciplinary team and initiate plan and interventions as ordered  Monitor patient's weight and dietary intake as ordered or per policy  Utilize nutrition screening tool and intervene as necessary  Determine patient's food preferences and provide high-protein, high-caloric foods as appropriate       INTERVENTIONS:  - Monitor oral intake, urinary output, labs, and treatment plans  - Assess nutrition and hydration status and recommend course of action  - Evaluate amount of meals eaten  - Assist patient with eating if necessary   - Allow adequate time for meals  - Recommend/ encourage appropriate diets, oral nutritional supplements, and vitamin/mineral supplements  - Order, calculate, and assess calorie counts as needed  - Recommend, monitor, and adjust tube feedings and TPN/PPN based on assessed needs  - Assess need for intravenous fluids  - Provide specific nutrition/hydration education as appropriate  - Include patient/family/caregiver in decisions related to nutrition  Outcome: Progressing     Problem: Alteration in Thoughts and Perception  Goal: Treatment Goal: Gain control of psychotic behaviors/thinking, reduce/eliminate presenting symptoms and demonstrate improved reality functioning upon discharge  Outcome: Progressing

## 2020-08-15 NOTE — NURSING NOTE
Pt seclusive to his room and out of bed for meals only  Initially requested "something for anxiety" but was unable to report why he was anxious or how he was feeling other than anxious  Received prescribed scheduled dose of klonopin and he did not verbalize any further symptoms  Pt appears suspicious and paranoid at the time of conversation  Appetite 75% for breakfast and 100% for lunch  Dishelved  Will continue to monitor and maintain q 7 min checks

## 2020-08-16 PROCEDURE — 99232 SBSQ HOSP IP/OBS MODERATE 35: CPT | Performed by: NURSE PRACTITIONER

## 2020-08-16 RX ADMIN — PERPHENAZINE 4 MG: 4 TABLET, FILM COATED ORAL at 20:58

## 2020-08-16 RX ADMIN — PERPHENAZINE 4 MG: 4 TABLET, FILM COATED ORAL at 08:38

## 2020-08-16 RX ADMIN — CLONAZEPAM 0.5 MG: 0.5 TABLET ORAL at 14:40

## 2020-08-16 RX ADMIN — CARIPRAZINE 6 MG: 6 CAPSULE, GELATIN COATED ORAL at 08:39

## 2020-08-16 RX ADMIN — CLONAZEPAM 0.5 MG: 0.5 TABLET ORAL at 20:58

## 2020-08-16 RX ADMIN — ASPIRIN 81 MG: 81 TABLET, COATED ORAL at 08:38

## 2020-08-16 RX ADMIN — POLYETHYLENE GLYCOL 3350 17 G: 17 POWDER, FOR SOLUTION ORAL at 08:38

## 2020-08-16 RX ADMIN — CLONAZEPAM 0.5 MG: 0.5 TABLET ORAL at 08:38

## 2020-08-16 RX ADMIN — MIRTAZAPINE 15 MG: 15 TABLET, FILM COATED ORAL at 20:58

## 2020-08-16 RX ADMIN — DULOXETINE HYDROCHLORIDE 60 MG: 60 CAPSULE, DELAYED RELEASE ORAL at 08:38

## 2020-08-16 RX ADMIN — NICOTINE 1 PATCH: 7 PATCH, EXTENDED RELEASE TRANSDERMAL at 08:39

## 2020-08-16 NOTE — NURSING NOTE
Pt approached nurse's station requesting medication for his anxiety  0 5 mg PO Klonopin given for 4/4 anxiety, which patient states was effective

## 2020-08-16 NOTE — NURSING NOTE
Patient is seclusive to room  Pt out for meals, only ate 50% for dinner  Pt reports he showered  Pt is cooperative and compliant with medications  No distress noted at this time
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
## 2020-08-16 NOTE — PROGRESS NOTES
Progress Note - Behavioral Health   Jas Cason 63 y o  male MRN: [id number]  Unit/Bed#Joe De Los Santos 574-11 Encounter: [id number]    The patient was seen for continuing care and reviewed with treatment team   Patient was observed lying in bed  Patient continues to be isolative to his room out of bed for meals only  Patient currently rates his depression as 8/10 (with 10 being the worst) with moderate anxiety noted  Patient appears to be suspicious and paranoid at times  He is currently denying any suicidal/homicidal ideations or auditory/visual hallucinations and did not appear to be responding to internal stimuli at time of encounter  He is reporting adequate sleep, fluctuating appetite, and decreased energy level  He is compliant with medications and no side effects noted at this time  No agitation noted  Mental Status Evaluation:  Appearance:  disheveled   Behavior:  cooperative and guarded   Mood:  anxious   Affect: constricted   Speech: Normal rate and Normal volume   Thought Process:  Goal directed and coherent   Thought Content:  Paranoid and mistrustful   Perceptual Disturbances: Denies hallucinations and does not appear to be responding to internal stimuli   Risk Potential: No suicidal or homicidal ideation   Attention/Concentration attention span and concentration were age appropriate   Orientation:   Alert and awake   Gait/Station: Not observed   Motor Activity: No abnormal movement noted     Progress Toward Goals:  No change    Assessment/Plan    Principal Problem:    Bipolar disorder, most recent episode depressed ([address] )  Active Problems:    History of MI (myocardial infarction)    History of CVA (cerebrovascular accident)    Constipation    Medical clearance for psychiatric admission    Major neurocognitive disorder, due to vascular disease, without behavioral disturbance, mild ([address] )      Recommended Treatment:   1   Continue with pharmacotherapy, group therapy, milieu therapy and occupational therapy  2  Continue with safety checks per unit protocol  3  No medication changes at this time  The patient will be maintained on the following medications:    Current Facility-Administered Medications   Medication Dose Route Frequency Provider Last Rate    acetaminophen  650 mg Oral Q6H PRN Fercho Delcid DO      aspirin  81 mg Oral Daily Dorethia Burkitt, MD      atorvastatin  40 mg Oral Daily With Octaiva Scruggs MD      benztropine  1 mg Intramuscular Q6H PRN Fercho Delcid DO      benztropine  1 mg Oral Q6H PRN Fercho Delcid DO      bisacodyl  10 mg Rectal Daily PRN Oriana Jimenes MD      cariprazine  6 mg Oral Daily Dorethia Burkitt, MD      clonazePAM  0 5 mg Oral Q6H PRN Dorethia Burkitt, MD      clonazePAM  0 5 mg Oral Q12H [address] Dorethia Burkitt, MD      DULoxetine  60 mg Oral Daily Dorethia Burkitt, MD      mirtazapine  15 mg Oral HS Dorethia Burkitt, MD      nicotine  1 patch Transdermal Daily Meaganrosario Delcid DO      perphenazine  4 mg Oral Q12H [address] Dorethia Burkitt, MD      polyethylene glycol  17 g Oral Daily Dorethia Burkitt, MD      risperiDONE  1 mg Oral Q12H PRN Pk Gomez DO         Risks, benefits and possible side effects of Medications:   Risks, benefits, and possible side effects of medications explained to patient and patient verbalizes understanding  Portions of the record may have been created with voice recognition software  Occasional wrong word or "sound a like" substitutions may have occurred due to the inherent limitations of voice recognition software  Read the chart carefully and recognize, using context, where substitutions have occurred

## 2020-08-17 RX ORDER — PERPHENAZINE 4 MG/1
4 TABLET, FILM COATED ORAL 2 TIMES DAILY
Status: DISCONTINUED | OUTPATIENT
Start: 2020-08-17 | End: 2020-08-18

## 2020-08-17 RX ORDER — PERPHENAZINE 2 MG/1
2 TABLET, FILM COATED ORAL 2 TIMES DAILY
Status: DISCONTINUED | OUTPATIENT
Start: 2020-08-17 | End: 2020-08-18

## 2020-08-17 RX ADMIN — NICOTINE 1 PATCH: 7 PATCH, EXTENDED RELEASE TRANSDERMAL at 08:42

## 2020-08-17 RX ADMIN — DULOXETINE HYDROCHLORIDE 60 MG: 60 CAPSULE, DELAYED RELEASE ORAL at 08:40

## 2020-08-17 RX ADMIN — ASPIRIN 81 MG: 81 TABLET, COATED ORAL at 08:40

## 2020-08-17 RX ADMIN — CARIPRAZINE 6 MG: 6 CAPSULE, GELATIN COATED ORAL at 08:44

## 2020-08-17 RX ADMIN — CLONAZEPAM 0.5 MG: 0.5 TABLET ORAL at 21:10

## 2020-08-17 RX ADMIN — CLONAZEPAM 0.5 MG: 0.5 TABLET ORAL at 08:40

## 2020-08-17 RX ADMIN — PERPHENAZINE 4 MG: 4 TABLET, FILM COATED ORAL at 08:39

## 2020-08-17 RX ADMIN — MIRTAZAPINE 15 MG: 15 TABLET, FILM COATED ORAL at 21:10

## 2020-08-17 RX ADMIN — PERPHENAZINE 4 MG: 4 TABLET, FILM COATED ORAL at 21:27

## 2020-08-17 RX ADMIN — PERPHENAZINE 2 MG: 2 TABLET, FILM COATED ORAL at 21:28

## 2020-08-17 RX ADMIN — ATORVASTATIN CALCIUM 40 MG: 40 TABLET, FILM COATED ORAL at 17:15

## 2020-08-17 NOTE — TREATMENT TEAM
08/17/20 0900   Team Meeting   Meeting Type Daily Rounds   Team Members Present   Team Members Present Physician;Nurse;; Other (Discipline and Name)   Physician Team Member Lea Lr Team Member [facility] Management Team Member [id number]    OT Team Member Ester      Pt discussed at treatment team today, will increase perphenazine  There are no beds available at [facility]   Plan is to return to group home once stable

## 2020-08-17 NOTE — PROGRESS NOTES
Progress Note - Behavioral Health   Sheldon Adams 63 y o  male MRN: [id number]  Unit/Bed#: Ruddy Patient 638-36 Encounter: [id number]     Patient was visited on unit for continuing care; chart reviewed and discussed with multidisciplinary treatment team  On approach, the patient was lying down in his bed awake, calm and superficially cooperative  Endorsed "better" mood, scored his mood 5/10 (10 for the best), and admitted good appetite, and energy level  No problem initiating and maintaining sleep  Denied A/VH currently  Endorsed feeling safe in the hospital, but noted feeling concerned about transfer the [address]  Denied SI/HI, intent or plan upon direct inquiry at this time  The patient reported that he is [place] and served in [employer] (no active combat)  Denied any  related trauma, flashbacks/nightmares, hypervigilance/startling, triggering/avoidance behavior or dissociative episodes  Patient continues to be seclusive with minimal interactions with staff and select peers  No reports of aggression or self-injurious behavior on unit  No PRN medications used in the past 24 hours  Patient accepted all offered medications and reported feeling better  No adverse effects of medications noted or reported        Current Mental Status Evaluation:  Appearance and attitude: appeared as stated age, dressed in hospital attire, with long untrimmed beard and fair hygiene  Eye contact: good  Motor Function: within normal limits, No PMA/PMR  Gait/station: Not observed  Speech: normal for rate, rhythm, volume, latency, amount  Language: No overt abnormality  Mood/affect: "better"; scored 5/10 (10 for the best)/ Affect was constricted, mood-congruent  Thought Processes: linear  Thought content: denies suicidal ideation or homicidal ideation; no delusions or first rank symptoms  Associations: intact associations  Perceptual disturbances: denies Auditory/Visual/Tactile Hallucinations  Orientation: oriented to time, person, place and to the situational context  Cognitive Function: intact  Memory: intact short-term and long-term  Intellect: average  Fund of knowledge: vocabulary Average  Impulse control: good  Insight/judgment: fair/good    Pain: denied  Pain scale: 0    Progress Toward Goals:   Principal Problem:    Bipolar disorder, most recent episode depressed ([facility] )  Active Problems:    History of MI (myocardial infarction)    History of CVA (cerebrovascular accident)    Constipation    Medical clearance for psychiatric admission    Major neurocognitive disorder, due to vascular disease, without behavioral disturbance, mild (HCC)      Vital signs in last 24 hours:    Temp:  [97 3 °F (36 3 °C)-97 7 °F (36 5 °C)] 97 3 °F (36 3 °C)  HR:  [54-83] 83  Resp:  [16] 16  BP: (122-145)/(70-87) 145/87    Laboratory results: I have personally reviewed all pertinent laboratory/tests results    Most Recent Labs:   Lab Results   Component Value Date    WBC 7 92 08/10/2020    RBC 4 84 08/10/2020    HGB 15 4 08/10/2020    HCT 47 5 08/10/2020     08/10/2020    RDW 12 5 08/10/2020    NEUTROABS 5 35 08/10/2020    SODIUM 142 08/10/2020    K 4 0 08/10/2020     08/10/2020    CO2 27 08/10/2020    BUN 9 08/10/2020    CREATININE 0 93 08/10/2020    GLUC 101 08/10/2020    GLUF 86 07/13/2017    CALCIUM 9 0 08/10/2020    AST 20 08/10/2020    ALT 21 08/10/2020    ALKPHOS 62 08/10/2020    TP 7 4 08/10/2020    ALB 3 8 08/10/2020    TBILI 1 00 08/10/2020    CHOLESTEROL 119 09/06/2019    HDL 38 (L) 09/06/2019    TRIG 80 09/06/2019    LDLCALC 65 09/06/2019    NONHDLC 81 09/06/2019    LITHIUM 0 7 09/05/2019    ACN0XJNBZEXO 0 352 (L) 08/10/2020    FREET4 1 14 08/10/2020    RPR Non-Reactive 08/25/2016    HGBA1C 5 2 08/03/2020     08/03/2020       Plan:  - Continue medication titration and treatment plan; adjust medication to optimize treatment response and as clinically indicated       Scheduled medications:  Current Facility-Administered Medications Medication Dose Route Frequency Provider Last Rate    acetaminophen  650 mg Oral Q6H PRN Juanita Conquest Borregginnara, DO      aspirin  81 mg Oral Daily Tk Hanson MD      atorvastatin  40 mg Oral Daily With Satish Carney MD      benztropine  1 mg Intramuscular Q6H PRN Juanita Conquest Borreggine, DO      benztropine  1 mg Oral Q6H PRN Juanita Conquest Borreggine, DO      bisacodyl  10 mg Rectal Daily PRN Johny Miramontes MD      cariprazine  6 mg Oral Daily Tk Hanson MD      clonazePAM  0 5 mg Oral Q6H PRN Tk Hanson, MD      clonazePAM  0 5 mg Oral Q12H [facility] Tk Hanson MD      DULoxetine  60 mg Oral Daily Tk Hanson MD      mirtazapine  15 mg Oral HS Tk Hanson MD      nicotine  1 patch Transdermal Daily Meagan Delcid,       perphenazine 6 mg  6 mg Oral Q12H Veronique Almanzar MD      polyethylene glycol  17 g Oral Daily Tk Hanson MD      risperiDONE  1 mg Oral Q12H PRN Alivia Lovell, DO          PRN:  Yancy Nine  acetaminophen    benztropine    benztropine    bisacodyl    clonazePAM    risperiDONE    - Observation: routine    - VS: as per unit protocol  - Diet: Regular diet  - Psychoeducation (benefits and potential risks) discussed, importance of compliance with the psychiatric treatment reiterated, and the patient verbalized understanding of the matter  - Encourage group attendance    - The pt was educated and agreed to verbalize any suicidal thoughts, frustrations or concerns to the nursing staff, immediately      - Dispo: Returning to the previous living arrangement pending psychiatric stabilization vs  Referral to [place] pending bed availability      Next of Kin  Extended Emergency Contact Information  Primary Emergency Contact: Xin Benítez  Address: [address] Phone: [phone]  Relation: Sister  Secondary Emergency Contact: Mary Mckeon  [facility]  [address] [address] [facility] [address] [facility]  Work Phone: [phone]  Relation: Other      Susan Santos MD  Attending [address]

## 2020-08-17 NOTE — PROGRESS NOTES
08/17/20 0845   Team Meeting   Meeting Type Daily Rounds   Team Members Present   Team Members Present Physician;Nurse;;Occupational Therapist   Physician Team Member [facility] Team Member [facility] Management Team Member [id number]   OT Team Member Ester   Patient/Family Present   Patient Present No   Patient's Family Present No     [id number]  Will discuss with Toney Mitchell CM

## 2020-08-17 NOTE — PLAN OF CARE
Problem: Alteration in Thoughts and Perception  Goal: Treatment Goal: Gain control of psychotic behaviors/thinking, reduce/eliminate presenting symptoms and demonstrate improved reality functioning upon discharge  Outcome: Progressing  Goal: Refrain from acting on delusional thinking/internal stimuli  Description: Interventions:  - Monitor patient closely, per order   - Utilize least restrictive measures   - Set reasonable limits, give positive feedback for acceptable   - Administer medications as ordered and monitor of potential side effects  Outcome: Progressing  Goal: Agree to be compliant with medication regime, as prescribed and report medication side effects  Description: Interventions:  - Offer appropriate PRN medication and supervise ingestion; conduct AIMS, as needed   Outcome: Progressing  Goal: Recognize dysfunctional thoughts, communicate reality-based thoughts at the time of discharge  Description: Interventions:  - Provide medication and psycho-education to assist patient in compliance and developing insight into his/her illness   Outcome: Progressing     Problem: Risk for Self Injury/Neglect  Goal: Treatment Goal: Remain safe during length of stay, learn and adopt new coping skills, and be free of self-injurious ideation, impulses and acts at the time of discharge  Outcome: Progressing     Problem: Depression  Goal: Treatment Goal: Demonstrate behavioral control of depressive symptoms, verbalize feelings of improved mood/affect, and adopt new coping skills prior to discharge  Outcome: Progressing  Goal: Refrain from isolation  Description: Interventions:  - Develop a trusting relationship   - Encourage socialization   Outcome: Progressing     Problem: Alteration in Thoughts and Perception  Goal: Treatment Goal: Gain control of psychotic behaviors/thinking, reduce/eliminate presenting symptoms and demonstrate improved reality functioning upon discharge  Outcome: Progressing

## 2020-08-17 NOTE — PLAN OF CARE
Problem: Alteration in Thoughts and Perception  Goal: Treatment Goal: Gain control of psychotic behaviors/thinking, reduce/eliminate presenting symptoms and demonstrate improved reality functioning upon discharge  Outcome: Progressing  Goal: Refrain from acting on delusional thinking/internal stimuli  Description: Interventions:  - Monitor patient closely, per order   - Utilize least restrictive measures   - Set reasonable limits, give positive feedback for acceptable   - Administer medications as ordered and monitor of potential side effects  Outcome: Progressing  Goal: Agree to be compliant with medication regime, as prescribed and report medication side effects  Description: Interventions:  - Offer appropriate PRN medication and supervise ingestion; conduct AIMS, as needed   Outcome: Progressing  Goal: Recognize dysfunctional thoughts, communicate reality-based thoughts at the time of discharge  Description: Interventions:  - Provide medication and psycho-education to assist patient in compliance and developing insight into his/her illness   Outcome: Progressing     Problem: Risk for Self Injury/Neglect  Goal: Treatment Goal: Remain safe during length of stay, learn and adopt new coping skills, and be free of self-injurious ideation, impulses and acts at the time of discharge  Outcome: Progressing  Goal: Verbalize thoughts and feelings  Description: Interventions:  - Assess and re-assess patient's lethality and potential for self-injury  - Engage patient in 1:1 interactions, daily, for a minimum of 15 minutes  - Encourage patient to express feelings, fears, frustrations, hopes  - Establish rapport/trust with patient   Outcome: Progressing  Goal: Refrain from harming self  Description: Interventions:  - Monitor patient closely, per order  - Develop a trusting relationship  - Supervise medication ingestion, monitor effects and side effects   Outcome: Progressing  Goal: Recognize maladaptive responses and adopt new coping mechanisms  Outcome: Progressing     Problem: Depression  Goal: Treatment Goal: Demonstrate behavioral control of depressive symptoms, verbalize feelings of improved mood/affect, and adopt new coping skills prior to discharge  Outcome: Progressing  Goal: Refrain from isolation  Description: Interventions:  - Develop a trusting relationship   - Encourage socialization   Outcome: Progressing  Goal: Refrain from self-neglect  Outcome: Progressing  Goal: Attend and participate in unit activities, including therapeutic, recreational, and educational groups  Description: Interventions:  - Provide therapeutic and educational activities daily, encourage attendance and participation, and document same in the medical record   Outcome: Progressing  Goal: Complete daily ADLs, including personal hygiene independently, as able  Description: Interventions:  - Observe, teach, and assist patient with ADLS  -  Monitor and promote a balance of rest/activity, with adequate nutrition and elimination   Outcome: Progressing     Problem: Alteration in Thoughts and Perception  Goal: Treatment Goal: Gain control of psychotic behaviors/thinking, reduce/eliminate presenting symptoms and demonstrate improved reality functioning upon discharge  Outcome: Progressing     Problem: Nutrition/Hydration-ADULT  Goal: Nutrient/Hydration intake appropriate for improving, restoring or maintaining nutritional needs  Description: Monitor and assess patient's nutrition/hydration status for malnutrition  Collaborate with interdisciplinary team and initiate plan and interventions as ordered  Monitor patient's weight and dietary intake as ordered or per policy  Utilize nutrition screening tool and intervene as necessary  Determine patient's food preferences and provide high-protein, high-caloric foods as appropriate       INTERVENTIONS:  - Monitor oral intake, urinary output, labs, and treatment plans  - Assess nutrition and hydration status and recommend course of action  - Evaluate amount of meals eaten  - Assist patient with eating if necessary   - Allow adequate time for meals  - Recommend/ encourage appropriate diets, oral nutritional supplements, and vitamin/mineral supplements  - Order, calculate, and assess calorie counts as needed  - Recommend, monitor, and adjust tube feedings and TPN/PPN based on assessed needs  - Assess need for intravenous fluids  - Provide specific nutrition/hydration education as appropriate  - Include patient/family/caregiver in decisions related to nutrition  Outcome: Progressing

## 2020-08-17 NOTE — NURSING NOTE
Up for breakfast,poor appetite noted,stated" I am not hungry"  Pleasant with staff,reported depression 8/10,anxiety 3/4  Pt denies any SI or HI,denies any AH or VH  Med compliant  Will continue to monitor closely and encourage participation in unit activities

## 2020-08-17 NOTE — NURSING NOTE
Patient isolative to self  Rated depression 4/10  Anxiety 2/4  continue to be paranoid he thinks people trying to poison him at [facility]   He don't want to go back there   Med compliant  Will continue to monitor

## 2020-08-17 NOTE — PROGRESS NOTES
08/17/20 1100 08/17/20 1330   Activity/Group Checklist   Group Life Skills  (unit rules/brain games) Life Skills   Attendance Attended Did not attend   Attendance Duration (min) 31-45  --    Interactions Interacted appropriately  --    Affect/Mood Appropriate  --    Goals Achieved Able to listen to others  --

## 2020-08-18 PROCEDURE — 99232 SBSQ HOSP IP/OBS MODERATE 35: CPT | Performed by: NURSE PRACTITIONER

## 2020-08-18 RX ORDER — PERPHENAZINE 4 MG/1
6 TABLET, FILM COATED ORAL 2 TIMES DAILY
Status: DISCONTINUED | OUTPATIENT
Start: 2020-08-19 | End: 2020-08-19

## 2020-08-18 RX ORDER — PERPHENAZINE 4 MG/1
4 TABLET, FILM COATED ORAL ONCE
Status: COMPLETED | OUTPATIENT
Start: 2020-08-18 | End: 2020-08-18

## 2020-08-18 RX ORDER — PERPHENAZINE 4 MG/1
2 TABLET, FILM COATED ORAL ONCE
Status: COMPLETED | OUTPATIENT
Start: 2020-08-18 | End: 2020-08-18

## 2020-08-18 RX ADMIN — PERPHENAZINE 2 MG: 2 TABLET, FILM COATED ORAL at 12:42

## 2020-08-18 RX ADMIN — ATORVASTATIN CALCIUM 40 MG: 40 TABLET, FILM COATED ORAL at 17:12

## 2020-08-18 RX ADMIN — CLONAZEPAM 0.5 MG: 0.5 TABLET ORAL at 08:29

## 2020-08-18 RX ADMIN — CARIPRAZINE 6 MG: 6 CAPSULE, GELATIN COATED ORAL at 08:29

## 2020-08-18 RX ADMIN — PERPHENAZINE 4 MG: 4 TABLET, FILM COATED ORAL at 08:29

## 2020-08-18 RX ADMIN — CLONAZEPAM 0.5 MG: 0.5 TABLET ORAL at 21:26

## 2020-08-18 RX ADMIN — ASPIRIN 81 MG: 81 TABLET, COATED ORAL at 08:29

## 2020-08-18 RX ADMIN — PERPHENAZINE 4 MG: 4 TABLET, FILM COATED ORAL at 17:12

## 2020-08-18 RX ADMIN — NICOTINE 1 PATCH: 7 PATCH, EXTENDED RELEASE TRANSDERMAL at 08:31

## 2020-08-18 RX ADMIN — PERPHENAZINE 2 MG: 4 TABLET, FILM COATED ORAL at 17:13

## 2020-08-18 RX ADMIN — DULOXETINE HYDROCHLORIDE 60 MG: 60 CAPSULE, DELAYED RELEASE ORAL at 08:29

## 2020-08-18 RX ADMIN — MIRTAZAPINE 15 MG: 15 TABLET, FILM COATED ORAL at 21:26

## 2020-08-18 NOTE — NURSING NOTE
Patient is seclusive to room and self  Pt is medication compliant and cooperative with care  Pt is pleasant on approach  Pt is out for meals  Pt reported depression rated 4/10 and anxiety rated 3/4  Pt asking at evening med pass if anyone would hurt him here, stating he felt like another peer was going to break his hands  Pt stated am I going to be killed  Pt counseled  Pt stated he feels safe here and no longer feels like anyone is going to hurt him  Pt is currently denying all other s/s at this time  Will continue to monitor frequently

## 2020-08-18 NOTE — TREATMENT TEAM
08/18/20 0959   Team Meeting   Meeting Type Daily Rounds   Team Members Present   Team Members Present Physician;Nurse;; Other (Discipline and Name)   Physician Team Member [address] Team Member [facility] Management Team Member Rodrigo SNOWDEN Team Member Jelly Matt discussed at treatment team today, no medication changes made

## 2020-08-18 NOTE — CASE MANAGEMENT
This writer spoke with patient's CM at [facility]  He reports patient was compliant with medications prior to admission  He reports that patient became paranoid a few weeks ago and escalated over the days  He reports patient can return to [facility] once he has completed his treatment here  Terra New reports the patient has lived at [facility] for 4 years  He reports recent inpatient admission was 5 months ago  He reports Dr Behar made medication changes 3 weeks ago

## 2020-08-18 NOTE — PROGRESS NOTES
Progress Note - Behavioral Health   Neelam Richardson 63 y o  male MRN: [id number]  Unit/Bed#Jeremi Rodriguez 451-35 Encounter: [id number]    The patient was seen for continuing care and reviewed with nursing staff  Patient was seen in his room - in bed resting; reported sleeping well, good appetite, visible on the unit and attended morning group  Pt was scant/guarded in conversation; rates his anxiety and depression at 4/10 with 10 being the worst - states he felt good except for "mild stomach ache"  Pt denies racing thoughts, denies suicidal ideation and denies A/V hallucinations  Pt reports not wanting to return to his group home because the "staff is mean to me", he denies physical abuse from staff and states they "say and do mean things"  Pt remains medication compliant and denies medication side effects  Psychiatric Review of Systems:    Behavior over the last 24 hours:  Slowly progressing  Sleep: normal  Appetite: normal  Medication side effects: No  ROS: "mild stomach ache", denies any shortness of breath or chest pain and all other systems are negative        Mental Status Evaluation:    Appearance:  disheveled, dressed in hospital attire   Behavior:  pleasant, cooperative, calm, good eye contact   Speech:  normal rate and volume   Mood:  improved, less anxious, less depressed   Affect:  constricted, mood-congruent   Thought Process:  linear   Thought Content:  mild paranoia, negative thinking, preoccupied with not returning to group home   Perceptual Disturbances: no auditory hallucinations, no visual hallucinations, denies when asked, does not appear responding to internal stimuli   Risk Potential: Suicidal ideation - None  Homicidal ideation - None  Potential for aggression - No   Memory:  recent and remote memory grossly intact   Consciousness:  alert and awake   Attention: attention span and concentration are age appropriate   Insight:  fair   Judgment: limited   Gait/Station: normal gait/station, normal balance   Motor Activity: no abnormal movements       Progress Toward Goals: Slowly progressing    Labs reviewed and no concerns noted  Recommended Treatment: Continue with pharmacotherapy, group therapy, milieu therapy and occupational therapy    The patient will be maintained on the following medications:  Current Facility-Administered Medications   Medication Dose Route Frequency Provider Last Rate    acetaminophen  650 mg Oral Q6H PRN Serg Moura, DO      aspirin  81 mg Oral Daily Morgan Olivares MD      atorvastatin  40 mg Oral Daily With Crystal Taylor MD      benztropine  1 mg Intramuscular Q6H PRN Shaunna Lilianaing Borreggine, DO      benztropine  1 mg Oral Q6H PRN Shaunna Messing Jaleesareggine, DO      bisacodyl  10 mg Rectal Daily PRN Masha Biswas MD      cariprazine  6 mg Oral Daily Morgan Olivares MD      clonazePAM  0 5 mg Oral Q6H PRN Morgan Olivares MD      clonazePAM  0 5 mg Oral Q12H [address] Morgan Olivares MD      DULoxetine  60 mg Oral Daily Morgan Olivares MD      mirtazapine  15 mg Oral HS Morgan Olivares MD      nicotine  1 patch Transdermal Daily Serg Moura DO      perphenazine  2 mg Oral BID Sage Bustamante MD      And    perphenazine  4 mg Oral BID Sage Bustamante MD      polyethylene glycol  17 g Oral Daily Morgan Olivares MD      risperiDONE  1 mg Oral Q12H PRN Shaunna Delcid,          Bipolar disorder, most recent episode depressed [facility] )        Assessment/Plan   Principal Problem:    Bipolar disorder, most recent episode depressed ([facility] [address] )  Active Problems:    History of MI (myocardial infarction)    History of CVA (cerebrovascular accident)    Constipation    Medical clearance for psychiatric admission    Major neurocognitive disorder, due to vascular disease, without behavioral disturbance, mild ([facility] )      ERNIE Vinson

## 2020-08-18 NOTE — PLAN OF CARE
Attends some groups    Problem: Ineffective Coping  Goal: Participates in unit activities  Description: Interventions:  - Provide therapeutic environment   - Provide required programming   - Redirect inappropriate behaviors   Outcome: Progressing

## 2020-08-18 NOTE — NURSING NOTE
Monitored on safety checks  Slept through the night  Voicing no complaints  Denies SI's, rates depression 7/10 and anxiety 3/4

## 2020-08-18 NOTE — PROGRESS NOTES
08/18/20 0915   Team Meeting   Meeting Type Daily Rounds   Team Members Present   Team Members Present Physician;;Nurse;;Occupational Therapist   Physician Team Member [facility] Team Member [facility] Team Member [address]   Social Work Team Member PENNY SNOWDEN Team Member Андрей Cruz   Patient/Family Present   Patient Present No   Patient's Family Present No     No beds available at [place]  Patient denies SI,HI,AH,VH  304 will be petitioned

## 2020-08-18 NOTE — NURSING NOTE
Pt reported good sleep,visible during breakfast then isolates to his room  Pt reported improvement in his depression,rated 4/10,anxiety 4/4  Pt denies any SI or HI,denies any AH or VH  Med compliant  Pt reported that he worries about his discharge and where he will be going,stated that he doesn't like [facility],but will not elaborate  Pt was encouraged to attend groups  Will continue to monitor closely and provide support

## 2020-08-19 PROCEDURE — 99233 SBSQ HOSP IP/OBS HIGH 50: CPT | Performed by: NURSE PRACTITIONER

## 2020-08-19 RX ORDER — PERPHENAZINE 8 MG
8 TABLET ORAL 2 TIMES DAILY
Status: DISCONTINUED | OUTPATIENT
Start: 2020-08-19 | End: 2020-08-21

## 2020-08-19 RX ADMIN — ATORVASTATIN CALCIUM 40 MG: 40 TABLET, FILM COATED ORAL at 17:00

## 2020-08-19 RX ADMIN — MIRTAZAPINE 15 MG: 15 TABLET, FILM COATED ORAL at 21:38

## 2020-08-19 RX ADMIN — CLONAZEPAM 0.5 MG: 0.5 TABLET ORAL at 21:38

## 2020-08-19 RX ADMIN — PERPHENAZINE 8 MG: 8 TABLET, FILM COATED ORAL at 17:00

## 2020-08-19 RX ADMIN — PERPHENAZINE 6 MG: 4 TABLET, FILM COATED ORAL at 08:57

## 2020-08-19 RX ADMIN — DULOXETINE HYDROCHLORIDE 60 MG: 60 CAPSULE, DELAYED RELEASE ORAL at 08:58

## 2020-08-19 RX ADMIN — CLONAZEPAM 0.5 MG: 0.5 TABLET ORAL at 08:57

## 2020-08-19 RX ADMIN — CARIPRAZINE 6 MG: 6 CAPSULE, GELATIN COATED ORAL at 09:02

## 2020-08-19 RX ADMIN — NICOTINE 1 PATCH: 7 PATCH, EXTENDED RELEASE TRANSDERMAL at 09:01

## 2020-08-19 RX ADMIN — ASPIRIN 81 MG: 81 TABLET, COATED ORAL at 08:59

## 2020-08-19 NOTE — NURSING NOTE
Patient is in bed  Eyes closed  Normal respirations  No needs during the night   Monitored on q 7 minute checks,

## 2020-08-19 NOTE — PROGRESS NOTES
Pt only attended 1 group today        08/19/20 1000   Activity/Group Checklist   Group Other (Comment)  (short term problem solving)   Attendance Did not attend

## 2020-08-19 NOTE — PLAN OF CARE
Attends some groups wit prompting    Problem: Ineffective Coping  Goal: Participates in unit activities  Description: Interventions:  - Provide therapeutic environment   - Provide required programming   - Redirect inappropriate behaviors   Outcome: Progressing
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
## 2020-08-19 NOTE — TREATMENT TEAM
08/19/20 0900   Team Meeting   Meeting Type Daily Rounds   Team Members Present   Team Members Present Physician;Nurse;;Occupational Therapist;Other (Discipline and Name)   Physician Team Member [facility] Team Member [id number]  Management Team Member Rodrigo   OT Team Member Jhonny Brown   Other (Discipline and Name) Leticia Santos   Pt discussed during treatment team,medication reviewed,increase Perphenazine 8 mg po BID, encourage group attendance

## 2020-08-19 NOTE — NURSING NOTE
Patient is seclusive to room and self  Pt is medication compliant and cooperative with care  Pt is scant in conversation  Pt reported anxiety rated 3/4  Pt is currently denying all other s/s at this time  No signs of distress noted  Will continue to monitor frequently
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
## 2020-08-20 PROCEDURE — 99232 SBSQ HOSP IP/OBS MODERATE 35: CPT | Performed by: NURSE PRACTITIONER

## 2020-08-20 RX ADMIN — PERPHENAZINE 8 MG: 8 TABLET, FILM COATED ORAL at 08:40

## 2020-08-20 RX ADMIN — MIRTAZAPINE 15 MG: 15 TABLET, FILM COATED ORAL at 21:21

## 2020-08-20 RX ADMIN — DULOXETINE HYDROCHLORIDE 60 MG: 60 CAPSULE, DELAYED RELEASE ORAL at 08:40

## 2020-08-20 RX ADMIN — CLONAZEPAM 0.5 MG: 0.5 TABLET ORAL at 08:40

## 2020-08-20 RX ADMIN — ATORVASTATIN CALCIUM 40 MG: 40 TABLET, FILM COATED ORAL at 17:06

## 2020-08-20 RX ADMIN — CLONAZEPAM 0.5 MG: 0.5 TABLET ORAL at 21:21

## 2020-08-20 RX ADMIN — PERPHENAZINE 8 MG: 8 TABLET, FILM COATED ORAL at 17:06

## 2020-08-20 RX ADMIN — CARIPRAZINE 6 MG: 6 CAPSULE, GELATIN COATED ORAL at 08:40

## 2020-08-20 RX ADMIN — NICOTINE 1 PATCH: 7 PATCH, EXTENDED RELEASE TRANSDERMAL at 08:43

## 2020-08-20 RX ADMIN — ASPIRIN 81 MG: 81 TABLET, COATED ORAL at 08:40

## 2020-08-20 NOTE — PLAN OF CARE
Problem: Alteration in Thoughts and Perception  Goal: Treatment Goal: Gain control of psychotic behaviors/thinking, reduce/eliminate presenting symptoms and demonstrate improved reality functioning upon discharge  Outcome: Progressing  Goal: Refrain from acting on delusional thinking/internal stimuli  Description: Interventions:  - Monitor patient closely, per order   - Utilize least restrictive measures   - Set reasonable limits, give positive feedback for acceptable   - Administer medications as ordered and monitor of potential side effects  Outcome: Progressing  Goal: Agree to be compliant with medication regime, as prescribed and report medication side effects  Description: Interventions:  - Offer appropriate PRN medication and supervise ingestion; conduct AIMS, as needed   Outcome: Progressing  Goal: Recognize dysfunctional thoughts, communicate reality-based thoughts at the time of discharge  Description: Interventions:  - Provide medication and psycho-education to assist patient in compliance and developing insight into his/her illness   Outcome: Progressing     Problem: Risk for Self Injury/Neglect  Goal: Treatment Goal: Remain safe during length of stay, learn and adopt new coping skills, and be free of self-injurious ideation, impulses and acts at the time of discharge  Outcome: Progressing  Goal: Verbalize thoughts and feelings  Description: Interventions:  - Assess and re-assess patient's lethality and potential for self-injury  - Engage patient in 1:1 interactions, daily, for a minimum of 15 minutes  - Encourage patient to express feelings, fears, frustrations, hopes  - Establish rapport/trust with patient   Outcome: Progressing  Goal: Refrain from harming self  Description: Interventions:  - Monitor patient closely, per order  - Develop a trusting relationship  - Supervise medication ingestion, monitor effects and side effects   Outcome: Progressing  Goal: Recognize maladaptive responses and adopt new coping mechanisms  Outcome: Progressing     Problem: Depression  Goal: Treatment Goal: Demonstrate behavioral control of depressive symptoms, verbalize feelings of improved mood/affect, and adopt new coping skills prior to discharge  Outcome: Progressing  Goal: Refrain from isolation  Description: Interventions:  - Develop a trusting relationship   - Encourage socialization   Outcome: Progressing  Goal: Refrain from self-neglect  Outcome: Progressing  Goal: Complete daily ADLs, including personal hygiene independently, as able  Description: Interventions:  - Observe, teach, and assist patient with ADLS  -  Monitor and promote a balance of rest/activity, with adequate nutrition and elimination   Outcome: Progressing     Problem: Nutrition/Hydration-ADULT  Goal: Nutrient/Hydration intake appropriate for improving, restoring or maintaining nutritional needs  Description: Monitor and assess patient's nutrition/hydration status for malnutrition  Collaborate with interdisciplinary team and initiate plan and interventions as ordered  Monitor patient's weight and dietary intake as ordered or per policy  Utilize nutrition screening tool and intervene as necessary  Determine patient's food preferences and provide high-protein, high-caloric foods as appropriate       INTERVENTIONS:  - Monitor oral intake, urinary output, labs, and treatment plans  - Assess nutrition and hydration status and recommend course of action  - Evaluate amount of meals eaten  - Assist patient with eating if necessary   - Allow adequate time for meals  - Recommend/ encourage appropriate diets, oral nutritional supplements, and vitamin/mineral supplements  - Order, calculate, and assess calorie counts as needed  - Recommend, monitor, and adjust tube feedings and TPN/PPN based on assessed needs  - Assess need for intravenous fluids  - Provide specific nutrition/hydration education as appropriate  - Include patient/family/caregiver in decisions related to nutrition  Outcome: Progressing     Problem: Alteration in Thoughts and Perception  Goal: Treatment Goal: Gain control of psychotic behaviors/thinking, reduce/eliminate presenting symptoms and demonstrate improved reality functioning upon discharge  Outcome: Progressing     Problem: Depression  Goal: Attend and participate in unit activities, including therapeutic, recreational, and educational groups  Description: Interventions:  - Provide therapeutic and educational activities daily, encourage attendance and participation, and document same in the medical record   Outcome: Not Progressing

## 2020-08-20 NOTE — PROGRESS NOTES
08/20/20 0900   Team Meeting   Meeting Type Daily Rounds   Team Members Present   Team Members Present Physician;Nurse;;Occupational Therapist   Physician Team Member [facility] [facility] Team Member [address] [place] [address] Management Team Member Rodrigo   OT Team Member Ester   Patient/Family Present   Patient Present No   Patient's Family Present No     Attended 1 Group  Denies SI,HI, Phone conference with [employer] CM   Possible d/c Monday

## 2020-08-20 NOTE — PROGRESS NOTES
Progress Note - Behavioral Health [facility] 63 y o  male MRN: [id number]  Unit/Bed#Eliz Pineda 615-21 Encounter: [id number]    The patient was seen for continuing care and reviewed with nursing staff  Patient was seen in his room in bed resting, reported sleeping well, good appetite, remains isolative to his room but attend group activities  Patient remains scant in conversation and has shown an improvement in his mood  Pt reports less depression, less anxiety, denies suicidal ideation and denies hallucinations at this time  Pt states he has a meeting with his group home case management to discuss his returning to the [address] [place] [address] and his safety concerns  Pt states he was ready to return to his group home  Staff reports patient is medication compliant and denies medication side effects  Psychiatric Review of Systems:    Behavior over the last 24 hours:  improved  Sleep: normal  Appetite: normal  Medication side effects: No  ROS: no complaints, denies any shortness of breath or chest pain and all other systems are negative        Mental Status Evaluation:    Appearance:  casually dressed, adequate grooming   Behavior:  pleasant, cooperative, calm   Speech:  normal rate and volume   Mood:  improved, less anxious, less depressed   Affect:  reactive, slightly brighter   Thought Process:  linear   Thought Content:  mild paranoia, negative thinking   Perceptual Disturbances: no auditory hallucinations, no visual hallucinations, denies when asked, does not appear responding to internal stimuli   Risk Potential: Suicidal ideation - None  Homicidal ideation - None  Potential for aggression - No   Memory:  recent and remote memory grossly intact   Consciousness:  alert and awake   Attention: attention span and concentration are age appropriate   Insight:  fair   Judgment: fair   Gait/Station: normal gait/station, normal balance   Motor Activity: no abnormal movements       Progress Toward Goals: Slowly progressing    Discharge Disposition:  Tentative Monday, 8/23    Labs reviewed and no concerns noted  Recommended Treatment: Continue with pharmacotherapy, group therapy, milieu therapy and occupational therapy    The patient will be maintained on the following medications:  Current Facility-Administered Medications   Medication Dose Route Frequency Provider Last Rate    acetaminophen  650 mg Oral Q6H PRN Chelsea Orellana DO      aspirin  81 mg Oral Daily Dorothe Earing, MD      atorvastatin  40 mg Oral Daily With Julian Chambers MD      benztropine  1 mg Intramuscular Q6H PRN Michele Kingston Borreggine, DO      benztropine  1 mg Oral Q6H PRN Michele Kingston Borreggine, DO      bisacodyl  10 mg Rectal Daily PRN Brayan Nurse, MD      cariprazine  6 mg Oral Daily Dorothe Earing, MD      clonazePAM  0 5 mg Oral Q6H PRN Dorothe Earing, MD      clonazePAM  0 5 mg Oral Q12H [address] Dorothe Earing, MD      DULoxetine  60 mg Oral Daily Dorothe Earing, MD      mirtazapine  15 mg Oral HS Dorothe Earing, MD      nicotine  1 patch Transdermal Daily Chelsea Orellana DO      perphenazine  8 mg Oral BID ERNIE Median      polyethylene glycol  17 g Oral Daily Dorothe Earing, MD      risperiDONE  1 mg Oral Q12H PRN Michele Kingston Borreggine, DO         Bipolar disorder, most recent episode depressed ([facility] [address] )        Assessment/Plan   Principal Problem:    Bipolar disorder, most recent episode depressed ([address] )  Active Problems:    History of MI (myocardial infarction)    History of CVA (cerebrovascular accident)    Constipation    Medical clearance for psychiatric admission    Major neurocognitive disorder, due to vascular disease, without behavioral disturbance, mild ([address] )      ERNIE Medina

## 2020-08-20 NOTE — NURSING NOTE
Pt was up for breakfast,then isolates to his room  Pt reported good sleep,rated depression 5/10,denies any anxiety,denies any SI or HI,no  or   Pt reported that he will be going back to [facility], no concerns express  Med complaint  Will continue to monitor closely and provide support
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
## 2020-08-20 NOTE — CASE MANAGEMENT
This writer attempted to complete discharge planning conference with patient and his CM at [facility], however his CM was not available  This writer met with patient and discussed his potential discharge on Monday  This writer spoke with patient's CM Timothy Zhu at [facility] and discussed patient's discharge on Monday  Emily Segura informed this writer the patient had a conflict with has been discharged from the facility  Emily Segura reported [facility] will transport patient home at discharge  Emily Segura reports the nurse will call this writer to discuss discharge paperwork and requirements  This writer discussed with Giuseppe Waggoner who appears relieved to know this  This writer has scheduled patient's outpatient appointments with his psychiatrist Dr Yessi Pedraza for [email or web address]  This writer left a message for his therapist Judith Klinefelter ([phone] n [phone]) for an appointment

## 2020-08-20 NOTE — CMS CERTIFICATION NOTE
Recertification: Based upon physical, mental and social evaluations, I certify that inpatient psychiatric services continue to be medically necessary for this patient for a duration of 12 midnights for the treatment of  Bipolar disorder, most recent episode depressed ([facility] [address] )   Available alternative community resources still do not meet the patient's mental health care needs  I further attest that an established written individualized plan of care has been updated and is outlined in the patient's medical records

## 2020-08-20 NOTE — PROGRESS NOTES
08/20/20 5820   Activity/Group Checklist   Group Life Skills   Attendance Did not attend   Pt did not attend any of two groups today

## 2020-08-21 PROCEDURE — 99232 SBSQ HOSP IP/OBS MODERATE 35: CPT | Performed by: NURSE PRACTITIONER

## 2020-08-21 RX ADMIN — DULOXETINE HYDROCHLORIDE 60 MG: 60 CAPSULE, DELAYED RELEASE ORAL at 08:28

## 2020-08-21 RX ADMIN — MIRTAZAPINE 15 MG: 15 TABLET, FILM COATED ORAL at 21:13

## 2020-08-21 RX ADMIN — NICOTINE 1 PATCH: 7 PATCH, EXTENDED RELEASE TRANSDERMAL at 08:27

## 2020-08-21 RX ADMIN — ATORVASTATIN CALCIUM 40 MG: 40 TABLET, FILM COATED ORAL at 17:34

## 2020-08-21 RX ADMIN — ASPIRIN 81 MG: 81 TABLET, COATED ORAL at 08:27

## 2020-08-21 RX ADMIN — CLONAZEPAM 0.5 MG: 0.5 TABLET ORAL at 08:27

## 2020-08-21 RX ADMIN — PERPHENAZINE 8 MG: 8 TABLET, FILM COATED ORAL at 08:29

## 2020-08-21 RX ADMIN — CARIPRAZINE 6 MG: 6 CAPSULE, GELATIN COATED ORAL at 08:38

## 2020-08-21 RX ADMIN — PERPHENAZINE 12 MG: 8 TABLET, FILM COATED ORAL at 17:34

## 2020-08-21 RX ADMIN — CLONAZEPAM 0.5 MG: 0.5 TABLET ORAL at 21:13

## 2020-08-21 NOTE — NURSING NOTE
Patient is seclusive to room and self  Pt is medication compliant and cooperative with care  Pt is pleasant on approach  Pt is currently denying all s/s at this time  Pt states he is hopeful to go home on Monday, stating he will be going back to his group home  Pt states he is happy because he likes the place and knows it  Will continue to monitor frequently

## 2020-08-21 NOTE — PLAN OF CARE
Problem: Alteration in Thoughts and Perception  Goal: Treatment Goal: Gain control of psychotic behaviors/thinking, reduce/eliminate presenting symptoms and demonstrate improved reality functioning upon discharge  Outcome: Progressing  Goal: Refrain from acting on delusional thinking/internal stimuli  Description: Interventions:  - Monitor patient closely, per order   - Utilize least restrictive measures   - Set reasonable limits, give positive feedback for acceptable   - Administer medications as ordered and monitor of potential side effects  Outcome: Progressing  Goal: Agree to be compliant with medication regime, as prescribed and report medication side effects  Description: Interventions:  - Offer appropriate PRN medication and supervise ingestion; conduct AIMS, as needed   Outcome: Progressing  Goal: Recognize dysfunctional thoughts, communicate reality-based thoughts at the time of discharge  Description: Interventions:  - Provide medication and psycho-education to assist patient in compliance and developing insight into his/her illness   Outcome: Progressing     Problem: Risk for Self Injury/Neglect  Goal: Treatment Goal: Remain safe during length of stay, learn and adopt new coping skills, and be free of self-injurious ideation, impulses and acts at the time of discharge  Outcome: Progressing  Goal: Verbalize thoughts and feelings  Description: Interventions:  - Assess and re-assess patient's lethality and potential for self-injury  - Engage patient in 1:1 interactions, daily, for a minimum of 15 minutes  - Encourage patient to express feelings, fears, frustrations, hopes  - Establish rapport/trust with patient   Outcome: Progressing  Goal: Refrain from harming self  Description: Interventions:  - Monitor patient closely, per order  - Develop a trusting relationship  - Supervise medication ingestion, monitor effects and side effects   Outcome: Progressing  Goal: Recognize maladaptive responses and adopt new coping mechanisms  Outcome: Progressing     Problem: Depression  Goal: Treatment Goal: Demonstrate behavioral control of depressive symptoms, verbalize feelings of improved mood/affect, and adopt new coping skills prior to discharge  Outcome: Progressing  Goal: Refrain from self-neglect  Outcome: Progressing  Goal: Complete daily ADLs, including personal hygiene independently, as able  Description: Interventions:  - Observe, teach, and assist patient with ADLS  -  Monitor and promote a balance of rest/activity, with adequate nutrition and elimination   Outcome: Progressing     Problem: Nutrition/Hydration-ADULT  Goal: Nutrient/Hydration intake appropriate for improving, restoring or maintaining nutritional needs  Description: Monitor and assess patient's nutrition/hydration status for malnutrition  Collaborate with interdisciplinary team and initiate plan and interventions as ordered  Monitor patient's weight and dietary intake as ordered or per policy  Utilize nutrition screening tool and intervene as necessary  Determine patient's food preferences and provide high-protein, high-caloric foods as appropriate       INTERVENTIONS:  - Monitor oral intake, urinary output, labs, and treatment plans  - Assess nutrition and hydration status and recommend course of action  - Evaluate amount of meals eaten  - Assist patient with eating if necessary   - Allow adequate time for meals  - Recommend/ encourage appropriate diets, oral nutritional supplements, and vitamin/mineral supplements  - Order, calculate, and assess calorie counts as needed  - Recommend, monitor, and adjust tube feedings and TPN/PPN based on assessed needs  - Assess need for intravenous fluids  - Provide specific nutrition/hydration education as appropriate  - Include patient/family/caregiver in decisions related to nutrition  Outcome: Progressing     Problem: Alteration in Thoughts and Perception  Goal: Treatment Goal: Gain control of psychotic behaviors/thinking, reduce/eliminate presenting symptoms and demonstrate improved reality functioning upon discharge  Outcome: Progressing     Problem: Depression  Goal: Refrain from isolation  Description: Interventions:  - Develop a trusting relationship   - Encourage socialization   Outcome: Not Progressing  Goal: Attend and participate in unit activities, including therapeutic, recreational, and educational groups  Description: Interventions:  - Provide therapeutic and educational activities daily, encourage attendance and participation, and document same in the medical record   Outcome: Not Progressing

## 2020-08-21 NOTE — PROGRESS NOTES
Progress Note - Behavioral Health   Vielka Mathews 63 y o  male MRN: [id number]  Unit/Bed#Manuel Choudhary 059-03 Encounter: [id number]    The patient was seen for continuing care and reviewed with nursing staff  Pt was seen in his room- in bed resting; he reported sleeping well, good appetite and denies medication side effect  Pt presentation was slightly brighter and announced to this writer that he will be discharged on Monday; he reports less anxiety and less depression; denies suicidal ideation and denies hallucinations  Staff reports patient is calm/pleasant during the day - but becomes agitated and paranoid in the evenings  Will increase Perphenazine 12 mg/BID for psychosis  Psychiatric Review of Systems:    Behavior over the last 24 hours:  Progressing  Sleep: normal  Appetite: normal  Medication side effects: No  ROS: no complaints, denies any shortness of breath or chest pain and all other systems are negative  Mental Status Evaluation:    Appearance:  marginal hygiene, wearing hospital clothes   Behavior:  pleasant, cooperative, calm   Speech:  normal rate and volume   Mood:  improved, less anxious, less depressed   Affect:  constricted   Thought Process:  linear   Thought Content:  mild paranoia   Perceptual Disturbances: no auditory hallucinations, no visual hallucinations, denies when asked, does not appear responding to internal stimuli   Risk Potential: Suicidal ideation - None  Homicidal ideation - None  Potential for aggression - No   Memory:  recent and remote memory grossly intact   Consciousness:  alert and awake   Attention: attention span and concentration are age appropriate   Insight:  fair   Judgment: fair   Gait/Station: normal gait/station, normal balance   Motor Activity: no abnormal movements       Progress Toward Goals: Progressing    Discharge Disposition: Discharge on Monday to [facility]     Labs reviewed and no concerns noted        Recommended Treatment: Continue with pharmacotherapy, group therapy, milieu therapy and occupational therapy    The patient will be maintained on the following medications:  Current Facility-Administered Medications   Medication Dose Route Frequency Provider Last Rate    acetaminophen  650 mg Oral Q6H PRN Alannah Lorenzana DO      aspirin  81 mg Oral Daily Ayala Cope MD      atorvastatin  40 mg Oral Daily With Rosana Oakley MD      benztropine  1 mg Intramuscular Q6H PRN Camryn Delcid DO      benztropine  1 mg Oral Q6H PRN Camryn Delcid DO      bisacodyl  10 mg Rectal Daily PRN Mandy Gandara MD      cariprazine  6 mg Oral Daily Ayala Cope MD      clonazePAM  0 5 mg Oral Q6H PRN Ayala Cope MD      clonazePAM  0 5 mg Oral Q12H [facility] Ayala Cope MD      DULoxetine  60 mg Oral Daily Ayala Cope MD      mirtazapine  15 mg Oral HS Ayala Cope MD      nicotine  1 patch Transdermal Daily Alannah Lorenzana DO      perphenazine  12 mg Oral BID ERNIE Monique      polyethylene glycol  17 g Oral Daily Ayala Cope MD      risperiDONE  1 mg Oral Q12H PRN Camryn Delcid DO         Bipolar disorder, most recent episode depressed ([facility] [address] )        Assessment/Plan   Principal Problem:    Bipolar disorder, most recent episode depressed ([facility] [address] )  Active Problems:    History of MI (myocardial infarction)    History of CVA (cerebrovascular accident)    Constipation    Medical clearance for psychiatric admission    Major neurocognitive disorder, due to vascular disease, without behavioral disturbance, mild ([facility] [address] )      ERNIE Monique

## 2020-08-21 NOTE — PROGRESS NOTES
Pt did not attend any groups when prompted or offered         08/21/20 1000   Activity/Group Checklist   Group Other (Comment)  ( education: stress reactions)   Attendance Did not attend

## 2020-08-21 NOTE — PLAN OF CARE
Not joining groups    Problem: Ineffective Coping  Goal: Participates in unit activities  Description: Interventions:  - Provide therapeutic environment   - Provide required programming   - Redirect inappropriate behaviors   Outcome: Not Progressing

## 2020-08-21 NOTE — NURSING NOTE
Patient is isolative to room, out for meals though poor appetite  (50,25%)  Pt is quiet and calm, cooperative with care  Pt denies S/S  Pt is compliant with medications

## 2020-08-21 NOTE — TREATMENT TEAM
08/21/20 1000   Team Meeting   Meeting Type Daily Rounds   Team Members Present   Team Members Present Physician;Nurse;;Occupational Therapist;Other (Discipline and Name)   Physician Team Member dr [facility] Team Member Barby HENAO   Care Management Team Member Amrik   OT Team Member Jacinta Al   Other (Discipline and Name) Marilyn Rodríguez   Pt was discussed during treatment team,medication reviewed,increased Perhanazine today,discharge Monday,Covid test to be done Sunday

## 2020-08-22 RX ADMIN — ASPIRIN 81 MG: 81 TABLET, COATED ORAL at 08:47

## 2020-08-22 RX ADMIN — CLONAZEPAM 0.5 MG: 0.5 TABLET ORAL at 08:47

## 2020-08-22 RX ADMIN — PERPHENAZINE 12 MG: 8 TABLET, FILM COATED ORAL at 08:47

## 2020-08-22 RX ADMIN — CLONAZEPAM 0.5 MG: 0.5 TABLET ORAL at 21:33

## 2020-08-22 RX ADMIN — PERPHENAZINE 12 MG: 8 TABLET, FILM COATED ORAL at 17:10

## 2020-08-22 RX ADMIN — CARIPRAZINE 6 MG: 6 CAPSULE, GELATIN COATED ORAL at 09:07

## 2020-08-22 RX ADMIN — MIRTAZAPINE 15 MG: 15 TABLET, FILM COATED ORAL at 21:33

## 2020-08-22 RX ADMIN — ATORVASTATIN CALCIUM 40 MG: 40 TABLET, FILM COATED ORAL at 15:59

## 2020-08-22 RX ADMIN — DULOXETINE HYDROCHLORIDE 60 MG: 60 CAPSULE, DELAYED RELEASE ORAL at 08:47

## 2020-08-22 RX ADMIN — NICOTINE 1 PATCH: 7 PATCH, EXTENDED RELEASE TRANSDERMAL at 08:48

## 2020-08-22 NOTE — NURSING NOTE
Patient is medication and meal compliant  Appetite is fair with patient eating 50% of both meals  No complaints of pain or discomfort  Patient reports depression at 4/10 and anxiety at 3/4  Patient denies paranoid thoughts but reports still concerns regarding COVID affecting his spouse or himself  Patient denies suicidal ideation and no attempts at self harm noted this shift  Patient is calm and cooperative with staff  Patient is hoping that he will be discharged soon  Patient advised to speak with case management on Monday regarding same  Patient agreed with this plan  Will continue to monitor for changes in mood or behavior

## 2020-08-22 NOTE — PROGRESS NOTES
Psychiatry Progress Note    Subjective: Interval History     The patient states I am doing good   He slept well through the night  Patient rates depression as a 3/10  He is denying anxiety  He denies suicidal ideation or hallucinations  Patient has been compliant with medication and meals  He is pleasant during our discussion today  He is hoping that he will be discharged on Monday  Per report, patient can be agitated and paranoid in the evening and therefore perphenazine was increased yesterday      Behavior over the last 24 hours:  improved  Sleep: normal  Appetite: normal  Medication side effects: No  ROS: no complaints    Current medications:    Current Facility-Administered Medications:     acetaminophen (TYLENOL) tablet 650 mg, 650 mg, Oral, Q6H PRN, Sarmad Delcid, DO    aspirin (ECOTRIN LOW STRENGTH) EC tablet 81 mg, 81 mg, Oral, Daily, Carlyn Garcia MD, 81 mg at 08/21/20 0827    atorvastatin (LIPITOR) tablet 40 mg, 40 mg, Oral, Daily With Dinner, Carlyn Garcia MD, 40 mg at 08/21/20 1734    benztropine (COGENTIN) injection 1 mg, 1 mg, Intramuscular, Q6H PRN, Sarmad Walle,     benztropine (COGENTIN) tablet 1 mg, 1 mg, Oral, Q6H PRN, Sarmad Delcid DO    bisacodyl (DULCOLAX) rectal suppository 10 mg, 10 mg, Rectal, Daily PRN, Vannessa Copeland MD    cariprazine (VRAYLAR) capsule 6 mg, 6 mg, Oral, Daily, Carlyn Garcia MD, 6 mg at 08/21/20 3239    clonazePAM (KlonoPIN) tablet 0 5 mg, 0 5 mg, Oral, Q6H PRN, Carlyn Garcia MD, 0 5 mg at 08/16/20 1440    clonazePAM (KlonoPIN) tablet 0 5 mg, 0 5 mg, Oral, Q12H [address], Carlyn Garcia MD, 0 5 mg at 08/21/20 2113    DULoxetine (CYMBALTA) delayed release capsule 60 mg, 60 mg, Oral, Daily, Carlyn Garcia MD, 60 mg at 08/21/20 4286    mirtazapine (REMERON) tablet 15 mg, 15 mg, Oral, HS, Earapple Garcia MD, 15 mg at 08/21/20 2113    nicotine (NICODERM CQ) 7 mg/24hr TD 24 hr patch 1 patch, 1 patch, Transdermal, Daily, Meagan Delcid DO, 1 patch at 08/21/20 0827    perphenazine tablet 12 mg, 12 mg, Oral, BID, [employer], CRNP, 12 mg at 08/21/20 1734    polyethylene glycol (MIRALAX) packet 17 g, 17 g, Oral, Daily, Brenda Schwartz MD, 17 g at 08/16/20 510    risperiDONE (RisperDAL M-TABS) dispersible tablet 1 mg, 1 mg, Oral, Q12H PRN, Arpita Villatoro DO    Current Problem List:    Patient Active Problem List   Diagnosis    Bipolar disorder, most recent episode depressed ([facility] [address] )   [address] [facility]    Encounter for medical assessment    History of MI (myocardial infarction)    History of CVA (cerebrovascular accident)    Other hyperlipidemia    NSTEMI (non-ST elevated myocardial infarction) ([facility] [address] )    Tobacco abuse    Stress-induced cardiomyopathy    Constipation    Medical clearance for psychiatric admission    Major neurocognitive disorder, due to vascular disease, without behavioral disturbance, mild ([facility] [address] )       Problem list reviewed 08/22/20     Objective:     Vital Signs:  Vitals:    08/21/20 0611 08/21/20 1452 08/21/20 2100 08/22/20 0632   BP: 152/88 135/54 116/71 132/93   BP Location: Right leg Right arm Right arm Left arm   Pulse: 75 87 71 83   Resp: 18 16 16 16   Temp: (!) 97 3 °F (36 3 °C) 98 4 °F (36 9 °C) 98 3 °F (36 8 °C) 98 °F (36 7 °C)   TempSrc: Temporal Temporal Temporal Temporal   SpO2: 95% 91% 96% 95%   Weight:       Height:             Appearance:  age appropriate and casually dressed   Behavior:  normal   Speech:  normal volume   Mood:  normal   Affect:  constricted   Thought Process:  logical   Thought Content:  paranoid at times per report   Perceptual Disturbances: None   Risk Potential: none   Sensorium:  person, place, situation and time   Cognition:  intact   Consciousness:  alert and awake    Attention: attention span and concentration were age appropriate   Intellect: average   Insight:  fair   Judgment: fair      Motor Activity: no abnormal movements       I/O Past 24 hours:  I/O last 3 completed shifts:   In: 180 [P O :180]  Out: -   No intake/output data recorded  Labs:  Reviewed 08/22/20      Assessment / Plan:     Bipolar disorder, most recent episode depressed ([facility] [address] )    Recommended Treatment:      Medication changes:  1) continue current medication regimen  Non-pharmacological treatments  1) Continue with group therapy, milieu therapy and occupational therapy  Safety  1) Safety/communication plan established targeting dynamic risk factors above  2) Risks, benefits, and possible side effects of medications explained to patient and patient verbalizes understanding  Counseling / Coordination of Care    Total floor / unit time spent today 20 minutes  Greater than 50% of total time was spent with the patient and / or family counseling and / or coordination of care  A description of the counseling / coordination of care  Patient's Rights, confidentiality and exceptions to confidentiality, use of automated medical record, [facility] staff access to medical record, and consent to treatment reviewed      Martinez Ryder PA-C

## 2020-08-22 NOTE — PLAN OF CARE
Problem: Alteration in Thoughts and Perception  Goal: Treatment Goal: Gain control of psychotic behaviors/thinking, reduce/eliminate presenting symptoms and demonstrate improved reality functioning upon discharge  Outcome: Completed  Goal: Refrain from acting on delusional thinking/internal stimuli  Description: Interventions:  - Monitor patient closely, per order   - Utilize least restrictive measures   - Set reasonable limits, give positive feedback for acceptable   - Administer medications as ordered and monitor of potential side effects  Outcome: Completed  Goal: Agree to be compliant with medication regime, as prescribed and report medication side effects  Description: Interventions:  - Offer appropriate PRN medication and supervise ingestion; conduct AIMS, as needed   Outcome: Completed  Goal: Recognize dysfunctional thoughts, communicate reality-based thoughts at the time of discharge  Description: Interventions:  - Provide medication and psycho-education to assist patient in compliance and developing insight into his/her illness   Outcome: Completed

## 2020-08-22 NOTE — NURSING NOTE
Patient appears to have slept all night  No needs throughout the night  Monitored on q 7 minute checks

## 2020-08-23 LAB — SARS-COV-2 RNA RESP QL NAA+PROBE: NEGATIVE

## 2020-08-23 PROCEDURE — 87635 SARS-COV-2 COVID-19 AMP PRB: CPT | Performed by: PSYCHIATRY & NEUROLOGY

## 2020-08-23 RX ADMIN — MIRTAZAPINE 15 MG: 15 TABLET, FILM COATED ORAL at 21:30

## 2020-08-23 RX ADMIN — ASPIRIN 81 MG: 81 TABLET, COATED ORAL at 08:42

## 2020-08-23 RX ADMIN — DULOXETINE HYDROCHLORIDE 60 MG: 60 CAPSULE, DELAYED RELEASE ORAL at 08:42

## 2020-08-23 RX ADMIN — PERPHENAZINE 12 MG: 8 TABLET, FILM COATED ORAL at 17:04

## 2020-08-23 RX ADMIN — POLYETHYLENE GLYCOL 3350 17 G: 17 POWDER, FOR SOLUTION ORAL at 08:50

## 2020-08-23 RX ADMIN — CARIPRAZINE 6 MG: 6 CAPSULE, GELATIN COATED ORAL at 08:42

## 2020-08-23 RX ADMIN — ATORVASTATIN CALCIUM 40 MG: 40 TABLET, FILM COATED ORAL at 15:49

## 2020-08-23 RX ADMIN — NICOTINE 1 PATCH: 7 PATCH, EXTENDED RELEASE TRANSDERMAL at 08:43

## 2020-08-23 RX ADMIN — CLONAZEPAM 0.5 MG: 0.5 TABLET ORAL at 08:42

## 2020-08-23 RX ADMIN — CLONAZEPAM 0.5 MG: 0.5 TABLET ORAL at 21:30

## 2020-08-23 RX ADMIN — PERPHENAZINE 12 MG: 8 TABLET, FILM COATED ORAL at 08:42

## 2020-08-23 NOTE — NURSING NOTE
Patient was paranoid last evening  He stated "please don't let them out there hurt me " patient reassured he was safe  Patient complaint with medications  Appears to have slept all night without needs  Monitored on q 7 minute checks

## 2020-08-23 NOTE — PROGRESS NOTES
Psychiatry Progress Note    Subjective: Interval History     The patient is visible on the unit  He reports sleeping well  Patient denies feeling depressed  He does have a blunted affect  Per report, he was paranoid last evening stating he was afraid people with hurt him  He also continues to be concerned with COVID affecting himself for spouse  Patient does report anxiety as a 4/4  He is unable to explain what is making him anxious this morning  He has been compliant with medication and meals  He is denying SI and HI      Behavior over the last 24 hours:  improved  Sleep: normal  Appetite: normal  Medication side effects: No  ROS: no complaints    Current medications:    Current Facility-Administered Medications:     acetaminophen (TYLENOL) tablet 650 mg, 650 mg, Oral, Q6H PRN, Josse Delcid DO    aspirin (ECOTRIN LOW STRENGTH) EC tablet 81 mg, 81 mg, Oral, Daily, Rhiannon Benjamin MD, 81 mg at 08/22/20 0847    atorvastatin (LIPITOR) tablet 40 mg, 40 mg, Oral, Daily With May Farfan MD, 40 mg at 08/22/20 1559    benztropine (COGENTIN) injection 1 mg, 1 mg, Intramuscular, Q6H PRN, Josse Delcid DO    benztropine (COGENTIN) tablet 1 mg, 1 mg, Oral, Q6H PRN, Josse Delcid DO    bisacodyl (DULCOLAX) rectal suppository 10 mg, 10 mg, Rectal, Daily PRN, Ramy Wolfe MD    cariprazine (VRAYLAR) capsule 6 mg, 6 mg, Oral, Daily, Rhiannon Benjamin MD, 6 mg at 08/22/20 0907    clonazePAM (KlonoPIN) tablet 0 5 mg, 0 5 mg, Oral, Q6H PRN, Rhiannon Benjamin MD, 0 5 mg at 08/16/20 1440    clonazePAM (KlonoPIN) tablet 0 5 mg, 0 5 mg, Oral, Q12H [address], Rhiannon Benjamin MD, 0 5 mg at 08/22/20 2133    DULoxetine (CYMBALTA) delayed release capsule 60 mg, 60 mg, Oral, Daily, Rhiannon Benjamin MD, 60 mg at 08/22/20 0847    mirtazapine (REMERON) tablet 15 mg, 15 mg, Oral, HS, Rhiannon Benjamin MD, 15 mg at 08/22/20 2133    nicotine (NICODERM CQ) 7 mg/24hr TD 24 hr patch 1 patch, 1 patch, Transdermal, Daily, Josse Herrera DO Bhavin, 1 patch at 08/22/20 0848    perphenazine tablet 12 mg, 12 mg, Oral, BID, Josefine Salt, CRNP, 12 mg at 08/22/20 1710    polyethylene glycol (MIRALAX) packet 17 g, 17 g, Oral, Daily, Christel Silva MD, 17 g at 08/16/20 9952    risperiDONE (RisperDAL M-TABS) dispersible tablet 1 mg, 1 mg, Oral, Q12H PRN, Margarita Caldwell DO    Current Problem List:    Patient Active Problem List   Diagnosis    Bipolar disorder, most recent episode depressed ([facility] [address] )   [address] [facility]    Encounter for medical assessment    History of MI (myocardial infarction)    History of CVA (cerebrovascular accident)    Other hyperlipidemia    NSTEMI (non-ST elevated myocardial infarction) ([facility] [address] )    Tobacco abuse    Stress-induced cardiomyopathy    Constipation    Medical clearance for psychiatric admission    Major neurocognitive disorder, due to vascular disease, without behavioral disturbance, mild ([facility] [address] )       Problem list reviewed 08/23/20     Objective:     Vital Signs:  Vitals:    08/22/20 0632 08/22/20 1415 08/22/20 2142 08/23/20 0619   BP: 132/93 139/89 140/70 132/79   BP Location: Left arm Left arm Right arm Left arm   Pulse: 83 86 86 96   Resp: 16 17 16 18   Temp: 98 °F (36 7 °C) (!) 97 4 °F (36 3 °C) 98 2 °F (36 8 °C) (!) 97 2 °F (36 2 °C)   TempSrc: Temporal Temporal Temporal Temporal   SpO2: 95% 95% 96% 92%   Weight:       Height:             Appearance:  age appropriate and casually dressed   Behavior:  normal   Speech:  normal volume   Mood:  normal   Affect:  constricted   Thought Process:  logical   Thought Content:  paranoid at times per report   Perceptual Disturbances: None   Risk Potential: none   Sensorium:  person, place, situation and time   Cognition:  intact   Consciousness:  alert and awake    Attention: attention span and concentration were age appropriate   Intellect: average   Insight:  fair   Judgment: fair      Motor Activity: no abnormal movements       I/O Past 24 hours:  I/O last 3 completed shifts: In: 780 [P O :780]  Out: -   No intake/output data recorded  Labs:  Reviewed 08/23/20      Assessment / Plan:     Bipolar disorder, most recent episode depressed ([facility] [address] )    Recommended Treatment:      Medication changes:  1) continue current medication regimen  Non-pharmacological treatments  1) Continue with group therapy, milieu therapy and occupational therapy  Safety  1) Safety/communication plan established targeting dynamic risk factors above  2) Risks, benefits, and possible side effects of medications explained to patient and patient verbalizes understanding  Counseling / Coordination of Care    Total floor / unit time spent today 20 minutes  Greater than 50% of total time was spent with the patient and / or family counseling and / or coordination of care  A description of the counseling / coordination of care  Patient's Rights, confidentiality and exceptions to confidentiality, use of automated medical record, [facility] staff access to medical record, and consent to treatment reviewed      Masoud Duke PA-C

## 2020-08-23 NOTE — PLAN OF CARE
Problem: Risk for Self Injury/Neglect  Goal: Treatment Goal: Remain safe during length of stay, learn and adopt new coping skills, and be free of self-injurious ideation, impulses and acts at the time of discharge  Outcome: Progressing  Goal: Verbalize thoughts and feelings  Description: Interventions:  - Assess and re-assess patient's lethality and potential for self-injury  - Engage patient in 1:1 interactions, daily, for a minimum of 15 minutes  - Encourage patient to express feelings, fears, frustrations, hopes  - Establish rapport/trust with patient   Outcome: Progressing  Goal: Refrain from harming self  Description: Interventions:  - Monitor patient closely, per order  - Develop a trusting relationship  - Supervise medication ingestion, monitor effects and side effects   Outcome: Progressing  Goal: Recognize maladaptive responses and adopt new coping mechanisms  Outcome: Progressing     Problem: Depression  Goal: Treatment Goal: Demonstrate behavioral control of depressive symptoms, verbalize feelings of improved mood/affect, and adopt new coping skills prior to discharge  Outcome: Progressing  Goal: Refrain from isolation  Description: Interventions:  - Develop a trusting relationship   - Encourage socialization   Outcome: Progressing  Goal: Refrain from self-neglect  Outcome: Progressing  Goal: Attend and participate in unit activities, including therapeutic, recreational, and educational groups  Description: Interventions:  - Provide therapeutic and educational activities daily, encourage attendance and participation, and document same in the medical record   Outcome: Progressing  Goal: Complete daily ADLs, including personal hygiene independently, as able  Description: Interventions:  - Observe, teach, and assist patient with ADLS  -  Monitor and promote a balance of rest/activity, with adequate nutrition and elimination   Outcome: Progressing     Problem: Alteration in Thoughts and Perception  Goal: Treatment Goal: Gain control of psychotic behaviors/thinking, reduce/eliminate presenting symptoms and demonstrate improved reality functioning upon discharge  Outcome: Progressing     Problem: Ineffective Coping  Goal: Participates in unit activities  Description: Interventions:  - Provide therapeutic environment   - Provide required programming   - Redirect inappropriate behaviors   Outcome: Progressing     Problem: Nutrition/Hydration-ADULT  Goal: Nutrient/Hydration intake appropriate for improving, restoring or maintaining nutritional needs  Description: Monitor and assess patient's nutrition/hydration status for malnutrition  Collaborate with interdisciplinary team and initiate plan and interventions as ordered  Monitor patient's weight and dietary intake as ordered or per policy  Utilize nutrition screening tool and intervene as necessary  Determine patient's food preferences and provide high-protein, high-caloric foods as appropriate       INTERVENTIONS:  - Monitor oral intake, urinary output, labs, and treatment plans  - Assess nutrition and hydration status and recommend course of action  - Evaluate amount of meals eaten  - Assist patient with eating if necessary   - Allow adequate time for meals  - Recommend/ encourage appropriate diets, oral nutritional supplements, and vitamin/mineral supplements  - Order, calculate, and assess calorie counts as needed  - Recommend, monitor, and adjust tube feedings and TPN/PPN based on assessed needs  - Assess need for intravenous fluids  - Provide specific nutrition/hydration education as appropriate  - Include patient/family/caregiver in decisions related to nutrition  Outcome: Progressing

## 2020-08-23 NOTE — NURSING NOTE
Patient is medication and meal compliant  Patient is isolative to his room and does not interact with peers  Patient does come out of his room for meals  Appetite is variable with patient eating 75% for breakfast and 25% for lunch  Patient is reporting depression at 5/10 but no anxiety symptoms 0/4  No PRN medication requested or needed  Patient does report that he is happy that he is a potential discharge tomorrow back to [facility] per case management notes  No new concerns or issues noted at this time  Will continue to monitor for changes in mood or behavior

## 2020-08-24 VITALS
RESPIRATION RATE: 16 BRPM | WEIGHT: 171.6 LBS | OXYGEN SATURATION: 93 % | BODY MASS INDEX: 24.02 KG/M2 | HEIGHT: 71 IN | SYSTOLIC BLOOD PRESSURE: 130 MMHG | DIASTOLIC BLOOD PRESSURE: 87 MMHG | HEART RATE: 80 BPM | TEMPERATURE: 97.3 F

## 2020-08-24 PROCEDURE — 99238 HOSP IP/OBS DSCHRG MGMT 30/<: CPT | Performed by: PSYCHIATRY & NEUROLOGY

## 2020-08-24 RX ORDER — PERPHENAZINE 4 MG/1
12 TABLET, FILM COATED ORAL 2 TIMES DAILY
Qty: 60 TABLET | Refills: 0 | Status: SHIPPED | OUTPATIENT
Start: 2020-08-24 | End: 2020-10-27 | Stop reason: HOSPADM

## 2020-08-24 RX ADMIN — NICOTINE 1 PATCH: 7 PATCH, EXTENDED RELEASE TRANSDERMAL at 08:30

## 2020-08-24 RX ADMIN — CLONAZEPAM 0.5 MG: 0.5 TABLET ORAL at 08:29

## 2020-08-24 RX ADMIN — ASPIRIN 81 MG: 81 TABLET, COATED ORAL at 08:29

## 2020-08-24 RX ADMIN — DULOXETINE HYDROCHLORIDE 60 MG: 60 CAPSULE, DELAYED RELEASE ORAL at 08:29

## 2020-08-24 RX ADMIN — CARIPRAZINE 6 MG: 6 CAPSULE, GELATIN COATED ORAL at 08:29

## 2020-08-24 RX ADMIN — PERPHENAZINE 12 MG: 8 TABLET, FILM COATED ORAL at 08:29

## 2020-08-24 RX ADMIN — POLYETHYLENE GLYCOL 3350 17 G: 17 POWDER, FOR SOLUTION ORAL at 08:30

## 2020-08-24 NOTE — PLAN OF CARE
Problem: DISCHARGE PLANNING  Goal: Discharge to home or other facility with appropriate resources  Description: INTERVENTIONS:  - Identify barriers to discharge w/patient and caregiver  - Arrange for needed discharge resources and transportation as appropriate  - Identify discharge learning needs (meds, wound care, etc )  - Refer to Case Management Department for coordinating discharge planning if the patient needs post-hospital services based on physician/advanced practitioner order or complex needs related to functional status, cognitive ability, or social support system  Outcome: Adequate for Discharge     Discharge to [place] [id number] at 11:00am  Outpatient psychiatrist and therapist appointment on 8/25

## 2020-08-24 NOTE — NURSING NOTE
Patient slept all night  Patient with more than six hours of sleep  Patient free of falls  Q7 safety checks maintained

## 2020-08-24 NOTE — NURSING NOTE
Pt constricted but pleasant and med-compliant with poor appetite at breakfast   Gait steady  Compliant with mask  Denied SI   VSS  Pt expressed understanding of d/c meds and f/u instructions  Pt left with all his belongings at   Pt escorted by staff to lobby to meet ride to [employer]  Cooperative with d/c  Monitored for safety and support

## 2020-08-24 NOTE — NURSING NOTE
Patient was mainly isolative to his room this evening, only coming out for snack  He was pleasant and calm in conversation  Patient rated his depression a 4 on a 1 to 10 scale and his anxiety a 2 on a 1 to 4 scale  He denied having any hallucinations or suicidal/homcidal thoughts  He was cooperative with taking his evening medications  Safety plan was reviewed with the patient and staff availability was reinforced

## 2020-08-24 NOTE — DISCHARGE SUMMARY
[address] [facility] Inpatient Geriatric Psychiatry  Psychiatrists Discharge Summary      Patient Name: Pilar Carl  MRN: [id number]  DOS: 08/24/20     Date of Admission: 8/12/2020  Date of Discharge:  08/24/20      Principal Problem:    Bipolar disorder, most recent episode depressed ([address] )  Active Problems:    History of MI (myocardial infarction)    History of CVA (cerebrovascular accident)    Constipation    Medical clearance for psychiatric admission    Major neurocognitive disorder, due to vascular disease, without behavioral disturbance, mild (HCC)      DISCHARGE MEDICATIONS:        Aspirin 81 mg Oral Daily      Atorvastatin Calcium 40 mg Oral Daily with dinner       Cariprazine HCl 6 mg Oral Daily      clonazePAM          0 5 mg Oral Every morning       1 mg Oral Daily at bedtime       0 5 mg Oral Every 6 hours PRN       DULoxetine HCl 60 mg Oral Daily       Mirtazapine 15 mg Oral      Perphenazine 12 mg Oral 2 times daily      Polyethylene Glycol 3350 17 g Oral Daily       Sennosides 17 2 mg Oral Daily at bedtime PRN       HOME MEDICATIONS RECONCILED ON ADMISSION:  Prior to Admission Medications   Prescriptions Last Dose Informant Patient Reported? Taking?    DULoxetine (CYMBALTA) 60 mg delayed release capsule  Outside Facility (Specify) Yes Yes   Sig: Take 60 mg by mouth daily   aspirin (ECOTRIN LOW STRENGTH) 81 mg EC tablet   Yes Yes   Sig: Take 81 mg by mouth daily   atorvastatin (LIPITOR) 40 mg tablet   No No   Sig: Take 1 tablet (40 mg total) by mouth daily with dinner   cariprazine (VRAYLAR) 6 MG capsule   Yes No   Sig: Take 6 mg by mouth daily    clonazePAM (KlonoPIN) 0 5 mg tablet  Outside Facility (Specify) Yes Yes   Sig: Take 0 5 mg by mouth every morning    clonazePAM (KlonoPIN) 0 5 mg tablet  Outside Facility (Specify) Yes Yes   Sig: Take 0 5 mg by mouth every 6 (six) hours as needed for anxiety    clonazePAM (KlonoPIN) 1 mg tablet  Outside Facility (Specify) Yes Yes   Sig: Take 1 mg by mouth daily at bedtime    mirtazapine (REMERON) 15 mg tablet   Yes No   Sig: Take 15 mg by mouth   polyethylene glycol (MIRALAX) 17 g packet   No No   Sig: Take 17 g by mouth daily   senna (SENOKOT) 8 6 mg   Yes No   Sig: Take 17 2 mg by mouth daily at bedtime as needed for constipation       Facility-Administered Medications: None       REASON FOR ADMISSION    Per admission h&p:    Maged Mallory is a 63 y o  [facility] with self-reported prior diagnosis of bipolar disorder, history of stroke, cognitive deficits, who presented reporting suicidal thoughts in the context of stress related to COVID restrictions as well as what he believes to be other residents at the assisted living facility that have been plotting to harm him  He states today that he heard people talking about beating him about site his room  Patient reports depressed mood and anxiety related to the situation  No recent signs or symptoms suggestive of kymberly  Denies hallucinations  Patient appears to have poor recall  Slums cognitive assessment included below with mental status exam   Of note patient states he [facility] to [facility]         Review of Systems   Constitutional: Negative for appetite change, diaphoresis, fatigue and fever  HENT: Negative for drooling, hearing loss, rhinorrhea, sinus pain and sore throat  Eyes: Negative for photophobia, pain, discharge and redness  Respiratory: Negative for cough, chest tightness, shortness of breath and wheezing  Cardiovascular: Negative for chest pain and palpitations  Gastrointestinal: Negative for abdominal pain, diarrhea, nausea and vomiting  Endocrine: Negative for cold intolerance, polydipsia and polyphagia  Genitourinary: Negative for dysuria, flank pain, frequency and urgency  Musculoskeletal: Negative for arthralgias, back pain, gait problem and myalgias     Skin: Negative for color change, pallor and rash  Neurological: Negative for dizziness, tremors, speech difficulty, numbness and headaches          PPHx:    1  Onset/Prior Diagnoses:              - reports prior dx of bipolar d/o but all 7 prior admissions in our chart since 2015 were due to depression id patient himself says that he has mostly had manic episodes              - patient reports past manic episodes were manifested by spending sprees, decreased need for sleep and rapid speech for a few weeks at a time              - history of cognitive deficits per assisted living facility document  2  Current and past outpatient psych treatment:                - sees Dr Shikha Woody  3  Inpatient hospitalizations:                - numerous; last known admission was to [facility] on 04/22/2020-05/04/2020       4  Medication trials in the past (including Family Hx):                - Lithium, zoloft, Lexapro, Haldol, Prolixin, Thorazine  5  Suicide attempts:                -history of overdose, carbon monoxide poisoning, self electrocution              - history of T MS which was ineffective  6  Substance use:              - marijuana    HOSPITAL COURSE    1  Treatment and response: Patient was continued on all home medications  Perphenazine was added to address paranoid delusions  Risks and benefits of dual antipsychotic therapy discussed with patient  Due to patient having been stabilized on vraylar with respect to bipolar depression, the benefits of changing the medication entirely to a different antipsychotic regimen would not outweigh the risks of affective decompensation  It's very possible that perphenazine can be reduced and/or tapered in the future after a period of stability  Patient tolerated medications well and had good response to treatment as noted by resolution of paranoid delusions  Risks/benefits of medications discussed with patient/family who verbalized understanding and agreed with plan       Is patient being discharged on more than 1 antipsychotic? no    2  Behavior/diagnostic clarification: cannot rule out possibility of paranoia having been related more to cognitive deficits rather than a true psychosis  It was noted that the paranoia responded quite well to reassurance from residence administration that the problematic resident at [facility] had been dealt with and left    3  Medical comorbidities: no acute needs     4  Case management: referred back to same outpatient provider    MSE ON DISCHARGE    Appearance: limited grooming, intact hygiene; no abnormal involuntary movements noted  Gait/station: stable  Behavior: calm, cooperative  Motor activity: no psychomotor retardation/agitation  Muscle strength and tone: intact  Speech: wnl  Language: intact  Mood: euthymic  Affect: neutral, stable, reactive  Thought process: linear, mostly logical, vague  Thought content: no delusions elicited  Risk Potential: denies SI/HI  Perceptual disturbances: denies AH/VH  Consciousness: alert  Sensorium: loosely oriented to all domains  Memory: mild deficits  Intellect: average  Fund of knowledge: intact  Cognition:  grossly intact   Insight: fair  Judgement: adequate for discharge back to supportive facility    Patient is being discharged due to having had complete resolution of paranoid delusions  Patient is forward thinking  Is no longer considered an acute danger to self or others  Discussed with treatment team      Discharge plan/instructions: Patient is being discharged back to [facility]  Follow up with: Dr Janny Ge    See after visit summary for additional details of outpatient follow up arrangements  MOST RECENT LABS AND OTHER WORKUP PERFORMED DURING THIS ADMISSION:    I have personally reviewed all pertinent laboratory/tests results    CBC:   Lab Results   Component Value Date    WBC 7 92 08/10/2020    RBC 4 84 08/10/2020    HGB 15 4 08/10/2020    HCT 47 5 08/10/2020    MCV 98 08/10/2020     08/10/2020    MCH 31 8 08/10/2020    MCHC 32 4 08/10/2020    RDW 12 5 08/10/2020    MPV 10 7 08/10/2020    NRBC 0 08/10/2020    NEUTROABS 5 35 08/10/2020     CMP:   Lab Results   Component Value Date    SODIUM 142 08/10/2020    K 4 0 08/10/2020     08/10/2020    CO2 27 08/10/2020    AGAP 10 08/10/2020    BUN 9 08/10/2020    CREATININE 0 93 08/10/2020    GLUC 101 08/10/2020    GLUF 86 07/13/2017    CALCIUM 9 0 08/10/2020    AST 20 08/10/2020    ALT 21 08/10/2020    ALKPHOS 62 08/10/2020    TP 7 4 08/10/2020    ALB 3 8 08/10/2020    TBILI 1 00 08/10/2020    EGFR 91 08/10/2020     Lipid Profile:   Lab Results   Component Value Date    CHOLESTEROL 119 09/06/2019    HDL 38 (L) 09/06/2019    TRIG 80 09/06/2019    LDLCALC 65 09/06/2019    [place] 81 09/06/2019     Thyroid Studies:   Lab Results   Component Value Date    WPG7WVSPWEJF 0 352 (L) 08/10/2020    FREET4 1 14 08/10/2020     COVID19:   Lab Results   Component Value Date    SARSCOV2 Negative 08/23/2020     Drug Screen:   Lab Results   Component Value Date    AMPMETHUR Negative 08/10/2020    BARBTUR Negative 08/10/2020    BDZUR Negative 08/10/2020    THCUR Negative 08/10/2020    COCAINEUR Negative 08/10/2020    METHADONEUR Negative 08/10/2020    OPIATEUR Negative 08/10/2020    PCPUR Negative 08/10/2020     Medication Drug Levels: No results found for: CBMZFREE, PHENOBARB, PHENYTOIN, VALPROICTOT, CARBAMAZEPIN, LAMOTRIGINE, LEVETIRACETA, TOPIRAMATE  Medical alcohol level   Lab Results   Component Value Date    ETOH <3 09/05/2019     Urinalysis   Lab Results   Component Value Date    COLORU yellow 08/10/2020    CLARITYU clear 08/10/2020    SPECGRAV 1 015 08/10/2020    PHUR 6 0 08/10/2020    LEUKOCYTESUR negative 08/10/2020    NITRITE negative 08/10/2020    PROTEIN UA negative 08/10/2020    GLUCOSEU negative 08/10/2020    KETONESU negative 08/10/2020    UROBILINOGEN 0 2 08/10/2020    BILIRUBINUR negative 08/10/2020    BLOODU negative 08/10/2020    RBCUA 0-1 (A) 10/01/2016    WBCUA None Seen 10/01/2016    EPIS None Seen 10/01/2016    BACTERIA Occasional 10/01/2016     Ext Breath Alcohol   Lab Results   Component Value Date    BREATHALC 0 08/10/2020     EKG   Lab Results   Component Value Date    VENTRATE 62 08/10/2020    ATRIALRATE 68 08/10/2020    PRINT 132 08/10/2020    QRSDINT 90 08/10/2020    QTINT 424 08/10/2020    QTCINT 430 08/10/2020    PAXIS 48 08/10/2020    QRSAXIS -23 08/10/2020    TWAVEAXIS 87 08/10/2020         25 minutes spent on discharge       Maverick Mallory MD  08/24/20

## 2020-08-24 NOTE — PROGRESS NOTES
08/24/20 0830   Team Meeting   Meeting Type Daily Rounds   Team Members Present   Team Members Present Physician;;    Physician Team Member [id number] Team Member [id number] Team Member [phone]   Social Work Team Member Hannah Sandoval   Patient/Family Present   Patient Present No   Patient's Family Present No     Discharge at 11am  D/C to [employer]   Denies SI  Med compliant,

## 2020-08-24 NOTE — BH TRANSITION RECORD
Contact Information: If you have any questions, concerns, pended studies, tests and/or procedures, or emergencies regarding your inpatient behavioral health visit  Please contact [address] [place] [address] older adult behavioral health unit [phone] and ask to speak to a , nurse or physician  A contact is available 24 hours/ 7 days a week at this number  Summary of Procedures Performed During your Stay:  Below is a list of major procedures performed during your hospital stay and a summary of results:  - No major procedures performed  Pending Studies (From admission, onward)    None        If studies are pending at discharge, follow up with your PCP and/or referring provider

## 2020-08-24 NOTE — DISCHARGE INSTR - OTHER ORDERS
Patient is being discharged, no SI/HI, no psychosis or A/V  Patient is in agreement with discharge  No questions verbalized  Patient provided with after care appointment, discharge instructions and prescriptions  IMM, quality core measures, transition of care, discharge instructions, and treatment plan complete  Patient will be transported by   Darryle Eriksson will continue to follow up as needed  Patient's discharge was faxed to outpatient providers  What you need to know aboutcoronavirus disease 2019 (COVID-19)     What is coronavirus disease 2019 (COVID-19)? Coronavirus disease 2019 (COVID-19) is a respiratory illness that can spread from person to person  The virus that causes COVID-19 is a novel coronavirus that was first identified during an investigation into an outbreak in [place]  [place] in [place]  get [place]? Yes  COVID-19 is spreading from person to person in parts of the [facility]  Risk of infection with COVID-19 is higher for people who are close contacts of someone known to have COVID-19, for example healthcare workers, or household members  Other people at higher risk for infection are those who live in or have recently been in an area with ongoing spread of COVID-19  Learn more about places with ongoing spread at   [employer] tn  html#geographic  Have there been cases of COVID[place] in [place] ?   Yes  The first case of COVID-19 in the [place] was reported on January 21, 2020  The current count of cases of COVID-19 in the [place] is available on [employer] at [facility]  How does COVID-19 spread? The virus that causes COVID-19 probably emerged from an animal source, but is now spreading from person to person   The virus is thought to spread mainly between people who are in close contact with one another (within about 6 feet) through respiratory droplets produced when an infected person coughs or sneezes  It also may be possible that a person can get COVID-19 by touching a surface or object that has the virus on it and then touching their own mouth, nose, or possibly their eyes, but this is not thought to be the main way the virus spreads  Learn what is known about the spread of newly emerged coronaviruses at [facility]  What are the symptoms of COVID-19? Patients with COVID-19 have had mild to severe respiratory illness with symptoms of   fever   cough   shortness of breath  What are severe complications from this virus? Some patients have pneumonia in both lungs, multi-organ failure and in some cases death  How can I help protect myself? People can help protect themselves from respiratory illness with everyday preventive actions  Avoid close contact with people who are sick  Avoid touching your eyes, nose, and mouth withunwashed hands  Wash your hands often with soap and water for at least 20 seconds  Use an alcohol-based hand  that contains at least 60% alcohol if soap and water are not available  If you are sick, to keep from spreading respiratory illness to others, you should   Stay home when you are sick  Cover your cough or sneeze with a tissue, then throw the tissue in the trash  Clean and disinfect frequently touched objectsand surfaces  What should I do if I recently traveled from an area with ongoing spread of COVID-19? If you have traveled from an affected area, there may be restrictions on your movements for up to 2 weeks  If you develop symptoms during that period (fever, cough, trouble breathing), seek medical advice  Call the office of your health care provider before you go, and tell them about your travel and your symptoms  They will give you instructions on how to get care without exposing other people to your illness   While sick, avoid contact with people, don't go out and delay any travel to reduce the possibility of spreading illness to others  Is there a vaccine? There is currently no vaccine to protect against COVID-19  The best way to prevent infection is to take everyday preventive actions, like avoiding close contact with people who are sick and washing your hands often  Is there a treatment? There is no specific antiviral treatment for COVID-19  People with COVID-19 can seek medical care to helprelieve symptoms  For more information: www cdc gov/VRCGY30LQ 314346-Y 03/03/2020       What to do if you are sick withcoronavirus disease 2019 (COVID-19)     If you are sick with COVID-19 or suspect you are infected with the virus that causes COVID-19, follow the steps below to help prevent the disease from spreading to people in your home and community  Stay home except to get medical care   You should restrict activities outside your home, except for getting medical care  Do not go to work, school, or public areas  Avoid using public transportation, ride-sharing, or taxis  Separate yourself from other people and animals inyour home  People: As much as possible, you should stay in a specific room and away from other people in your home  Also, you should use a separate bathroom, if available  Animals: Do not handle pets or other animals while sick  See COVID-19 and Animals for more information  Call ahead before visiting your doctor   If you have a medical appointment, call the healthcare provider and tell them that you have or may have COVID-19  This will help the healthcare provider's office take steps to keep other people from getting infected or exposed  Wear a facemask  You should wear a facemask when you are around other people (e g , sharing a room or vehicle) or pets and before you enter a healthcare provider's office   If you are not able to wear a facemask (for example, because it causes trouble breathing), then people who live with you should not stay in the same room with you, or they should wear a facemask if they enteryour room  Cover your coughs and sneezes   Cover your mouth and nose with a tissue when you cough or sneeze  Throw used tissues in a lined trash can; immediately wash your hands with soap and water for at least 20 seconds or clean your hands with an alcohol-based hand  that contains at least 60 to 95% alcohol, covering all surfaces of your hands and rubbing them together until they feel dry  Soap and water should be used preferentially if hands are visibly dirty  Avoid sharing personal household items   You should not share dishes, drinking glasses, cups, eating utensils, towels, or bedding with other people or pets in your home  After using these items, they should be washed thoroughly with soap and water  Clean your hands often  Wash your hands often with soap and water for at least 20 seconds  If soap and water are not available, clean your hands with an alcohol-based hand  that contains at least 60% alcohol, covering all surfaces of your hands and rubbing them together until they feel dry  Soap and water should be used preferentially if hands are visibly dirty  Avoid touching your eyes, nose, and mouth with unwashed hands  Clean all "high-touch" surfaces every day  High touch surfaces include counters, tabletops, doorknobs, bathroom fixtures, toilets, phones, keyboards, tablets, and bedside tables  Also, clean any surfaces that may have blood, stool, or body fluids on them  Use a household cleaning spray or wipe, according to the label instructions  Labels contain instructions for safe and effective use of the cleaning product including precautions you should take when applying the product, such as wearing gloves and making sure you have good ventilation during use of the product  Monitor your symptoms  Seek prompt medical attention if your illness is worsening (e g , difficulty breathing)   Before seeking care, call your healthcare provider and tell them that you have, or are being evaluated for, COVID-19  Put on a facemask before you enter the facility  These steps will help the healthcare provider's office to keep other people in the office or waiting room from getting infectedor exposed  Ask your healthcare provider to call the local or state health department  Persons who are placed under active monitoring or facilitated self-monitoring should follow instructions provided by their local health department or occupational health professionals, as appropriate  If you have a medical emergency and need to call 911, notify the dispatch personnel that you have, or are being evaluated for COVID-19  If possible, put on a facemask before emergency medical services arrive  Discontinuing home isolation  Patients with confirmed COVID-19 should remain under home isolation precautions until the risk of secondary transmission to others is thought to be low  The decision to discontinue home isolation precautions should be made on a case-by-case basis, in consultation with healthcare providers and [facility] health departments  For more information: www cdc gov/DCJIY38QM 237038-R 02/24/2020       Stay home when you are sick,except to get medical care  Wash your hands often with soap and water for at least 20 seconds  Cover your cough or sneeze with a tissue, then throw the tissue in the trash  Clean and disinfect frequently touched objects and surfaces  Avoid touching your eyes, nose, and mouth  STOP THE SPREAD OF GERMS  For more information: www cdc gov/COVID19 Avoid close contact with people who are sick  Help prevent the spread of respiratory diseases like COVID-19  Christiane Strickland RN, our [employer], will be calling you after your discharge, on the phone number that you provided  She will be available as an additional support, if needed  If you wish to speak with her, you may contact Teto [place] at [phone]       [facility] [address]   [place], [address]  [phone] Fax [address] [place] [address],[address] [phone] [address] [place] [address] [phone]    Medicaid, SNAP, TANF,  Assistance    The [facility] Family-to-Family Education Program is a free 12-week (2 1/2 hours/week) course for families of individuals with severe brain disorders (mental illnesses)  The classes are taught by trained family members  All course materials are furnished at no cost to you  Below are some details  To register, e-mail [email or web address] or call [phone]  The curriculum focuses on schizophrenia, bipolar disorder (manic depression), clinical depression, panic disorders and obsessive-compulsive disorder (OCD)  The course discusses the clinical treatment of these illnesses and teaches the knowledge and skills that family members need to cope more effectively  Topics Include:  Learning about feelings, learning about facts   Schizophrenia, major depression and kymberly: diagnosis and dealing with critical periods   Subtypes of depression and bipolar disorder, panic disorder and OCD; diagnosis and causes; sharing our stories   The biology of the brain/new research   Problem solving workshops   Medication review   Empathy workshop - what its like to have a brain disorder   Communication skills workshop   Self-care and relative groups   Rehabilitation, services available   Advocacy: fighting stigma   Review and certification ceremony    Snvi-ob-Wcbr Education Course  The [employer] Education class is a ten week - two hours per week - experiential education course on the topic of recovery for any person with serious mental illness who is interested in establishing and maintaining wellness  The course uses a combination of lecture, interactive exercises, and structural group processes  The diversity of experience among participants affords for a lively dynamic that moves the course along    [facility]ok-p[facility]-Johp Education class is offered free of charge to people who experience mental illness  You do not need to be a member of [employer] to take the course  Courses are taught by teams of trained mentors/peer-teachers who are themselves experienced at living well with mental illness  Below are some details  To register, call [phone] or e-mail [email or web address]  Sign up today! [facility] group is for family members, caregivers, and loved ones of individuals living with the everyday challenges of mental illness  The leaders are family members in the same situation  Sessions take place in an intimate, confidential setting to allow families to share openly with each other  These support groups allow participants to learn from the experiences of other group members, share coping strategies, and offer each other encouragement and understanding  Eugenie Kemp know that you are not alone  Drop in--no registration is necessary  Here are the times and locations  LENOEL  Monthly: 3rd Monday, 7:00-8:30 pm  [facility]  [address]  Monthly: 4th Tuesday, 7:00-8:30 pm  [facility]  Monthly: 1st Monday, 7:00-8:30 pm  [facility]  [address] [place] [address], [facility], [address] [place] [address]         Monthly Support for Persons with Mental Illness  The Peer Support Group is a monthly meeting for individuals facing the challenges of recovering from severe and persistent mental illness  Depression, manic depression, schizophrenia, and general anxiety disorder are only a few of the diagnoses of individuals who have found a supportive place at our meetings  Our [place]  We are a fellowship of individuals who share a common goal of recovery and the ability to maintain mental and emotional stability  We help others and ourselves through sharing our experiences, strength and hope with each other   No matter how traumatic our past or how despairing our present may be, there is hope for a new day  Sessions take place in an intimate, confidential setting to allow individuals to share openly with each other  Chicho Josue know that you are not alone  Drop in--no registration is necessary  Here are the times and locations  EDDIE  Monthly: 1st Monday, 7:00-8:30 pm  [facility]  [address] [place], [place]   [id number]  Monthly: 3rd Monday, 7:00-8:30 pm  [address], [address] are being discharged to [employer]   You will have a follow up visit with your psychiatrist and therapist

## 2020-09-13 ENCOUNTER — HOSPITAL ENCOUNTER (EMERGENCY)
Facility: HOSPITAL | Age: 55
End: 2020-09-14
Attending: EMERGENCY MEDICINE | Admitting: EMERGENCY MEDICINE
Payer: COMMERCIAL

## 2020-09-13 DIAGNOSIS — F01.50 MAJOR NEUROCOGNITIVE DISORDER, DUE TO VASCULAR DISEASE, WITHOUT BEHAVIORAL DISTURBANCE, MILD (HCC): Chronic | ICD-10-CM

## 2020-09-13 DIAGNOSIS — R45.851 SUICIDAL IDEATIONS: Primary | ICD-10-CM

## 2020-09-13 DIAGNOSIS — E78.49 OTHER HYPERLIPIDEMIA: ICD-10-CM

## 2020-09-13 DIAGNOSIS — I25.2 HISTORY OF MI (MYOCARDIAL INFARCTION): ICD-10-CM

## 2020-09-13 DIAGNOSIS — F32.A DEPRESSION: ICD-10-CM

## 2020-09-13 DIAGNOSIS — Z72.0 TOBACCO ABUSE: ICD-10-CM

## 2020-09-13 DIAGNOSIS — Z86.73 HISTORY OF CVA (CEREBROVASCULAR ACCIDENT): ICD-10-CM

## 2020-09-13 LAB
AMPHETAMINES SERPL QL SCN: NEGATIVE
ANION GAP SERPL CALCULATED.3IONS-SCNC: 6 MMOL/L (ref 4–13)
BARBITURATES UR QL: NEGATIVE
BASOPHILS # BLD AUTO: 0.08 THOUSANDS/ΜL (ref 0–0.1)
BASOPHILS NFR BLD AUTO: 1 % (ref 0–1)
BENZODIAZ UR QL: NEGATIVE
BUN SERPL-MCNC: 15 MG/DL (ref 5–25)
CALCIUM SERPL-MCNC: 9 MG/DL (ref 8.3–10.1)
CHLORIDE SERPL-SCNC: 105 MMOL/L (ref 100–108)
CLARITY, POC: NORMAL
CO2 SERPL-SCNC: 28 MMOL/L (ref 21–32)
COCAINE UR QL: NEGATIVE
COLOR, POC: YELLOW
CREAT SERPL-MCNC: 1.12 MG/DL (ref 0.6–1.3)
EOSINOPHIL # BLD AUTO: 0.11 THOUSAND/ΜL (ref 0–0.61)
EOSINOPHIL NFR BLD AUTO: 1 % (ref 0–6)
ERYTHROCYTE [DISTWIDTH] IN BLOOD BY AUTOMATED COUNT: 11.9 % (ref 11.6–15.1)
ETHANOL EXG-MCNC: 0 MG/DL
EXT BILIRUBIN, UA: NEGATIVE
EXT BLOOD URINE: NEGATIVE
EXT GLUCOSE, UA: NEGATIVE
EXT KETONES: NEGATIVE
EXT NITRITE, UA: NEGATIVE
EXT PH, UA: 7.5
EXT PROTEIN, UA: NEGATIVE
EXT SPECIFIC GRAVITY, UA: 1.01
EXT UROBILINOGEN: 0.2
GFR SERPL CREATININE-BSD FRML MDRD: 73 ML/MIN/1.73SQ M
GLUCOSE SERPL-MCNC: 124 MG/DL (ref 65–140)
HCT VFR BLD AUTO: 46.9 % (ref 36.5–49.3)
HGB BLD-MCNC: 15.5 G/DL (ref 12–17)
IMM GRANULOCYTES # BLD AUTO: 0.03 THOUSAND/UL (ref 0–0.2)
IMM GRANULOCYTES NFR BLD AUTO: 0 % (ref 0–2)
LYMPHOCYTES # BLD AUTO: 1.26 THOUSANDS/ΜL (ref 0.6–4.47)
LYMPHOCYTES NFR BLD AUTO: 15 % (ref 14–44)
MCH RBC QN AUTO: 31.5 PG (ref 26.8–34.3)
MCHC RBC AUTO-ENTMCNC: 33 G/DL (ref 31.4–37.4)
MCV RBC AUTO: 95 FL (ref 82–98)
METHADONE UR QL: NEGATIVE
MONOCYTES # BLD AUTO: 0.55 THOUSAND/ΜL (ref 0.17–1.22)
MONOCYTES NFR BLD AUTO: 7 % (ref 4–12)
NEUTROPHILS # BLD AUTO: 6.46 THOUSANDS/ΜL (ref 1.85–7.62)
NEUTS SEG NFR BLD AUTO: 76 % (ref 43–75)
NRBC BLD AUTO-RTO: 0 /100 WBCS
OPIATES UR QL SCN: NEGATIVE
OXYCODONE+OXYMORPHONE UR QL SCN: NEGATIVE
PCP UR QL: NEGATIVE
PLATELET # BLD AUTO: 283 THOUSANDS/UL (ref 149–390)
PMV BLD AUTO: 9.8 FL (ref 8.9–12.7)
POTASSIUM SERPL-SCNC: 3.9 MMOL/L (ref 3.5–5.3)
RBC # BLD AUTO: 4.92 MILLION/UL (ref 3.88–5.62)
SARS-COV-2 RNA RESP QL NAA+PROBE: NEGATIVE
SODIUM SERPL-SCNC: 139 MMOL/L (ref 136–145)
THC UR QL: NEGATIVE
WBC # BLD AUTO: 8.49 THOUSAND/UL (ref 4.31–10.16)
WBC # BLD EST: NEGATIVE 10*3/UL

## 2020-09-13 PROCEDURE — 87635 SARS-COV-2 COVID-19 AMP PRB: CPT | Performed by: EMERGENCY MEDICINE

## 2020-09-13 PROCEDURE — 99285 EMERGENCY DEPT VISIT HI MDM: CPT

## 2020-09-13 PROCEDURE — 93005 ELECTROCARDIOGRAM TRACING: CPT

## 2020-09-13 PROCEDURE — 80307 DRUG TEST PRSMV CHEM ANLYZR: CPT | Performed by: EMERGENCY MEDICINE

## 2020-09-13 PROCEDURE — 85025 COMPLETE CBC W/AUTO DIFF WBC: CPT | Performed by: EMERGENCY MEDICINE

## 2020-09-13 PROCEDURE — 99285 EMERGENCY DEPT VISIT HI MDM: CPT | Performed by: EMERGENCY MEDICINE

## 2020-09-13 PROCEDURE — 82075 ASSAY OF BREATH ETHANOL: CPT | Performed by: EMERGENCY MEDICINE

## 2020-09-13 PROCEDURE — 36415 COLL VENOUS BLD VENIPUNCTURE: CPT | Performed by: EMERGENCY MEDICINE

## 2020-09-13 PROCEDURE — 80048 BASIC METABOLIC PNL TOTAL CA: CPT | Performed by: EMERGENCY MEDICINE

## 2020-09-13 PROCEDURE — 81002 URINALYSIS NONAUTO W/O SCOPE: CPT | Performed by: EMERGENCY MEDICINE

## 2020-09-13 RX ORDER — CARVEDILOL 3.12 MG/1
3.12 TABLET ORAL 2 TIMES DAILY WITH MEALS
COMMUNITY
Start: 2020-09-11 | End: 2020-10-27 | Stop reason: HOSPADM

## 2020-09-13 NOTE — ED NOTES
Pt is resting quietly with no complaints at present time  Pt is calm and cooperative with a flat affect  Pt denies any SI/HI/AH/VH at present time  Pt verbalizes contract for safety  Staff from [facility] is at bedside  RN instructed her to please notify staff if she plans to leave so we can have our staff sit with the pt  Staff member verbalizes understanding  Will continue to monitor        Dagmar Dash RN  09/13/20 [id number]

## 2020-09-13 NOTE — ED PROVIDER NOTES
History  Chief Complaint   Patient presents with    Psychiatric Evaluation     To ED after eloping out of his window at [facility]  [employer] and trying to run into the traffic  Patient states that he was trying to kill himself  Denies any HI  48-year-old male coming from [facility], jumped out of 1st story window  No injuries  Plan to jump in front of traffic to kill himself  He thinks that people are after him  He appears very flat in terms of affect and withdrawn  No complaints no injuries no pain  Take his medications apparently  No other modifying factors or associated symptoms  It appears he was recently discharged from [facility] is medical clearance for crisis consult          Prior to Admission Medications   Prescriptions Last Dose Informant Patient Reported? Taking?    DULoxetine (CYMBALTA) 60 mg delayed release capsule  Outside Facility (Specify) Yes No   Sig: Take 60 mg by mouth daily   aspirin (ECOTRIN LOW STRENGTH) 81 mg EC tablet   Yes No   Sig: Take 81 mg by mouth daily   atorvastatin (LIPITOR) 40 mg tablet   No No   Sig: Take 1 tablet (40 mg total) by mouth daily with dinner   cariprazine (VRAYLAR) 6 MG capsule   Yes No   Sig: Take 6 mg by mouth daily    carvedilol (COREG) 3 125 mg tablet   Yes Yes   Sig: Take 3 125 mg by mouth   clonazePAM (KlonoPIN) 0 5 mg tablet  Outside Facility (Specify) Yes No   Sig: Take 0 5 mg by mouth every morning    clonazePAM (KlonoPIN) 0 5 mg tablet  Outside Facility (Specify) Yes No   Sig: Take 0 5 mg by mouth every 6 (six) hours as needed for anxiety    clonazePAM (KlonoPIN) 1 mg tablet  Outside Facility (Specify) Yes No   Sig: Take 1 mg by mouth daily at bedtime    mirtazapine (REMERON) 15 mg tablet   Yes No   Sig: Take 15 mg by mouth   perphenazine 4 mg tablet   No No   Sig: Take 3 tablets (12 mg total) by mouth 2 (two) times a day for 10 days   polyethylene glycol (MIRALAX) 17 g packet   No No   Sig: Take 17 g by mouth daily   senna (SENOKOT) 8 6 mg   Yes No   Sig: Take 17 2 mg by mouth daily at bedtime as needed for constipation       Facility-Administered Medications: None       Past Medical History:   Diagnosis Date    Anxiety     Bipolar 1 disorder (HCC)     Bowel habit changes     Chronic pain disorder     Coronary artery disease     CVA [facility]cerebral vascular accident) ([facility] )     Depression     Family hx of colon cancer     father and paternal uncle    Myocardial infarction [facility])     Psychiatric disorder     Psychiatric illness     Stroke ([place] [address] ) 12/2015    CVA x3  Pt states he is unaware of when he had first two CVA    Suicide attempt [facility])        Past Surgical History:   Procedure Laterality Date    COLONOSCOPY         Family History   Problem Relation Age of Onset    No Known Problems Mother     No Known Problems Father     No Known Problems Sister     No Known Problems Brother     No Known Problems Maternal Aunt     No Known Problems Paternal Aunt     No Known Problems Maternal Uncle     No Known Problems Paternal Uncle     No Known Problems Maternal Grandfather     No Known Problems Maternal Grandmother     No Known Problems Paternal Grandfather     No Known Problems Paternal Grandmother     No Known Problems Cousin     ADD / ADHD Neg Hx     Alcohol abuse Neg Hx     Anxiety disorder Neg Hx     Bipolar disorder Neg Hx     Completed Suicide  Neg Hx     Dementia Neg Hx     Depression Neg Hx     Drug abuse Neg Hx     OCD Neg Hx     Psychiatric Illness Neg Hx     Psychosis Neg Hx     Schizoaffective Disorder  Neg Hx     Schizophrenia Neg Hx     Self-Injury Neg Hx     Suicide Attempts Neg Hx      I have reviewed and agree with the history as documented      E-Cigarette/Vaping    E-Cigarette Use Never User      E-Cigarette/Vaping Substances    Nicotine No     Flavoring No     Other No     Unknown No      Social History     Tobacco Use    Smoking status: Current Every Day Smoker     Packs/day: 1 00 Years: 15 00     Pack years: 15 00     Types: Cigarettes    Smokeless tobacco: Never Used    Tobacco comment: Wishes to quit   Substance Use Topics    Alcohol use: Never     Alcohol/week: 0 0 standard drinks     Frequency: Never     Drinks per session: Patient refused     Binge frequency: Never     Comment: pt denies    Drug use: No     Comment: pt denies       Review of Systems   Unable to perform ROS: Psychiatric disorder       Physical Exam  Physical Exam  Vitals signs and nursing note reviewed  Constitutional:       Appearance: He is well-developed  HENT:      Head: Normocephalic and atraumatic  Right Ear: External ear normal       Left Ear: External ear normal    Eyes:      Conjunctiva/sclera: Conjunctivae normal       Pupils: Pupils are equal, round, and reactive to light  Neck:      Musculoskeletal: Normal range of motion and neck supple  Thyroid: No thyromegaly  Vascular: No JVD  Cardiovascular:      Rate and Rhythm: Normal rate and regular rhythm  Heart sounds: Normal heart sounds  No murmur  No friction rub  No gallop  Pulmonary:      Effort: Pulmonary effort is normal  No respiratory distress  Breath sounds: Normal breath sounds  No wheezing or rales  Abdominal:      General: Bowel sounds are normal  There is no distension  Palpations: Abdomen is soft  Tenderness: There is no guarding or rebound  Musculoskeletal: Normal range of motion  Lymphadenopathy:      Cervical: No cervical adenopathy  Skin:     General: Skin is warm  Neurological:      Mental Status: He is alert and oriented to person, place, and time  Cranial Nerves: No cranial nerve deficit     Psychiatric:         Behavior: Behavior normal          Vital Signs  ED Triage Vitals [09/13/20 1839]   Temperature Pulse Respirations Blood Pressure SpO2   (!) 97 3 °F (36 3 °C) 80 20 138/77 98 %      Temp Source Heart Rate Source Patient Position - Orthostatic VS BP Location FiO2 (%) Tympanic Monitor Sitting Right arm --      Pain Score       --           Vitals:    09/13/20 1839   BP: 138/77   Pulse: 80   Patient Position - Orthostatic VS: Sitting         Visual Acuity      ED Medications  Medications - No data to display    Diagnostic Studies  Results Reviewed     Procedure Component Value Units Date/Time    Novel Coronavirus Earline Ormond [facility] [[id number]]  (Normal) Collected:  09/13/20 2211    Lab Status:  Final result Specimen:  Nares from Nose Updated:  09/13/20 2306     SARS-CoV-2 Negative    Narrative: The specimen collection materials, transport medium, and/or testing methodology utilized in the production of these test results have been proven to be reliable in a limited validation with an abbreviated program under the Emergency Utilization Authorization provided by the FDA  Testing reported as "Presumptive positive" will be confirmed with secondary testing with a reference laboratory to ensure result accuracy  Clinical caution and judgement should be used with the interpretation of these results with consideration of the clinical impression and other laboratory testing  Testing reported as "Positive" or "Negative" has been proven to be accurate according to standard laboratory validation requirements  All testing is performed with control materials showing appropriate reactivity at standard intervals        POCT alcohol breath test [[id number]]  (Normal) Resulted:  09/13/20 2122    Lab Status:  Final result Updated:  09/13/20 2122     EXTBreath Alcohol 0 00    Rapid drug screen, urine [[id number]]  (Normal) Collected:  09/13/20 1905    Lab Status:  Final result Specimen:  Urine, Clean Catch Updated:  09/13/20 1923     Amph/Meth UR Negative     Barbiturate Ur Negative     Benzodiazepine Urine Negative     Cocaine Urine Negative     Methadone Urine Negative     Opiate Urine Negative     PCP Ur Negative     THC Urine Negative     Oxycodone Urine Negative    Narrative:       FOR MEDICAL PURPOSES ONLY  IF CONFIRMATION NEEDED PLEASE CONTACT THE LAB WITHIN 5 DAYS      Drug Screen Cutoff Levels:  AMPHETAMINE/METHAMPHETAMINES  1000 ng/mL  BARBITURATES     200 ng/mL  BENZODIAZEPINES     200 ng/mL  COCAINE      300 ng/mL  METHADONE      300 ng/mL  OPIATES      300 ng/mL  PHENCYCLIDINE     25 ng/mL  THC       50 ng/mL  OXYCODONE      100 ng/mL    POCT urinalysis dipstick [[id number]]  (Normal) Resulted:  09/13/20 1910    Lab Status:  Final result Specimen:  Urine Updated:  09/13/20 1911     Color, UA yellow     Clarity, UA hazy     Glucose, UA (Ref: Negative) negative     Bilirubin, UA (Ref: Negative) negative     Ketones, UA (Ref: Negative) negative     Spec Grav, UA (Ref:1 003-1 030) 1 010     Blood, UA (Ref: Negative) negative     pH, UA (Ref: 4 5-8 0) 7 5     Protein, UA (Ref: Negative) negative     Urobilinogen, UA (Ref: 0 2- 1 0) 0 2      Leukocytes, UA (Ref: Negative) negative     Nitrite, UA (Ref: Negative) negative    Basic metabolic panel [[id number]] Collected:  09/13/20 1847    Lab Status:  Final result Specimen:  Blood from Arm, Right Updated:  09/13/20 1911     Sodium 139 mmol/L      Potassium 3 9 mmol/L      Chloride 105 mmol/L      CO2 28 mmol/L      ANION GAP 6 mmol/L      BUN 15 mg/dL      Creatinine 1 12 mg/dL      Glucose 124 mg/dL      Calcium 9 0 mg/dL      eGFR 73 ml/min/1 73sq m     Narrative:       Lis guidelines for Chronic Kidney Disease (CKD):     Stage 1 with normal or high GFR (GFR > 90 mL/min/1 73 square meters)    Stage 2 Mild CKD (GFR = 60-89 mL/min/1 73 square meters)    Stage 3A Moderate CKD (GFR = 45-59 mL/min/1 73 square meters)    Stage 3B Moderate CKD (GFR = 30-44 mL/min/1 73 square meters)    Stage 4 Severe CKD (GFR = 15-29 mL/min/1 73 square meters)    Stage 5 End Stage CKD (GFR <15 mL/min/1 73 square meters)  Note: GFR calculation is accurate only with a steady state creatinine    CBC and differential [[id number]] (Abnormal) Collected:  09/13/20 1847    Lab Status:  Final result Specimen:  Blood from Arm, Right Updated:  09/13/20 1854     WBC 8 49 Thousand/uL      RBC 4 92 Million/uL      Hemoglobin 15 5 g/dL      Hematocrit 46 9 %      MCV 95 fL      MCH 31 5 pg      MCHC 33 0 g/dL      RDW 11 9 %      MPV 9 8 fL      Platelets 106 Thousands/uL      nRBC 0 /100 WBCs      Neutrophils Relative 76 %      Immat GRANS % 0 %      Lymphocytes Relative 15 %      Monocytes Relative 7 %      Eosinophils Relative 1 %      Basophils Relative 1 %      Neutrophils Absolute 6 46 Thousands/µL      Immature Grans Absolute 0 03 Thousand/uL      Lymphocytes Absolute 1 26 Thousands/µL      Monocytes Absolute 0 55 Thousand/µL      Eosinophils Absolute 0 11 Thousand/µL      Basophils Absolute 0 08 Thousands/µL                  No orders to display              Procedures  Procedures         ED Course  ED Course as of Sep 14 0052   Mon Sep 14, 2020   0024 Patient states he is suicidal and wants to                                     Patient medically cleared for behavioral health evaluation           MDM  Number of Diagnoses or Management Options  Depression: new and requires workup  Suicidal ideations: new and requires workup     Amount and/or Complexity of Data Reviewed  Clinical lab tests: ordered and reviewed  Tests in the medicine section of CPT®: ordered and reviewed  Independent visualization of images, tracings, or specimens: yes        Disposition  Final diagnoses:   Suicidal ideations   Depression     Time reflects when diagnosis was documented in both MDM as applicable and the Disposition within this note     Time User Action Codes Description Comment    9/14/2020 12:51 AM Fabby COLÓN Add [R45 851] Suicidal ideations     9/14/2020 12:51 AM Sarmad Covarrubias Add [F32 9] Depression       ED Disposition     None      Follow-up Information    None         Patient's Medications   Discharge Prescriptions    No medications on file     No discharge procedures on file      PDMP Review     None          ED Provider  Electronically Signed by           Blas Blount DO  09/14/20 DO Keyonna  09/14/20 Mary

## 2020-09-14 ENCOUNTER — HOSPITAL ENCOUNTER (INPATIENT)
Facility: HOSPITAL | Age: 55
LOS: 43 days | Discharge: HOME | DRG: 885 | End: 2020-10-27
Attending: PSYCHIATRY & NEUROLOGY | Admitting: PSYCHIATRY & NEUROLOGY
Payer: COMMERCIAL

## 2020-09-14 VITALS
RESPIRATION RATE: 18 BRPM | OXYGEN SATURATION: 97 % | HEART RATE: 65 BPM | BODY MASS INDEX: 23.85 KG/M2 | TEMPERATURE: 97.9 F | SYSTOLIC BLOOD PRESSURE: 134 MMHG | DIASTOLIC BLOOD PRESSURE: 83 MMHG | WEIGHT: 171 LBS

## 2020-09-14 DIAGNOSIS — F25.9 SCHIZOAFFECTIVE DISORDER (HCC): Primary | ICD-10-CM

## 2020-09-14 DIAGNOSIS — Z86.73 HISTORY OF CVA (CEREBROVASCULAR ACCIDENT): ICD-10-CM

## 2020-09-14 DIAGNOSIS — I21.4 NSTEMI (NON-ST ELEVATED MYOCARDIAL INFARCTION) (HCC): ICD-10-CM

## 2020-09-14 DIAGNOSIS — Z72.0 TOBACCO ABUSE: ICD-10-CM

## 2020-09-14 DIAGNOSIS — F25.0 SCHIZOAFFECTIVE DISORDER, BIPOLAR TYPE (HCC): Chronic | ICD-10-CM

## 2020-09-14 DIAGNOSIS — K59.00 CONSTIPATION: ICD-10-CM

## 2020-09-14 DIAGNOSIS — E78.49 OTHER HYPERLIPIDEMIA: ICD-10-CM

## 2020-09-14 DIAGNOSIS — F01.50 MAJOR NEUROCOGNITIVE DISORDER, DUE TO VASCULAR DISEASE, WITHOUT BEHAVIORAL DISTURBANCE, MILD (HCC): Chronic | ICD-10-CM

## 2020-09-14 DIAGNOSIS — I25.2 HISTORY OF MI (MYOCARDIAL INFARCTION): ICD-10-CM

## 2020-09-14 LAB
ATRIAL RATE: 82 BPM
P AXIS: 67 DEGREES
PR INTERVAL: 160 MS
QRS AXIS: -39 DEGREES
QRSD INTERVAL: 86 MS
QT INTERVAL: 360 MS
QTC INTERVAL: 420 MS
T WAVE AXIS: 80 DEGREES
VENTRICULAR RATE: 82 BPM

## 2020-09-14 PROCEDURE — 93010 ELECTROCARDIOGRAM REPORT: CPT | Performed by: INTERNAL MEDICINE

## 2020-09-14 RX ORDER — CLONAZEPAM 0.5 MG/1
0.5 TABLET ORAL EVERY 6 HOURS PRN
Status: CANCELLED | OUTPATIENT
Start: 2020-09-14

## 2020-09-14 RX ORDER — CARVEDILOL 3.12 MG/1
3.12 TABLET ORAL 2 TIMES DAILY WITH MEALS
Status: CANCELLED | OUTPATIENT
Start: 2020-09-14 | End: 2020-10-11

## 2020-09-14 RX ORDER — ACETAMINOPHEN 325 MG/1
325 TABLET ORAL EVERY 6 HOURS PRN
Status: DISCONTINUED | OUTPATIENT
Start: 2020-09-14 | End: 2020-10-27 | Stop reason: HOSPADM

## 2020-09-14 RX ORDER — NICOTINE 21 MG/24HR
1 PATCH, TRANSDERMAL 24 HOURS TRANSDERMAL DAILY
Status: DISCONTINUED | OUTPATIENT
Start: 2020-09-15 | End: 2020-10-14

## 2020-09-14 RX ORDER — OLANZAPINE 10 MG/1
10 INJECTION, POWDER, LYOPHILIZED, FOR SOLUTION INTRAMUSCULAR
Status: DISCONTINUED | OUTPATIENT
Start: 2020-09-14 | End: 2020-09-15

## 2020-09-14 RX ORDER — BUSPIRONE HYDROCHLORIDE 5 MG/1
5 TABLET ORAL 3 TIMES DAILY
Status: CANCELLED | OUTPATIENT
Start: 2020-09-14

## 2020-09-14 RX ORDER — RISPERIDONE 1 MG/1
1 TABLET, ORALLY DISINTEGRATING ORAL EVERY 4 HOURS PRN
Status: DISCONTINUED | OUTPATIENT
Start: 2020-09-14 | End: 2020-09-15

## 2020-09-14 RX ORDER — MAGNESIUM HYDROXIDE/ALUMINUM HYDROXICE/SIMETHICONE 120; 1200; 1200 MG/30ML; MG/30ML; MG/30ML
30 SUSPENSION ORAL EVERY 4 HOURS PRN
Status: DISCONTINUED | OUTPATIENT
Start: 2020-09-14 | End: 2020-10-27 | Stop reason: HOSPADM

## 2020-09-14 RX ORDER — ACETAMINOPHEN 325 MG/1
650 TABLET ORAL EVERY 4 HOURS PRN
Status: CANCELLED | OUTPATIENT
Start: 2020-09-14

## 2020-09-14 RX ORDER — LORAZEPAM 2 MG/ML
1 INJECTION INTRAMUSCULAR EVERY 4 HOURS PRN
Status: DISCONTINUED | OUTPATIENT
Start: 2020-09-14 | End: 2020-09-15

## 2020-09-14 RX ORDER — MAGNESIUM HYDROXIDE/ALUMINUM HYDROXICE/SIMETHICONE 120; 1200; 1200 MG/30ML; MG/30ML; MG/30ML
30 SUSPENSION ORAL EVERY 4 HOURS PRN
Status: CANCELLED | OUTPATIENT
Start: 2020-09-14

## 2020-09-14 RX ORDER — HYDROXYZINE HYDROCHLORIDE 25 MG/1
25 TABLET, FILM COATED ORAL EVERY 4 HOURS PRN
Status: DISCONTINUED | OUTPATIENT
Start: 2020-09-14 | End: 2020-09-15

## 2020-09-14 RX ORDER — MIRTAZAPINE 15 MG/1
15 TABLET, FILM COATED ORAL
Status: CANCELLED | OUTPATIENT
Start: 2020-09-14

## 2020-09-14 RX ORDER — HYDROXYZINE HYDROCHLORIDE 25 MG/1
25 TABLET, FILM COATED ORAL EVERY 4 HOURS PRN
Status: CANCELLED | OUTPATIENT
Start: 2020-09-14

## 2020-09-14 RX ORDER — ACETAMINOPHEN 325 MG/1
650 TABLET ORAL EVERY 4 HOURS PRN
Status: DISCONTINUED | OUTPATIENT
Start: 2020-09-14 | End: 2020-10-27 | Stop reason: HOSPADM

## 2020-09-14 RX ORDER — DULOXETIN HYDROCHLORIDE 30 MG/1
60 CAPSULE, DELAYED RELEASE ORAL DAILY
Status: CANCELLED | OUTPATIENT
Start: 2020-09-14

## 2020-09-14 RX ORDER — OLANZAPINE 10 MG/1
10 INJECTION, POWDER, LYOPHILIZED, FOR SOLUTION INTRAMUSCULAR
Status: CANCELLED | OUTPATIENT
Start: 2020-09-14

## 2020-09-14 RX ORDER — ASPIRIN 81 MG/1
81 TABLET ORAL DAILY
Status: CANCELLED | OUTPATIENT
Start: 2020-09-14

## 2020-09-14 RX ORDER — BUSPIRONE HYDROCHLORIDE 5 MG/1
5 TABLET ORAL 3 TIMES DAILY
COMMUNITY
End: 2020-10-27 | Stop reason: HOSPADM

## 2020-09-14 RX ORDER — ACETAMINOPHEN 325 MG/1
325 TABLET ORAL EVERY 6 HOURS PRN
Status: CANCELLED | OUTPATIENT
Start: 2020-09-14

## 2020-09-14 RX ORDER — ACETAMINOPHEN 325 MG/1
975 TABLET ORAL EVERY 6 HOURS PRN
Status: DISCONTINUED | OUTPATIENT
Start: 2020-09-14 | End: 2020-10-27 | Stop reason: HOSPADM

## 2020-09-14 RX ORDER — ACETAMINOPHEN 325 MG/1
975 TABLET ORAL EVERY 6 HOURS PRN
Status: CANCELLED | OUTPATIENT
Start: 2020-09-14

## 2020-09-14 RX ORDER — RISPERIDONE 1 MG/1
1 TABLET, ORALLY DISINTEGRATING ORAL EVERY 4 HOURS PRN
Status: CANCELLED | OUTPATIENT
Start: 2020-09-14

## 2020-09-14 RX ORDER — LORAZEPAM 2 MG/ML
1 INJECTION INTRAMUSCULAR EVERY 4 HOURS PRN
Status: CANCELLED | OUTPATIENT
Start: 2020-09-14

## 2020-09-14 RX ORDER — NICOTINE 21 MG/24HR
1 PATCH, TRANSDERMAL 24 HOURS TRANSDERMAL DAILY
Status: CANCELLED | OUTPATIENT
Start: 2020-09-14

## 2020-09-14 RX ORDER — ATORVASTATIN CALCIUM 40 MG/1
40 TABLET, FILM COATED ORAL
Status: CANCELLED | OUTPATIENT
Start: 2020-09-14

## 2020-09-14 NOTE — ED NOTES
Met with patient (group home staff at bedside) and completed the crisis intake assessment as well as the safety risk assessment  Patient presents as depressed with flat affect  Patient eye contact was fair throughout assessment and speech was slow and low in tone  Patient reports SI with plan to walk into traffic/jump in front of a car  Patient also reports disturbances with sleep and appetite ( no weight loss reported) as well as lack of concentration and motivation  Patient reports his stressor to be unhappy living arrangements at the group home  Patient feels that the other residents as the group home are out to get him, talking about him and might even want to hurt him  Patient in agreement to sign a voluntary admission  Voluntary rights and 72 hour notice explained to patient, patient verbalized an understanding  A copy of both were placed on the patients chart  Bed search and insurance in progress

## 2020-09-14 NOTE — ED CARE HANDOFF
Emergency Department Sign Out Note        Sign out and transfer of care from Dr Jazmín Menjivar  See Separate Emergency Department note  The patient, Sharyn Schwartz, was evaluated by the previous provider for SI  Workup Completed:  Medically cleared  ED Course / Workup Pending (followup): Staff from [facility] are home is monitoring patient  Await Crisis evaluation                          ED Course as of Sep 14 1635   Mon Sep 14, 2020   1327 Two-doctor 302 signed since patient is unable to sign a 201          Procedures  MDM  Number of Diagnoses or Management Options  Depression: established and worsening  Suicidal ideations:      Amount and/or Complexity of Data Reviewed  Clinical lab tests: reviewed  Discuss the patient with other providers: yes        Disposition  Final diagnoses:   Suicidal ideations   Depression     Time reflects when diagnosis was documented in both MDM as applicable and the Disposition within this note     Time User Action Codes Description Comment    9/14/2020 12:51 AM Arianna Polio F Add [R45 851] Suicidal ideations     9/14/2020 12:51 AM Arianna Polio F Add [F32 9] Depression     9/14/2020  2:29 PM Keo Hang Add [I25 2] History of MI (myocardial infarction)     9/14/2020  2:29 PM Keo Hang Add [Z86 73] History of CVA (cerebrovascular accident)     9/14/2020  2:29 PM Keo Hang Add [E78 49] Other hyperlipidemia     9/14/2020  2:29 PM Keo Hang Add [Z72 0] Tobacco abuse     9/14/2020  2:29 PM Keo Hang Add [F01 50] Major neurocognitive disorder, due to vascular disease, without behavioral disturbance, mild ([address] )     9/14/2020  2:29 PM Keo Hang Modify [F01 50] Major neurocognitive disorder, due to vascular disease, without behavioral disturbance, mild [facility]       ED Disposition     ED Disposition Condition Date/Time Comment    Transfer to Another [facility] Sep 14, 2020  4:27 PM Sharyn Schwartz should be transferred out to [facility] -6T         MD Documentation      Most Recent Value   Patient Condition  The patient has been stabilized such that within reasonable medical probability, no material deterioration of the patient condition or the condition of the unborn child(kartik) is likely to result from the transfer   Reason for Transfer  Level of Care needed not available at this facility   Benefits of Transfer  Specialized equipment and/or services available at the receiving facility (Include comment)________________________   Risks of Transfer  Potential for delay in receiving treatment, Potential deterioration of medical condition   Accepting Physician  Patricia  [address] Name, [address]    (Name & Tel number)  Jovana Waters   Transported by [employer] and [address] #)  [address]   Sending MD Rita Mcclure DO      RN Documentation      Most [address] [facility] Name, [address]    (Name & Tel number)  Jovana Waters   Transported by [employer] and Unit #)  [facility]      Follow-up Information    None       Patient's Medications   Discharge Prescriptions    No medications on file     No discharge procedures on file         ED Provider  Electronically Signed by     Nancy Guallpa DO  09/14/20 3869

## 2020-09-14 NOTE — EMTALA/ACUTE CARE TRANSFER
[facility] EMERGENCY DEPARTMENT  [address]  [facility] [place] [address]  Dept: [phone]      EMTALA TRANSFER CONSENT    NAME Mikel Hernandez                                         1965                              MRN [id number]    I have been informed of my rights regarding examination, treatment, and transfer   by Dr Norma Scott DO    Benefits: Specialized equipment and/or services available at the receiving facility (Include comment)________________________    Risks: Potential for delay in receiving treatment, Potential deterioration of medical condition      Consent for Transfer:  I acknowledge that my medical condition has been evaluated and explained to me by the emergency department physician or other qualified medical person and/or my attending physician, who has recommended that I be transferred to the service of  Accepting Physician: Adams Coffey at [address] Name, [address] : [facility]  The above potential benefits of such transfer, the potential risks associated with such transfer, and the probable risks of not being transferred have been explained to me, and I fully understand them  The doctor has explained that, in my case, the benefits of transfer outweigh the risks  I agree to be transferred  I authorize the performance of emergency medical procedures and treatments upon me in both transit and upon arrival at the receiving facility  Additionally, I authorize the release of any and all medical records to the receiving facility and request they be transported with me, if possible  I understand that the safest mode of transportation during a medical emergency is an ambulance and that the Hospital advocates the use of this mode of transport   Risks of traveling to the receiving facility by car, including absence of medical control, life sustaining equipment, such as oxygen, and medical personnel has been explained to me and I fully understand them     ([facility])  [X]  I consent to the stated transfer and to be transported by ambulance/helicopter  [  ]  I consent to the stated transfer, but refuse transportation by ambulance and accept full responsibility for my transportation by car  I understand the risks of non-ambulance transfers and I exonerate the Hospital and its staff from any deterioration in my condition that results from this refusal     X___________________________________________    DATE  20  TIME________  Signature of patient or legally responsible individual signing on patient behalf           RELATIONSHIP TO PATIENT_________________________          Provider Certification    NAME Alois Cogan                                        ZOILA 1965                              MRN [id number]    A medical screening exam was performed on the above named patient  Based on the examination:    Condition Necessitating Transfer The primary encounter diagnosis was Suicidal ideations  Diagnoses of Depression, History of MI (myocardial infarction), History of CVA (cerebrovascular accident), Other hyperlipidemia, Tobacco abuse, and Major neurocognitive disorder, due to vascular disease, without behavioral disturbance, mild ([facility] [address] ) were also pertinent to this visit      Patient Condition: The patient has been stabilized such that within reasonable medical probability, no material deterioration of the patient condition or the condition of the unborn child(kartik) is likely to result from the transfer    Reason for Transfer: Level of Care needed not available at this facility    Transfer Requirements: Facility [facility]   · Space available and qualified personnel available for treatment as acknowledged by Sean Leger  · Agreed to accept transfer and to provide appropriate medical treatment as acknowledged by       [facility]  · Appropriate medical records of the examination and treatment of the patient are provided at the time of transfer   STAFF INITIAL WHEN COMPLETED _______  · Transfer will be performed by qualified personnel from [facility]  and appropriate transfer equipment as required, including the use of necessary and appropriate life support measures  Provider Certification: I have examined the patient and explained the following risks and benefits of being transferred/refusing transfer to the patient/family:         Based on these reasonable risks and benefits to the patient and/or the unborn child(kartik), and based upon the information available at the time of the patients examination, I certify that the medical benefits reasonably to be expected from the provision of appropriate medical treatments at another medical facility outweigh the increasing risks, if any, to the individuals medical condition, and in the case of labor to the unborn child, from effecting the transfer      X____________________________________________ DATE 09/14/20        TIME_______      ORIGINAL - SEND TO MEDICAL RECORDS   COPY - SEND WITH PATIENT DURING TRANSFER

## 2020-09-14 NOTE — ED NOTES
Patient is accepted at [facility]  Patient is accepted by Dr Trevin Varner per Vini Hudson at intake            Nurse report is to be called to [phone] prior to patient transfer

## 2020-09-14 NOTE — ED NOTES
Mauricio does not require prior authorization if secondary payor; patient has Medicare A&B primary       Rui Hernandez, OLIVER  09/14/20   [id number]

## 2020-09-14 NOTE — EMTALA/ACUTE CARE TRANSFER
Liliam Banerjee [facility] EMERGENCY DEPARTMENT  [address]  [facility] [address]  Dept: [phone]      EMTALA TRANSFER CONSENT    NAME Zunilda Hernandez DOB 1965                              MRN [id number]    I have been informed of my rights regarding examination, treatment, and transfer   by Dr Ruth Tamayo DO    Benefits: Specialized equipment and/or services available at the receiving facility (Include comment)________________________    Risks: Potential for delay in receiving treatment, Potential deterioration of medical condition      Consent for Transfer:  I acknowledge that my medical condition has been evaluated and explained to me by the emergency department physician or other qualified medical person and/or my attending physician, who has recommended that I be transferred to the service of  Accepting Physician: Veronika Smyth at [address], [address] : [facility]  The above potential benefits of such transfer, the potential risks associated with such transfer, and the probable risks of not being transferred have been explained to me, and I fully understand them  The doctor has explained that, in my case, the benefits of transfer outweigh the risks  I agree to be transferred  I authorize the performance of emergency medical procedures and treatments upon me in both transit and upon arrival at the receiving facility  Additionally, I authorize the release of any and all medical records to the receiving facility and request they be transported with me, if possible  I understand that the safest mode of transportation during a medical emergency is an ambulance and that the Hospital advocates the use of this mode of transport   Risks of traveling to the receiving facility by car, including absence of medical control, life sustaining equipment, such as oxygen, and medical personnel has been explained to me and I fully understand them     ([facility])  [  ]  I consent to the stated transfer and to be transported by ambulance/helicopter  [  ]  I consent to the stated transfer, but refuse transportation by ambulance and accept full responsibility for my transportation by car  I understand the risks of non-ambulance transfers and I exonerate the Hospital and its staff from any deterioration in my condition that results from this refusal     X___________________________________________    DATE  20  TIME________  Signature of patient or legally responsible individual signing on patient behalf           RELATIONSHIP TO PATIENT_________________________          Provider Certification    NAME Marco Santiago                                         1965                              MRN [id number]    A medical screening exam was performed on the above named patient  Based on the examination:    Condition Necessitating Transfer The primary encounter diagnosis was Suicidal ideations  Diagnoses of Depression, History of MI (myocardial infarction), History of CVA (cerebrovascular accident), Other hyperlipidemia, Tobacco abuse, and Major neurocognitive disorder, due to vascular disease, without behavioral disturbance, mild ([address] ) were also pertinent to this visit      Patient Condition: The patient has been stabilized such that within reasonable medical probability, no material deterioration of the patient condition or the condition of the unborn child(kartik) is likely to result from the transfer    Reason for Transfer: Level of Care needed not available at this facility    Transfer Requirements: Facility [facility]   · Space available and qualified personnel available for treatment as acknowledged by Zechariah Loco  · Agreed to accept transfer and to provide appropriate medical treatment as acknowledged by       [facility]  · Appropriate medical records of the examination and treatment of the patient are provided at the time of transfer   STAFF INITIAL WHEN COMPLETED _______  · Transfer will be performed by qualified personnel from [facility]  and appropriate transfer equipment as required, including the use of necessary and appropriate life support measures  Provider Certification: I have examined the patient and explained the following risks and benefits of being transferred/refusing transfer to the patient/family:         Based on these reasonable risks and benefits to the patient and/or the unborn child(kartik), and based upon the information available at the time of the patients examination, I certify that the medical benefits reasonably to be expected from the provision of appropriate medical treatments at another medical facility outweigh the increasing risks, if any, to the individuals medical condition, and in the case of labor to the unborn child, from effecting the transfer      X____________________________________________ DATE 09/14/20        TIME_______      ORIGINAL - SEND TO MEDICAL RECORDS   COPY - SEND WITH PATIENT DURING TRANSFER

## 2020-09-14 NOTE — ED NOTES
Spoke with [facility]UR[facility] at [facility]    Transportation is arranged with [employer] is scheduled for 1930   Patient may go to the floor at [facility]

## 2020-09-15 ENCOUNTER — APPOINTMENT (INPATIENT)
Dept: CT IMAGING | Facility: HOSPITAL | Age: 55
DRG: 885 | End: 2020-09-15
Payer: COMMERCIAL

## 2020-09-15 PROCEDURE — G1004 CDSM NDSC: HCPCS

## 2020-09-15 PROCEDURE — 99222 1ST HOSP IP/OBS MODERATE 55: CPT | Performed by: PHYSICIAN ASSISTANT

## 2020-09-15 PROCEDURE — 99222 1ST HOSP IP/OBS MODERATE 55: CPT | Performed by: PSYCHIATRY & NEUROLOGY

## 2020-09-15 PROCEDURE — 70450 CT HEAD/BRAIN W/O DYE: CPT

## 2020-09-15 RX ORDER — LORAZEPAM 2 MG/ML
1 INJECTION INTRAMUSCULAR ONCE AS NEEDED
Status: DISCONTINUED | OUTPATIENT
Start: 2020-09-15 | End: 2020-10-27 | Stop reason: HOSPADM

## 2020-09-15 RX ORDER — PERPHENAZINE 8 MG
16 TABLET ORAL EVERY 12 HOURS SCHEDULED
Status: DISCONTINUED | OUTPATIENT
Start: 2020-09-15 | End: 2020-09-16

## 2020-09-15 RX ORDER — CLONAZEPAM 1 MG/1
1 TABLET ORAL
Status: DISCONTINUED | OUTPATIENT
Start: 2020-09-15 | End: 2020-09-24

## 2020-09-15 RX ORDER — CLONAZEPAM 0.5 MG/1
0.5 TABLET ORAL EVERY MORNING
Status: DISCONTINUED | OUTPATIENT
Start: 2020-09-16 | End: 2020-10-08

## 2020-09-15 RX ORDER — ATORVASTATIN CALCIUM 40 MG/1
40 TABLET, FILM COATED ORAL
Status: DISCONTINUED | OUTPATIENT
Start: 2020-09-15 | End: 2020-09-15

## 2020-09-15 RX ORDER — ATORVASTATIN CALCIUM 40 MG/1
40 TABLET, FILM COATED ORAL
Status: DISCONTINUED | OUTPATIENT
Start: 2020-09-15 | End: 2020-10-27 | Stop reason: HOSPADM

## 2020-09-15 RX ORDER — DULOXETIN HYDROCHLORIDE 60 MG/1
60 CAPSULE, DELAYED RELEASE ORAL DAILY
Status: DISCONTINUED | OUTPATIENT
Start: 2020-09-15 | End: 2020-09-24

## 2020-09-15 RX ORDER — CARVEDILOL 3.12 MG/1
3.12 TABLET ORAL 2 TIMES DAILY WITH MEALS
Status: DISCONTINUED | OUTPATIENT
Start: 2020-09-15 | End: 2020-09-15

## 2020-09-15 RX ORDER — OLANZAPINE 10 MG/1
5 INJECTION, POWDER, LYOPHILIZED, FOR SOLUTION INTRAMUSCULAR
Status: DISCONTINUED | OUTPATIENT
Start: 2020-09-15 | End: 2020-10-27 | Stop reason: HOSPADM

## 2020-09-15 RX ORDER — DULOXETIN HYDROCHLORIDE 60 MG/1
60 CAPSULE, DELAYED RELEASE ORAL DAILY
Status: DISCONTINUED | OUTPATIENT
Start: 2020-09-16 | End: 2020-09-15

## 2020-09-15 RX ORDER — OLANZAPINE 5 MG/1
5 TABLET ORAL
Status: DISCONTINUED | OUTPATIENT
Start: 2020-09-15 | End: 2020-10-27 | Stop reason: HOSPADM

## 2020-09-15 RX ORDER — LORAZEPAM 1 MG/1
1 TABLET ORAL EVERY 6 HOURS PRN
Status: DISCONTINUED | OUTPATIENT
Start: 2020-09-15 | End: 2020-10-27 | Stop reason: HOSPADM

## 2020-09-15 RX ORDER — ASPIRIN 81 MG/1
81 TABLET ORAL DAILY
Status: DISCONTINUED | OUTPATIENT
Start: 2020-09-16 | End: 2020-09-15

## 2020-09-15 RX ORDER — PERPHENAZINE 4 MG/1
12 TABLET, FILM COATED ORAL 2 TIMES DAILY
COMMUNITY
End: 2020-10-27 | Stop reason: HOSPADM

## 2020-09-15 RX ORDER — POLYETHYLENE GLYCOL 3350 17 G/17G
17 POWDER, FOR SOLUTION ORAL DAILY
Status: DISCONTINUED | OUTPATIENT
Start: 2020-09-15 | End: 2020-10-27 | Stop reason: HOSPADM

## 2020-09-15 RX ORDER — POLYETHYLENE GLYCOL 3350 17 G/17G
17 POWDER, FOR SOLUTION ORAL DAILY
Status: DISCONTINUED | OUTPATIENT
Start: 2020-09-16 | End: 2020-09-15

## 2020-09-15 RX ORDER — BUSPIRONE HYDROCHLORIDE 5 MG/1
5 TABLET ORAL 3 TIMES DAILY
Status: DISCONTINUED | OUTPATIENT
Start: 2020-09-15 | End: 2020-09-21

## 2020-09-15 RX ORDER — MIRTAZAPINE 15 MG/1
15 TABLET, FILM COATED ORAL
Status: DISCONTINUED | OUTPATIENT
Start: 2020-09-15 | End: 2020-10-09

## 2020-09-15 RX ORDER — ASPIRIN 81 MG/1
81 TABLET ORAL DAILY
Status: DISCONTINUED | OUTPATIENT
Start: 2020-09-15 | End: 2020-10-27 | Stop reason: HOSPADM

## 2020-09-15 RX ORDER — CARVEDILOL 3.12 MG/1
3.12 TABLET ORAL 2 TIMES DAILY WITH MEALS
Status: DISCONTINUED | OUTPATIENT
Start: 2020-09-15 | End: 2020-10-27 | Stop reason: HOSPADM

## 2020-09-15 RX ADMIN — NICOTINE 1 PATCH: 14 PATCH TRANSDERMAL at 09:41

## 2020-09-15 RX ADMIN — OLANZAPINE 7.5 MG: 5 TABLET, FILM COATED ORAL at 17:40

## 2020-09-15 RX ADMIN — CARVEDILOL 3.12 MG: 3.12 TABLET, FILM COATED ORAL at 18:09

## 2020-09-15 RX ADMIN — MIRTAZAPINE 15 MG: 15 TABLET, FILM COATED ORAL at 21:10

## 2020-09-15 RX ADMIN — DULOXETINE HYDROCHLORIDE 60 MG: 60 CAPSULE, DELAYED RELEASE ORAL at 18:09

## 2020-09-15 RX ADMIN — BUSPIRONE HYDROCHLORIDE 5 MG: 5 TABLET ORAL at 18:09

## 2020-09-15 RX ADMIN — BUSPIRONE HYDROCHLORIDE 5 MG: 5 TABLET ORAL at 21:13

## 2020-09-15 RX ADMIN — LORAZEPAM 1 MG: 1 TABLET ORAL at 18:52

## 2020-09-15 RX ADMIN — CLONAZEPAM 1 MG: 1 TABLET ORAL at 21:10

## 2020-09-15 RX ADMIN — PERPHENAZINE 16 MG: 8 TABLET, FILM COATED ORAL at 21:10

## 2020-09-15 RX ADMIN — ASPIRIN 81 MG: 81 TABLET, COATED ORAL at 12:05

## 2020-09-15 RX ADMIN — ATORVASTATIN CALCIUM 40 MG: 40 TABLET, FILM COATED ORAL at 18:08

## 2020-09-15 RX ADMIN — CARIPRAZINE 6 MG: 6 CAPSULE, GELATIN COATED ORAL at 21:13

## 2020-09-15 RX ADMIN — POLYETHYLENE GLYCOL 3350 17 G: 17 POWDER, FOR SOLUTION ORAL at 12:05

## 2020-09-16 PROCEDURE — 99232 SBSQ HOSP IP/OBS MODERATE 35: CPT | Performed by: PSYCHIATRY & NEUROLOGY

## 2020-09-16 RX ADMIN — CLONAZEPAM 0.5 MG: 0.5 TABLET ORAL at 08:22

## 2020-09-16 RX ADMIN — CLONAZEPAM 1 MG: 1 TABLET ORAL at 21:15

## 2020-09-16 RX ADMIN — ASPIRIN 81 MG: 81 TABLET, COATED ORAL at 08:22

## 2020-09-16 RX ADMIN — NICOTINE 1 PATCH: 14 PATCH TRANSDERMAL at 08:23

## 2020-09-16 RX ADMIN — ATORVASTATIN CALCIUM 40 MG: 40 TABLET, FILM COATED ORAL at 17:04

## 2020-09-16 RX ADMIN — MIRTAZAPINE 15 MG: 15 TABLET, FILM COATED ORAL at 21:14

## 2020-09-16 RX ADMIN — PERPHENAZINE 20 MG: 8 TABLET, FILM COATED ORAL at 21:14

## 2020-09-16 RX ADMIN — BUSPIRONE HYDROCHLORIDE 5 MG: 5 TABLET ORAL at 21:15

## 2020-09-16 RX ADMIN — PERPHENAZINE 16 MG: 8 TABLET, FILM COATED ORAL at 08:22

## 2020-09-16 RX ADMIN — BUSPIRONE HYDROCHLORIDE 5 MG: 5 TABLET ORAL at 17:04

## 2020-09-16 RX ADMIN — CARVEDILOL 3.12 MG: 3.12 TABLET, FILM COATED ORAL at 08:22

## 2020-09-16 RX ADMIN — BUSPIRONE HYDROCHLORIDE 5 MG: 5 TABLET ORAL at 08:22

## 2020-09-16 RX ADMIN — CARIPRAZINE 6 MG: 6 CAPSULE, GELATIN COATED ORAL at 21:13

## 2020-09-16 RX ADMIN — DULOXETINE HYDROCHLORIDE 60 MG: 60 CAPSULE, DELAYED RELEASE ORAL at 08:22

## 2020-09-16 RX ADMIN — CARVEDILOL 3.12 MG: 3.12 TABLET, FILM COATED ORAL at 17:04

## 2020-09-17 PROCEDURE — 99232 SBSQ HOSP IP/OBS MODERATE 35: CPT | Performed by: PSYCHIATRY & NEUROLOGY

## 2020-09-17 RX ADMIN — MIRTAZAPINE 15 MG: 15 TABLET, FILM COATED ORAL at 21:02

## 2020-09-17 RX ADMIN — ASPIRIN 81 MG: 81 TABLET, COATED ORAL at 09:44

## 2020-09-17 RX ADMIN — CLONAZEPAM 1 MG: 1 TABLET ORAL at 21:02

## 2020-09-17 RX ADMIN — ATORVASTATIN CALCIUM 40 MG: 40 TABLET, FILM COATED ORAL at 16:41

## 2020-09-17 RX ADMIN — NICOTINE 1 PATCH: 14 PATCH TRANSDERMAL at 09:43

## 2020-09-17 RX ADMIN — CARVEDILOL 3.12 MG: 3.12 TABLET, FILM COATED ORAL at 16:41

## 2020-09-17 RX ADMIN — PERPHENAZINE 20 MG: 8 TABLET, FILM COATED ORAL at 21:02

## 2020-09-17 RX ADMIN — CARIPRAZINE 6 MG: 6 CAPSULE, GELATIN COATED ORAL at 21:02

## 2020-09-17 RX ADMIN — PERPHENAZINE 20 MG: 8 TABLET, FILM COATED ORAL at 09:43

## 2020-09-17 RX ADMIN — BUSPIRONE HYDROCHLORIDE 5 MG: 5 TABLET ORAL at 09:44

## 2020-09-17 RX ADMIN — CARVEDILOL 3.12 MG: 3.12 TABLET, FILM COATED ORAL at 09:43

## 2020-09-17 RX ADMIN — BUSPIRONE HYDROCHLORIDE 5 MG: 5 TABLET ORAL at 16:41

## 2020-09-17 RX ADMIN — DULOXETINE HYDROCHLORIDE 60 MG: 60 CAPSULE, DELAYED RELEASE ORAL at 09:43

## 2020-09-17 RX ADMIN — BUSPIRONE HYDROCHLORIDE 5 MG: 5 TABLET ORAL at 21:02

## 2020-09-17 RX ADMIN — CLONAZEPAM 0.5 MG: 0.5 TABLET ORAL at 09:44

## 2020-09-18 PROCEDURE — 99232 SBSQ HOSP IP/OBS MODERATE 35: CPT | Performed by: PSYCHIATRY & NEUROLOGY

## 2020-09-18 RX ORDER — PERPHENAZINE 4 MG/1
4 TABLET, FILM COATED ORAL
Status: DISCONTINUED | OUTPATIENT
Start: 2020-09-19 | End: 2020-09-21

## 2020-09-18 RX ADMIN — CLONAZEPAM 0.5 MG: 0.5 TABLET ORAL at 08:33

## 2020-09-18 RX ADMIN — BUSPIRONE HYDROCHLORIDE 5 MG: 5 TABLET ORAL at 08:34

## 2020-09-18 RX ADMIN — CLONAZEPAM 1 MG: 1 TABLET ORAL at 21:01

## 2020-09-18 RX ADMIN — CARVEDILOL 3.12 MG: 3.12 TABLET, FILM COATED ORAL at 08:34

## 2020-09-18 RX ADMIN — ATORVASTATIN CALCIUM 40 MG: 40 TABLET, FILM COATED ORAL at 17:21

## 2020-09-18 RX ADMIN — NICOTINE 1 PATCH: 14 PATCH TRANSDERMAL at 08:39

## 2020-09-18 RX ADMIN — PERPHENAZINE 20 MG: 8 TABLET, FILM COATED ORAL at 08:34

## 2020-09-18 RX ADMIN — BUSPIRONE HYDROCHLORIDE 5 MG: 5 TABLET ORAL at 17:21

## 2020-09-18 RX ADMIN — BUSPIRONE HYDROCHLORIDE 5 MG: 5 TABLET ORAL at 21:01

## 2020-09-18 RX ADMIN — MIRTAZAPINE 15 MG: 15 TABLET, FILM COATED ORAL at 21:01

## 2020-09-18 RX ADMIN — CARVEDILOL 3.12 MG: 3.12 TABLET, FILM COATED ORAL at 17:21

## 2020-09-18 RX ADMIN — DULOXETINE HYDROCHLORIDE 60 MG: 60 CAPSULE, DELAYED RELEASE ORAL at 08:33

## 2020-09-18 RX ADMIN — ASPIRIN 81 MG: 81 TABLET, COATED ORAL at 08:33

## 2020-09-18 RX ADMIN — PERPHENAZINE 20 MG: 8 TABLET, FILM COATED ORAL at 21:01

## 2020-09-18 RX ADMIN — POLYETHYLENE GLYCOL 3350 17 G: 17 POWDER, FOR SOLUTION ORAL at 08:35

## 2020-09-18 RX ADMIN — CARIPRAZINE 6 MG: 6 CAPSULE, GELATIN COATED ORAL at 21:01

## 2020-09-19 PROCEDURE — 99232 SBSQ HOSP IP/OBS MODERATE 35: CPT | Performed by: PSYCHIATRY & NEUROLOGY

## 2020-09-19 RX ADMIN — ATORVASTATIN CALCIUM 40 MG: 40 TABLET, FILM COATED ORAL at 17:02

## 2020-09-19 RX ADMIN — CLONAZEPAM 1 MG: 1 TABLET ORAL at 21:01

## 2020-09-19 RX ADMIN — BUSPIRONE HYDROCHLORIDE 5 MG: 5 TABLET ORAL at 21:01

## 2020-09-19 RX ADMIN — OLANZAPINE 5 MG: 5 TABLET, FILM COATED ORAL at 15:25

## 2020-09-19 RX ADMIN — DULOXETINE HYDROCHLORIDE 60 MG: 60 CAPSULE, DELAYED RELEASE ORAL at 08:41

## 2020-09-19 RX ADMIN — CARIPRAZINE 6 MG: 6 CAPSULE, GELATIN COATED ORAL at 21:01

## 2020-09-19 RX ADMIN — CARVEDILOL 3.12 MG: 3.12 TABLET, FILM COATED ORAL at 08:41

## 2020-09-19 RX ADMIN — PERPHENAZINE 20 MG: 8 TABLET, FILM COATED ORAL at 21:01

## 2020-09-19 RX ADMIN — BUSPIRONE HYDROCHLORIDE 5 MG: 5 TABLET ORAL at 08:41

## 2020-09-19 RX ADMIN — MIRTAZAPINE 15 MG: 15 TABLET, FILM COATED ORAL at 21:01

## 2020-09-19 RX ADMIN — PERPHENAZINE 4 MG: 4 TABLET, FILM COATED ORAL at 14:29

## 2020-09-19 RX ADMIN — POLYETHYLENE GLYCOL 3350 17 G: 17 POWDER, FOR SOLUTION ORAL at 08:43

## 2020-09-19 RX ADMIN — CARVEDILOL 3.12 MG: 3.12 TABLET, FILM COATED ORAL at 17:02

## 2020-09-19 RX ADMIN — CLONAZEPAM 0.5 MG: 0.5 TABLET ORAL at 08:41

## 2020-09-19 RX ADMIN — PERPHENAZINE 20 MG: 8 TABLET, FILM COATED ORAL at 08:41

## 2020-09-19 RX ADMIN — BUSPIRONE HYDROCHLORIDE 5 MG: 5 TABLET ORAL at 17:02

## 2020-09-19 RX ADMIN — ASPIRIN 81 MG: 81 TABLET, COATED ORAL at 08:40

## 2020-09-19 RX ADMIN — NICOTINE 1 PATCH: 14 PATCH TRANSDERMAL at 08:46

## 2020-09-20 RX ADMIN — ATORVASTATIN CALCIUM 40 MG: 40 TABLET, FILM COATED ORAL at 17:06

## 2020-09-20 RX ADMIN — ASPIRIN 81 MG: 81 TABLET, COATED ORAL at 08:13

## 2020-09-20 RX ADMIN — PERPHENAZINE 4 MG: 4 TABLET, FILM COATED ORAL at 14:17

## 2020-09-20 RX ADMIN — NICOTINE 1 PATCH: 14 PATCH TRANSDERMAL at 08:14

## 2020-09-20 RX ADMIN — CLONAZEPAM 1 MG: 1 TABLET ORAL at 21:28

## 2020-09-20 RX ADMIN — PERPHENAZINE 20 MG: 8 TABLET, FILM COATED ORAL at 21:28

## 2020-09-20 RX ADMIN — BUSPIRONE HYDROCHLORIDE 5 MG: 5 TABLET ORAL at 21:28

## 2020-09-20 RX ADMIN — BUSPIRONE HYDROCHLORIDE 5 MG: 5 TABLET ORAL at 08:13

## 2020-09-20 RX ADMIN — CLONAZEPAM 0.5 MG: 0.5 TABLET ORAL at 08:13

## 2020-09-20 RX ADMIN — BUSPIRONE HYDROCHLORIDE 5 MG: 5 TABLET ORAL at 17:06

## 2020-09-20 RX ADMIN — CARIPRAZINE 6 MG: 6 CAPSULE, GELATIN COATED ORAL at 21:28

## 2020-09-20 RX ADMIN — POLYETHYLENE GLYCOL 3350 17 G: 17 POWDER, FOR SOLUTION ORAL at 08:14

## 2020-09-20 RX ADMIN — DULOXETINE HYDROCHLORIDE 60 MG: 60 CAPSULE, DELAYED RELEASE ORAL at 08:13

## 2020-09-20 RX ADMIN — PERPHENAZINE 20 MG: 8 TABLET, FILM COATED ORAL at 08:13

## 2020-09-20 RX ADMIN — MIRTAZAPINE 15 MG: 15 TABLET, FILM COATED ORAL at 21:28

## 2020-09-20 RX ADMIN — CARVEDILOL 3.12 MG: 3.12 TABLET, FILM COATED ORAL at 08:13

## 2020-09-20 RX ADMIN — LORAZEPAM 1 MG: 1 TABLET ORAL at 11:50

## 2020-09-21 PROCEDURE — 99232 SBSQ HOSP IP/OBS MODERATE 35: CPT | Performed by: NURSE PRACTITIONER

## 2020-09-21 RX ORDER — PERPHENAZINE 8 MG
8 TABLET ORAL
Status: DISCONTINUED | OUTPATIENT
Start: 2020-09-21 | End: 2020-09-22

## 2020-09-21 RX ADMIN — PERPHENAZINE 20 MG: 8 TABLET, FILM COATED ORAL at 08:05

## 2020-09-21 RX ADMIN — DULOXETINE HYDROCHLORIDE 60 MG: 60 CAPSULE, DELAYED RELEASE ORAL at 08:05

## 2020-09-21 RX ADMIN — BUSPIRONE HYDROCHLORIDE 5 MG: 5 TABLET ORAL at 08:05

## 2020-09-21 RX ADMIN — CLONAZEPAM 0.5 MG: 0.5 TABLET ORAL at 08:05

## 2020-09-21 RX ADMIN — CARIPRAZINE 6 MG: 6 CAPSULE, GELATIN COATED ORAL at 21:22

## 2020-09-21 RX ADMIN — ATORVASTATIN CALCIUM 40 MG: 40 TABLET, FILM COATED ORAL at 16:55

## 2020-09-21 RX ADMIN — PERPHENAZINE 20 MG: 8 TABLET, FILM COATED ORAL at 21:22

## 2020-09-21 RX ADMIN — ASPIRIN 81 MG: 81 TABLET, COATED ORAL at 08:05

## 2020-09-21 RX ADMIN — CARVEDILOL 3.12 MG: 3.12 TABLET, FILM COATED ORAL at 08:04

## 2020-09-21 RX ADMIN — PERPHENAZINE 8 MG: 8 TABLET, FILM COATED ORAL at 13:24

## 2020-09-21 RX ADMIN — POLYETHYLENE GLYCOL 3350 17 G: 17 POWDER, FOR SOLUTION ORAL at 08:04

## 2020-09-21 RX ADMIN — MIRTAZAPINE 15 MG: 15 TABLET, FILM COATED ORAL at 21:23

## 2020-09-21 RX ADMIN — CLONAZEPAM 1 MG: 1 TABLET ORAL at 21:22

## 2020-09-22 PROCEDURE — 99232 SBSQ HOSP IP/OBS MODERATE 35: CPT | Performed by: NURSE PRACTITIONER

## 2020-09-22 RX ADMIN — DULOXETINE HYDROCHLORIDE 60 MG: 60 CAPSULE, DELAYED RELEASE ORAL at 09:19

## 2020-09-22 RX ADMIN — ATORVASTATIN CALCIUM 40 MG: 40 TABLET, FILM COATED ORAL at 17:54

## 2020-09-22 RX ADMIN — CARIPRAZINE 6 MG: 6 CAPSULE, GELATIN COATED ORAL at 21:37

## 2020-09-22 RX ADMIN — CARVEDILOL 3.12 MG: 3.12 TABLET, FILM COATED ORAL at 09:18

## 2020-09-22 RX ADMIN — MIRTAZAPINE 15 MG: 15 TABLET, FILM COATED ORAL at 21:37

## 2020-09-22 RX ADMIN — PERPHENAZINE 20 MG: 8 TABLET, FILM COATED ORAL at 21:36

## 2020-09-22 RX ADMIN — PERPHENAZINE 20 MG: 8 TABLET, FILM COATED ORAL at 09:18

## 2020-09-22 RX ADMIN — CLONAZEPAM 0.5 MG: 0.5 TABLET ORAL at 09:18

## 2020-09-22 RX ADMIN — CLONAZEPAM 1 MG: 1 TABLET ORAL at 21:37

## 2020-09-22 RX ADMIN — ASPIRIN 81 MG: 81 TABLET, COATED ORAL at 09:19

## 2020-09-23 PROCEDURE — 99232 SBSQ HOSP IP/OBS MODERATE 35: CPT | Performed by: NURSE PRACTITIONER

## 2020-09-23 RX ADMIN — CLONAZEPAM 1 MG: 1 TABLET ORAL at 21:12

## 2020-09-23 RX ADMIN — CARVEDILOL 3.12 MG: 3.12 TABLET, FILM COATED ORAL at 16:43

## 2020-09-23 RX ADMIN — DULOXETINE HYDROCHLORIDE 60 MG: 60 CAPSULE, DELAYED RELEASE ORAL at 09:03

## 2020-09-23 RX ADMIN — POLYETHYLENE GLYCOL 3350 17 G: 17 POWDER, FOR SOLUTION ORAL at 09:03

## 2020-09-23 RX ADMIN — CARIPRAZINE 6 MG: 6 CAPSULE, GELATIN COATED ORAL at 21:12

## 2020-09-23 RX ADMIN — ATORVASTATIN CALCIUM 40 MG: 40 TABLET, FILM COATED ORAL at 16:43

## 2020-09-23 RX ADMIN — PERPHENAZINE 20 MG: 8 TABLET, FILM COATED ORAL at 21:12

## 2020-09-23 RX ADMIN — CLONAZEPAM 0.5 MG: 0.5 TABLET ORAL at 09:02

## 2020-09-23 RX ADMIN — ASPIRIN 81 MG: 81 TABLET, COATED ORAL at 09:03

## 2020-09-23 RX ADMIN — CARVEDILOL 3.12 MG: 3.12 TABLET, FILM COATED ORAL at 09:02

## 2020-09-23 RX ADMIN — PERPHENAZINE 20 MG: 8 TABLET, FILM COATED ORAL at 09:03

## 2020-09-23 RX ADMIN — MIRTAZAPINE 15 MG: 15 TABLET, FILM COATED ORAL at 21:12

## 2020-09-24 ENCOUNTER — ANESTHESIA EVENT (OUTPATIENT)
Dept: ANESTHESIOLOGY | Facility: HOSPITAL | Age: 55
End: 2020-09-24

## 2020-09-24 ENCOUNTER — ANESTHESIA (OUTPATIENT)
Dept: ANESTHESIOLOGY | Facility: HOSPITAL | Age: 55
End: 2020-09-24

## 2020-09-24 PROBLEM — F17.200 SMOKING: Status: ACTIVE | Noted: 2019-09-05

## 2020-09-24 PROBLEM — IMO0001 SMOKING: Status: ACTIVE | Noted: 2019-09-05

## 2020-09-24 PROBLEM — I63.9 CVA (CEREBRAL VASCULAR ACCIDENT) (HCC): Status: ACTIVE | Noted: 2020-09-24

## 2020-09-24 PROCEDURE — 99232 SBSQ HOSP IP/OBS MODERATE 35: CPT | Performed by: NURSE PRACTITIONER

## 2020-09-24 RX ORDER — CLONAZEPAM 0.5 MG/1
0.5 TABLET ORAL
Status: DISCONTINUED | OUTPATIENT
Start: 2020-09-24 | End: 2020-10-01

## 2020-09-24 RX ORDER — DULOXETIN HYDROCHLORIDE 20 MG/1
40 CAPSULE, DELAYED RELEASE ORAL DAILY
Status: DISCONTINUED | OUTPATIENT
Start: 2020-09-25 | End: 2020-10-08

## 2020-09-24 RX ORDER — PERPHENAZINE 8 MG
16 TABLET ORAL EVERY 12 HOURS SCHEDULED
Status: DISCONTINUED | OUTPATIENT
Start: 2020-09-24 | End: 2020-10-05

## 2020-09-24 RX ADMIN — CLONAZEPAM 0.5 MG: 0.5 TABLET ORAL at 21:00

## 2020-09-24 RX ADMIN — PERPHENAZINE 16 MG: 8 TABLET, FILM COATED ORAL at 21:00

## 2020-09-24 RX ADMIN — CARIPRAZINE 4.5 MG: 4.5 CAPSULE, GELATIN COATED ORAL at 21:01

## 2020-09-24 RX ADMIN — ATORVASTATIN CALCIUM 40 MG: 40 TABLET, FILM COATED ORAL at 17:19

## 2020-09-24 RX ADMIN — CARVEDILOL 3.12 MG: 3.12 TABLET, FILM COATED ORAL at 17:19

## 2020-09-24 RX ADMIN — ASPIRIN 81 MG: 81 TABLET, COATED ORAL at 09:04

## 2020-09-24 RX ADMIN — CARVEDILOL 3.12 MG: 3.12 TABLET, FILM COATED ORAL at 09:04

## 2020-09-24 RX ADMIN — LORAZEPAM 1 MG: 1 TABLET ORAL at 15:27

## 2020-09-24 RX ADMIN — CLONAZEPAM 0.5 MG: 0.5 TABLET ORAL at 09:05

## 2020-09-24 RX ADMIN — PERPHENAZINE 20 MG: 8 TABLET, FILM COATED ORAL at 09:04

## 2020-09-24 RX ADMIN — MIRTAZAPINE 15 MG: 15 TABLET, FILM COATED ORAL at 21:01

## 2020-09-24 RX ADMIN — DULOXETINE HYDROCHLORIDE 60 MG: 60 CAPSULE, DELAYED RELEASE ORAL at 09:04

## 2020-09-24 RX ADMIN — NICOTINE 1 PATCH: 14 PATCH TRANSDERMAL at 09:03

## 2020-09-25 ENCOUNTER — ANESTHESIA EVENT (INPATIENT)
Dept: PREOP/PACU | Facility: HOSPITAL | Age: 55
DRG: 885 | End: 2020-09-25
Payer: COMMERCIAL

## 2020-09-25 PROCEDURE — 99223 1ST HOSP IP/OBS HIGH 75: CPT | Performed by: INTERNAL MEDICINE

## 2020-09-25 PROCEDURE — 99232 SBSQ HOSP IP/OBS MODERATE 35: CPT | Performed by: NURSE PRACTITIONER

## 2020-09-25 RX ADMIN — POLYETHYLENE GLYCOL 3350 17 G: 17 POWDER, FOR SOLUTION ORAL at 09:09

## 2020-09-25 RX ADMIN — CLONAZEPAM 0.5 MG: 0.5 TABLET ORAL at 09:10

## 2020-09-25 RX ADMIN — CARIPRAZINE 4.5 MG: 4.5 CAPSULE, GELATIN COATED ORAL at 21:17

## 2020-09-25 RX ADMIN — PERPHENAZINE 16 MG: 8 TABLET, FILM COATED ORAL at 09:09

## 2020-09-25 RX ADMIN — MIRTAZAPINE 15 MG: 15 TABLET, FILM COATED ORAL at 21:17

## 2020-09-25 RX ADMIN — PERPHENAZINE 16 MG: 8 TABLET, FILM COATED ORAL at 21:17

## 2020-09-25 RX ADMIN — DULOXETINE HYDROCHLORIDE 40 MG: 20 CAPSULE, DELAYED RELEASE ORAL at 09:09

## 2020-09-25 RX ADMIN — CLONAZEPAM 0.5 MG: 0.5 TABLET ORAL at 21:17

## 2020-09-25 RX ADMIN — ASPIRIN 81 MG: 81 TABLET, COATED ORAL at 09:10

## 2020-09-25 RX ADMIN — ATORVASTATIN CALCIUM 40 MG: 40 TABLET, FILM COATED ORAL at 17:17

## 2020-09-25 RX ADMIN — CARVEDILOL 3.12 MG: 3.12 TABLET, FILM COATED ORAL at 09:14

## 2020-09-25 NOTE — ANESTHESIA PREPROCEDURE EVALUATION
Procedure:  ECT INPATIENT    Relevant Problems   CARDIO  Pt evaluated by Cardiology to clearance for ECT   (+) NSTEMI (non-ST elevated myocardial infarction) ([facility] [address] )   (+) Other hyperlipidemia   (-) Chest pain      GI/HEPATIC   (-) Gastroesophageal reflux disease      NEURO/PSYCH   (+) CVA (cerebral vascular accident) ([facility] [address] )   (+) History of CVA (cerebrovascular accident)   (+) History of MI (myocardial infarction)      PULMONARY   (+) Smoking        Physical Exam    Airway    Mallampati score: II  TM Distance: >3 FB  Neck ROM: full     Dental   No notable dental hx     Cardiovascular  Cardiovascular exam normal    Pulmonary  Pulmonary exam normal     Other Findings        Anesthesia Plan  ASA Score- 3     Anesthesia Type- general with ASA Monitors  Additional Monitors:   Airway Plan:           Plan Factors-Exercise tolerance (METS): >4 METS  Induction- intravenous  Postoperative Plan-     Informed Consent- Anesthetic plan and risks discussed with patient  I personally reviewed this patient with the CRNA  Discussed and agreed on the Anesthesia Plan with the CRNA  Isaak Nicolas

## 2020-09-26 PROCEDURE — 99232 SBSQ HOSP IP/OBS MODERATE 35: CPT | Performed by: NURSE PRACTITIONER

## 2020-09-26 RX ADMIN — CARIPRAZINE 4.5 MG: 4.5 CAPSULE, GELATIN COATED ORAL at 21:23

## 2020-09-26 RX ADMIN — CLONAZEPAM 0.5 MG: 0.5 TABLET ORAL at 08:34

## 2020-09-26 RX ADMIN — ASPIRIN 81 MG: 81 TABLET, COATED ORAL at 08:34

## 2020-09-26 RX ADMIN — PERPHENAZINE 16 MG: 8 TABLET, FILM COATED ORAL at 21:23

## 2020-09-26 RX ADMIN — LORAZEPAM 1 MG: 1 TABLET ORAL at 12:11

## 2020-09-26 RX ADMIN — POLYETHYLENE GLYCOL 3350 17 G: 17 POWDER, FOR SOLUTION ORAL at 08:35

## 2020-09-26 RX ADMIN — PERPHENAZINE 16 MG: 8 TABLET, FILM COATED ORAL at 08:36

## 2020-09-26 RX ADMIN — DULOXETINE HYDROCHLORIDE 40 MG: 20 CAPSULE, DELAYED RELEASE ORAL at 08:34

## 2020-09-26 RX ADMIN — NICOTINE 1 PATCH: 14 PATCH TRANSDERMAL at 08:35

## 2020-09-26 RX ADMIN — CLONAZEPAM 0.5 MG: 0.5 TABLET ORAL at 21:23

## 2020-09-26 RX ADMIN — ATORVASTATIN CALCIUM 40 MG: 40 TABLET, FILM COATED ORAL at 17:13

## 2020-09-26 RX ADMIN — CARVEDILOL 3.12 MG: 3.12 TABLET, FILM COATED ORAL at 08:34

## 2020-09-26 RX ADMIN — CARVEDILOL 3.12 MG: 3.12 TABLET, FILM COATED ORAL at 17:12

## 2020-09-26 RX ADMIN — MIRTAZAPINE 15 MG: 15 TABLET, FILM COATED ORAL at 21:23

## 2020-09-27 PROCEDURE — 99232 SBSQ HOSP IP/OBS MODERATE 35: CPT | Performed by: NURSE PRACTITIONER

## 2020-09-27 RX ADMIN — CLONAZEPAM 0.5 MG: 0.5 TABLET ORAL at 21:06

## 2020-09-27 RX ADMIN — ASPIRIN 81 MG: 81 TABLET, COATED ORAL at 08:30

## 2020-09-27 RX ADMIN — CARIPRAZINE 4.5 MG: 4.5 CAPSULE, GELATIN COATED ORAL at 21:06

## 2020-09-27 RX ADMIN — MIRTAZAPINE 15 MG: 15 TABLET, FILM COATED ORAL at 21:06

## 2020-09-27 RX ADMIN — PERPHENAZINE 16 MG: 8 TABLET, FILM COATED ORAL at 08:30

## 2020-09-27 RX ADMIN — DULOXETINE HYDROCHLORIDE 40 MG: 20 CAPSULE, DELAYED RELEASE ORAL at 08:30

## 2020-09-27 RX ADMIN — POLYETHYLENE GLYCOL 3350 17 G: 17 POWDER, FOR SOLUTION ORAL at 08:29

## 2020-09-27 RX ADMIN — NICOTINE 1 PATCH: 14 PATCH TRANSDERMAL at 08:29

## 2020-09-27 RX ADMIN — CARVEDILOL 3.12 MG: 3.12 TABLET, FILM COATED ORAL at 17:04

## 2020-09-27 RX ADMIN — CARVEDILOL 3.12 MG: 3.12 TABLET, FILM COATED ORAL at 08:34

## 2020-09-27 RX ADMIN — ATORVASTATIN CALCIUM 40 MG: 40 TABLET, FILM COATED ORAL at 17:04

## 2020-09-27 RX ADMIN — CLONAZEPAM 0.5 MG: 0.5 TABLET ORAL at 08:30

## 2020-09-27 RX ADMIN — PERPHENAZINE 16 MG: 8 TABLET, FILM COATED ORAL at 21:06

## 2020-09-28 ENCOUNTER — ANESTHESIA (INPATIENT)
Dept: PREOP/PACU | Facility: HOSPITAL | Age: 55
DRG: 885 | End: 2020-09-28
Payer: COMMERCIAL

## 2020-09-28 ENCOUNTER — APPOINTMENT (INPATIENT)
Dept: PREOP/PACU | Facility: HOSPITAL | Age: 55
DRG: 885 | End: 2020-09-28
Payer: COMMERCIAL

## 2020-09-28 VITALS — HEART RATE: 73 BPM

## 2020-09-28 PROCEDURE — GZB0ZZZ ELECTROCONVULSIVE THERAPY, UNILATERAL-SINGLE SEIZURE: ICD-10-PCS | Performed by: PSYCHIATRY & NEUROLOGY

## 2020-09-28 PROCEDURE — 90870 ELECTROCONVULSIVE THERAPY: CPT | Performed by: PSYCHIATRY & NEUROLOGY

## 2020-09-28 PROCEDURE — 99232 SBSQ HOSP IP/OBS MODERATE 35: CPT | Performed by: NURSE PRACTITIONER

## 2020-09-28 PROCEDURE — 90870 ELECTROCONVULSIVE THERAPY: CPT

## 2020-09-28 RX ORDER — SODIUM CHLORIDE 9 MG/ML
INJECTION, SOLUTION INTRAVENOUS CONTINUOUS PRN
Status: DISCONTINUED | OUTPATIENT
Start: 2020-09-28 | End: 2020-09-28

## 2020-09-28 RX ORDER — GLYCOPYRROLATE 0.2 MG/ML
INJECTION INTRAMUSCULAR; INTRAVENOUS AS NEEDED
Status: DISCONTINUED | OUTPATIENT
Start: 2020-09-28 | End: 2020-09-28

## 2020-09-28 RX ORDER — SODIUM CHLORIDE 9 MG/ML
50 INJECTION, SOLUTION INTRAVENOUS CONTINUOUS
Status: DISCONTINUED | OUTPATIENT
Start: 2020-09-28 | End: 2020-09-28

## 2020-09-28 RX ORDER — SUCCINYLCHOLINE/SOD CL,ISO/PF 100 MG/5ML
SYRINGE (ML) INTRAVENOUS AS NEEDED
Status: DISCONTINUED | OUTPATIENT
Start: 2020-09-28 | End: 2020-09-28

## 2020-09-28 RX ORDER — SODIUM CHLORIDE 9 MG/ML
125 INJECTION, SOLUTION INTRAVENOUS CONTINUOUS
Status: DISCONTINUED | OUTPATIENT
Start: 2020-09-28 | End: 2020-09-28

## 2020-09-28 RX ORDER — ESMOLOL HYDROCHLORIDE 10 MG/ML
INJECTION INTRAVENOUS AS NEEDED
Status: DISCONTINUED | OUTPATIENT
Start: 2020-09-28 | End: 2020-09-28

## 2020-09-28 RX ADMIN — PERPHENAZINE 16 MG: 8 TABLET, FILM COATED ORAL at 08:19

## 2020-09-28 RX ADMIN — CARIPRAZINE 4.5 MG: 4.5 CAPSULE, GELATIN COATED ORAL at 21:15

## 2020-09-28 RX ADMIN — POLYETHYLENE GLYCOL 3350 17 G: 17 POWDER, FOR SOLUTION ORAL at 08:20

## 2020-09-28 RX ADMIN — DULOXETINE HYDROCHLORIDE 40 MG: 20 CAPSULE, DELAYED RELEASE ORAL at 08:19

## 2020-09-28 RX ADMIN — ESMOLOL HYDROCHLORIDE 5 MG: 10 INJECTION, SOLUTION INTRAVENOUS at 06:23

## 2020-09-28 RX ADMIN — CLONAZEPAM 0.5 MG: 0.5 TABLET ORAL at 08:19

## 2020-09-28 RX ADMIN — LORAZEPAM 1 MG: 1 TABLET ORAL at 16:28

## 2020-09-28 RX ADMIN — ATORVASTATIN CALCIUM 40 MG: 40 TABLET, FILM COATED ORAL at 16:56

## 2020-09-28 RX ADMIN — Medication 100 MG: at 06:23

## 2020-09-28 RX ADMIN — CARVEDILOL 3.12 MG: 3.12 TABLET, FILM COATED ORAL at 08:19

## 2020-09-28 RX ADMIN — SODIUM CHLORIDE 50 ML/HR: 0.9 INJECTION, SOLUTION INTRAVENOUS at 05:49

## 2020-09-28 RX ADMIN — MIRTAZAPINE 15 MG: 15 TABLET, FILM COATED ORAL at 21:14

## 2020-09-28 RX ADMIN — ASPIRIN 81 MG: 81 TABLET, COATED ORAL at 08:19

## 2020-09-28 RX ADMIN — CLONAZEPAM 0.5 MG: 0.5 TABLET ORAL at 21:15

## 2020-09-28 RX ADMIN — GLYCOPYRROLATE 0.2 MG: 0.2 INJECTION, SOLUTION INTRAMUSCULAR; INTRAVENOUS at 06:17

## 2020-09-28 RX ADMIN — PERPHENAZINE 16 MG: 8 TABLET, FILM COATED ORAL at 21:14

## 2020-09-28 RX ADMIN — NICOTINE 1 PATCH: 14 PATCH TRANSDERMAL at 08:19

## 2020-09-28 RX ADMIN — SODIUM CHLORIDE: 0.9 INJECTION, SOLUTION INTRAVENOUS at 06:16

## 2020-09-28 RX ADMIN — Medication 140 MG: at 06:23

## 2020-09-29 ENCOUNTER — ANESTHESIA EVENT (INPATIENT)
Dept: PREOP/PACU | Facility: HOSPITAL | Age: 55
DRG: 885 | End: 2020-09-29
Payer: COMMERCIAL

## 2020-09-29 PROCEDURE — 99233 SBSQ HOSP IP/OBS HIGH 50: CPT | Performed by: NURSE PRACTITIONER

## 2020-09-29 RX ADMIN — ATORVASTATIN CALCIUM 40 MG: 40 TABLET, FILM COATED ORAL at 17:16

## 2020-09-29 RX ADMIN — DULOXETINE HYDROCHLORIDE 40 MG: 20 CAPSULE, DELAYED RELEASE ORAL at 08:52

## 2020-09-29 RX ADMIN — PERPHENAZINE 16 MG: 8 TABLET, FILM COATED ORAL at 21:15

## 2020-09-29 RX ADMIN — NICOTINE 1 PATCH: 14 PATCH TRANSDERMAL at 08:53

## 2020-09-29 RX ADMIN — MIRTAZAPINE 15 MG: 15 TABLET, FILM COATED ORAL at 21:15

## 2020-09-29 RX ADMIN — CLONAZEPAM 0.5 MG: 0.5 TABLET ORAL at 21:15

## 2020-09-29 RX ADMIN — PERPHENAZINE 16 MG: 8 TABLET, FILM COATED ORAL at 08:52

## 2020-09-29 RX ADMIN — CARIPRAZINE 4.5 MG: 4.5 CAPSULE, GELATIN COATED ORAL at 21:15

## 2020-09-29 RX ADMIN — POLYETHYLENE GLYCOL 3350 17 G: 17 POWDER, FOR SOLUTION ORAL at 08:53

## 2020-09-29 RX ADMIN — CLONAZEPAM 0.5 MG: 0.5 TABLET ORAL at 08:52

## 2020-09-29 RX ADMIN — ASPIRIN 81 MG: 81 TABLET, COATED ORAL at 08:52

## 2020-09-30 ENCOUNTER — APPOINTMENT (INPATIENT)
Dept: PREOP/PACU | Facility: HOSPITAL | Age: 55
DRG: 885 | End: 2020-09-30
Payer: COMMERCIAL

## 2020-09-30 ENCOUNTER — ANESTHESIA (INPATIENT)
Dept: PREOP/PACU | Facility: HOSPITAL | Age: 55
DRG: 885 | End: 2020-09-30
Payer: COMMERCIAL

## 2020-09-30 VITALS — HEART RATE: 48 BPM

## 2020-09-30 PROCEDURE — 90870 ELECTROCONVULSIVE THERAPY: CPT | Performed by: PSYCHIATRY & NEUROLOGY

## 2020-09-30 PROCEDURE — 99232 SBSQ HOSP IP/OBS MODERATE 35: CPT | Performed by: NURSE PRACTITIONER

## 2020-09-30 PROCEDURE — GZB0ZZZ ELECTROCONVULSIVE THERAPY, UNILATERAL-SINGLE SEIZURE: ICD-10-PCS | Performed by: PSYCHIATRY & NEUROLOGY

## 2020-09-30 PROCEDURE — 90870 ELECTROCONVULSIVE THERAPY: CPT

## 2020-09-30 RX ORDER — ESMOLOL HYDROCHLORIDE 10 MG/ML
INJECTION INTRAVENOUS AS NEEDED
Status: DISCONTINUED | OUTPATIENT
Start: 2020-09-30 | End: 2020-09-30

## 2020-09-30 RX ORDER — SUCCINYLCHOLINE/SOD CL,ISO/PF 100 MG/5ML
SYRINGE (ML) INTRAVENOUS AS NEEDED
Status: DISCONTINUED | OUTPATIENT
Start: 2020-09-30 | End: 2020-09-30

## 2020-09-30 RX ORDER — DOCUSATE SODIUM 100 MG/1
100 CAPSULE, LIQUID FILLED ORAL 2 TIMES DAILY
Status: DISCONTINUED | OUTPATIENT
Start: 2020-09-30 | End: 2020-10-27 | Stop reason: HOSPADM

## 2020-09-30 RX ORDER — BISACODYL 10 MG
10 SUPPOSITORY, RECTAL RECTAL ONCE
Status: DISCONTINUED | OUTPATIENT
Start: 2020-09-30 | End: 2020-09-30

## 2020-09-30 RX ORDER — BISACODYL 10 MG
10 SUPPOSITORY, RECTAL RECTAL ONCE
Status: COMPLETED | OUTPATIENT
Start: 2020-09-30 | End: 2020-09-30

## 2020-09-30 RX ORDER — SODIUM CHLORIDE 9 MG/ML
50 INJECTION, SOLUTION INTRAVENOUS CONTINUOUS
Status: DISCONTINUED | OUTPATIENT
Start: 2020-09-30 | End: 2020-10-05

## 2020-09-30 RX ORDER — GLYCOPYRROLATE 0.2 MG/ML
INJECTION INTRAMUSCULAR; INTRAVENOUS AS NEEDED
Status: DISCONTINUED | OUTPATIENT
Start: 2020-09-30 | End: 2020-09-30

## 2020-09-30 RX ADMIN — ASPIRIN 81 MG: 81 TABLET, COATED ORAL at 08:55

## 2020-09-30 RX ADMIN — ATORVASTATIN CALCIUM 40 MG: 40 TABLET, FILM COATED ORAL at 17:13

## 2020-09-30 RX ADMIN — ESMOLOL HYDROCHLORIDE 50 MG: 10 INJECTION, SOLUTION INTRAVENOUS at 06:59

## 2020-09-30 RX ADMIN — CLONAZEPAM 0.5 MG: 0.5 TABLET ORAL at 21:25

## 2020-09-30 RX ADMIN — METHOHEXITAL SODIUM 100 MG: 500 INJECTION, POWDER, LYOPHILIZED, FOR SOLUTION INTRAMUSCULAR; INTRAVENOUS; RECTAL at 06:54

## 2020-09-30 RX ADMIN — NICOTINE 1 PATCH: 14 PATCH TRANSDERMAL at 08:56

## 2020-09-30 RX ADMIN — CLONAZEPAM 0.5 MG: 0.5 TABLET ORAL at 08:55

## 2020-09-30 RX ADMIN — CARVEDILOL 3.12 MG: 3.12 TABLET, FILM COATED ORAL at 08:55

## 2020-09-30 RX ADMIN — PERPHENAZINE 16 MG: 8 TABLET, FILM COATED ORAL at 21:25

## 2020-09-30 RX ADMIN — DULOXETINE HYDROCHLORIDE 40 MG: 20 CAPSULE, DELAYED RELEASE ORAL at 08:55

## 2020-09-30 RX ADMIN — SODIUM CHLORIDE 50 ML/HR: 0.9 INJECTION, SOLUTION INTRAVENOUS at 06:13

## 2020-09-30 RX ADMIN — Medication 140 MG: at 06:54

## 2020-09-30 RX ADMIN — CARIPRAZINE 4.5 MG: 4.5 CAPSULE, GELATIN COATED ORAL at 21:25

## 2020-09-30 RX ADMIN — PERPHENAZINE 16 MG: 8 TABLET, FILM COATED ORAL at 08:55

## 2020-09-30 RX ADMIN — POLYETHYLENE GLYCOL 3350 17 G: 17 POWDER, FOR SOLUTION ORAL at 08:56

## 2020-09-30 RX ADMIN — DOCUSATE SODIUM 100 MG: 100 CAPSULE, LIQUID FILLED ORAL at 11:00

## 2020-09-30 RX ADMIN — DOCUSATE SODIUM 100 MG: 100 CAPSULE, LIQUID FILLED ORAL at 17:13

## 2020-09-30 RX ADMIN — GLYCOPYRROLATE 0.2 MG: 0.2 INJECTION, SOLUTION INTRAMUSCULAR; INTRAVENOUS at 06:43

## 2020-09-30 RX ADMIN — BISACODYL 10 MG: 10 SUPPOSITORY RECTAL at 11:17

## 2020-09-30 RX ADMIN — MIRTAZAPINE 15 MG: 15 TABLET, FILM COATED ORAL at 21:25

## 2020-09-30 NOTE — ANESTHESIA PREPROCEDURE EVALUATION
Procedure:  ECT INPATIENT    Relevant Problems   CARDIO  Pt evaluated by Cardiology to clearance for ECT   (+) NSTEMI (non-ST elevated myocardial infarction) ([facility] [address] )   (+) Other hyperlipidemia   (-) Chest pain      GI/HEPATIC   (-) Gastroesophageal reflux disease      NEURO/PSYCH   (+) CVA (cerebral vascular accident) ([facility] [address] )   (+) History of CVA (cerebrovascular accident)   (+) History of MI (myocardial infarction)      PULMONARY   (+) Smoking        Physical Exam    Airway    Mallampati score: II  TM Distance: >3 FB  Neck ROM: full     Dental   No notable dental hx     Cardiovascular  Cardiovascular exam normal    Pulmonary  Pulmonary exam normal     Other Findings        Anesthesia Plan  ASA Score- 3     Anesthesia Type- general with ASA Monitors  Additional Monitors:   Airway Plan:           Plan Factors-Exercise tolerance (METS): >4 METS  Induction- intravenous  Postoperative Plan-     Informed Consent- Anesthetic plan and risks discussed with patient  I personally reviewed this patient with the CRNA  Discussed and agreed on the Anesthesia Plan with the CRNA               Lab Results   Component Value Date    HGBA1C 5 2 08/03/2020       Lab Results   Component Value Date     01/02/2016    K 3 9 09/13/2020     09/13/2020    CO2 28 09/13/2020    ANIONGAP 11 01/02/2016    BUN 15 09/13/2020    CREATININE 1 12 09/13/2020    GLUCOSE 99 10/01/2016    GLUF 86 07/13/2017    CALCIUM 9 0 09/13/2020    AST 20 08/10/2020    ALT 21 08/10/2020    ALKPHOS 62 08/10/2020    PROT 7 6 01/02/2016    BILITOT 0 60 01/02/2016    EGFR 73 09/13/2020       Lab Results   Component Value Date    WBC 8 49 09/13/2020    HGB 15 5 09/13/2020    HCT 46 9 09/13/2020    MCV 95 09/13/2020     09/13/2020   ASSESSMENT:   · Preoperative CV risk assessment prior to ECT  · Schizoaffective disorder  · CAD s/p cath w/ 50% mRCA and luminal irregularities of LAD and LCx, September 2019  · History of Non ST-elevation MI, September 2019  · Abnormal ECG with anterior and inferior infarct age indeterminate  · LVEF 50%, apical akinesis, grade 1 diastolic dysfunction, trace MR, September 2019  History of CVA, December 2015    Echo sept 2019   LEFT VENTRICLE:  Size was normal   Systolic function was at the lower limits of normal  Ejection fraction was estimated to be 50 %  There was akinesis of the apex    Wall thickness was normal   Doppler parameters were consistent with abnormal left ventricular relaxation (grade 1 diastolic dysfunction)      RIGHT VENTRICLE:  The size was normal   Systolic function was normal      MITRAL VALVE:  There was trace regurgitation

## 2020-09-30 NOTE — ANESTHESIA PREPROCEDURE EVALUATION
Procedure:  ECT INPATIENT    Relevant Problems   CARDIO   (+) [facility]non[facility] elevated [facility]) [facility] )   (+) Other hyperlipidemia      NEURO/PSYCH   (+) CVA (cerebral vascular accident) ([facility] )   (+) History of CVA (cerebrovascular accident)   (+) History of MI (myocardial infarction)      PULMONARY   (+) Smoking

## 2020-09-30 NOTE — ADDENDUM NOTE
Addendum  created 09/30/20 5919 by Reena Proctor MD    Attestation recorded in [address] [facility] filed

## 2020-09-30 NOTE — ANESTHESIA POSTPROCEDURE EVALUATION
Post-Op Assessment Note    CV Status:  Stable  Pain Score: 0    Pain management: adequate     Mental Status:  Alert and awake   Hydration Status:  Euvolemic   PONV Controlled:  Controlled   Airway Patency:  Patent      Post Op Vitals Reviewed: Yes      Staff: CRNA         No complications documented      BP   126/75   Temp      Pulse  69   Resp   20   SpO2   100

## 2020-10-01 ENCOUNTER — ANESTHESIA (OUTPATIENT)
Dept: ANESTHESIOLOGY | Facility: HOSPITAL | Age: 55
End: 2020-10-01

## 2020-10-01 ENCOUNTER — ANESTHESIA EVENT (OUTPATIENT)
Dept: ANESTHESIOLOGY | Facility: HOSPITAL | Age: 55
End: 2020-10-01

## 2020-10-01 ENCOUNTER — ANESTHESIA EVENT (INPATIENT)
Dept: PREOP/PACU | Facility: HOSPITAL | Age: 55
DRG: 885 | End: 2020-10-01
Payer: COMMERCIAL

## 2020-10-01 PROCEDURE — 99232 SBSQ HOSP IP/OBS MODERATE 35: CPT | Performed by: NURSE PRACTITIONER

## 2020-10-01 RX ADMIN — POLYETHYLENE GLYCOL 3350 17 G: 17 POWDER, FOR SOLUTION ORAL at 08:33

## 2020-10-01 RX ADMIN — CARIPRAZINE 4.5 MG: 4.5 CAPSULE, GELATIN COATED ORAL at 21:02

## 2020-10-01 RX ADMIN — ASPIRIN 81 MG: 81 TABLET, COATED ORAL at 08:33

## 2020-10-01 RX ADMIN — ATORVASTATIN CALCIUM 40 MG: 40 TABLET, FILM COATED ORAL at 17:07

## 2020-10-01 RX ADMIN — MIRTAZAPINE 15 MG: 15 TABLET, FILM COATED ORAL at 21:03

## 2020-10-01 RX ADMIN — DULOXETINE HYDROCHLORIDE 40 MG: 20 CAPSULE, DELAYED RELEASE ORAL at 08:33

## 2020-10-01 RX ADMIN — DOCUSATE SODIUM 100 MG: 100 CAPSULE, LIQUID FILLED ORAL at 08:33

## 2020-10-01 RX ADMIN — DOCUSATE SODIUM 100 MG: 100 CAPSULE, LIQUID FILLED ORAL at 17:07

## 2020-10-01 RX ADMIN — PERPHENAZINE 16 MG: 8 TABLET, FILM COATED ORAL at 21:02

## 2020-10-01 RX ADMIN — CLONAZEPAM 0.5 MG: 0.5 TABLET ORAL at 08:33

## 2020-10-01 RX ADMIN — PERPHENAZINE 16 MG: 8 TABLET, FILM COATED ORAL at 08:34

## 2020-10-01 RX ADMIN — CARVEDILOL 3.12 MG: 3.12 TABLET, FILM COATED ORAL at 08:33

## 2020-10-01 RX ADMIN — LORAZEPAM 1 MG: 1 TABLET ORAL at 18:32

## 2020-10-02 ENCOUNTER — APPOINTMENT (INPATIENT)
Dept: PREOP/PACU | Facility: HOSPITAL | Age: 55
DRG: 885 | End: 2020-10-02
Payer: COMMERCIAL

## 2020-10-02 ENCOUNTER — ANESTHESIA (INPATIENT)
Dept: PREOP/PACU | Facility: HOSPITAL | Age: 55
DRG: 885 | End: 2020-10-02
Payer: COMMERCIAL

## 2020-10-02 VITALS — HEART RATE: 84 BPM

## 2020-10-02 PROCEDURE — 90870 ELECTROCONVULSIVE THERAPY: CPT | Performed by: PSYCHIATRY & NEUROLOGY

## 2020-10-02 PROCEDURE — GZB0ZZZ ELECTROCONVULSIVE THERAPY, UNILATERAL-SINGLE SEIZURE: ICD-10-PCS | Performed by: PSYCHIATRY & NEUROLOGY

## 2020-10-02 PROCEDURE — 90870 ELECTROCONVULSIVE THERAPY: CPT

## 2020-10-02 PROCEDURE — 99232 SBSQ HOSP IP/OBS MODERATE 35: CPT | Performed by: NURSE PRACTITIONER

## 2020-10-02 RX ORDER — SODIUM CHLORIDE 9 MG/ML
50 INJECTION, SOLUTION INTRAVENOUS CONTINUOUS
Status: CANCELLED | OUTPATIENT
Start: 2020-10-02

## 2020-10-02 RX ORDER — ESMOLOL HYDROCHLORIDE 10 MG/ML
INJECTION INTRAVENOUS AS NEEDED
Status: DISCONTINUED | OUTPATIENT
Start: 2020-10-02 | End: 2020-10-02

## 2020-10-02 RX ORDER — SUCCINYLCHOLINE/SOD CL,ISO/PF 100 MG/5ML
SYRINGE (ML) INTRAVENOUS AS NEEDED
Status: DISCONTINUED | OUTPATIENT
Start: 2020-10-02 | End: 2020-10-02

## 2020-10-02 RX ORDER — GLYCOPYRROLATE 0.2 MG/ML
INJECTION INTRAMUSCULAR; INTRAVENOUS AS NEEDED
Status: DISCONTINUED | OUTPATIENT
Start: 2020-10-02 | End: 2020-10-02

## 2020-10-02 RX ADMIN — CARIPRAZINE 4.5 MG: 4.5 CAPSULE, GELATIN COATED ORAL at 21:02

## 2020-10-02 RX ADMIN — POLYETHYLENE GLYCOL 3350 17 G: 17 POWDER, FOR SOLUTION ORAL at 09:48

## 2020-10-02 RX ADMIN — METHOHEXITAL SODIUM 100 MG: 500 INJECTION, POWDER, LYOPHILIZED, FOR SOLUTION INTRAMUSCULAR; INTRAVENOUS; RECTAL at 06:30

## 2020-10-02 RX ADMIN — PERPHENAZINE 16 MG: 8 TABLET, FILM COATED ORAL at 09:48

## 2020-10-02 RX ADMIN — ESMOLOL HYDROCHLORIDE 50 MG: 10 INJECTION, SOLUTION INTRAVENOUS at 06:32

## 2020-10-02 RX ADMIN — MAGNESIUM HYDROXIDE 30 ML: 400 SUSPENSION ORAL at 10:33

## 2020-10-02 RX ADMIN — ASPIRIN 81 MG: 81 TABLET, COATED ORAL at 09:47

## 2020-10-02 RX ADMIN — DOCUSATE SODIUM 100 MG: 100 CAPSULE, LIQUID FILLED ORAL at 09:48

## 2020-10-02 RX ADMIN — Medication 140 MG: at 06:32

## 2020-10-02 RX ADMIN — DOCUSATE SODIUM 100 MG: 100 CAPSULE, LIQUID FILLED ORAL at 17:15

## 2020-10-02 RX ADMIN — CLONAZEPAM 0.5 MG: 0.5 TABLET ORAL at 09:48

## 2020-10-02 RX ADMIN — ATORVASTATIN CALCIUM 40 MG: 40 TABLET, FILM COATED ORAL at 17:14

## 2020-10-02 RX ADMIN — PERPHENAZINE 16 MG: 8 TABLET, FILM COATED ORAL at 21:02

## 2020-10-02 RX ADMIN — GLYCOPYRROLATE 0.2 MG: 0.2 INJECTION, SOLUTION INTRAMUSCULAR; INTRAVENOUS at 06:15

## 2020-10-02 RX ADMIN — SODIUM CHLORIDE 50 ML/HR: 0.9 INJECTION, SOLUTION INTRAVENOUS at 05:54

## 2020-10-02 RX ADMIN — DULOXETINE HYDROCHLORIDE 40 MG: 20 CAPSULE, DELAYED RELEASE ORAL at 09:48

## 2020-10-02 RX ADMIN — BISACODYL 5 MG: 5 TABLET, COATED ORAL at 11:45

## 2020-10-02 RX ADMIN — MIRTAZAPINE 15 MG: 15 TABLET, FILM COATED ORAL at 21:02

## 2020-10-02 RX ADMIN — CARVEDILOL 3.12 MG: 3.12 TABLET, FILM COATED ORAL at 09:47

## 2020-10-03 PROCEDURE — 99232 SBSQ HOSP IP/OBS MODERATE 35: CPT | Performed by: PSYCHIATRY & NEUROLOGY

## 2020-10-03 RX ADMIN — DOCUSATE SODIUM 100 MG: 100 CAPSULE, LIQUID FILLED ORAL at 08:52

## 2020-10-03 RX ADMIN — ATORVASTATIN CALCIUM 40 MG: 40 TABLET, FILM COATED ORAL at 17:08

## 2020-10-03 RX ADMIN — CARIPRAZINE 4.5 MG: 4.5 CAPSULE, GELATIN COATED ORAL at 21:26

## 2020-10-03 RX ADMIN — CARVEDILOL 3.12 MG: 3.12 TABLET, FILM COATED ORAL at 08:52

## 2020-10-03 RX ADMIN — ASPIRIN 81 MG: 81 TABLET, COATED ORAL at 08:52

## 2020-10-03 RX ADMIN — CLONAZEPAM 0.5 MG: 0.5 TABLET ORAL at 08:53

## 2020-10-03 RX ADMIN — PERPHENAZINE 16 MG: 8 TABLET, FILM COATED ORAL at 08:52

## 2020-10-03 RX ADMIN — MIRTAZAPINE 15 MG: 15 TABLET, FILM COATED ORAL at 21:27

## 2020-10-03 RX ADMIN — POLYETHYLENE GLYCOL 3350 17 G: 17 POWDER, FOR SOLUTION ORAL at 08:53

## 2020-10-03 RX ADMIN — PERPHENAZINE 16 MG: 8 TABLET, FILM COATED ORAL at 21:26

## 2020-10-03 RX ADMIN — DULOXETINE HYDROCHLORIDE 40 MG: 20 CAPSULE, DELAYED RELEASE ORAL at 08:52

## 2020-10-03 RX ADMIN — DOCUSATE SODIUM 100 MG: 100 CAPSULE, LIQUID FILLED ORAL at 17:08

## 2020-10-04 PROCEDURE — 99232 SBSQ HOSP IP/OBS MODERATE 35: CPT | Performed by: PSYCHIATRY & NEUROLOGY

## 2020-10-04 RX ADMIN — DOCUSATE SODIUM 100 MG: 100 CAPSULE, LIQUID FILLED ORAL at 08:30

## 2020-10-04 RX ADMIN — DOCUSATE SODIUM 100 MG: 100 CAPSULE, LIQUID FILLED ORAL at 17:15

## 2020-10-04 RX ADMIN — PERPHENAZINE 16 MG: 8 TABLET, FILM COATED ORAL at 21:05

## 2020-10-04 RX ADMIN — DULOXETINE HYDROCHLORIDE 40 MG: 20 CAPSULE, DELAYED RELEASE ORAL at 08:34

## 2020-10-04 RX ADMIN — MIRTAZAPINE 15 MG: 15 TABLET, FILM COATED ORAL at 21:05

## 2020-10-04 RX ADMIN — ASPIRIN 81 MG: 81 TABLET, COATED ORAL at 08:30

## 2020-10-04 RX ADMIN — CARIPRAZINE 4.5 MG: 4.5 CAPSULE, GELATIN COATED ORAL at 21:05

## 2020-10-04 RX ADMIN — CLONAZEPAM 0.5 MG: 0.5 TABLET ORAL at 08:30

## 2020-10-04 RX ADMIN — CARVEDILOL 3.12 MG: 3.12 TABLET, FILM COATED ORAL at 08:30

## 2020-10-04 RX ADMIN — POLYETHYLENE GLYCOL 3350 17 G: 17 POWDER, FOR SOLUTION ORAL at 08:32

## 2020-10-04 RX ADMIN — ATORVASTATIN CALCIUM 40 MG: 40 TABLET, FILM COATED ORAL at 17:15

## 2020-10-04 RX ADMIN — PERPHENAZINE 16 MG: 8 TABLET, FILM COATED ORAL at 08:30

## 2020-10-05 ENCOUNTER — ANESTHESIA EVENT (INPATIENT)
Dept: PREOP/PACU | Facility: HOSPITAL | Age: 55
DRG: 885 | End: 2020-10-05
Payer: COMMERCIAL

## 2020-10-05 ENCOUNTER — ANESTHESIA (INPATIENT)
Dept: PREOP/PACU | Facility: HOSPITAL | Age: 55
DRG: 885 | End: 2020-10-05
Payer: COMMERCIAL

## 2020-10-05 ENCOUNTER — APPOINTMENT (INPATIENT)
Dept: PREOP/PACU | Facility: HOSPITAL | Age: 55
DRG: 885 | End: 2020-10-05
Payer: COMMERCIAL

## 2020-10-05 VITALS — HEART RATE: 71 BPM

## 2020-10-05 PROCEDURE — 99232 SBSQ HOSP IP/OBS MODERATE 35: CPT | Performed by: NURSE PRACTITIONER

## 2020-10-05 PROCEDURE — 90870 ELECTROCONVULSIVE THERAPY: CPT

## 2020-10-05 PROCEDURE — GZB0ZZZ ELECTROCONVULSIVE THERAPY, UNILATERAL-SINGLE SEIZURE: ICD-10-PCS | Performed by: PSYCHIATRY & NEUROLOGY

## 2020-10-05 PROCEDURE — 90870 ELECTROCONVULSIVE THERAPY: CPT | Performed by: PSYCHIATRY & NEUROLOGY

## 2020-10-05 RX ORDER — SODIUM CHLORIDE 9 MG/ML
50 INJECTION, SOLUTION INTRAVENOUS CONTINUOUS
Status: DISCONTINUED | OUTPATIENT
Start: 2020-10-05 | End: 2020-10-05

## 2020-10-05 RX ORDER — SUCCINYLCHOLINE/SOD CL,ISO/PF 100 MG/5ML
SYRINGE (ML) INTRAVENOUS AS NEEDED
Status: DISCONTINUED | OUTPATIENT
Start: 2020-10-05 | End: 2020-10-05

## 2020-10-05 RX ORDER — GLYCOPYRROLATE 0.2 MG/ML
INJECTION INTRAMUSCULAR; INTRAVENOUS AS NEEDED
Status: DISCONTINUED | OUTPATIENT
Start: 2020-10-05 | End: 2020-10-05

## 2020-10-05 RX ORDER — SODIUM CHLORIDE 9 MG/ML
50 INJECTION, SOLUTION INTRAVENOUS CONTINUOUS
Status: DISCONTINUED | OUTPATIENT
Start: 2020-10-05 | End: 2020-10-27 | Stop reason: HOSPADM

## 2020-10-05 RX ORDER — ESMOLOL HYDROCHLORIDE 10 MG/ML
INJECTION INTRAVENOUS AS NEEDED
Status: DISCONTINUED | OUTPATIENT
Start: 2020-10-05 | End: 2020-10-05

## 2020-10-05 RX ADMIN — MIRTAZAPINE 15 MG: 15 TABLET, FILM COATED ORAL at 21:03

## 2020-10-05 RX ADMIN — NICOTINE 1 PATCH: 14 PATCH TRANSDERMAL at 08:11

## 2020-10-05 RX ADMIN — DOCUSATE SODIUM 100 MG: 100 CAPSULE, LIQUID FILLED ORAL at 08:13

## 2020-10-05 RX ADMIN — ESMOLOL HYDROCHLORIDE 50 MG: 10 INJECTION, SOLUTION INTRAVENOUS at 06:46

## 2020-10-05 RX ADMIN — METHOHEXITAL SODIUM 140 MG: 500 INJECTION, POWDER, LYOPHILIZED, FOR SOLUTION INTRAMUSCULAR; INTRAVENOUS; RECTAL at 06:46

## 2020-10-05 RX ADMIN — GLYCOPYRROLATE 0.2 MG: 0.2 INJECTION, SOLUTION INTRAMUSCULAR; INTRAVENOUS at 06:42

## 2020-10-05 RX ADMIN — CARVEDILOL 3.12 MG: 3.12 TABLET, FILM COATED ORAL at 08:12

## 2020-10-05 RX ADMIN — Medication 100 MG: at 06:46

## 2020-10-05 RX ADMIN — DULOXETINE HYDROCHLORIDE 40 MG: 20 CAPSULE, DELAYED RELEASE ORAL at 08:12

## 2020-10-05 RX ADMIN — ATORVASTATIN CALCIUM 40 MG: 40 TABLET, FILM COATED ORAL at 15:38

## 2020-10-05 RX ADMIN — PERPHENAZINE 16 MG: 8 TABLET, FILM COATED ORAL at 08:13

## 2020-10-05 RX ADMIN — SODIUM CHLORIDE: 0.9 INJECTION, SOLUTION INTRAVENOUS at 06:39

## 2020-10-05 RX ADMIN — ASPIRIN 81 MG: 81 TABLET, COATED ORAL at 08:13

## 2020-10-05 RX ADMIN — POLYETHYLENE GLYCOL 3350 17 G: 17 POWDER, FOR SOLUTION ORAL at 08:14

## 2020-10-05 RX ADMIN — DOCUSATE SODIUM 100 MG: 100 CAPSULE, LIQUID FILLED ORAL at 17:16

## 2020-10-05 RX ADMIN — PERPHENAZINE 12 MG: 8 TABLET, FILM COATED ORAL at 21:03

## 2020-10-05 RX ADMIN — CARIPRAZINE 3 MG: 3 CAPSULE, GELATIN COATED ORAL at 21:03

## 2020-10-05 RX ADMIN — CLONAZEPAM 0.5 MG: 0.5 TABLET ORAL at 08:12

## 2020-10-06 ENCOUNTER — ANESTHESIA EVENT (INPATIENT)
Dept: PREOP/PACU | Facility: HOSPITAL | Age: 55
DRG: 885 | End: 2020-10-06
Payer: COMMERCIAL

## 2020-10-06 PROCEDURE — 99232 SBSQ HOSP IP/OBS MODERATE 35: CPT | Performed by: NURSE PRACTITIONER

## 2020-10-06 RX ADMIN — DOCUSATE SODIUM 100 MG: 100 CAPSULE, LIQUID FILLED ORAL at 08:04

## 2020-10-06 RX ADMIN — PERPHENAZINE 12 MG: 8 TABLET, FILM COATED ORAL at 08:03

## 2020-10-06 RX ADMIN — MIRTAZAPINE 15 MG: 15 TABLET, FILM COATED ORAL at 21:29

## 2020-10-06 RX ADMIN — NICOTINE 1 PATCH: 14 PATCH TRANSDERMAL at 08:08

## 2020-10-06 RX ADMIN — PERPHENAZINE 12 MG: 8 TABLET, FILM COATED ORAL at 21:29

## 2020-10-06 RX ADMIN — ATORVASTATIN CALCIUM 40 MG: 40 TABLET, FILM COATED ORAL at 17:19

## 2020-10-06 RX ADMIN — DOCUSATE SODIUM 100 MG: 100 CAPSULE, LIQUID FILLED ORAL at 17:19

## 2020-10-06 RX ADMIN — CLONAZEPAM 0.5 MG: 0.5 TABLET ORAL at 08:03

## 2020-10-06 RX ADMIN — DULOXETINE HYDROCHLORIDE 40 MG: 20 CAPSULE, DELAYED RELEASE ORAL at 08:03

## 2020-10-06 RX ADMIN — CARIPRAZINE 3 MG: 3 CAPSULE, GELATIN COATED ORAL at 21:29

## 2020-10-06 RX ADMIN — CARVEDILOL 3.12 MG: 3.12 TABLET, FILM COATED ORAL at 08:04

## 2020-10-06 RX ADMIN — ASPIRIN 81 MG: 81 TABLET, COATED ORAL at 08:04

## 2020-10-07 ENCOUNTER — APPOINTMENT (INPATIENT)
Dept: PREOP/PACU | Facility: HOSPITAL | Age: 55
DRG: 885 | End: 2020-10-07
Payer: COMMERCIAL

## 2020-10-07 ENCOUNTER — ANESTHESIA (INPATIENT)
Dept: PREOP/PACU | Facility: HOSPITAL | Age: 55
DRG: 885 | End: 2020-10-07
Payer: COMMERCIAL

## 2020-10-07 VITALS — HEART RATE: 84 BPM

## 2020-10-07 PROCEDURE — 90870 ELECTROCONVULSIVE THERAPY: CPT

## 2020-10-07 PROCEDURE — 99232 SBSQ HOSP IP/OBS MODERATE 35: CPT | Performed by: NURSE PRACTITIONER

## 2020-10-07 PROCEDURE — GZB0ZZZ ELECTROCONVULSIVE THERAPY, UNILATERAL-SINGLE SEIZURE: ICD-10-PCS | Performed by: PSYCHIATRY & NEUROLOGY

## 2020-10-07 PROCEDURE — 90870 ELECTROCONVULSIVE THERAPY: CPT | Performed by: PSYCHIATRY & NEUROLOGY

## 2020-10-07 RX ORDER — GLYCOPYRROLATE 0.2 MG/ML
INJECTION INTRAMUSCULAR; INTRAVENOUS AS NEEDED
Status: DISCONTINUED | OUTPATIENT
Start: 2020-10-07 | End: 2020-10-07

## 2020-10-07 RX ORDER — SODIUM CHLORIDE 9 MG/ML
125 INJECTION, SOLUTION INTRAVENOUS CONTINUOUS
Status: DISCONTINUED | OUTPATIENT
Start: 2020-10-07 | End: 2020-10-07

## 2020-10-07 RX ORDER — SODIUM CHLORIDE 9 MG/ML
INJECTION, SOLUTION INTRAVENOUS CONTINUOUS PRN
Status: DISCONTINUED | OUTPATIENT
Start: 2020-10-07 | End: 2020-10-07

## 2020-10-07 RX ORDER — SUCCINYLCHOLINE/SOD CL,ISO/PF 100 MG/5ML
SYRINGE (ML) INTRAVENOUS AS NEEDED
Status: DISCONTINUED | OUTPATIENT
Start: 2020-10-07 | End: 2020-10-07

## 2020-10-07 RX ORDER — ESMOLOL HYDROCHLORIDE 10 MG/ML
INJECTION INTRAVENOUS AS NEEDED
Status: DISCONTINUED | OUTPATIENT
Start: 2020-10-07 | End: 2020-10-07

## 2020-10-07 RX ADMIN — CARVEDILOL 3.12 MG: 3.12 TABLET, FILM COATED ORAL at 08:30

## 2020-10-07 RX ADMIN — DOCUSATE SODIUM 100 MG: 100 CAPSULE, LIQUID FILLED ORAL at 17:18

## 2020-10-07 RX ADMIN — DOCUSATE SODIUM 100 MG: 100 CAPSULE, LIQUID FILLED ORAL at 08:28

## 2020-10-07 RX ADMIN — Medication 140 MG: at 06:26

## 2020-10-07 RX ADMIN — CLONAZEPAM 0.5 MG: 0.5 TABLET ORAL at 08:28

## 2020-10-07 RX ADMIN — ESMOLOL HYDROCHLORIDE 50 MG: 10 INJECTION, SOLUTION INTRAVENOUS at 06:26

## 2020-10-07 RX ADMIN — ATORVASTATIN CALCIUM 40 MG: 40 TABLET, FILM COATED ORAL at 17:18

## 2020-10-07 RX ADMIN — SODIUM CHLORIDE: 0.9 INJECTION, SOLUTION INTRAVENOUS at 06:10

## 2020-10-07 RX ADMIN — GLYCOPYRROLATE 0.2 MG: 0.2 INJECTION, SOLUTION INTRAMUSCULAR; INTRAVENOUS at 06:20

## 2020-10-07 RX ADMIN — PERPHENAZINE 12 MG: 8 TABLET, FILM COATED ORAL at 21:18

## 2020-10-07 RX ADMIN — CARIPRAZINE 3 MG: 3 CAPSULE, GELATIN COATED ORAL at 21:18

## 2020-10-07 RX ADMIN — POLYETHYLENE GLYCOL 3350 17 G: 17 POWDER, FOR SOLUTION ORAL at 08:28

## 2020-10-07 RX ADMIN — ASPIRIN 81 MG: 81 TABLET, COATED ORAL at 08:28

## 2020-10-07 RX ADMIN — PERPHENAZINE 12 MG: 8 TABLET, FILM COATED ORAL at 08:27

## 2020-10-07 RX ADMIN — SODIUM CHLORIDE 125 ML/HR: 0.9 INJECTION, SOLUTION INTRAVENOUS at 05:51

## 2020-10-07 RX ADMIN — DULOXETINE HYDROCHLORIDE 40 MG: 20 CAPSULE, DELAYED RELEASE ORAL at 08:27

## 2020-10-07 RX ADMIN — METHOHEXITAL SODIUM 100 MG: 500 INJECTION, POWDER, LYOPHILIZED, FOR SOLUTION INTRAMUSCULAR; INTRAVENOUS; RECTAL at 06:26

## 2020-10-07 RX ADMIN — MIRTAZAPINE 15 MG: 15 TABLET, FILM COATED ORAL at 21:18

## 2020-10-08 ENCOUNTER — ANESTHESIA EVENT (INPATIENT)
Dept: PREOP/PACU | Facility: HOSPITAL | Age: 55
DRG: 885 | End: 2020-10-08
Payer: COMMERCIAL

## 2020-10-08 PROCEDURE — 99232 SBSQ HOSP IP/OBS MODERATE 35: CPT | Performed by: NURSE PRACTITIONER

## 2020-10-08 RX ORDER — DULOXETIN HYDROCHLORIDE 30 MG/1
30 CAPSULE, DELAYED RELEASE ORAL DAILY
Status: DISCONTINUED | OUTPATIENT
Start: 2020-10-09 | End: 2020-10-13

## 2020-10-08 RX ORDER — CLONAZEPAM 0.5 MG/1
0.25 TABLET ORAL EVERY MORNING
Status: DISCONTINUED | OUTPATIENT
Start: 2020-10-09 | End: 2020-10-09

## 2020-10-08 RX ADMIN — PERPHENAZINE 12 MG: 8 TABLET, FILM COATED ORAL at 09:48

## 2020-10-08 RX ADMIN — ASPIRIN 81 MG: 81 TABLET, COATED ORAL at 09:56

## 2020-10-08 RX ADMIN — CLONAZEPAM 0.5 MG: 0.5 TABLET ORAL at 09:00

## 2020-10-08 RX ADMIN — DOCUSATE SODIUM 100 MG: 100 CAPSULE, LIQUID FILLED ORAL at 17:17

## 2020-10-08 RX ADMIN — POLYETHYLENE GLYCOL 3350 17 G: 17 POWDER, FOR SOLUTION ORAL at 09:00

## 2020-10-08 RX ADMIN — CARIPRAZINE 3 MG: 3 CAPSULE, GELATIN COATED ORAL at 21:08

## 2020-10-08 RX ADMIN — PERPHENAZINE 12 MG: 8 TABLET, FILM COATED ORAL at 21:08

## 2020-10-08 RX ADMIN — CARVEDILOL 3.12 MG: 3.12 TABLET, FILM COATED ORAL at 09:00

## 2020-10-08 RX ADMIN — NICOTINE 1 PATCH: 14 PATCH TRANSDERMAL at 09:45

## 2020-10-08 RX ADMIN — DOCUSATE SODIUM 100 MG: 100 CAPSULE, LIQUID FILLED ORAL at 09:47

## 2020-10-08 RX ADMIN — ATORVASTATIN CALCIUM 40 MG: 40 TABLET, FILM COATED ORAL at 17:17

## 2020-10-08 RX ADMIN — DULOXETINE HYDROCHLORIDE 40 MG: 20 CAPSULE, DELAYED RELEASE ORAL at 09:48

## 2020-10-08 RX ADMIN — MIRTAZAPINE 15 MG: 15 TABLET, FILM COATED ORAL at 21:08

## 2020-10-09 ENCOUNTER — ANESTHESIA (INPATIENT)
Dept: PREOP/PACU | Facility: HOSPITAL | Age: 55
DRG: 885 | End: 2020-10-09
Payer: COMMERCIAL

## 2020-10-09 ENCOUNTER — APPOINTMENT (INPATIENT)
Dept: PREOP/PACU | Facility: HOSPITAL | Age: 55
DRG: 885 | End: 2020-10-09
Payer: COMMERCIAL

## 2020-10-09 VITALS — HEART RATE: 82 BPM

## 2020-10-09 PROCEDURE — 90870 ELECTROCONVULSIVE THERAPY: CPT

## 2020-10-09 PROCEDURE — GZB0ZZZ ELECTROCONVULSIVE THERAPY, UNILATERAL-SINGLE SEIZURE: ICD-10-PCS | Performed by: PSYCHIATRY & NEUROLOGY

## 2020-10-09 PROCEDURE — 90870 ELECTROCONVULSIVE THERAPY: CPT | Performed by: PSYCHIATRY & NEUROLOGY

## 2020-10-09 PROCEDURE — 99232 SBSQ HOSP IP/OBS MODERATE 35: CPT | Performed by: NURSE PRACTITIONER

## 2020-10-09 RX ORDER — SODIUM CHLORIDE 9 MG/ML
INJECTION, SOLUTION INTRAVENOUS CONTINUOUS PRN
Status: DISCONTINUED | OUTPATIENT
Start: 2020-10-09 | End: 2020-10-09

## 2020-10-09 RX ORDER — SUCCINYLCHOLINE/SOD CL,ISO/PF 100 MG/5ML
SYRINGE (ML) INTRAVENOUS AS NEEDED
Status: DISCONTINUED | OUTPATIENT
Start: 2020-10-09 | End: 2020-10-09

## 2020-10-09 RX ORDER — GLYCOPYRROLATE 0.2 MG/ML
INJECTION INTRAMUSCULAR; INTRAVENOUS AS NEEDED
Status: DISCONTINUED | OUTPATIENT
Start: 2020-10-09 | End: 2020-10-09

## 2020-10-09 RX ORDER — MIRTAZAPINE 7.5 MG/1
7.5 TABLET, FILM COATED ORAL
Status: DISCONTINUED | OUTPATIENT
Start: 2020-10-09 | End: 2020-10-27 | Stop reason: HOSPADM

## 2020-10-09 RX ORDER — SODIUM CHLORIDE 9 MG/ML
50 INJECTION, SOLUTION INTRAVENOUS CONTINUOUS
Status: DISCONTINUED | OUTPATIENT
Start: 2020-10-09 | End: 2020-10-09

## 2020-10-09 RX ORDER — ESMOLOL HYDROCHLORIDE 10 MG/ML
INJECTION INTRAVENOUS AS NEEDED
Status: DISCONTINUED | OUTPATIENT
Start: 2020-10-09 | End: 2020-10-09

## 2020-10-09 RX ADMIN — ESMOLOL HYDROCHLORIDE 50 MG: 10 INJECTION, SOLUTION INTRAVENOUS at 06:32

## 2020-10-09 RX ADMIN — METHOHEXITAL SODIUM 100 MG: 500 INJECTION, POWDER, LYOPHILIZED, FOR SOLUTION INTRAMUSCULAR; INTRAVENOUS; RECTAL at 06:30

## 2020-10-09 RX ADMIN — MIRTAZAPINE 7.5 MG: 7.5 TABLET, FILM COATED ORAL at 21:05

## 2020-10-09 RX ADMIN — PERPHENAZINE 12 MG: 8 TABLET, FILM COATED ORAL at 21:05

## 2020-10-09 RX ADMIN — PERPHENAZINE 12 MG: 8 TABLET, FILM COATED ORAL at 08:54

## 2020-10-09 RX ADMIN — CARIPRAZINE 3 MG: 3 CAPSULE, GELATIN COATED ORAL at 21:05

## 2020-10-09 RX ADMIN — DULOXETINE HYDROCHLORIDE 30 MG: 30 CAPSULE, DELAYED RELEASE ORAL at 08:55

## 2020-10-09 RX ADMIN — CARVEDILOL 3.12 MG: 3.12 TABLET, FILM COATED ORAL at 08:54

## 2020-10-09 RX ADMIN — CLONAZEPAM 0.25 MG: 0.5 TABLET ORAL at 08:55

## 2020-10-09 RX ADMIN — DOCUSATE SODIUM 100 MG: 100 CAPSULE, LIQUID FILLED ORAL at 17:56

## 2020-10-09 RX ADMIN — GLYCOPYRROLATE 0.2 MG: 0.2 INJECTION, SOLUTION INTRAMUSCULAR; INTRAVENOUS at 06:28

## 2020-10-09 RX ADMIN — SODIUM CHLORIDE: 0.9 INJECTION, SOLUTION INTRAVENOUS at 06:24

## 2020-10-09 RX ADMIN — ATORVASTATIN CALCIUM 40 MG: 40 TABLET, FILM COATED ORAL at 17:56

## 2020-10-09 RX ADMIN — Medication 140 MG: at 06:31

## 2020-10-09 RX ADMIN — DOCUSATE SODIUM 100 MG: 100 CAPSULE, LIQUID FILLED ORAL at 08:55

## 2020-10-09 RX ADMIN — ASPIRIN 81 MG: 81 TABLET, COATED ORAL at 08:55

## 2020-10-09 RX ADMIN — POLYETHYLENE GLYCOL 3350 17 G: 17 POWDER, FOR SOLUTION ORAL at 08:53

## 2020-10-10 PROCEDURE — 99232 SBSQ HOSP IP/OBS MODERATE 35: CPT | Performed by: PSYCHIATRY & NEUROLOGY

## 2020-10-10 RX ADMIN — ASPIRIN 81 MG: 81 TABLET, COATED ORAL at 08:16

## 2020-10-10 RX ADMIN — POLYETHYLENE GLYCOL 3350 17 G: 17 POWDER, FOR SOLUTION ORAL at 08:16

## 2020-10-10 RX ADMIN — CARVEDILOL 3.12 MG: 3.12 TABLET, FILM COATED ORAL at 08:16

## 2020-10-10 RX ADMIN — DULOXETINE HYDROCHLORIDE 30 MG: 30 CAPSULE, DELAYED RELEASE ORAL at 08:16

## 2020-10-10 RX ADMIN — DOCUSATE SODIUM 100 MG: 100 CAPSULE, LIQUID FILLED ORAL at 17:07

## 2020-10-10 RX ADMIN — PERPHENAZINE 12 MG: 8 TABLET, FILM COATED ORAL at 21:07

## 2020-10-10 RX ADMIN — PERPHENAZINE 12 MG: 8 TABLET, FILM COATED ORAL at 08:15

## 2020-10-10 RX ADMIN — DOCUSATE SODIUM 100 MG: 100 CAPSULE, LIQUID FILLED ORAL at 08:15

## 2020-10-10 RX ADMIN — MIRTAZAPINE 7.5 MG: 7.5 TABLET, FILM COATED ORAL at 21:07

## 2020-10-10 RX ADMIN — CARIPRAZINE 3 MG: 3 CAPSULE, GELATIN COATED ORAL at 21:07

## 2020-10-10 RX ADMIN — ATORVASTATIN CALCIUM 40 MG: 40 TABLET, FILM COATED ORAL at 17:07

## 2020-10-11 ENCOUNTER — APPOINTMENT (INPATIENT)
Dept: CT IMAGING | Facility: HOSPITAL | Age: 55
DRG: 885 | End: 2020-10-11
Payer: COMMERCIAL

## 2020-10-11 ENCOUNTER — ANESTHESIA EVENT (INPATIENT)
Dept: PREOP/PACU | Facility: HOSPITAL | Age: 55
DRG: 885 | End: 2020-10-11
Payer: COMMERCIAL
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
## 2020-10-11 PROCEDURE — 72125 CT NECK SPINE W/O DYE: CPT

## 2020-10-11 PROCEDURE — G1004 CDSM NDSC: HCPCS

## 2020-10-11 PROCEDURE — 70450 CT HEAD/BRAIN W/O DYE: CPT

## 2020-10-11 PROCEDURE — 99231 SBSQ HOSP IP/OBS SF/LOW 25: CPT | Performed by: PHYSICIAN ASSISTANT

## 2020-10-11 PROCEDURE — 99232 SBSQ HOSP IP/OBS MODERATE 35: CPT | Performed by: PSYCHIATRY & NEUROLOGY

## 2020-10-11 PROCEDURE — 70486 CT MAXILLOFACIAL W/O DYE: CPT

## 2020-10-11 RX ADMIN — DOCUSATE SODIUM 100 MG: 100 CAPSULE, LIQUID FILLED ORAL at 08:07

## 2020-10-11 RX ADMIN — CARVEDILOL 3.12 MG: 3.12 TABLET, FILM COATED ORAL at 08:07

## 2020-10-11 RX ADMIN — MIRTAZAPINE 7.5 MG: 7.5 TABLET, FILM COATED ORAL at 21:19

## 2020-10-11 RX ADMIN — DOCUSATE SODIUM 100 MG: 100 CAPSULE, LIQUID FILLED ORAL at 17:38

## 2020-10-11 RX ADMIN — PERPHENAZINE 12 MG: 8 TABLET, FILM COATED ORAL at 21:19

## 2020-10-11 RX ADMIN — POLYETHYLENE GLYCOL 3350 17 G: 17 POWDER, FOR SOLUTION ORAL at 08:07

## 2020-10-11 RX ADMIN — CARIPRAZINE 3 MG: 3 CAPSULE, GELATIN COATED ORAL at 21:19

## 2020-10-11 RX ADMIN — ASPIRIN 81 MG: 81 TABLET, COATED ORAL at 08:07

## 2020-10-11 RX ADMIN — DULOXETINE HYDROCHLORIDE 30 MG: 30 CAPSULE, DELAYED RELEASE ORAL at 08:07

## 2020-10-11 RX ADMIN — ATORVASTATIN CALCIUM 40 MG: 40 TABLET, FILM COATED ORAL at 17:38

## 2020-10-11 RX ADMIN — PERPHENAZINE 12 MG: 8 TABLET, FILM COATED ORAL at 08:07

## 2020-10-12 ENCOUNTER — ANESTHESIA (INPATIENT)
Dept: PREOP/PACU | Facility: HOSPITAL | Age: 55
DRG: 885 | End: 2020-10-12
Payer: COMMERCIAL

## 2020-10-12 ENCOUNTER — APPOINTMENT (INPATIENT)
Dept: PREOP/PACU | Facility: HOSPITAL | Age: 55
DRG: 885 | End: 2020-10-12
Payer: COMMERCIAL

## 2020-10-12 VITALS — HEART RATE: 64 BPM

## 2020-10-12 PROCEDURE — 99232 SBSQ HOSP IP/OBS MODERATE 35: CPT | Performed by: NURSE PRACTITIONER

## 2020-10-12 PROCEDURE — GZB0ZZZ ELECTROCONVULSIVE THERAPY, UNILATERAL-SINGLE SEIZURE: ICD-10-PCS | Performed by: PSYCHIATRY & NEUROLOGY

## 2020-10-12 PROCEDURE — 90870 ELECTROCONVULSIVE THERAPY: CPT

## 2020-10-12 PROCEDURE — 90870 ELECTROCONVULSIVE THERAPY: CPT | Performed by: PSYCHIATRY & NEUROLOGY

## 2020-10-12 RX ORDER — SODIUM CHLORIDE 9 MG/ML
75 INJECTION, SOLUTION INTRAVENOUS CONTINUOUS
Status: DISCONTINUED | OUTPATIENT
Start: 2020-10-12 | End: 2020-10-12

## 2020-10-12 RX ORDER — ESMOLOL HYDROCHLORIDE 10 MG/ML
INJECTION INTRAVENOUS AS NEEDED
Status: DISCONTINUED | OUTPATIENT
Start: 2020-10-12 | End: 2020-10-12

## 2020-10-12 RX ORDER — GLYCOPYRROLATE 0.2 MG/ML
INJECTION INTRAMUSCULAR; INTRAVENOUS AS NEEDED
Status: DISCONTINUED | OUTPATIENT
Start: 2020-10-12 | End: 2020-10-12

## 2020-10-12 RX ORDER — SODIUM CHLORIDE 9 MG/ML
INJECTION, SOLUTION INTRAVENOUS CONTINUOUS PRN
Status: DISCONTINUED | OUTPATIENT
Start: 2020-10-12 | End: 2020-10-12

## 2020-10-12 RX ORDER — SUCCINYLCHOLINE/SOD CL,ISO/PF 100 MG/5ML
SYRINGE (ML) INTRAVENOUS AS NEEDED
Status: DISCONTINUED | OUTPATIENT
Start: 2020-10-12 | End: 2020-10-12

## 2020-10-12 RX ADMIN — DOCUSATE SODIUM 100 MG: 100 CAPSULE, LIQUID FILLED ORAL at 08:09

## 2020-10-12 RX ADMIN — GLYCOPYRROLATE 0.2 MG: 0.2 INJECTION, SOLUTION INTRAMUSCULAR; INTRAVENOUS at 06:29

## 2020-10-12 RX ADMIN — ASPIRIN 81 MG: 81 TABLET, COATED ORAL at 08:08

## 2020-10-12 RX ADMIN — MIRTAZAPINE 7.5 MG: 7.5 TABLET, FILM COATED ORAL at 21:31

## 2020-10-12 RX ADMIN — CARIPRAZINE 3 MG: 3 CAPSULE, GELATIN COATED ORAL at 21:31

## 2020-10-12 RX ADMIN — ESMOLOL HYDROCHLORIDE 50 MG: 10 INJECTION, SOLUTION INTRAVENOUS at 06:30

## 2020-10-12 RX ADMIN — PERPHENAZINE 12 MG: 8 TABLET, FILM COATED ORAL at 08:08

## 2020-10-12 RX ADMIN — ATORVASTATIN CALCIUM 40 MG: 40 TABLET, FILM COATED ORAL at 17:12

## 2020-10-12 RX ADMIN — POLYETHYLENE GLYCOL 3350 17 G: 17 POWDER, FOR SOLUTION ORAL at 08:09

## 2020-10-12 RX ADMIN — DULOXETINE HYDROCHLORIDE 30 MG: 30 CAPSULE, DELAYED RELEASE ORAL at 08:09

## 2020-10-12 RX ADMIN — PERPHENAZINE 12 MG: 8 TABLET, FILM COATED ORAL at 21:30

## 2020-10-12 RX ADMIN — METHOHEXITAL SODIUM 100 MG: 500 INJECTION, POWDER, LYOPHILIZED, FOR SOLUTION INTRAMUSCULAR; INTRAVENOUS; RECTAL at 06:32

## 2020-10-12 RX ADMIN — DOCUSATE SODIUM 100 MG: 100 CAPSULE, LIQUID FILLED ORAL at 17:12

## 2020-10-12 RX ADMIN — Medication 140 MG: at 06:32

## 2020-10-12 RX ADMIN — CARVEDILOL 3.12 MG: 3.12 TABLET, FILM COATED ORAL at 08:08

## 2020-10-12 RX ADMIN — SODIUM CHLORIDE: 0.9 INJECTION, SOLUTION INTRAVENOUS at 05:58

## 2020-10-13 ENCOUNTER — ANESTHESIA EVENT (INPATIENT)
Dept: PREOP/PACU | Facility: HOSPITAL | Age: 55
DRG: 885 | End: 2020-10-13
Payer: COMMERCIAL

## 2020-10-13 PROCEDURE — 99232 SBSQ HOSP IP/OBS MODERATE 35: CPT | Performed by: NURSE PRACTITIONER

## 2020-10-13 RX ORDER — PERPHENAZINE 8 MG
8 TABLET ORAL EVERY 12 HOURS SCHEDULED
Status: DISCONTINUED | OUTPATIENT
Start: 2020-10-13 | End: 2020-10-18

## 2020-10-13 RX ORDER — DULOXETIN HYDROCHLORIDE 20 MG/1
20 CAPSULE, DELAYED RELEASE ORAL DAILY
Status: DISCONTINUED | OUTPATIENT
Start: 2020-10-14 | End: 2020-10-21

## 2020-10-13 RX ADMIN — POLYETHYLENE GLYCOL 3350 17 G: 17 POWDER, FOR SOLUTION ORAL at 08:28

## 2020-10-13 RX ADMIN — DULOXETINE HYDROCHLORIDE 30 MG: 30 CAPSULE, DELAYED RELEASE ORAL at 08:28

## 2020-10-13 RX ADMIN — ASPIRIN 81 MG: 81 TABLET, COATED ORAL at 08:28

## 2020-10-13 RX ADMIN — DOCUSATE SODIUM 100 MG: 100 CAPSULE, LIQUID FILLED ORAL at 17:20

## 2020-10-13 RX ADMIN — CARVEDILOL 3.12 MG: 3.12 TABLET, FILM COATED ORAL at 08:28

## 2020-10-13 RX ADMIN — PERPHENAZINE 8 MG: 8 TABLET, FILM COATED ORAL at 21:05

## 2020-10-13 RX ADMIN — CARIPRAZINE 3 MG: 3 CAPSULE, GELATIN COATED ORAL at 21:05

## 2020-10-13 RX ADMIN — ATORVASTATIN CALCIUM 40 MG: 40 TABLET, FILM COATED ORAL at 17:20

## 2020-10-13 RX ADMIN — DOCUSATE SODIUM 100 MG: 100 CAPSULE, LIQUID FILLED ORAL at 08:28

## 2020-10-13 RX ADMIN — PERPHENAZINE 12 MG: 8 TABLET, FILM COATED ORAL at 08:28

## 2020-10-13 RX ADMIN — MIRTAZAPINE 7.5 MG: 7.5 TABLET, FILM COATED ORAL at 21:05

## 2020-10-14 ENCOUNTER — ANESTHESIA (INPATIENT)
Dept: PREOP/PACU | Facility: HOSPITAL | Age: 55
DRG: 885 | End: 2020-10-14
Payer: COMMERCIAL

## 2020-10-14 ENCOUNTER — APPOINTMENT (INPATIENT)
Dept: PREOP/PACU | Facility: HOSPITAL | Age: 55
DRG: 885 | End: 2020-10-14
Payer: COMMERCIAL

## 2020-10-14 VITALS — HEART RATE: 75 BPM

## 2020-10-14 PROCEDURE — GZB0ZZZ ELECTROCONVULSIVE THERAPY, UNILATERAL-SINGLE SEIZURE: ICD-10-PCS | Performed by: PSYCHIATRY & NEUROLOGY

## 2020-10-14 PROCEDURE — 99232 SBSQ HOSP IP/OBS MODERATE 35: CPT | Performed by: NURSE PRACTITIONER

## 2020-10-14 PROCEDURE — 90870 ELECTROCONVULSIVE THERAPY: CPT | Performed by: PSYCHIATRY & NEUROLOGY

## 2020-10-14 PROCEDURE — 90870 ELECTROCONVULSIVE THERAPY: CPT

## 2020-10-14 RX ORDER — SODIUM CHLORIDE 9 MG/ML
INJECTION, SOLUTION INTRAVENOUS CONTINUOUS PRN
Status: DISCONTINUED | OUTPATIENT
Start: 2020-10-14 | End: 2020-10-14

## 2020-10-14 RX ORDER — SODIUM CHLORIDE 9 MG/ML
125 INJECTION, SOLUTION INTRAVENOUS CONTINUOUS
Status: DISCONTINUED | OUTPATIENT
Start: 2020-10-14 | End: 2020-10-14

## 2020-10-14 RX ORDER — SUCCINYLCHOLINE/SOD CL,ISO/PF 100 MG/5ML
SYRINGE (ML) INTRAVENOUS AS NEEDED
Status: DISCONTINUED | OUTPATIENT
Start: 2020-10-14 | End: 2020-10-14

## 2020-10-14 RX ORDER — ESMOLOL HYDROCHLORIDE 10 MG/ML
INJECTION INTRAVENOUS AS NEEDED
Status: DISCONTINUED | OUTPATIENT
Start: 2020-10-14 | End: 2020-10-14

## 2020-10-14 RX ORDER — GLYCOPYRROLATE 0.2 MG/ML
INJECTION INTRAMUSCULAR; INTRAVENOUS AS NEEDED
Status: DISCONTINUED | OUTPATIENT
Start: 2020-10-14 | End: 2020-10-14

## 2020-10-14 RX ADMIN — DULOXETINE 20 MG: 20 CAPSULE, DELAYED RELEASE ORAL at 08:08

## 2020-10-14 RX ADMIN — CARIPRAZINE 3 MG: 3 CAPSULE, GELATIN COATED ORAL at 21:17

## 2020-10-14 RX ADMIN — PERPHENAZINE 8 MG: 8 TABLET, FILM COATED ORAL at 21:17

## 2020-10-14 RX ADMIN — POLYETHYLENE GLYCOL 3350 17 G: 17 POWDER, FOR SOLUTION ORAL at 08:13

## 2020-10-14 RX ADMIN — CARVEDILOL 3.12 MG: 3.12 TABLET, FILM COATED ORAL at 08:09

## 2020-10-14 RX ADMIN — GLYCOPYRROLATE 0.2 MG: 0.2 INJECTION, SOLUTION INTRAMUSCULAR; INTRAVENOUS at 06:14

## 2020-10-14 RX ADMIN — SODIUM CHLORIDE: 0.9 INJECTION, SOLUTION INTRAVENOUS at 05:52

## 2020-10-14 RX ADMIN — Medication 140 MG: at 06:17

## 2020-10-14 RX ADMIN — ESMOLOL HYDROCHLORIDE 50 MG: 10 INJECTION, SOLUTION INTRAVENOUS at 06:14

## 2020-10-14 RX ADMIN — CARVEDILOL 3.12 MG: 3.12 TABLET, FILM COATED ORAL at 17:10

## 2020-10-14 RX ADMIN — MAGNESIUM HYDROXIDE 30 ML: 400 SUSPENSION ORAL at 21:17

## 2020-10-14 RX ADMIN — PERPHENAZINE 8 MG: 8 TABLET, FILM COATED ORAL at 08:09

## 2020-10-14 RX ADMIN — ATORVASTATIN CALCIUM 40 MG: 40 TABLET, FILM COATED ORAL at 17:10

## 2020-10-14 RX ADMIN — MIRTAZAPINE 7.5 MG: 7.5 TABLET, FILM COATED ORAL at 21:17

## 2020-10-14 RX ADMIN — SODIUM CHLORIDE 125 ML/HR: 0.9 INJECTION, SOLUTION INTRAVENOUS at 05:39

## 2020-10-14 RX ADMIN — DOCUSATE SODIUM 100 MG: 100 CAPSULE, LIQUID FILLED ORAL at 17:10

## 2020-10-14 RX ADMIN — DOCUSATE SODIUM 100 MG: 100 CAPSULE, LIQUID FILLED ORAL at 08:09

## 2020-10-14 RX ADMIN — ASPIRIN 81 MG: 81 TABLET, COATED ORAL at 08:09

## 2020-10-14 RX ADMIN — METHOHEXITAL SODIUM 100 MG: 500 INJECTION, POWDER, LYOPHILIZED, FOR SOLUTION INTRAMUSCULAR; INTRAVENOUS; RECTAL at 06:16

## 2020-10-15 ENCOUNTER — ANESTHESIA EVENT (INPATIENT)
Dept: PREOP/PACU | Facility: HOSPITAL | Age: 55
DRG: 885 | End: 2020-10-15
Payer: COMMERCIAL

## 2020-10-15 PROCEDURE — 99232 SBSQ HOSP IP/OBS MODERATE 35: CPT | Performed by: PSYCHIATRY & NEUROLOGY

## 2020-10-15 RX ORDER — ZOLPIDEM TARTRATE 5 MG/1
5 TABLET ORAL
Status: DISCONTINUED | OUTPATIENT
Start: 2020-10-15 | End: 2020-10-27 | Stop reason: HOSPADM

## 2020-10-15 RX ADMIN — MIRTAZAPINE 7.5 MG: 7.5 TABLET, FILM COATED ORAL at 21:15

## 2020-10-15 RX ADMIN — DOCUSATE SODIUM 100 MG: 100 CAPSULE, LIQUID FILLED ORAL at 17:13

## 2020-10-15 RX ADMIN — DOCUSATE SODIUM 100 MG: 100 CAPSULE, LIQUID FILLED ORAL at 08:10

## 2020-10-15 RX ADMIN — PERPHENAZINE 8 MG: 8 TABLET, FILM COATED ORAL at 08:10

## 2020-10-15 RX ADMIN — CARIPRAZINE 6 MG: 6 CAPSULE, GELATIN COATED ORAL at 21:15

## 2020-10-15 RX ADMIN — PERPHENAZINE 8 MG: 8 TABLET, FILM COATED ORAL at 21:15

## 2020-10-15 RX ADMIN — DULOXETINE 20 MG: 20 CAPSULE, DELAYED RELEASE ORAL at 08:10

## 2020-10-15 RX ADMIN — ASPIRIN 81 MG: 81 TABLET, COATED ORAL at 08:10

## 2020-10-15 RX ADMIN — ATORVASTATIN CALCIUM 40 MG: 40 TABLET, FILM COATED ORAL at 17:13

## 2020-10-15 RX ADMIN — POLYETHYLENE GLYCOL 3350 17 G: 17 POWDER, FOR SOLUTION ORAL at 08:11

## 2020-10-16 ENCOUNTER — APPOINTMENT (INPATIENT)
Dept: PREOP/PACU | Facility: HOSPITAL | Age: 55
DRG: 885 | End: 2020-10-16
Payer: COMMERCIAL

## 2020-10-16 ENCOUNTER — ANESTHESIA (INPATIENT)
Dept: PREOP/PACU | Facility: HOSPITAL | Age: 55
DRG: 885 | End: 2020-10-16
Payer: COMMERCIAL

## 2020-10-16 VITALS — HEART RATE: 87 BPM

## 2020-10-16 PROCEDURE — 99232 SBSQ HOSP IP/OBS MODERATE 35: CPT | Performed by: NURSE PRACTITIONER

## 2020-10-16 PROCEDURE — 90870 ELECTROCONVULSIVE THERAPY: CPT

## 2020-10-16 PROCEDURE — GZB0ZZZ ELECTROCONVULSIVE THERAPY, UNILATERAL-SINGLE SEIZURE: ICD-10-PCS | Performed by: PSYCHIATRY & NEUROLOGY

## 2020-10-16 PROCEDURE — 90870 ELECTROCONVULSIVE THERAPY: CPT | Performed by: PSYCHIATRY & NEUROLOGY

## 2020-10-16 RX ORDER — SODIUM CHLORIDE 9 MG/ML
INJECTION, SOLUTION INTRAVENOUS CONTINUOUS PRN
Status: DISCONTINUED | OUTPATIENT
Start: 2020-10-16 | End: 2020-10-16

## 2020-10-16 RX ORDER — ESMOLOL HYDROCHLORIDE 10 MG/ML
INJECTION INTRAVENOUS AS NEEDED
Status: DISCONTINUED | OUTPATIENT
Start: 2020-10-16 | End: 2020-10-16

## 2020-10-16 RX ORDER — SODIUM CHLORIDE 9 MG/ML
125 INJECTION, SOLUTION INTRAVENOUS CONTINUOUS
Status: DISCONTINUED | OUTPATIENT
Start: 2020-10-16 | End: 2020-10-16

## 2020-10-16 RX ORDER — GLYCOPYRROLATE 0.2 MG/ML
INJECTION INTRAMUSCULAR; INTRAVENOUS AS NEEDED
Status: DISCONTINUED | OUTPATIENT
Start: 2020-10-16 | End: 2020-10-16

## 2020-10-16 RX ORDER — SUCCINYLCHOLINE/SOD CL,ISO/PF 100 MG/5ML
SYRINGE (ML) INTRAVENOUS AS NEEDED
Status: DISCONTINUED | OUTPATIENT
Start: 2020-10-16 | End: 2020-10-16

## 2020-10-16 RX ADMIN — MIRTAZAPINE 7.5 MG: 7.5 TABLET, FILM COATED ORAL at 21:55

## 2020-10-16 RX ADMIN — GLYCOPYRROLATE 0.2 MG: 0.2 INJECTION, SOLUTION INTRAMUSCULAR; INTRAVENOUS at 06:10

## 2020-10-16 RX ADMIN — ATORVASTATIN CALCIUM 40 MG: 40 TABLET, FILM COATED ORAL at 17:18

## 2020-10-16 RX ADMIN — Medication 140 MG: at 06:13

## 2020-10-16 RX ADMIN — SODIUM CHLORIDE: 9 INJECTION, SOLUTION INTRAVENOUS at 06:03

## 2020-10-16 RX ADMIN — SODIUM CHLORIDE 125 ML/HR: 0.9 INJECTION, SOLUTION INTRAVENOUS at 05:56

## 2020-10-16 RX ADMIN — CARIPRAZINE 6 MG: 6 CAPSULE, GELATIN COATED ORAL at 21:55

## 2020-10-16 RX ADMIN — DULOXETINE 20 MG: 20 CAPSULE, DELAYED RELEASE ORAL at 08:11

## 2020-10-16 RX ADMIN — ASPIRIN 81 MG: 81 TABLET, COATED ORAL at 08:11

## 2020-10-16 RX ADMIN — DOCUSATE SODIUM 100 MG: 100 CAPSULE, LIQUID FILLED ORAL at 17:18

## 2020-10-16 RX ADMIN — DOCUSATE SODIUM 100 MG: 100 CAPSULE, LIQUID FILLED ORAL at 08:11

## 2020-10-16 RX ADMIN — POLYETHYLENE GLYCOL 3350 17 G: 17 POWDER, FOR SOLUTION ORAL at 08:11

## 2020-10-16 RX ADMIN — CARVEDILOL 3.12 MG: 3.12 TABLET, FILM COATED ORAL at 08:11

## 2020-10-16 RX ADMIN — PERPHENAZINE 8 MG: 8 TABLET, FILM COATED ORAL at 08:11

## 2020-10-16 RX ADMIN — PERPHENAZINE 8 MG: 8 TABLET, FILM COATED ORAL at 21:55

## 2020-10-16 RX ADMIN — METHOHEXITAL SODIUM 100 MG: 500 INJECTION, POWDER, LYOPHILIZED, FOR SOLUTION INTRAMUSCULAR; INTRAVENOUS; RECTAL at 06:12

## 2020-10-16 RX ADMIN — ESMOLOL HYDROCHLORIDE 50 MG: 10 INJECTION, SOLUTION INTRAVENOUS at 06:15

## 2020-10-17 PROCEDURE — 99232 SBSQ HOSP IP/OBS MODERATE 35: CPT | Performed by: PSYCHIATRY & NEUROLOGY

## 2020-10-17 RX ADMIN — POLYETHYLENE GLYCOL 3350 17 G: 17 POWDER, FOR SOLUTION ORAL at 09:18

## 2020-10-17 RX ADMIN — ASPIRIN 81 MG: 81 TABLET, COATED ORAL at 09:18

## 2020-10-17 RX ADMIN — PERPHENAZINE 8 MG: 8 TABLET, FILM COATED ORAL at 21:00

## 2020-10-17 RX ADMIN — ATORVASTATIN CALCIUM 40 MG: 40 TABLET, FILM COATED ORAL at 17:41

## 2020-10-17 RX ADMIN — DULOXETINE 20 MG: 20 CAPSULE, DELAYED RELEASE ORAL at 09:18

## 2020-10-17 RX ADMIN — PERPHENAZINE 8 MG: 8 TABLET, FILM COATED ORAL at 09:18

## 2020-10-17 RX ADMIN — DOCUSATE SODIUM 100 MG: 100 CAPSULE, LIQUID FILLED ORAL at 09:18

## 2020-10-17 RX ADMIN — CARVEDILOL 3.12 MG: 3.12 TABLET, FILM COATED ORAL at 09:19

## 2020-10-17 RX ADMIN — DOCUSATE SODIUM 100 MG: 100 CAPSULE, LIQUID FILLED ORAL at 17:41

## 2020-10-17 RX ADMIN — MIRTAZAPINE 7.5 MG: 7.5 TABLET, FILM COATED ORAL at 21:00

## 2020-10-17 RX ADMIN — CARIPRAZINE 6 MG: 6 CAPSULE, GELATIN COATED ORAL at 21:00

## 2020-10-18 ENCOUNTER — ANESTHESIA EVENT (INPATIENT)
Dept: PREOP/PACU | Facility: HOSPITAL | Age: 55
DRG: 885 | End: 2020-10-18
Payer: COMMERCIAL

## 2020-10-18 PROBLEM — G89.29 OTHER CHRONIC PAIN: Status: ACTIVE | Noted: 2020-10-18

## 2020-10-18 PROCEDURE — 99232 SBSQ HOSP IP/OBS MODERATE 35: CPT | Performed by: PSYCHIATRY & NEUROLOGY

## 2020-10-18 RX ORDER — PERPHENAZINE 4 MG/1
4 TABLET, FILM COATED ORAL EVERY 12 HOURS SCHEDULED
Status: DISCONTINUED | OUTPATIENT
Start: 2020-10-18 | End: 2020-10-21

## 2020-10-18 RX ADMIN — ACETAMINOPHEN 325 MG: 325 TABLET ORAL at 16:08

## 2020-10-18 RX ADMIN — DULOXETINE 20 MG: 20 CAPSULE, DELAYED RELEASE ORAL at 08:10

## 2020-10-18 RX ADMIN — CARVEDILOL 3.12 MG: 3.12 TABLET, FILM COATED ORAL at 16:08

## 2020-10-18 RX ADMIN — LORAZEPAM 1 MG: 1 TABLET ORAL at 08:11

## 2020-10-18 RX ADMIN — CARVEDILOL 3.12 MG: 3.12 TABLET, FILM COATED ORAL at 08:13

## 2020-10-18 RX ADMIN — ACETAMINOPHEN 325 MG: 325 TABLET ORAL at 10:48

## 2020-10-18 RX ADMIN — LURASIDONE HYDROCHLORIDE 20 MG: 20 TABLET, FILM COATED ORAL at 16:08

## 2020-10-18 RX ADMIN — DOCUSATE SODIUM 100 MG: 100 CAPSULE, LIQUID FILLED ORAL at 17:01

## 2020-10-18 RX ADMIN — PERPHENAZINE 8 MG: 8 TABLET, FILM COATED ORAL at 08:11

## 2020-10-18 RX ADMIN — ASPIRIN 81 MG: 81 TABLET, COATED ORAL at 08:11

## 2020-10-18 RX ADMIN — ATORVASTATIN CALCIUM 40 MG: 40 TABLET, FILM COATED ORAL at 16:08

## 2020-10-18 RX ADMIN — MIRTAZAPINE 7.5 MG: 7.5 TABLET, FILM COATED ORAL at 21:01

## 2020-10-18 RX ADMIN — DOCUSATE SODIUM 100 MG: 100 CAPSULE, LIQUID FILLED ORAL at 08:10

## 2020-10-18 RX ADMIN — CARIPRAZINE 3 MG: 3 CAPSULE, GELATIN COATED ORAL at 21:01

## 2020-10-18 RX ADMIN — POLYETHYLENE GLYCOL 3350 17 G: 17 POWDER, FOR SOLUTION ORAL at 08:13

## 2020-10-18 RX ADMIN — PERPHENAZINE 4 MG: 4 TABLET, FILM COATED ORAL at 21:01

## 2020-10-19 ENCOUNTER — APPOINTMENT (INPATIENT)
Dept: PREOP/PACU | Facility: HOSPITAL | Age: 55
DRG: 885 | End: 2020-10-19
Payer: COMMERCIAL

## 2020-10-19 ENCOUNTER — ANESTHESIA (INPATIENT)
Dept: PREOP/PACU | Facility: HOSPITAL | Age: 55
DRG: 885 | End: 2020-10-19
Payer: COMMERCIAL

## 2020-10-19 VITALS — HEART RATE: 74 BPM

## 2020-10-19 PROCEDURE — 90870 ELECTROCONVULSIVE THERAPY: CPT | Performed by: PSYCHIATRY & NEUROLOGY

## 2020-10-19 PROCEDURE — 99232 SBSQ HOSP IP/OBS MODERATE 35: CPT | Performed by: NURSE PRACTITIONER

## 2020-10-19 PROCEDURE — 90870 ELECTROCONVULSIVE THERAPY: CPT

## 2020-10-19 PROCEDURE — GZB0ZZZ ELECTROCONVULSIVE THERAPY, UNILATERAL-SINGLE SEIZURE: ICD-10-PCS | Performed by: PSYCHIATRY & NEUROLOGY

## 2020-10-19 RX ORDER — SUCCINYLCHOLINE/SOD CL,ISO/PF 100 MG/5ML
SYRINGE (ML) INTRAVENOUS AS NEEDED
Status: DISCONTINUED | OUTPATIENT
Start: 2020-10-19 | End: 2020-10-19

## 2020-10-19 RX ORDER — ESMOLOL HYDROCHLORIDE 10 MG/ML
INJECTION INTRAVENOUS AS NEEDED
Status: DISCONTINUED | OUTPATIENT
Start: 2020-10-19 | End: 2020-10-19

## 2020-10-19 RX ORDER — SODIUM CHLORIDE 9 MG/ML
125 INJECTION, SOLUTION INTRAVENOUS CONTINUOUS
Status: DISCONTINUED | OUTPATIENT
Start: 2020-10-19 | End: 2020-10-21

## 2020-10-19 RX ORDER — GLYCOPYRROLATE 0.2 MG/ML
INJECTION INTRAMUSCULAR; INTRAVENOUS AS NEEDED
Status: DISCONTINUED | OUTPATIENT
Start: 2020-10-19 | End: 2020-10-19

## 2020-10-19 RX ADMIN — MIRTAZAPINE 7.5 MG: 7.5 TABLET, FILM COATED ORAL at 21:26

## 2020-10-19 RX ADMIN — METHOHEXITAL SODIUM 100 MG: 500 INJECTION, POWDER, LYOPHILIZED, FOR SOLUTION INTRAMUSCULAR; INTRAVENOUS; RECTAL at 06:30

## 2020-10-19 RX ADMIN — ASPIRIN 81 MG: 81 TABLET, COATED ORAL at 08:30

## 2020-10-19 RX ADMIN — SODIUM CHLORIDE 125 ML/HR: 0.9 INJECTION, SOLUTION INTRAVENOUS at 06:16

## 2020-10-19 RX ADMIN — CARIPRAZINE 3 MG: 3 CAPSULE, GELATIN COATED ORAL at 21:26

## 2020-10-19 RX ADMIN — DOCUSATE SODIUM 100 MG: 100 CAPSULE, LIQUID FILLED ORAL at 08:30

## 2020-10-19 RX ADMIN — GLYCOPYRROLATE 0.2 MG: 0.2 INJECTION, SOLUTION INTRAMUSCULAR; INTRAVENOUS at 06:16

## 2020-10-19 RX ADMIN — PERPHENAZINE 4 MG: 4 TABLET, FILM COATED ORAL at 21:26

## 2020-10-19 RX ADMIN — ESMOLOL HYDROCHLORIDE 50 MG: 10 INJECTION, SOLUTION INTRAVENOUS at 06:34

## 2020-10-19 RX ADMIN — CARVEDILOL 3.12 MG: 3.12 TABLET, FILM COATED ORAL at 16:15

## 2020-10-19 RX ADMIN — Medication 140 MG: at 06:31

## 2020-10-19 RX ADMIN — POLYETHYLENE GLYCOL 3350 17 G: 17 POWDER, FOR SOLUTION ORAL at 08:31

## 2020-10-19 RX ADMIN — PERPHENAZINE 4 MG: 4 TABLET, FILM COATED ORAL at 08:30

## 2020-10-19 RX ADMIN — DULOXETINE 20 MG: 20 CAPSULE, DELAYED RELEASE ORAL at 08:30

## 2020-10-19 RX ADMIN — SODIUM CHLORIDE: 0.9 INJECTION, SOLUTION INTRAVENOUS at 06:13

## 2020-10-19 RX ADMIN — ATORVASTATIN CALCIUM 40 MG: 40 TABLET, FILM COATED ORAL at 16:15

## 2020-10-19 RX ADMIN — SODIUM CHLORIDE: 0.9 INJECTION, SOLUTION INTRAVENOUS at 06:22

## 2020-10-19 RX ADMIN — CARVEDILOL 3.12 MG: 3.12 TABLET, FILM COATED ORAL at 08:30

## 2020-10-19 RX ADMIN — DOCUSATE SODIUM 100 MG: 100 CAPSULE, LIQUID FILLED ORAL at 17:06

## 2020-10-19 RX ADMIN — LURASIDONE HYDROCHLORIDE 20 MG: 20 TABLET, FILM COATED ORAL at 16:15

## 2020-10-20 ENCOUNTER — ANESTHESIA EVENT (INPATIENT)
Dept: PREOP/PACU | Facility: HOSPITAL | Age: 55
DRG: 885 | End: 2020-10-20
Payer: COMMERCIAL

## 2020-10-20 PROBLEM — I10 ESSENTIAL HYPERTENSION: Status: ACTIVE | Noted: 2020-10-20

## 2020-10-20 PROBLEM — F23 BRIEF PSYCHOTIC DISORDER (HCC): Status: ACTIVE | Noted: 2020-10-20

## 2020-10-20 PROBLEM — M19.90 ARTHRITIS: Status: ACTIVE | Noted: 2020-10-20

## 2020-10-20 PROCEDURE — 99232 SBSQ HOSP IP/OBS MODERATE 35: CPT | Performed by: NURSE PRACTITIONER

## 2020-10-20 RX ADMIN — CARVEDILOL 3.12 MG: 3.12 TABLET, FILM COATED ORAL at 17:29

## 2020-10-20 RX ADMIN — CARVEDILOL 3.12 MG: 3.12 TABLET, FILM COATED ORAL at 08:39

## 2020-10-20 RX ADMIN — DULOXETINE 20 MG: 20 CAPSULE, DELAYED RELEASE ORAL at 08:47

## 2020-10-20 RX ADMIN — DOCUSATE SODIUM 100 MG: 100 CAPSULE, LIQUID FILLED ORAL at 17:29

## 2020-10-20 RX ADMIN — POLYETHYLENE GLYCOL 3350 17 G: 17 POWDER, FOR SOLUTION ORAL at 08:48

## 2020-10-20 RX ADMIN — MIRTAZAPINE 7.5 MG: 7.5 TABLET, FILM COATED ORAL at 21:23

## 2020-10-20 RX ADMIN — PERPHENAZINE 4 MG: 4 TABLET, FILM COATED ORAL at 21:23

## 2020-10-20 RX ADMIN — ATORVASTATIN CALCIUM 40 MG: 40 TABLET, FILM COATED ORAL at 17:29

## 2020-10-20 RX ADMIN — DOCUSATE SODIUM 100 MG: 100 CAPSULE, LIQUID FILLED ORAL at 08:41

## 2020-10-20 RX ADMIN — LURASIDONE HYDROCHLORIDE 40 MG: 40 TABLET, FILM COATED ORAL at 17:29

## 2020-10-20 RX ADMIN — ASPIRIN 81 MG: 81 TABLET, COATED ORAL at 08:39

## 2020-10-20 RX ADMIN — PERPHENAZINE 4 MG: 4 TABLET, FILM COATED ORAL at 08:47

## 2020-10-21 ENCOUNTER — APPOINTMENT (INPATIENT)
Dept: PREOP/PACU | Facility: HOSPITAL | Age: 55
DRG: 885 | End: 2020-10-21
Payer: COMMERCIAL

## 2020-10-21 ENCOUNTER — ANESTHESIA (INPATIENT)
Dept: PREOP/PACU | Facility: HOSPITAL | Age: 55
DRG: 885 | End: 2020-10-21
Payer: COMMERCIAL

## 2020-10-21 VITALS — HEART RATE: 77 BPM

## 2020-10-21 PROCEDURE — 90870 ELECTROCONVULSIVE THERAPY: CPT | Performed by: PSYCHIATRY & NEUROLOGY

## 2020-10-21 PROCEDURE — 99232 SBSQ HOSP IP/OBS MODERATE 35: CPT | Performed by: NURSE PRACTITIONER

## 2020-10-21 PROCEDURE — GZB0ZZZ ELECTROCONVULSIVE THERAPY, UNILATERAL-SINGLE SEIZURE: ICD-10-PCS | Performed by: PSYCHIATRY & NEUROLOGY

## 2020-10-21 PROCEDURE — 90870 ELECTROCONVULSIVE THERAPY: CPT

## 2020-10-21 RX ORDER — ESMOLOL HYDROCHLORIDE 10 MG/ML
INJECTION INTRAVENOUS AS NEEDED
Status: DISCONTINUED | OUTPATIENT
Start: 2020-10-21 | End: 2020-10-21

## 2020-10-21 RX ORDER — SODIUM CHLORIDE 9 MG/ML
125 INJECTION, SOLUTION INTRAVENOUS CONTINUOUS
Status: DISCONTINUED | OUTPATIENT
Start: 2020-10-21 | End: 2020-10-21

## 2020-10-21 RX ORDER — SUCCINYLCHOLINE/SOD CL,ISO/PF 100 MG/5ML
SYRINGE (ML) INTRAVENOUS AS NEEDED
Status: DISCONTINUED | OUTPATIENT
Start: 2020-10-21 | End: 2020-10-21

## 2020-10-21 RX ORDER — SODIUM CHLORIDE 9 MG/ML
INJECTION, SOLUTION INTRAVENOUS CONTINUOUS PRN
Status: DISCONTINUED | OUTPATIENT
Start: 2020-10-21 | End: 2020-10-21

## 2020-10-21 RX ORDER — GLYCOPYRROLATE 0.2 MG/ML
INJECTION INTRAMUSCULAR; INTRAVENOUS AS NEEDED
Status: DISCONTINUED | OUTPATIENT
Start: 2020-10-21 | End: 2020-10-21

## 2020-10-21 RX ORDER — PERPHENAZINE 2 MG/1
2 TABLET, FILM COATED ORAL EVERY 12 HOURS SCHEDULED
Status: DISCONTINUED | OUTPATIENT
Start: 2020-10-21 | End: 2020-10-22

## 2020-10-21 RX ADMIN — DOCUSATE SODIUM 100 MG: 100 CAPSULE, LIQUID FILLED ORAL at 08:26

## 2020-10-21 RX ADMIN — LURASIDONE HYDROCHLORIDE 60 MG: 40 TABLET, FILM COATED ORAL at 17:47

## 2020-10-21 RX ADMIN — GLYCOPYRROLATE 0.2 MG: 0.2 INJECTION, SOLUTION INTRAMUSCULAR; INTRAVENOUS at 06:27

## 2020-10-21 RX ADMIN — SODIUM CHLORIDE 125 ML/HR: 0.9 INJECTION, SOLUTION INTRAVENOUS at 06:15

## 2020-10-21 RX ADMIN — CARVEDILOL 3.12 MG: 3.12 TABLET, FILM COATED ORAL at 08:26

## 2020-10-21 RX ADMIN — PERPHENAZINE 2 MG: 2 TABLET, FILM COATED ORAL at 21:05

## 2020-10-21 RX ADMIN — SODIUM CHLORIDE: 9 INJECTION, SOLUTION INTRAVENOUS at 06:21

## 2020-10-21 RX ADMIN — MIRTAZAPINE 7.5 MG: 7.5 TABLET, FILM COATED ORAL at 21:05

## 2020-10-21 RX ADMIN — ESMOLOL HYDROCHLORIDE 50 MG: 10 INJECTION, SOLUTION INTRAVENOUS at 06:35

## 2020-10-21 RX ADMIN — Medication 140 MG: at 06:33

## 2020-10-21 RX ADMIN — ATORVASTATIN CALCIUM 40 MG: 40 TABLET, FILM COATED ORAL at 17:48

## 2020-10-21 RX ADMIN — ASPIRIN 81 MG: 81 TABLET, COATED ORAL at 08:26

## 2020-10-21 RX ADMIN — DULOXETINE 20 MG: 20 CAPSULE, DELAYED RELEASE ORAL at 08:26

## 2020-10-21 RX ADMIN — METHOHEXITAL SODIUM 100 MG: 500 INJECTION, POWDER, LYOPHILIZED, FOR SOLUTION INTRAMUSCULAR; INTRAVENOUS; RECTAL at 06:32

## 2020-10-21 RX ADMIN — POLYETHYLENE GLYCOL 3350 17 G: 17 POWDER, FOR SOLUTION ORAL at 08:26

## 2020-10-21 RX ADMIN — PERPHENAZINE 4 MG: 4 TABLET, FILM COATED ORAL at 08:26

## 2020-10-21 RX ADMIN — DOCUSATE SODIUM 100 MG: 100 CAPSULE, LIQUID FILLED ORAL at 17:48

## 2020-10-22 PROCEDURE — 99232 SBSQ HOSP IP/OBS MODERATE 35: CPT | Performed by: NURSE PRACTITIONER

## 2020-10-22 RX ADMIN — DOCUSATE SODIUM 100 MG: 100 CAPSULE, LIQUID FILLED ORAL at 17:05

## 2020-10-22 RX ADMIN — LURASIDONE HYDROCHLORIDE 80 MG: 80 TABLET, FILM COATED ORAL at 17:05

## 2020-10-22 RX ADMIN — PERPHENAZINE 2 MG: 2 TABLET, FILM COATED ORAL at 08:27

## 2020-10-22 RX ADMIN — ASPIRIN 81 MG: 81 TABLET, COATED ORAL at 08:28

## 2020-10-22 RX ADMIN — DOCUSATE SODIUM 100 MG: 100 CAPSULE, LIQUID FILLED ORAL at 08:27

## 2020-10-22 RX ADMIN — ATORVASTATIN CALCIUM 40 MG: 40 TABLET, FILM COATED ORAL at 17:05

## 2020-10-22 RX ADMIN — CARVEDILOL 3.12 MG: 3.12 TABLET, FILM COATED ORAL at 08:27

## 2020-10-22 RX ADMIN — POLYETHYLENE GLYCOL 3350 17 G: 17 POWDER, FOR SOLUTION ORAL at 08:26

## 2020-10-22 RX ADMIN — MIRTAZAPINE 7.5 MG: 7.5 TABLET, FILM COATED ORAL at 21:09

## 2020-10-23 PROCEDURE — 99232 SBSQ HOSP IP/OBS MODERATE 35: CPT | Performed by: NURSE PRACTITIONER

## 2020-10-23 RX ORDER — CARVEDILOL 3.12 MG/1
3.12 TABLET ORAL 2 TIMES DAILY WITH MEALS
Qty: 60 TABLET | Refills: 0 | Status: SHIPPED | OUTPATIENT
Start: 2020-10-23 | End: 2022-04-18

## 2020-10-23 RX ORDER — MIRTAZAPINE 7.5 MG/1
7.5 TABLET, FILM COATED ORAL
Qty: 30 TABLET | Refills: 0 | Status: SHIPPED | OUTPATIENT
Start: 2020-10-23

## 2020-10-23 RX ORDER — DOCUSATE SODIUM 100 MG/1
100 CAPSULE, LIQUID FILLED ORAL 2 TIMES DAILY
Qty: 60 CAPSULE | Refills: 0 | Status: SHIPPED | OUTPATIENT
Start: 2020-10-23 | End: 2021-07-19

## 2020-10-23 RX ORDER — SODIUM CHLORIDE 9 MG/ML
50 INJECTION, SOLUTION INTRAVENOUS CONTINUOUS
Status: CANCELLED | OUTPATIENT
Start: 2020-10-23

## 2020-10-23 RX ADMIN — MIRTAZAPINE 7.5 MG: 7.5 TABLET, FILM COATED ORAL at 21:14

## 2020-10-23 RX ADMIN — LURASIDONE HYDROCHLORIDE 80 MG: 80 TABLET, FILM COATED ORAL at 17:08

## 2020-10-23 RX ADMIN — CARVEDILOL 3.12 MG: 3.12 TABLET, FILM COATED ORAL at 08:28

## 2020-10-23 RX ADMIN — DOCUSATE SODIUM 100 MG: 100 CAPSULE, LIQUID FILLED ORAL at 17:07

## 2020-10-23 RX ADMIN — ATORVASTATIN CALCIUM 40 MG: 40 TABLET, FILM COATED ORAL at 17:09

## 2020-10-23 RX ADMIN — CARVEDILOL 3.12 MG: 3.12 TABLET, FILM COATED ORAL at 17:08

## 2020-10-23 RX ADMIN — ASPIRIN 81 MG: 81 TABLET, COATED ORAL at 08:29

## 2020-10-23 RX ADMIN — DOCUSATE SODIUM 100 MG: 100 CAPSULE, LIQUID FILLED ORAL at 08:28

## 2020-10-24 PROCEDURE — 99232 SBSQ HOSP IP/OBS MODERATE 35: CPT | Performed by: PSYCHIATRY & NEUROLOGY

## 2020-10-24 RX ADMIN — CARVEDILOL 3.12 MG: 3.12 TABLET, FILM COATED ORAL at 08:10

## 2020-10-24 RX ADMIN — POLYETHYLENE GLYCOL 3350 17 G: 17 POWDER, FOR SOLUTION ORAL at 08:11

## 2020-10-24 RX ADMIN — LURASIDONE HYDROCHLORIDE 80 MG: 80 TABLET, FILM COATED ORAL at 17:10

## 2020-10-24 RX ADMIN — ASPIRIN 81 MG: 81 TABLET, COATED ORAL at 08:10

## 2020-10-24 RX ADMIN — ACETAMINOPHEN 325 MG: 325 TABLET ORAL at 12:17

## 2020-10-24 RX ADMIN — DOCUSATE SODIUM 100 MG: 100 CAPSULE, LIQUID FILLED ORAL at 08:10

## 2020-10-24 RX ADMIN — ATORVASTATIN CALCIUM 40 MG: 40 TABLET, FILM COATED ORAL at 17:10

## 2020-10-24 RX ADMIN — DOCUSATE SODIUM 100 MG: 100 CAPSULE, LIQUID FILLED ORAL at 17:09

## 2020-10-24 RX ADMIN — MIRTAZAPINE 7.5 MG: 7.5 TABLET, FILM COATED ORAL at 21:14

## 2020-10-25 PROCEDURE — 99232 SBSQ HOSP IP/OBS MODERATE 35: CPT | Performed by: PSYCHIATRY & NEUROLOGY

## 2020-10-25 RX ADMIN — ASPIRIN 81 MG: 81 TABLET, COATED ORAL at 08:02

## 2020-10-25 RX ADMIN — CARVEDILOL 3.12 MG: 3.12 TABLET, FILM COATED ORAL at 08:02

## 2020-10-25 RX ADMIN — LURASIDONE HYDROCHLORIDE 80 MG: 80 TABLET, FILM COATED ORAL at 17:08

## 2020-10-25 RX ADMIN — DOCUSATE SODIUM 100 MG: 100 CAPSULE, LIQUID FILLED ORAL at 17:08

## 2020-10-25 RX ADMIN — POLYETHYLENE GLYCOL 3350 17 G: 17 POWDER, FOR SOLUTION ORAL at 08:02

## 2020-10-25 RX ADMIN — CARVEDILOL 3.12 MG: 3.12 TABLET, FILM COATED ORAL at 17:08

## 2020-10-25 RX ADMIN — DOCUSATE SODIUM 100 MG: 100 CAPSULE, LIQUID FILLED ORAL at 08:02

## 2020-10-25 RX ADMIN — ATORVASTATIN CALCIUM 40 MG: 40 TABLET, FILM COATED ORAL at 17:08

## 2020-10-25 RX ADMIN — MIRTAZAPINE 7.5 MG: 7.5 TABLET, FILM COATED ORAL at 21:02

## 2020-10-26 ENCOUNTER — ANESTHESIA (INPATIENT)
Dept: PREOP/PACU | Facility: HOSPITAL | Age: 55
DRG: 885 | End: 2020-10-26
Payer: COMMERCIAL

## 2020-10-26 ENCOUNTER — ANESTHESIA EVENT (INPATIENT)
Dept: PREOP/PACU | Facility: HOSPITAL | Age: 55
DRG: 885 | End: 2020-10-26
Payer: COMMERCIAL

## 2020-10-26 ENCOUNTER — APPOINTMENT (INPATIENT)
Dept: PREOP/PACU | Facility: HOSPITAL | Age: 55
DRG: 885 | End: 2020-10-26
Payer: COMMERCIAL

## 2020-10-26 VITALS — HEART RATE: 75 BPM

## 2020-10-26 PROCEDURE — 99232 SBSQ HOSP IP/OBS MODERATE 35: CPT | Performed by: NURSE PRACTITIONER

## 2020-10-26 PROCEDURE — GZB0ZZZ ELECTROCONVULSIVE THERAPY, UNILATERAL-SINGLE SEIZURE: ICD-10-PCS | Performed by: PSYCHIATRY & NEUROLOGY

## 2020-10-26 PROCEDURE — 90870 ELECTROCONVULSIVE THERAPY: CPT

## 2020-10-26 PROCEDURE — 90870 ELECTROCONVULSIVE THERAPY: CPT | Performed by: PSYCHIATRY & NEUROLOGY

## 2020-10-26 RX ORDER — SODIUM CHLORIDE 9 MG/ML
INJECTION, SOLUTION INTRAVENOUS CONTINUOUS PRN
Status: DISCONTINUED | OUTPATIENT
Start: 2020-10-26 | End: 2020-10-26

## 2020-10-26 RX ORDER — SODIUM CHLORIDE 9 MG/ML
50 INJECTION, SOLUTION INTRAVENOUS CONTINUOUS
Status: DISCONTINUED | OUTPATIENT
Start: 2020-10-26 | End: 2020-10-27 | Stop reason: HOSPADM

## 2020-10-26 RX ORDER — GLYCOPYRROLATE 0.2 MG/ML
INJECTION INTRAMUSCULAR; INTRAVENOUS AS NEEDED
Status: DISCONTINUED | OUTPATIENT
Start: 2020-10-26 | End: 2020-10-26

## 2020-10-26 RX ORDER — SUCCINYLCHOLINE/SOD CL,ISO/PF 100 MG/5ML
SYRINGE (ML) INTRAVENOUS AS NEEDED
Status: DISCONTINUED | OUTPATIENT
Start: 2020-10-26 | End: 2020-10-26

## 2020-10-26 RX ORDER — ESMOLOL HYDROCHLORIDE 10 MG/ML
INJECTION INTRAVENOUS AS NEEDED
Status: DISCONTINUED | OUTPATIENT
Start: 2020-10-26 | End: 2020-10-26

## 2020-10-26 RX ADMIN — Medication 140 MG: at 06:55

## 2020-10-26 RX ADMIN — ESMOLOL HYDROCHLORIDE 50 MG: 10 INJECTION, SOLUTION INTRAVENOUS at 06:55

## 2020-10-26 RX ADMIN — SODIUM CHLORIDE: 0.9 INJECTION, SOLUTION INTRAVENOUS at 06:24

## 2020-10-26 RX ADMIN — POLYETHYLENE GLYCOL 3350 17 G: 17 POWDER, FOR SOLUTION ORAL at 09:07

## 2020-10-26 RX ADMIN — METHOHEXITAL SODIUM 100 MG: 500 INJECTION, POWDER, LYOPHILIZED, FOR SOLUTION INTRAMUSCULAR; INTRAVENOUS; RECTAL at 06:55

## 2020-10-26 RX ADMIN — DOCUSATE SODIUM 100 MG: 100 CAPSULE, LIQUID FILLED ORAL at 17:02

## 2020-10-26 RX ADMIN — MIRTAZAPINE 7.5 MG: 7.5 TABLET, FILM COATED ORAL at 21:14

## 2020-10-26 RX ADMIN — SODIUM CHLORIDE 50 ML/HR: 0.9 INJECTION, SOLUTION INTRAVENOUS at 06:25

## 2020-10-26 RX ADMIN — GLYCOPYRROLATE 0.2 MG: 0.2 INJECTION, SOLUTION INTRAMUSCULAR; INTRAVENOUS at 06:51

## 2020-10-26 RX ADMIN — CARVEDILOL 3.12 MG: 3.12 TABLET, FILM COATED ORAL at 09:06

## 2020-10-26 RX ADMIN — ATORVASTATIN CALCIUM 40 MG: 40 TABLET, FILM COATED ORAL at 17:02

## 2020-10-26 RX ADMIN — ASPIRIN 81 MG: 81 TABLET, COATED ORAL at 09:07

## 2020-10-26 RX ADMIN — CARVEDILOL 3.12 MG: 3.12 TABLET, FILM COATED ORAL at 17:02

## 2020-10-26 RX ADMIN — LURASIDONE HYDROCHLORIDE 80 MG: 80 TABLET, FILM COATED ORAL at 17:02

## 2020-10-26 RX ADMIN — DOCUSATE SODIUM 100 MG: 100 CAPSULE, LIQUID FILLED ORAL at 09:07

## 2020-10-27 VITALS
HEIGHT: 71 IN | SYSTOLIC BLOOD PRESSURE: 124 MMHG | HEART RATE: 88 BPM | OXYGEN SATURATION: 96 % | RESPIRATION RATE: 16 BRPM | BODY MASS INDEX: 24.44 KG/M2 | TEMPERATURE: 98 F | WEIGHT: 174.6 LBS | DIASTOLIC BLOOD PRESSURE: 77 MMHG

## 2020-10-27 PROCEDURE — 90870 ELECTROCONVULSIVE THERAPY: CPT | Performed by: PSYCHIATRY & NEUROLOGY

## 2020-10-27 PROCEDURE — 99238 HOSP IP/OBS DSCHRG MGMT 30/<: CPT | Performed by: STUDENT IN AN ORGANIZED HEALTH CARE EDUCATION/TRAINING PROGRAM

## 2020-10-27 RX ADMIN — CARVEDILOL 3.12 MG: 3.12 TABLET, FILM COATED ORAL at 08:12

## 2020-10-27 RX ADMIN — DOCUSATE SODIUM 100 MG: 100 CAPSULE, LIQUID FILLED ORAL at 08:13

## 2020-10-27 RX ADMIN — ASPIRIN 81 MG: 81 TABLET, COATED ORAL at 08:13

## 2020-11-02 ENCOUNTER — ANESTHESIA EVENT (OUTPATIENT)
Dept: PREOP/PACU | Facility: HOSPITAL | Age: 55
End: 2020-11-02

## 2020-11-03 ENCOUNTER — HOSPITAL ENCOUNTER (OUTPATIENT)
Dept: PREOP/PACU | Facility: HOSPITAL | Age: 55
Setting detail: OUTPATIENT SURGERY
Discharge: HOME | End: 2020-11-03
Payer: MEDICARE

## 2020-11-03 ENCOUNTER — ANESTHESIA (OUTPATIENT)
Dept: PREOP/PACU | Facility: HOSPITAL | Age: 55
End: 2020-11-03

## 2020-11-03 VITALS
HEART RATE: 68 BPM | HEIGHT: 71 IN | DIASTOLIC BLOOD PRESSURE: 88 MMHG | WEIGHT: 175 LBS | OXYGEN SATURATION: 100 % | RESPIRATION RATE: 18 BRPM | TEMPERATURE: 96 F | BODY MASS INDEX: 24.5 KG/M2 | SYSTOLIC BLOOD PRESSURE: 140 MMHG

## 2020-11-03 VITALS — HEART RATE: 75 BPM

## 2020-11-03 LAB — SARS-COV-2 RNA RESP QL NAA+PROBE: NEGATIVE

## 2020-11-03 PROCEDURE — 87635 SARS-COV-2 COVID-19 AMP PRB: CPT | Performed by: PSYCHIATRY & NEUROLOGY

## 2020-11-03 PROCEDURE — 90870 ELECTROCONVULSIVE THERAPY: CPT

## 2020-11-03 RX ORDER — ONDANSETRON 2 MG/ML
4 INJECTION INTRAMUSCULAR; INTRAVENOUS ONCE AS NEEDED
Status: CANCELLED | OUTPATIENT
Start: 2020-11-03

## 2020-11-03 RX ORDER — SODIUM CHLORIDE 9 MG/ML
125 INJECTION, SOLUTION INTRAVENOUS CONTINUOUS
Status: CANCELLED | OUTPATIENT
Start: 2020-11-03

## 2020-11-03 RX ORDER — SODIUM CHLORIDE 9 MG/ML
INJECTION, SOLUTION INTRAVENOUS CONTINUOUS PRN
Status: DISCONTINUED | OUTPATIENT
Start: 2020-11-03 | End: 2020-11-03

## 2020-11-03 RX ORDER — SODIUM CHLORIDE 9 MG/ML
125 INJECTION, SOLUTION INTRAVENOUS CONTINUOUS
Status: DISCONTINUED | OUTPATIENT
Start: 2020-11-03 | End: 2020-11-07 | Stop reason: HOSPADM

## 2020-11-03 RX ORDER — SUCCINYLCHOLINE/SOD CL,ISO/PF 100 MG/5ML
SYRINGE (ML) INTRAVENOUS AS NEEDED
Status: DISCONTINUED | OUTPATIENT
Start: 2020-11-03 | End: 2020-11-03

## 2020-11-03 RX ORDER — SODIUM CHLORIDE, SODIUM LACTATE, POTASSIUM CHLORIDE, CALCIUM CHLORIDE 600; 310; 30; 20 MG/100ML; MG/100ML; MG/100ML; MG/100ML
125 INJECTION, SOLUTION INTRAVENOUS CONTINUOUS
Status: DISCONTINUED | OUTPATIENT
Start: 2020-11-03 | End: 2020-11-03

## 2020-11-03 RX ORDER — GLYCOPYRROLATE 0.2 MG/ML
INJECTION INTRAMUSCULAR; INTRAVENOUS AS NEEDED
Status: DISCONTINUED | OUTPATIENT
Start: 2020-11-03 | End: 2020-11-03

## 2020-11-03 RX ORDER — ESMOLOL HYDROCHLORIDE 10 MG/ML
INJECTION INTRAVENOUS AS NEEDED
Status: DISCONTINUED | OUTPATIENT
Start: 2020-11-03 | End: 2020-11-03

## 2020-11-03 RX ADMIN — SODIUM CHLORIDE: 0.9 INJECTION, SOLUTION INTRAVENOUS at 06:41

## 2020-11-03 RX ADMIN — Medication 140 MG: at 06:51

## 2020-11-03 RX ADMIN — GLYCOPYRROLATE 0.2 MG: 0.2 INJECTION, SOLUTION INTRAMUSCULAR; INTRAVENOUS at 06:45

## 2020-11-03 RX ADMIN — METHOHEXITAL SODIUM 100 MG: 500 INJECTION, POWDER, LYOPHILIZED, FOR SOLUTION INTRAMUSCULAR; INTRAVENOUS; RECTAL at 06:51

## 2020-11-03 RX ADMIN — ESMOLOL HYDROCHLORIDE 50 MG: 10 INJECTION, SOLUTION INTRAVENOUS at 06:53

## 2020-11-09 ENCOUNTER — ANESTHESIA EVENT (OUTPATIENT)
Dept: PREOP/PACU | Facility: HOSPITAL | Age: 55
End: 2020-11-09

## 2020-11-09 RX ORDER — SODIUM CHLORIDE 9 MG/ML
125 INJECTION, SOLUTION INTRAVENOUS CONTINUOUS
Status: CANCELLED | OUTPATIENT
Start: 2020-11-09

## 2020-11-10 ENCOUNTER — ANESTHESIA (OUTPATIENT)
Dept: PREOP/PACU | Facility: HOSPITAL | Age: 55
End: 2020-11-10

## 2020-11-10 ENCOUNTER — HOSPITAL ENCOUNTER (OUTPATIENT)
Dept: PREOP/PACU | Facility: HOSPITAL | Age: 55
Setting detail: OUTPATIENT SURGERY
Discharge: HOME | End: 2020-11-10
Payer: MEDICARE

## 2020-11-10 VITALS
RESPIRATION RATE: 20 BRPM | HEIGHT: 71 IN | OXYGEN SATURATION: 96 % | WEIGHT: 175 LBS | TEMPERATURE: 98.1 F | HEART RATE: 58 BPM | SYSTOLIC BLOOD PRESSURE: 106 MMHG | DIASTOLIC BLOOD PRESSURE: 71 MMHG | BODY MASS INDEX: 24.5 KG/M2

## 2020-11-10 VITALS — HEART RATE: 77 BPM

## 2020-11-10 LAB — SARS-COV-2 RNA RESP QL NAA+PROBE: NEGATIVE

## 2020-11-10 PROCEDURE — 87635 SARS-COV-2 COVID-19 AMP PRB: CPT | Performed by: PSYCHIATRY & NEUROLOGY

## 2020-11-10 PROCEDURE — 90870 ELECTROCONVULSIVE THERAPY: CPT

## 2020-11-10 RX ORDER — GLYCOPYRROLATE 0.2 MG/ML
INJECTION INTRAMUSCULAR; INTRAVENOUS AS NEEDED
Status: DISCONTINUED | OUTPATIENT
Start: 2020-11-10 | End: 2020-11-10

## 2020-11-10 RX ORDER — ESMOLOL HYDROCHLORIDE 10 MG/ML
INJECTION INTRAVENOUS AS NEEDED
Status: DISCONTINUED | OUTPATIENT
Start: 2020-11-10 | End: 2020-11-10

## 2020-11-10 RX ORDER — SODIUM CHLORIDE 9 MG/ML
125 INJECTION, SOLUTION INTRAVENOUS CONTINUOUS
Status: DISCONTINUED | OUTPATIENT
Start: 2020-11-10 | End: 2020-11-14 | Stop reason: HOSPADM

## 2020-11-10 RX ORDER — SUCCINYLCHOLINE/SOD CL,ISO/PF 100 MG/5ML
SYRINGE (ML) INTRAVENOUS AS NEEDED
Status: DISCONTINUED | OUTPATIENT
Start: 2020-11-10 | End: 2020-11-10

## 2020-11-10 RX ORDER — SODIUM CHLORIDE 9 MG/ML
INJECTION, SOLUTION INTRAVENOUS CONTINUOUS PRN
Status: DISCONTINUED | OUTPATIENT
Start: 2020-11-10 | End: 2020-11-10

## 2020-11-10 RX ADMIN — ESMOLOL HYDROCHLORIDE 50 MG: 10 INJECTION, SOLUTION INTRAVENOUS at 07:08

## 2020-11-10 RX ADMIN — Medication 140 MG: at 07:13

## 2020-11-10 RX ADMIN — SODIUM CHLORIDE 125 ML/HR: 0.9 INJECTION, SOLUTION INTRAVENOUS at 05:44

## 2020-11-10 RX ADMIN — SODIUM CHLORIDE: 0.9 INJECTION, SOLUTION INTRAVENOUS at 06:46

## 2020-11-10 RX ADMIN — GLYCOPYRROLATE 0.2 MG: 0.2 INJECTION, SOLUTION INTRAMUSCULAR; INTRAVENOUS at 07:08

## 2020-11-10 RX ADMIN — METHOHEXITAL SODIUM 100 MG: 500 INJECTION, POWDER, LYOPHILIZED, FOR SOLUTION INTRAMUSCULAR; INTRAVENOUS; RECTAL at 07:12

## 2020-11-23 ENCOUNTER — ANESTHESIA EVENT (OUTPATIENT)
Dept: PREOP/PACU | Facility: HOSPITAL | Age: 55
End: 2020-11-23

## 2020-11-25 ENCOUNTER — ANESTHESIA (OUTPATIENT)
Dept: PREOP/PACU | Facility: HOSPITAL | Age: 55
End: 2020-11-25

## 2020-11-25 ENCOUNTER — HOSPITAL ENCOUNTER (OUTPATIENT)
Dept: PREOP/PACU | Facility: HOSPITAL | Age: 55
Setting detail: OUTPATIENT SURGERY
Discharge: HOME | End: 2020-11-25
Payer: MEDICARE

## 2020-11-25 VITALS
WEIGHT: 184 LBS | SYSTOLIC BLOOD PRESSURE: 128 MMHG | BODY MASS INDEX: 25.76 KG/M2 | HEIGHT: 71 IN | TEMPERATURE: 97 F | OXYGEN SATURATION: 99 % | HEART RATE: 64 BPM | RESPIRATION RATE: 20 BRPM | DIASTOLIC BLOOD PRESSURE: 85 MMHG

## 2020-11-25 VITALS — HEART RATE: 70 BPM

## 2020-11-25 LAB
FLUAV RNA RESP QL NAA+PROBE: NEGATIVE
FLUBV RNA RESP QL NAA+PROBE: NEGATIVE
RSV RNA RESP QL NAA+PROBE: NEGATIVE
SARS-COV-2 RNA RESP QL NAA+PROBE: NEGATIVE

## 2020-11-25 PROCEDURE — 0241U HB NFCT DS VIR RESP RNA 4 TRGT: CPT | Performed by: PSYCHIATRY & NEUROLOGY

## 2020-11-25 PROCEDURE — 90870 ELECTROCONVULSIVE THERAPY: CPT

## 2020-11-25 RX ORDER — GLYCOPYRROLATE 0.2 MG/ML
INJECTION INTRAMUSCULAR; INTRAVENOUS AS NEEDED
Status: DISCONTINUED | OUTPATIENT
Start: 2020-11-25 | End: 2020-11-25

## 2020-11-25 RX ORDER — SODIUM CHLORIDE 9 MG/ML
INJECTION, SOLUTION INTRAVENOUS CONTINUOUS PRN
Status: DISCONTINUED | OUTPATIENT
Start: 2020-11-25 | End: 2020-11-25

## 2020-11-25 RX ORDER — SUCCINYLCHOLINE/SOD CL,ISO/PF 100 MG/5ML
SYRINGE (ML) INTRAVENOUS AS NEEDED
Status: DISCONTINUED | OUTPATIENT
Start: 2020-11-25 | End: 2020-11-25

## 2020-11-25 RX ORDER — ESMOLOL HYDROCHLORIDE 10 MG/ML
INJECTION INTRAVENOUS AS NEEDED
Status: DISCONTINUED | OUTPATIENT
Start: 2020-11-25 | End: 2020-11-25

## 2020-11-25 RX ADMIN — GLYCOPYRROLATE 0.2 MG: 0.2 INJECTION, SOLUTION INTRAMUSCULAR; INTRAVENOUS at 07:24

## 2020-11-25 RX ADMIN — METHOHEXITAL SODIUM 100 MG: 500 INJECTION, POWDER, LYOPHILIZED, FOR SOLUTION INTRAMUSCULAR; INTRAVENOUS; RECTAL at 07:24

## 2020-11-25 RX ADMIN — Medication 140 MG: at 07:25

## 2020-11-25 RX ADMIN — ESMOLOL HYDROCHLORIDE 50 MG: 10 INJECTION, SOLUTION INTRAVENOUS at 07:24

## 2020-11-25 RX ADMIN — SODIUM CHLORIDE: 0.9 INJECTION, SOLUTION INTRAVENOUS at 07:11

## 2020-12-22 ENCOUNTER — ANESTHESIA EVENT (OUTPATIENT)
Dept: PREOP/PACU | Facility: HOSPITAL | Age: 55
End: 2020-12-22

## 2020-12-23 ENCOUNTER — ANESTHESIA (OUTPATIENT)
Dept: PREOP/PACU | Facility: HOSPITAL | Age: 55
End: 2020-12-23

## 2020-12-23 ENCOUNTER — HOSPITAL ENCOUNTER (OUTPATIENT)
Dept: PREOP/PACU | Facility: HOSPITAL | Age: 55
Setting detail: OUTPATIENT SURGERY
Discharge: HOME | End: 2020-12-23
Payer: MEDICARE

## 2020-12-23 VITALS — HEART RATE: 70 BPM

## 2020-12-23 VITALS
RESPIRATION RATE: 20 BRPM | WEIGHT: 184 LBS | HEART RATE: 66 BPM | OXYGEN SATURATION: 96 % | SYSTOLIC BLOOD PRESSURE: 159 MMHG | HEIGHT: 71 IN | DIASTOLIC BLOOD PRESSURE: 92 MMHG | TEMPERATURE: 97.5 F | BODY MASS INDEX: 25.76 KG/M2

## 2020-12-23 PROCEDURE — 90870 ELECTROCONVULSIVE THERAPY: CPT

## 2020-12-23 PROCEDURE — 0241U HB NFCT DS VIR RESP RNA 4 TRGT: CPT | Performed by: PSYCHIATRY & NEUROLOGY

## 2020-12-23 RX ORDER — ESMOLOL HYDROCHLORIDE 10 MG/ML
INJECTION INTRAVENOUS AS NEEDED
Status: DISCONTINUED | OUTPATIENT
Start: 2020-12-23 | End: 2020-12-23

## 2020-12-23 RX ORDER — SODIUM CHLORIDE 9 MG/ML
125 INJECTION, SOLUTION INTRAVENOUS CONTINUOUS
Status: DISCONTINUED | OUTPATIENT
Start: 2020-12-23 | End: 2020-12-27 | Stop reason: HOSPADM

## 2020-12-23 RX ORDER — SUCCINYLCHOLINE/SOD CL,ISO/PF 100 MG/5ML
SYRINGE (ML) INTRAVENOUS AS NEEDED
Status: DISCONTINUED | OUTPATIENT
Start: 2020-12-23 | End: 2020-12-23

## 2020-12-23 RX ADMIN — ESMOLOL HYDROCHLORIDE 40 MG: 10 INJECTION, SOLUTION INTRAVENOUS at 07:35

## 2020-12-23 RX ADMIN — Medication 160 MG: at 07:28

## 2020-12-23 RX ADMIN — METHOHEXITAL SODIUM 100 MG: 500 INJECTION, POWDER, LYOPHILIZED, FOR SOLUTION INTRAMUSCULAR; INTRAVENOUS; RECTAL at 07:27

## 2020-12-23 RX ADMIN — SODIUM CHLORIDE 125 ML/HR: 0.9 INJECTION, SOLUTION INTRAVENOUS at 05:25

## 2021-01-18 RX ORDER — SODIUM CHLORIDE 9 MG/ML
125 INJECTION, SOLUTION INTRAVENOUS CONTINUOUS
Status: CANCELLED | OUTPATIENT
Start: 2021-01-18

## 2021-01-20 ENCOUNTER — HOSPITAL ENCOUNTER (OUTPATIENT)
Dept: PREOP/PACU | Facility: HOSPITAL | Age: 56
Setting detail: OUTPATIENT SURGERY
Discharge: HOME | End: 2021-01-20

## 2021-02-16 ENCOUNTER — ANESTHESIA EVENT (OUTPATIENT)
Dept: PREOP/PACU | Facility: HOSPITAL | Age: 56
End: 2021-02-16

## 2021-02-16 NOTE — ANESTHESIA PREPROCEDURE EVALUATION
Procedure:  ECT OUTPATIENT    Relevant Problems   ANESTHESIA  old chart reviewed   pt known to me  no interval change inhealth      CARDIO  Coreg this AM   (+) Essential hypertension   (+) NSTEMI (non-ST elevated myocardial infarction) (HCC)   (+) Other hyperlipidemia      ENDO (within normal limits)      GI/HEPATIC  chronic costiipatioin      /RENAL (within normal limits)      HEMATOLOGY (within normal limits)      MUSCULOSKELETAL   (+) Arthritis      NEURO/PSYCH  bipolar hx   (+) CVA (cerebral vascular accident) ([facility] [address] )   (+) History of CVA (cerebrovascular accident)   (+) History of MI (myocardial infarction)   (+) Other chronic pain      PULMONARY  smoking cessation stressed   (+) Smoking   (-) Sleep apnea   (-) URI (upper respiratory infection)        Physical Exam    Airway    Mallampati score: II  TM Distance: >3 FB  Neck ROM: full     Dental       Cardiovascular  Cardiovascular exam normal    Pulmonary  Pulmonary exam normal     Other Findings  Fixed upper and lower teeth and in good repair      Anesthesia Plan  ASA Score- 3     Anesthesia Type- general with ASA Monitors  Additional Monitors:   Airway Plan:           Plan Factors-Exercise tolerance (METS): >4 METS  Chart reviewed  EKG reviewed  Existing labs reviewed  Patient summary reviewed  Patient is a current smoker  Patient instructed to abstain from smoking on day of procedure  Induction- intravenous  Postoperative Plan-     Informed Consent- Anesthetic plan and risks discussed with patient  I personally reviewed this patient with the CRNA  Discussed and agreed on the Anesthesia Plan with the CRNA  Hema Fong

## 2021-02-17 ENCOUNTER — HOSPITAL ENCOUNTER (OUTPATIENT)
Dept: PREOP/PACU | Facility: HOSPITAL | Age: 56
Setting detail: OUTPATIENT SURGERY
Discharge: HOME | End: 2021-02-17
Payer: MEDICARE

## 2021-02-17 ENCOUNTER — ANESTHESIA (OUTPATIENT)
Dept: PREOP/PACU | Facility: HOSPITAL | Age: 56
End: 2021-02-17

## 2021-02-17 VITALS
BODY MASS INDEX: 25.76 KG/M2 | DIASTOLIC BLOOD PRESSURE: 87 MMHG | HEIGHT: 71 IN | RESPIRATION RATE: 16 BRPM | SYSTOLIC BLOOD PRESSURE: 139 MMHG | HEART RATE: 79 BPM | OXYGEN SATURATION: 95 % | WEIGHT: 184 LBS | TEMPERATURE: 97.9 F

## 2021-02-17 VITALS — HEART RATE: 58 BPM

## 2021-02-17 PROCEDURE — 0241U HB NFCT DS VIR RESP RNA 4 TRGT: CPT | Performed by: PSYCHIATRY & NEUROLOGY

## 2021-02-17 PROCEDURE — 90870 ELECTROCONVULSIVE THERAPY: CPT

## 2021-02-17 RX ORDER — SODIUM CHLORIDE 9 MG/ML
75 INJECTION, SOLUTION INTRAVENOUS CONTINUOUS
Status: CANCELLED | OUTPATIENT
Start: 2021-02-17

## 2021-02-17 RX ORDER — SODIUM CHLORIDE 9 MG/ML
125 INJECTION, SOLUTION INTRAVENOUS CONTINUOUS
Status: DISCONTINUED | OUTPATIENT
Start: 2021-02-17 | End: 2021-02-21 | Stop reason: HOSPADM

## 2021-02-17 RX ORDER — SODIUM CHLORIDE 9 MG/ML
INJECTION, SOLUTION INTRAVENOUS CONTINUOUS PRN
Status: DISCONTINUED | OUTPATIENT
Start: 2021-02-17 | End: 2021-02-17

## 2021-02-17 RX ADMIN — SODIUM CHLORIDE 125 ML/HR: 0.9 INJECTION, SOLUTION INTRAVENOUS at 05:34

## 2021-02-17 RX ADMIN — SODIUM CHLORIDE: 0.9 INJECTION, SOLUTION INTRAVENOUS at 05:33

## 2021-02-17 NOTE — DISCHARGE INSTRUCTIONS
Electroconvulsive Therapy   WHAT YOU NEED TO KNOW:   Electroconvulsive therapy (ECT) is a treatment that sends an electric current to your brain to cause a seizure  The seizure affects the chemicals in your brain, which may make your brain cells work better  ECT is used to treat certain conditions, such as depression, that do not get better after medicines or other therapies have been tried  DISCHARGE INSTRUCTIONS:   Call your local emergency number (911 in the [address] [facility]) if:   · You have shortness of breath, chest pain, or a fast heartbeat  Seek care immediately if:   · You have a severe headache that does not get better, even after you take medicine  · You have a stiff neck or trouble thinking clearly  · You think about hurting yourself or others  Call your doctor if:   · You have a fever  · You have severe pain in your back or neck  · You have questions or concerns about your condition or care  Medicines: You may need any of the following:  · Acetaminophen  decreases pain and fever  It is available without a doctor's order  Ask how much to take and how often to take it  Follow directions  Read the labels of all other medicines you are using to see if they also contain acetaminophen, or ask your doctor or pharmacist  Acetaminophen can cause liver damage if not taken correctly  Do not use more than 4 grams (4,000 milligrams) total of acetaminophen in one day  · NSAIDs  help decrease swelling and pain or fever  This medicine is available with or without a doctor's order  NSAIDs can cause stomach bleeding or kidney problems in certain people  If you take blood thinner medicine, always ask your healthcare provider if NSAIDs are safe for you  Always read the medicine label and follow directions  · Take your medicine as directed  Contact your healthcare provider if you think your medicine is not helping or if you have side effects  Tell him or her if you are allergic to any medicine   Keep a list of the medicines, vitamins, and herbs you take  Include the amounts, and when and why you take them  Bring the list or the pill bottles to follow-up visits  Carry your medicine list with you in case of an emergency  Follow up with your doctor as directed:  Write down your questions so you remember to ask them during your visits  © Copyright [address] Information is for End User's use only and may not be sold, redistributed or otherwise used for commercial purposes  All illustrations and images included in CareNotes® are the copyrighted property of A D A M , Inc  or [facility] Rene Otero   The above information is an  only  It is not intended as medical advice for individual conditions or treatments  Talk to your doctor, nurse or pharmacist before following any medical regimen to see if it is safe and effective for you

## 2021-02-17 NOTE — NURSING NOTE
Received from PACU at 0719 after ECT  Alert  Denies headache or muscle pain  Respirations easy and non labored  IV fluids continue  Call bell in reach

## 2021-02-17 NOTE — PROCEDURES
Procedure Note - ECT  Shane Chaney 63 y o  male MRN: [id number]    Time out was taken with staff to confirm correct patient and correct procedure to be performed  Session Number: ECT Maintenance    Diagnosis: Major Depression, recurrent    ECT Type: Outpatient    Anesthesia: Brevital    Electrode Placement: Bilateral    Energy level:    60 %    Seizure Duration   EE sec  EM sec on readout, visualized as 15 sec  PSI: 28 6 %  Results:Clinical seizure was satisfactory, Patient tolerated ECT well  Media Information       Document Information    Clinical Image - Mobile Device      2021 07:00   Attached To:    Hospital Encounter on 21 with [facility] [address] [place]    Lida Moser MD   Pacu

## 2021-03-16 RX ORDER — SODIUM CHLORIDE 9 MG/ML
50 INJECTION, SOLUTION INTRAVENOUS CONTINUOUS
Status: CANCELLED | OUTPATIENT
Start: 2021-03-16

## 2021-03-17 ENCOUNTER — HOSPITAL ENCOUNTER (OUTPATIENT)
Dept: PREOP/PACU | Facility: HOSPITAL | Age: 56
Setting detail: OUTPATIENT SURGERY
Discharge: HOME | End: 2021-03-17

## 2021-04-12 ENCOUNTER — ANESTHESIA EVENT (OUTPATIENT)
Dept: PREOP/PACU | Facility: HOSPITAL | Age: 56
End: 2021-04-12

## 2021-04-12 NOTE — ANESTHESIA PREPROCEDURE EVALUATION
Procedure:  ECT OUTPATIENT    Relevant Problems   ANESTHESIA  old chart reviewed   pt known to me  no interval change inhealth      CARDIO  Coreg this AM   (+) Essential hypertension   (+) NSTEMI (non-ST elevated myocardial infarction) (HCC)   (+) Other hyperlipidemia      ENDO (within normal limits)      GI/HEPATIC  chronic costiipatioin      /RENAL (within normal limits)      HEMATOLOGY (within normal limits)      MUSCULOSKELETAL   (+) Arthritis      NEURO/PSYCH  bipolar hx   (+) CVA (cerebral vascular accident) ([address] )   (+) History of CVA (cerebrovascular accident)   (+) History of MI (myocardial infarction)   (+) Other chronic pain      PULMONARY  smoking cessation stressed   (+) Smoking   (-) Sleep apnea   (-) URI (upper respiratory infection)        Physical Exam    Airway    Mallampati score: II  TM Distance: >3 FB  Neck ROM: full     Dental       Cardiovascular  Cardiovascular exam normal    Pulmonary  Pulmonary exam normal     Other Findings  Fixed upper and lower teeth and in good repair      Anesthesia Plan  ASA Score- 3     Anesthesia Type- general with ASA Monitors  Additional Monitors:   Airway Plan:           Plan Factors-Exercise tolerance (METS): >4 METS  Chart reviewed  EKG reviewed  Existing labs reviewed  Patient summary reviewed  Patient is not a current smoker  Patient not instructed to abstain from smoking on day of procedure  Patient did not smoke on day of surgery  Obstructive sleep apnea risk education given perioperatively  Induction- intravenous  Postoperative Plan-     Informed Consent- Anesthetic plan and risks discussed with patient  I personally reviewed this patient with the CRNA  Discussed and agreed on the Anesthesia Plan with the CRNA  Fely Samaniego

## 2021-04-14 ENCOUNTER — ANESTHESIA (OUTPATIENT)
Dept: PREOP/PACU | Facility: HOSPITAL | Age: 56
End: 2021-04-14

## 2021-04-14 ENCOUNTER — HOSPITAL ENCOUNTER (OUTPATIENT)
Dept: PREOP/PACU | Facility: HOSPITAL | Age: 56
Setting detail: OUTPATIENT SURGERY
Discharge: HOME | End: 2021-04-14
Payer: MEDICARE

## 2021-04-14 VITALS
RESPIRATION RATE: 18 BRPM | BODY MASS INDEX: 25.76 KG/M2 | SYSTOLIC BLOOD PRESSURE: 152 MMHG | TEMPERATURE: 98.2 F | OXYGEN SATURATION: 99 % | HEART RATE: 64 BPM | HEIGHT: 71 IN | DIASTOLIC BLOOD PRESSURE: 87 MMHG | WEIGHT: 184 LBS

## 2021-04-14 PROCEDURE — 90870 ELECTROCONVULSIVE THERAPY: CPT

## 2021-04-14 PROCEDURE — 90870 ELECTROCONVULSIVE THERAPY: CPT | Performed by: PSYCHIATRY & NEUROLOGY

## 2021-04-14 RX ORDER — ESMOLOL HYDROCHLORIDE 10 MG/ML
INJECTION INTRAVENOUS AS NEEDED
Status: DISCONTINUED | OUTPATIENT
Start: 2021-04-14 | End: 2021-04-14

## 2021-04-14 RX ORDER — SODIUM CHLORIDE 9 MG/ML
125 INJECTION, SOLUTION INTRAVENOUS CONTINUOUS
Status: DISCONTINUED | OUTPATIENT
Start: 2021-04-14 | End: 2021-04-18 | Stop reason: HOSPADM

## 2021-04-14 RX ORDER — SUCCINYLCHOLINE/SOD CL,ISO/PF 100 MG/5ML
SYRINGE (ML) INTRAVENOUS AS NEEDED
Status: DISCONTINUED | OUTPATIENT
Start: 2021-04-14 | End: 2021-04-14

## 2021-04-14 RX ORDER — SODIUM CHLORIDE 9 MG/ML
50 INJECTION, SOLUTION INTRAVENOUS CONTINUOUS
Status: DISCONTINUED | OUTPATIENT
Start: 2021-04-14 | End: 2021-04-18 | Stop reason: HOSPADM

## 2021-04-14 RX ORDER — GLYCOPYRROLATE 0.2 MG/ML
INJECTION INTRAMUSCULAR; INTRAVENOUS AS NEEDED
Status: DISCONTINUED | OUTPATIENT
Start: 2021-04-14 | End: 2021-04-14

## 2021-04-14 RX ADMIN — METHOHEXITAL SODIUM 100 MG: 500 INJECTION, POWDER, LYOPHILIZED, FOR SOLUTION INTRAMUSCULAR; INTRAVENOUS; RECTAL at 06:48

## 2021-04-14 RX ADMIN — SODIUM CHLORIDE 125 ML/HR: 0.9 INJECTION, SOLUTION INTRAVENOUS at 05:22

## 2021-04-14 RX ADMIN — Medication 140 MG: at 06:49

## 2021-04-14 RX ADMIN — GLYCOPYRROLATE 0.2 MG: 0.2 INJECTION, SOLUTION INTRAMUSCULAR; INTRAVENOUS at 06:44

## 2021-04-14 RX ADMIN — ESMOLOL HYDROCHLORIDE 50 MG: 10 INJECTION, SOLUTION INTRAVENOUS at 06:48

## 2021-04-14 NOTE — PROCEDURES
Procedure Note - ECT  Latasha Dyer 63 y o  male MRN: [id number]    Time out was taken with staff to confirm correct patient and correct procedure to be performed  Session Number:  Monthly maintenance ECT    Diagnosis: Schizoaffective disorder    ECT Type: Outpatient    Anesthesia: Brevital    Electrode Placement: Bilateral    Energy level:     % 60    Seizure Duration   EE sec  EMG:  Activity not detected   PSI: % <10  Results:Clinical seizure was satisfactory, Patient tolerated ECT well  Media Information       Document Information    Clinical Image - Mobile Device   ECT   2021 07:01   Attached To:    Hospital Encounter on 21 with [facility] MD Boyd   Pacu

## 2021-07-19 ENCOUNTER — APPOINTMENT (EMERGENCY)
Dept: RADIOLOGY | Facility: HOSPITAL | Age: 56
End: 2021-07-19
Payer: MEDICARE

## 2021-07-19 ENCOUNTER — HOSPITAL ENCOUNTER (EMERGENCY)
Facility: HOSPITAL | Age: 56
Discharge: HOME | End: 2021-07-19
Attending: EMERGENCY MEDICINE | Admitting: EMERGENCY MEDICINE
Payer: MEDICARE

## 2021-07-19 VITALS
WEIGHT: 187 LBS | HEIGHT: 71 IN | TEMPERATURE: 97.2 F | SYSTOLIC BLOOD PRESSURE: 108 MMHG | HEART RATE: 69 BPM | BODY MASS INDEX: 26.18 KG/M2 | OXYGEN SATURATION: 97 % | DIASTOLIC BLOOD PRESSURE: 71 MMHG | RESPIRATION RATE: 18 BRPM

## 2021-07-19 DIAGNOSIS — N39.0 URINARY TRACT INFECTION: ICD-10-CM

## 2021-07-19 DIAGNOSIS — F32.A DEPRESSION: Primary | ICD-10-CM

## 2021-07-19 LAB
ALBUMIN SERPL BCP-MCNC: 3.7 G/DL (ref 3.5–5)
ALP SERPL-CCNC: 62 U/L (ref 46–116)
ALT SERPL W P-5'-P-CCNC: 52 U/L (ref 12–78)
AMORPH URATE CRY URNS QL MICRO: ABNORMAL /HPF
AMPHETAMINES SERPL QL SCN: NEGATIVE
ANION GAP SERPL CALCULATED.3IONS-SCNC: 12 MMOL/L (ref 4–13)
AST SERPL W P-5'-P-CCNC: 117 U/L (ref 5–45)
ATRIAL RATE: 56 BPM
BACTERIA UR QL AUTO: ABNORMAL /HPF
BARBITURATES UR QL: NEGATIVE
BASOPHILS # BLD AUTO: 0.12 THOUSANDS/ΜL (ref 0–0.1)
BASOPHILS NFR BLD AUTO: 1 % (ref 0–1)
BENZODIAZ UR QL: NEGATIVE
BILIRUB SERPL-MCNC: 0.9 MG/DL (ref 0.2–1)
BILIRUB UR QL STRIP: NEGATIVE
BUN SERPL-MCNC: 16 MG/DL (ref 5–25)
CALCIUM SERPL-MCNC: 8.6 MG/DL (ref 8.3–10.1)
CHLORIDE SERPL-SCNC: 109 MMOL/L (ref 100–108)
CLARITY UR: ABNORMAL
CO2 SERPL-SCNC: 23 MMOL/L (ref 21–32)
COCAINE UR QL: NEGATIVE
COLOR UR: YELLOW
CREAT SERPL-MCNC: 1.3 MG/DL (ref 0.6–1.3)
EOSINOPHIL # BLD AUTO: 0.16 THOUSAND/ΜL (ref 0–0.61)
EOSINOPHIL NFR BLD AUTO: 2 % (ref 0–6)
ERYTHROCYTE [DISTWIDTH] IN BLOOD BY AUTOMATED COUNT: 12.9 % (ref 11.6–15.1)
ETHANOL EXG-MCNC: 0 MG/DL
GFR SERPL CREATININE-BSD FRML MDRD: 61 ML/MIN/1.73SQ M
GLUCOSE SERPL-MCNC: 124 MG/DL (ref 65–140)
GLUCOSE UR STRIP-MCNC: NEGATIVE MG/DL
HCT VFR BLD AUTO: 46.6 % (ref 36.5–49.3)
HGB BLD-MCNC: 15.2 G/DL (ref 12–17)
HGB UR QL STRIP.AUTO: ABNORMAL
IMM GRANULOCYTES # BLD AUTO: 0.03 THOUSAND/UL (ref 0–0.2)
IMM GRANULOCYTES NFR BLD AUTO: 0 % (ref 0–2)
KETONES UR STRIP-MCNC: ABNORMAL MG/DL
LEUKOCYTE ESTERASE UR QL STRIP: ABNORMAL
LYMPHOCYTES # BLD AUTO: 1.99 THOUSANDS/ΜL (ref 0.6–4.47)
LYMPHOCYTES NFR BLD AUTO: 19 % (ref 14–44)
MCH RBC QN AUTO: 31.6 PG (ref 26.8–34.3)
MCHC RBC AUTO-ENTMCNC: 32.6 G/DL (ref 31.4–37.4)
MCV RBC AUTO: 97 FL (ref 82–98)
METHADONE UR QL: NEGATIVE
MONOCYTES # BLD AUTO: 0.67 THOUSAND/ΜL (ref 0.17–1.22)
MONOCYTES NFR BLD AUTO: 6 % (ref 4–12)
MUCOUS THREADS UR QL AUTO: ABNORMAL
NEUTROPHILS # BLD AUTO: 7.48 THOUSANDS/ΜL (ref 1.85–7.62)
NEUTS SEG NFR BLD AUTO: 72 % (ref 43–75)
NITRITE UR QL STRIP: NEGATIVE
NON-SQ EPI CELLS URNS QL MICRO: ABNORMAL /HPF
NRBC BLD AUTO-RTO: 0 /100 WBCS
OPIATES UR QL SCN: NEGATIVE
OXYCODONE+OXYMORPHONE UR QL SCN: NEGATIVE
P AXIS: 69 DEGREES
PCP UR QL: NEGATIVE
PH UR STRIP.AUTO: 6 [PH]
PLATELET # BLD AUTO: 278 THOUSANDS/UL (ref 149–390)
PMV BLD AUTO: 10.3 FL (ref 8.9–12.7)
POTASSIUM SERPL-SCNC: 3.9 MMOL/L (ref 3.5–5.3)
PR INTERVAL: 160 MS
PROT SERPL-MCNC: 6.8 G/DL (ref 6.4–8.2)
PROT UR STRIP-MCNC: ABNORMAL MG/DL
QRS AXIS: -42 DEGREES
QRSD INTERVAL: 92 MS
QT INTERVAL: 490 MS
QTC INTERVAL: 472 MS
RBC # BLD AUTO: 4.81 MILLION/UL (ref 3.88–5.62)
RBC #/AREA URNS AUTO: ABNORMAL /HPF
SARS-COV-2 RNA RESP QL NAA+PROBE: NEGATIVE
SODIUM SERPL-SCNC: 144 MMOL/L (ref 136–145)
SP GR UR STRIP.AUTO: 1.02 (ref 1–1.03)
T WAVE AXIS: 115 DEGREES
THC UR QL: NEGATIVE
TROPONIN I SERPL-MCNC: 0.11 NG/ML
TSH SERPL DL<=0.05 MIU/L-ACNC: 1.87 UIU/ML (ref 0.36–3.74)
UROBILINOGEN UR QL STRIP.AUTO: 0.2 E.U./DL
VENTRICULAR RATE: 56 BPM
WBC # BLD AUTO: 10.45 THOUSAND/UL (ref 4.31–10.16)
WBC #/AREA URNS AUTO: ABNORMAL /HPF

## 2021-07-19 PROCEDURE — 99284 EMERGENCY DEPT VISIT MOD MDM: CPT

## 2021-07-19 PROCEDURE — 71045 X-RAY EXAM CHEST 1 VIEW: CPT

## 2021-07-19 PROCEDURE — 84484 ASSAY OF TROPONIN QUANT: CPT | Performed by: PHYSICIAN ASSISTANT

## 2021-07-19 PROCEDURE — 80053 COMPREHEN METABOLIC PANEL: CPT | Performed by: PHYSICIAN ASSISTANT

## 2021-07-19 PROCEDURE — 82075 ASSAY OF BREATH ETHANOL: CPT | Performed by: PHYSICIAN ASSISTANT

## 2021-07-19 PROCEDURE — 84443 ASSAY THYROID STIM HORMONE: CPT | Performed by: PHYSICIAN ASSISTANT

## 2021-07-19 PROCEDURE — 93010 ELECTROCARDIOGRAM REPORT: CPT | Performed by: INTERNAL MEDICINE

## 2021-07-19 PROCEDURE — U0003 INFECTIOUS AGENT DETECTION BY NUCLEIC ACID (DNA OR RNA); SEVERE ACUTE RESPIRATORY SYNDROME CORONAVIRUS 2 (SARS-COV-2) (CORONAVIRUS DISEASE [COVID-19]), AMPLIFIED PROBE TECHNIQUE, MAKING USE OF HIGH THROUGHPUT TECHNOLOGIES AS DESCRIBED BY CMS-2020-01-R: HCPCS | Performed by: PHYSICIAN ASSISTANT

## 2021-07-19 PROCEDURE — 99284 EMERGENCY DEPT VISIT MOD MDM: CPT | Performed by: PHYSICIAN ASSISTANT

## 2021-07-19 PROCEDURE — 80307 DRUG TEST PRSMV CHEM ANLYZR: CPT | Performed by: PHYSICIAN ASSISTANT

## 2021-07-19 PROCEDURE — U0005 INFEC AGEN DETEC AMPLI PROBE: HCPCS | Performed by: PHYSICIAN ASSISTANT

## 2021-07-19 PROCEDURE — 85025 COMPLETE CBC W/AUTO DIFF WBC: CPT | Performed by: PHYSICIAN ASSISTANT

## 2021-07-19 PROCEDURE — 81001 URINALYSIS AUTO W/SCOPE: CPT | Performed by: PHYSICIAN ASSISTANT

## 2021-07-19 PROCEDURE — 93005 ELECTROCARDIOGRAM TRACING: CPT

## 2021-07-19 PROCEDURE — 87086 URINE CULTURE/COLONY COUNT: CPT | Performed by: PHYSICIAN ASSISTANT

## 2021-07-19 PROCEDURE — 36415 COLL VENOUS BLD VENIPUNCTURE: CPT | Performed by: PHYSICIAN ASSISTANT

## 2021-07-19 RX ORDER — NICOTINE POLACRILEX 2 MG
1 LOZENGE BUCCAL DAILY
COMMUNITY

## 2021-07-19 RX ORDER — THIAMINE HCL 50 MG
100 TABLET ORAL DAILY
COMMUNITY

## 2021-07-19 RX ORDER — CEPHALEXIN 500 MG/1
500 CAPSULE ORAL EVERY 12 HOURS SCHEDULED
Qty: 10 CAPSULE | Refills: 0 | Status: SHIPPED | OUTPATIENT
Start: 2021-07-19 | End: 2021-07-19 | Stop reason: SDUPTHER

## 2021-07-19 RX ORDER — CLONAZEPAM 0.5 MG/1
0.25 TABLET ORAL 2 TIMES DAILY PRN
COMMUNITY

## 2021-07-19 RX ORDER — AMIODARONE HYDROCHLORIDE 200 MG/1
200 TABLET ORAL DAILY
COMMUNITY

## 2021-07-19 RX ORDER — CEPHALEXIN 500 MG/1
500 CAPSULE ORAL EVERY 12 HOURS SCHEDULED
Qty: 10 CAPSULE | Refills: 0 | Status: SHIPPED | OUTPATIENT
Start: 2021-07-19 | End: 2021-07-24

## 2021-07-19 RX ORDER — CEPHALEXIN 250 MG/1
500 CAPSULE ORAL EVERY 12 HOURS SCHEDULED
Status: DISCONTINUED | OUTPATIENT
Start: 2021-07-19 | End: 2021-07-19 | Stop reason: HOSPADM

## 2021-07-19 RX ADMIN — CEPHALEXIN 500 MG: 250 CAPSULE ORAL at 10:54

## 2021-07-19 NOTE — ED NOTES
RN interrogated patient's pacemaker with [employer]   Report being faxed     Leonor Coleman RN  07/19/21 [id number]

## 2021-07-19 NOTE — ED NOTES
Patient reports he wants to sign himself in and doesnt like where he lives  He denies suicidal and homicidal ideations and denies psychosis  He reports he does see a psychiatrist at [facility] for his medications and sees him virtually but doesnt know his name  Patient will be discharged to follow with his therapist and psychitrist and will return to the emergency department if symptoms worsen

## 2021-07-20 NOTE — ED PROVIDER NOTES
History  Chief Complaint   Patient presents with    Psychiatric Evaluation     Patient states that he has not been feeling mentally well and does not feel right since he had a stroke a couple months ago  Linda Baron states he does not want to hurt himself  Has been seeing a psychiatrist and was started on medication two weeksago      48-year-old male with history of schizoaffective disorder, CVA x3, takotsubo cardiomyopathy status post ICD placement, atrial fibrillation on Eliquis, coronary artery disease and bipolar 1 disorder presents to the emergency department from [place] [address] for evaluation of depression  The patient states to me that he feels hopeless and feels that his current therapy regimen is not working  He is requesting to sign himself in for voluntary admission  No recent fevers or chills  Did have recent hospitalization to [facility] 2 months ago at which time an ICD was placed due to cardiomyopathy  Per record review it appears that the device has not had any issues recently  He denies active suicidal ideation, homicidal ideation, alcohol use, or illicit substance use  No hallucinations  Prior to Admission Medications   Prescriptions Last Dose Informant Patient Reported? Taking?   amiodarone 200 mg tablet   Yes Yes   Sig: Take 200 mg by mouth daily   apixaban (Eliquis) 5 mg   Yes Yes   Sig: Take 5 mg by mouth 2 (two) times a day   aspirin (ECOTRIN LOW STRENGTH) 81 mg EC tablet   Yes No   Sig: Take 81 mg by mouth daily   atorvastatin (LIPITOR) 40 mg tablet   No No   Sig: Take 1 tablet (40 mg total) by mouth daily with dinner   Patient taking differently: Take 80 mg by mouth daily    carvedilol (COREG) 3 125 mg tablet   No No   Sig: Take 1 tablet (3 125 mg total) by mouth 2 (two) times a day with meals Hold for heart rate less than 50 beats per minute    Hold for systolic blood pressure less than 110 mmHg   clonazePAM (KlonoPIN) 0 5 mg tablet   Yes Yes   Sig: Take 0 5 mg by mouth 2 (two) times a day as needed for seizures   lurasidone (LATUDA) 80 mg tablet   No No   Sig: Take 1 tablet (80 mg total) by mouth daily with dinner   mirtazapine (REMERON) 7 5 MG tablet   No No   Sig: Take 1 tablet (7 5 mg total) by mouth daily at bedtime   Patient taking differently: Take 30 mg by mouth daily at bedtime    multivitamin-iron-minerals-folic acid (THERAPEUTIC-M) TABS tablet   Yes Yes   Sig: Take 1 tablet by mouth daily   polyethylene glycol (MIRALAX) 17 g packet   No No   Sig: Take 17 g by mouth daily   senna (SENOKOT) 8 6 mg   Yes No   Sig: Take 17 2 mg by mouth daily as needed for constipation    sertraline (ZOLOFT) 50 mg tablet   Yes Yes   Sig: Take 50 mg by mouth daily   thiamine (VITAMIN B1) 50 mg tablet   Yes Yes   Sig: Take 100 mg by mouth daily      Facility-Administered Medications: None       Past Medical History:   Diagnosis Date    Anxiety     Bipolar 1 disorder (HCC)     Bowel habit changes     Chronic pain disorder     Coronary artery disease     [facility]     CVA [facility]cere[facility] vascular accident) ([facility] )     Depression     Family hx of colon cancer     father and paternal uncle    Myocardial infarction [facility])     Psychiatric disorder     Psychiatric illness     Schizoaffective disorder, bipolar type ([place] [address] )     Schizoaffective disorder, bipolar type ([place] [address] )     Stroke ([facility] ) 12/2015    CVA x3   Pt states he is unaware of when he had first two CVA    Suicide attempt [facility])        Past Surgical History:   Procedure Laterality Date    CARDIAC PACEMAKER PLACEMENT      COLONOSCOPY         Family History   Problem Relation Age of Onset    No Known Problems Mother     No Known Problems Father     No Known Problems Sister     No Known Problems Brother     No Known Problems Maternal Aunt     No Known Problems Paternal Aunt     No Known Problems Maternal Uncle     No Known Problems Paternal Uncle     No Known Problems Maternal Grandfather     No Known Problems Maternal Grandmother     No Known Problems Paternal Grandfather     No Known Problems Paternal Grandmother     No Known Problems Cousin     ADD / ADHD Neg Hx     Alcohol abuse Neg Hx     Anxiety disorder Neg Hx     Bipolar disorder Neg Hx     Completed Suicide  Neg Hx     Dementia Neg Hx     Depression Neg Hx     Drug abuse Neg Hx     OCD Neg Hx     Psychiatric Illness Neg Hx     Psychosis Neg Hx     Schizoaffective Disorder  Neg Hx     Schizophrenia Neg Hx     Self-Injury Neg Hx     Suicide Attempts Neg Hx      I have reviewed and agree with the history as documented  E-Cigarette/Vaping    E-Cigarette Use Never User      E-Cigarette/Vaping Substances    Nicotine No     Flavoring No     Other No     Unknown No      Social History     Tobacco Use    Smoking status: Current Every Day Smoker     Packs/day: 1 00     Years: 15 00     Pack years: 15 00     Types: Cigarettes    Smokeless tobacco: Never Used    Tobacco comment: Wishes to quit   Vaping Use    Vaping Use: Never used   Substance Use Topics    Alcohol use: Never     Alcohol/week: 0 0 standard drinks     Comment: pt denies    Drug use: No     Comment: pt denies       Review of Systems   Constitutional: Negative for chills, diaphoresis and fever  Eyes: Negative for visual disturbance  Respiratory: Negative for cough and shortness of breath  Cardiovascular: Negative for chest pain and palpitations  Gastrointestinal: Negative for abdominal pain, diarrhea, nausea and vomiting  Genitourinary: Negative for dysuria, flank pain and frequency  Musculoskeletal: Negative for arthralgias and myalgias  Skin: Negative for color change, rash and wound  Allergic/Immunologic: Negative for immunocompromised state  Neurological: Negative for dizziness and light-headedness  Hematological: Does not bruise/bleed easily  Psychiatric/Behavioral: Positive for dysphoric mood   Negative for confusion, hallucinations, sleep disturbance and suicidal ideas  The patient is not nervous/anxious  Physical Exam  Physical Exam  Vitals and nursing note reviewed  Constitutional:       Appearance: He is well-developed  HENT:      Head: Normocephalic and atraumatic  Eyes:      Conjunctiva/sclera: Conjunctivae normal    Cardiovascular:      Rate and Rhythm: Normal rate and regular rhythm  Heart sounds: No murmur heard  Pulmonary:      Effort: Pulmonary effort is normal  No respiratory distress  Breath sounds: Normal breath sounds  Abdominal:      Palpations: Abdomen is soft  Tenderness: There is no abdominal tenderness  Musculoskeletal:      Cervical back: Neck supple  Right lower leg: No edema  Left lower leg: No edema  Skin:     General: Skin is warm and dry  Capillary Refill: Capillary refill takes less than 2 seconds  Neurological:      General: No focal deficit present  Mental Status: He is alert  Psychiatric:         Attention and Perception: Attention normal          Mood and Affect: Affect is flat  Speech: Speech normal          Behavior: Behavior normal  Behavior is cooperative           Vital Signs  ED Triage Vitals   Temperature Pulse Respirations Blood Pressure SpO2   07/19/21 0930 07/19/21 0933 07/19/21 0933 07/19/21 0933 07/19/21 0933   (!) 97 2 °F (36 2 °C) 69 18 108/71 97 %      Temp Source Heart Rate Source Patient Position - Orthostatic VS BP Location FiO2 (%)   07/19/21 0930 -- 07/19/21 0933 07/19/21 0933 --   Temporal  Sitting Right arm       Pain Score       --                  Vitals:    07/19/21 0933   BP: 108/71   Pulse: 69   Patient Position - Orthostatic VS: Sitting         Visual Acuity      ED Medications  Medications - No data to display    Diagnostic Studies  Results Reviewed     Procedure Component Value Units Date/Time    Comprehensive metabolic panel [[id number]]  (Abnormal) Collected: 07/19/21 0948    Lab Status: Final result Specimen: Blood from Arm, Right Updated: 07/19/21 1115     Sodium 144 mmol/L      Potassium 3 9 mmol/L      Chloride 109 mmol/L      CO2 23 mmol/L      ANION GAP 12 mmol/L      BUN 16 mg/dL      Creatinine 1 30 mg/dL      Glucose 124 mg/dL      Calcium 8 6 mg/dL       U/L      ALT 52 U/L      Alkaline Phosphatase 62 U/L      Total Protein 6 8 g/dL      Albumin 3 7 g/dL      Total Bilirubin 0 90 mg/dL      eGFR 61 ml/min/1 73sq m     Narrative:      [place] guidelines for Chronic Kidney Disease (CKD):     Stage 1 with normal or high GFR (GFR > 90 mL/min/1 73 square meters)    Stage 2 Mild CKD (GFR = 60-89 mL/min/1 73 square meters)    Stage 3A Moderate CKD (GFR = 45-59 mL/min/1 73 square meters)    Stage 3B Moderate CKD (GFR = 30-44 mL/min/1 73 square meters)    Stage 4 Severe CKD (GFR = 15-29 mL/min/1 73 square meters)    Stage 5 End Stage CKD (GFR <15 mL/min/1 73 square meters)  Note: GFR calculation is accurate only with a steady state creatinine    Novel Coronavirus (Covid-19),PCR [facility] [[id number]]  (Normal) Collected: 07/19/21 0948    Lab Status: Final result Specimen: Nares from Nasopharyngeal Swab Updated: 07/19/21 1107     SARS-CoV-2 Negative    Narrative: The specimen collection materials, transport medium, and/or testing methodology utilized in the production of these test results have been proven to be reliable in a limited validation with an abbreviated program under the Emergency Utilization Authorization provided by the FDA  Testing reported as "Presumptive positive" will be confirmed with secondary testing to ensure result accuracy  Clinical caution and judgement should be used with the interpretation of these results with consideration of the clinical impression and other laboratory testing  Testing reported as "Positive" or "Negative" has been proven to be accurate according to standard laboratory validation requirements    All testing is performed with control materials showing appropriate reactivity at standard intervals  Troponin I [[id number]]  (Abnormal) Collected: 07/19/21 0956    Lab Status: Final result Specimen: Blood from Arm, Right Updated: 07/19/21 1056     Troponin I 0 11 ng/mL     Urine Microscopic [[id number]]  (Abnormal) Collected: 07/19/21 0956    Lab Status: Final result Specimen: Urine, Clean Catch Updated: 07/19/21 1040     RBC, UA 2-4 /hpf      WBC, UA Innumerable /hpf      Epithelial Cells None Seen /hpf      Bacteria, UA Moderate /hpf      AMORPH URATES Innumerable /hpf      MUCUS THREADS Occasional    Urine culture [[id number]] Collected: 07/19/21 0956    Lab Status: In process Specimen: Urine, Clean Catch Updated: 07/19/21 1040    POCT alcohol breath test [[id number]]  (Normal) Resulted: 07/19/21 1039    Lab Status: Final result Updated: 07/19/21 1039     EXTBreath Alcohol 0 0    TSH [[id number]]  (Normal) Collected: 07/19/21 0948    Lab Status: Final result Specimen: Blood from Arm, Right Updated: 07/19/21 1037     TSH 3RD GENERATON 1 870 uIU/mL     Narrative:      Patients undergoing fluorescein dye angiography may retain small amounts of fluorescein in the body for 48-72 hours post procedure  Samples containing fluorescein can produce falsely depressed TSH values  If the patient had this procedure,a specimen should be resubmitted post fluorescein clearance  Rapid drug screen, urine [[id number]]  (Normal) Collected: 07/19/21 0956    Lab Status: Final result Specimen: Urine, Clean Catch Updated: 07/19/21 1032     Amph/Meth UR Negative     Barbiturate Ur Negative     Benzodiazepine Urine Negative     Cocaine Urine Negative     Methadone Urine Negative     Opiate Urine Negative     PCP Ur Negative     THC Urine Negative     Oxycodone Urine Negative    Narrative:      FOR MEDICAL PURPOSES ONLY  IF CONFIRMATION NEEDED PLEASE CONTACT THE LAB WITHIN 5 DAYS      Drug Screen Cutoff Levels:  AMPHETAMINE/METHAMPHETAMINES  1000 ng/mL  BARBITURATES     200 ng/mL  BENZODIAZEPINES     200 ng/mL  COCAINE      300 ng/mL  METHADONE      300 ng/mL  OPIATES      300 ng/mL  PHENCYCLIDINE     25 ng/mL  THC       50 ng/mL  OXYCODONE      100 ng/mL    UA w Reflex to Microscopic w Reflex to Culture [[id number]]  (Abnormal) Collected: 07/19/21 0956    Lab Status: Final result Specimen: Urine, Clean Catch Updated: 07/19/21 1021     Color, UA Yellow     Clarity, UA Cloudy     Specific [place], UA 1 020     pH, UA 6 0     Leukocytes, UA Small     Nitrite, UA Negative     Protein, UA Trace mg/dl      Glucose, UA Negative mg/dl      Ketones, UA Trace mg/dl      Urobilinogen, UA 0 2 E U /dl      Bilirubin, UA Negative     Blood, UA Moderate    CBC and differential [[id number]]  (Abnormal) Collected: 07/19/21 0948    Lab Status: Final result Specimen: Blood from Arm, Right Updated: 07/19/21 1010     WBC 10 45 Thousand/uL      RBC 4 81 Million/uL      Hemoglobin 15 2 g/dL      Hematocrit 46 6 %      MCV 97 fL      MCH 31 6 pg      MCHC 32 6 g/dL      RDW 12 9 %      MPV 10 3 fL      Platelets 420 Thousands/uL      nRBC 0 /100 WBCs      Neutrophils Relative 72 %      Immat GRANS % 0 %      Lymphocytes Relative 19 %      Monocytes Relative 6 %      Eosinophils Relative 2 %      Basophils Relative 1 %      Neutrophils Absolute 7 48 Thousands/µL      Immature Grans Absolute 0 03 Thousand/uL      Lymphocytes Absolute 1 99 Thousands/µL      Monocytes Absolute 0 67 Thousand/µL      Eosinophils Absolute 0 16 Thousand/µL      Basophils Absolute 0 12 Thousands/µL                  XR chest 1 view portable   Final Result by Renée Rabago MD (07/19 1017)      No acute cardiopulmonary disease  Workstation performed: [id number]                    Procedures  Procedures         ED Course  ED Course as of Jul 19 2056   Mon Jul 19, 2021   1003 Initial EKG review shows borderling ST elevations with T wave inversions in V2-3-4, changed from prior   However, my last viewable EKG is from 9/2020, pt has had significant cardiac events since that time, most notably in May 2021 at [facility]  Message left at pt's cardiologist office for review of prior EKGs      1021 Per [employer] rep, device functioning appropriately      1135 Chronic troponin elevation   Troponin I(!): 0 11                             SBIRT 22yo+      Most Recent Value   SBIRT (22 yo +)   In order to provide better care to our patients, we are screening all of our patients for alcohol and drug use  Would it be okay to ask you these screening questions? No Filed at: 07/19/2021 1010              Patient medically cleared for behavioral health evaluation  MDM  Number of Diagnoses or Management Options  Depression: new and requires workup  Urinary tract infection: new and requires workup  Diagnosis management comments:   Patient noted to have bacteriuria on workup, will treat empirically  He was evaluated by crisis and admitted to the crisis worker that he simply does not like where he lives and was hoping to be placed elsewhere    There is no indication for inpatient hospitalization at this time, will discharge back to his group home and recommend outpatient psychiatry follow-up       Amount and/or Complexity of Data Reviewed  Clinical lab tests: ordered and reviewed  Tests in the radiology section of CPT®: ordered and reviewed  Tests in the medicine section of CPT®: ordered and reviewed  Review and summarize past medical records: yes  Independent visualization of images, tracings, or specimens: yes        Disposition  Final diagnoses:   Depression   Urinary tract infection     Time reflects when diagnosis was documented in both MDM as applicable and the Disposition within this note     Time User Action Codes Description Comment    7/19/2021 11:55 AM Kary Harrell Add [F32 9] Depression     7/19/2021 12:02 PM Kary Harrell Add [N39 0] Urinary tract infection       ED Disposition     ED Disposition Condition Date/Time Comment Discharge Stable Mon Jul 19, 2021 11:55 AM Jailene Vieyra discharge to home/self care  Follow-up Information     Follow up With Specialties Details Why Contact Info    Michelle Muhammad MD Family Medicine   [address], [address]  [phone]            Discharge Medication List as of 7/19/2021 11:56 AM      CONTINUE these medications which have NOT CHANGED    Details   amiodarone 200 mg tablet Take 200 mg by mouth daily, Historical Med      apixaban (Eliquis) 5 mg Take 5 mg by mouth 2 (two) times a day, Historical Med      clonazePAM (KlonoPIN) 0 5 mg tablet Take 0 5 mg by mouth 2 (two) times a day as needed for seizures, Historical Med      multivitamin-iron-minerals-folic acid (THERAPEUTIC-M) TABS tablet Take 1 tablet by mouth daily, Historical Med      sertraline (ZOLOFT) 50 mg tablet Take 50 mg by mouth daily, Historical Med      thiamine (VITAMIN B1) 50 mg tablet Take 100 mg by mouth daily, Historical Med      aspirin (ECOTRIN LOW STRENGTH) 81 mg EC tablet Take 81 mg by mouth daily, Historical Med      atorvastatin (LIPITOR) 40 mg tablet Take 1 tablet (40 mg total) by mouth daily with dinner, Starting Wed 9/11/2019, Print      carvedilol (COREG) 3 125 mg tablet Take 1 tablet (3 125 mg total) by mouth 2 (two) times a day with meals Hold for heart rate less than 50 beats per minute    Hold for systolic blood pressure less than 110 mmHg, Starting Fri 10/23/2020, Print      lurasidone (LATUDA) 80 mg tablet Take 1 tablet (80 mg total) by mouth daily with dinner, Starting Fri 10/23/2020, Print      mirtazapine (REMERON) 7 5 MG tablet Take 1 tablet (7 5 mg total) by mouth daily at bedtime, Starting Fri 10/23/2020, Print      polyethylene glycol (MIRALAX) 17 g packet Take 17 g by mouth daily, Starting Wed 11/13/2019, Normal      senna (SENOKOT) 8 6 mg Take 17 2 mg by mouth daily as needed for constipation , Starting Mon 7/6/2020, Historical Med No discharge procedures on file      PDMP Review     None          ED Provider  Electronically Signed by           Roxi Ferguson PA-C  07/19/21 2056

## 2021-07-21 LAB — BACTERIA UR CULT: NORMAL

## 2021-09-17 ENCOUNTER — APPOINTMENT (EMERGENCY)
Dept: CT IMAGING | Facility: HOSPITAL | Age: 56
End: 2021-09-17
Payer: MEDICARE

## 2021-09-17 ENCOUNTER — HOSPITAL ENCOUNTER (EMERGENCY)
Facility: HOSPITAL | Age: 56
End: 2021-09-18
Attending: EMERGENCY MEDICINE | Admitting: EMERGENCY MEDICINE
Payer: MEDICARE

## 2021-09-17 ENCOUNTER — APPOINTMENT (EMERGENCY)
Dept: RADIOLOGY | Facility: HOSPITAL | Age: 56
End: 2021-09-17
Payer: MEDICARE

## 2021-09-17 DIAGNOSIS — N39.0 UTI (URINARY TRACT INFECTION): ICD-10-CM

## 2021-09-17 DIAGNOSIS — R45.851 SUICIDAL IDEATION: ICD-10-CM

## 2021-09-17 DIAGNOSIS — F32.A DEPRESSION: ICD-10-CM

## 2021-09-17 DIAGNOSIS — R77.8 ELEVATED TROPONIN: ICD-10-CM

## 2021-09-17 DIAGNOSIS — R41.82 ALTERED MENTAL STATUS: Primary | ICD-10-CM

## 2021-09-17 LAB
ALBUMIN SERPL BCP-MCNC: 3.7 G/DL (ref 3.5–5)
ALP SERPL-CCNC: 60 U/L (ref 46–116)
ALT SERPL W P-5'-P-CCNC: 27 U/L (ref 12–78)
AMPHETAMINES SERPL QL SCN: NEGATIVE
ANION GAP SERPL CALCULATED.3IONS-SCNC: 9 MMOL/L (ref 4–13)
APTT PPP: 27 SECONDS (ref 23–37)
AST SERPL W P-5'-P-CCNC: 29 U/L (ref 5–45)
ATRIAL RATE: 52 BPM
ATRIAL RATE: 55 BPM
BACTERIA UR QL AUTO: ABNORMAL /HPF
BARBITURATES UR QL: NEGATIVE
BASOPHILS # BLD AUTO: 0.09 THOUSANDS/ΜL (ref 0–0.1)
BASOPHILS NFR BLD AUTO: 1 % (ref 0–1)
BENZODIAZ UR QL: POSITIVE
BILIRUB SERPL-MCNC: 0.91 MG/DL (ref 0.2–1)
BILIRUB UR QL STRIP: NEGATIVE
BUN SERPL-MCNC: 14 MG/DL (ref 5–25)
CALCIUM SERPL-MCNC: 8.8 MG/DL (ref 8.3–10.1)
CHLORIDE SERPL-SCNC: 106 MMOL/L (ref 100–108)
CLARITY UR: ABNORMAL
CO2 SERPL-SCNC: 27 MMOL/L (ref 21–32)
COCAINE UR QL: NEGATIVE
COLOR UR: YELLOW
CREAT SERPL-MCNC: 1.31 MG/DL (ref 0.6–1.3)
EOSINOPHIL # BLD AUTO: 0.09 THOUSAND/ΜL (ref 0–0.61)
EOSINOPHIL NFR BLD AUTO: 1 % (ref 0–6)
ERYTHROCYTE [DISTWIDTH] IN BLOOD BY AUTOMATED COUNT: 12.9 % (ref 11.6–15.1)
ETHANOL SERPL-MCNC: <3 MG/DL (ref 0–3)
GFR SERPL CREATININE-BSD FRML MDRD: 60 ML/MIN/1.73SQ M
GLUCOSE SERPL-MCNC: 90 MG/DL (ref 65–140)
GLUCOSE UR STRIP-MCNC: NEGATIVE MG/DL
HCT VFR BLD AUTO: 44.5 % (ref 36.5–49.3)
HGB BLD-MCNC: 14.2 G/DL (ref 12–17)
HGB UR QL STRIP.AUTO: NEGATIVE
IMM GRANULOCYTES # BLD AUTO: 0.04 THOUSAND/UL (ref 0–0.2)
IMM GRANULOCYTES NFR BLD AUTO: 0 % (ref 0–2)
INR PPP: 1.4 (ref 0.84–1.19)
KETONES UR STRIP-MCNC: ABNORMAL MG/DL
LEUKOCYTE ESTERASE UR QL STRIP: ABNORMAL
LYMPHOCYTES # BLD AUTO: 1.7 THOUSANDS/ΜL (ref 0.6–4.47)
LYMPHOCYTES NFR BLD AUTO: 15 % (ref 14–44)
MCH RBC QN AUTO: 31.1 PG (ref 26.8–34.3)
MCHC RBC AUTO-ENTMCNC: 31.9 G/DL (ref 31.4–37.4)
MCV RBC AUTO: 98 FL (ref 82–98)
METHADONE UR QL: NEGATIVE
MONOCYTES # BLD AUTO: 0.97 THOUSAND/ΜL (ref 0.17–1.22)
MONOCYTES NFR BLD AUTO: 8 % (ref 4–12)
MUCOUS THREADS UR QL AUTO: ABNORMAL
NEUTROPHILS # BLD AUTO: 8.7 THOUSANDS/ΜL (ref 1.85–7.62)
NEUTS SEG NFR BLD AUTO: 75 % (ref 43–75)
NITRITE UR QL STRIP: NEGATIVE
NON-SQ EPI CELLS URNS QL MICRO: ABNORMAL /HPF
NRBC BLD AUTO-RTO: 0 /100 WBCS
OPIATES UR QL SCN: NEGATIVE
OTHER STN SPEC: ABNORMAL
OXYCODONE+OXYMORPHONE UR QL SCN: NEGATIVE
P AXIS: 73 DEGREES
P AXIS: 74 DEGREES
PCP UR QL: NEGATIVE
PH UR STRIP.AUTO: 6 [PH]
PLATELET # BLD AUTO: 268 THOUSANDS/UL (ref 149–390)
PMV BLD AUTO: 10.2 FL (ref 8.9–12.7)
POTASSIUM SERPL-SCNC: 3.6 MMOL/L (ref 3.5–5.3)
PR INTERVAL: 168 MS
PR INTERVAL: 174 MS
PROT SERPL-MCNC: 7.3 G/DL (ref 6.4–8.2)
PROT UR STRIP-MCNC: NEGATIVE MG/DL
PROTHROMBIN TIME: 17 SECONDS (ref 11.6–14.5)
QRS AXIS: -13 DEGREES
QRS AXIS: 9 DEGREES
QRSD INTERVAL: 92 MS
QRSD INTERVAL: 98 MS
QT INTERVAL: 392 MS
QT INTERVAL: 508 MS
QTC INTERVAL: 364 MS
QTC INTERVAL: 485 MS
RBC # BLD AUTO: 4.56 MILLION/UL (ref 3.88–5.62)
RBC #/AREA URNS AUTO: ABNORMAL /HPF
SARS-COV-2 RNA RESP QL NAA+PROBE: NEGATIVE
SODIUM SERPL-SCNC: 142 MMOL/L (ref 136–145)
SP GR UR STRIP.AUTO: 1.02 (ref 1–1.03)
T WAVE AXIS: 107 DEGREES
T WAVE AXIS: 89 DEGREES
THC UR QL: NEGATIVE
TROPONIN I SERPL-MCNC: 0.13 NG/ML
TROPONIN I SERPL-MCNC: 0.13 NG/ML
UROBILINOGEN UR QL STRIP.AUTO: 0.2 E.U./DL
VENTRICULAR RATE: 52 BPM
VENTRICULAR RATE: 55 BPM
WBC # BLD AUTO: 11.59 THOUSAND/UL (ref 4.31–10.16)
WBC #/AREA URNS AUTO: ABNORMAL /HPF

## 2021-09-17 PROCEDURE — 87086 URINE CULTURE/COLONY COUNT: CPT | Performed by: PHYSICIAN ASSISTANT

## 2021-09-17 PROCEDURE — 71045 X-RAY EXAM CHEST 1 VIEW: CPT

## 2021-09-17 PROCEDURE — 81001 URINALYSIS AUTO W/SCOPE: CPT | Performed by: PHYSICIAN ASSISTANT

## 2021-09-17 PROCEDURE — 93005 ELECTROCARDIOGRAM TRACING: CPT

## 2021-09-17 PROCEDURE — U0003 INFECTIOUS AGENT DETECTION BY NUCLEIC ACID (DNA OR RNA); SEVERE ACUTE RESPIRATORY SYNDROME CORONAVIRUS 2 (SARS-COV-2) (CORONAVIRUS DISEASE [COVID-19]), AMPLIFIED PROBE TECHNIQUE, MAKING USE OF HIGH THROUGHPUT TECHNOLOGIES AS DESCRIBED BY CMS-2020-01-R: HCPCS | Performed by: PHYSICIAN ASSISTANT

## 2021-09-17 PROCEDURE — 85610 PROTHROMBIN TIME: CPT | Performed by: PHYSICIAN ASSISTANT

## 2021-09-17 PROCEDURE — 96365 THER/PROPH/DIAG IV INF INIT: CPT

## 2021-09-17 PROCEDURE — 85730 THROMBOPLASTIN TIME PARTIAL: CPT | Performed by: PHYSICIAN ASSISTANT

## 2021-09-17 PROCEDURE — 84484 ASSAY OF TROPONIN QUANT: CPT | Performed by: PHYSICIAN ASSISTANT

## 2021-09-17 PROCEDURE — 70450 CT HEAD/BRAIN W/O DYE: CPT

## 2021-09-17 PROCEDURE — 80053 COMPREHEN METABOLIC PANEL: CPT | Performed by: PHYSICIAN ASSISTANT

## 2021-09-17 PROCEDURE — 96361 HYDRATE IV INFUSION ADD-ON: CPT

## 2021-09-17 PROCEDURE — 80307 DRUG TEST PRSMV CHEM ANLYZR: CPT | Performed by: PHYSICIAN ASSISTANT

## 2021-09-17 PROCEDURE — 85025 COMPLETE CBC W/AUTO DIFF WBC: CPT | Performed by: PHYSICIAN ASSISTANT

## 2021-09-17 PROCEDURE — 99285 EMERGENCY DEPT VISIT HI MDM: CPT

## 2021-09-17 PROCEDURE — 93010 ELECTROCARDIOGRAM REPORT: CPT | Performed by: INTERNAL MEDICINE

## 2021-09-17 PROCEDURE — 82077 ASSAY SPEC XCP UR&BREATH IA: CPT | Performed by: PHYSICIAN ASSISTANT

## 2021-09-17 PROCEDURE — 99285 EMERGENCY DEPT VISIT HI MDM: CPT | Performed by: PHYSICIAN ASSISTANT

## 2021-09-17 PROCEDURE — U0005 INFEC AGEN DETEC AMPLI PROBE: HCPCS | Performed by: PHYSICIAN ASSISTANT

## 2021-09-17 PROCEDURE — G1004 CDSM NDSC: HCPCS

## 2021-09-17 PROCEDURE — 36415 COLL VENOUS BLD VENIPUNCTURE: CPT | Performed by: PHYSICIAN ASSISTANT

## 2021-09-17 RX ORDER — CLONAZEPAM 0.5 MG/1
0.5 TABLET ORAL 2 TIMES DAILY
Status: DISCONTINUED | OUTPATIENT
Start: 2021-09-18 | End: 2021-09-18 | Stop reason: HOSPADM

## 2021-09-17 RX ORDER — SENNOSIDES 8.6 MG
2 TABLET ORAL
Status: DISCONTINUED | OUTPATIENT
Start: 2021-09-17 | End: 2021-09-18 | Stop reason: HOSPADM

## 2021-09-17 RX ORDER — CEFTRIAXONE 1 G/50ML
1000 INJECTION, SOLUTION INTRAVENOUS ONCE
Status: COMPLETED | OUTPATIENT
Start: 2021-09-17 | End: 2021-09-17

## 2021-09-17 RX ORDER — AMIODARONE HYDROCHLORIDE 200 MG/1
200 TABLET ORAL
Status: DISCONTINUED | OUTPATIENT
Start: 2021-09-18 | End: 2021-09-18 | Stop reason: HOSPADM

## 2021-09-17 RX ORDER — MIRTAZAPINE 15 MG/1
30 TABLET, FILM COATED ORAL
Status: DISCONTINUED | OUTPATIENT
Start: 2021-09-17 | End: 2021-09-18 | Stop reason: HOSPADM

## 2021-09-17 RX ORDER — POLYETHYLENE GLYCOL 3350 17 G/17G
17 POWDER, FOR SOLUTION ORAL DAILY
Status: DISCONTINUED | OUTPATIENT
Start: 2021-09-18 | End: 2021-09-18 | Stop reason: HOSPADM

## 2021-09-17 RX ORDER — CARVEDILOL 3.12 MG/1
3.12 TABLET ORAL 2 TIMES DAILY WITH MEALS
Status: DISCONTINUED | OUTPATIENT
Start: 2021-09-17 | End: 2021-09-17

## 2021-09-17 RX ORDER — ATORVASTATIN CALCIUM 40 MG/1
80 TABLET, FILM COATED ORAL DAILY
Status: DISCONTINUED | OUTPATIENT
Start: 2021-09-18 | End: 2021-09-18 | Stop reason: HOSPADM

## 2021-09-17 RX ORDER — CEPHALEXIN 500 MG/1
500 CAPSULE ORAL EVERY 6 HOURS SCHEDULED
Qty: 28 CAPSULE | Refills: 0 | Status: SHIPPED | OUTPATIENT
Start: 2021-09-17 | End: 2021-09-24

## 2021-09-17 RX ORDER — ASPIRIN 81 MG/1
81 TABLET, CHEWABLE ORAL DAILY
Status: DISCONTINUED | OUTPATIENT
Start: 2021-09-18 | End: 2021-09-18 | Stop reason: HOSPADM

## 2021-09-17 RX ORDER — CARVEDILOL 3.12 MG/1
3.12 TABLET ORAL 2 TIMES DAILY WITH MEALS
Status: DISCONTINUED | OUTPATIENT
Start: 2021-09-18 | End: 2021-09-18 | Stop reason: HOSPADM

## 2021-09-17 RX ORDER — CEPHALEXIN 250 MG/1
500 CAPSULE ORAL EVERY 12 HOURS SCHEDULED
Status: DISCONTINUED | OUTPATIENT
Start: 2021-09-17 | End: 2021-09-18 | Stop reason: HOSPADM

## 2021-09-17 RX ADMIN — LURASIDONE HYDROCHLORIDE 80 MG: 80 TABLET, FILM COATED ORAL at 19:12

## 2021-09-17 RX ADMIN — APIXABAN 5 MG: 5 TABLET, FILM COATED ORAL at 19:09

## 2021-09-17 RX ADMIN — SODIUM CHLORIDE 1000 ML: 0.9 INJECTION, SOLUTION INTRAVENOUS at 10:37

## 2021-09-17 RX ADMIN — CEPHALEXIN 500 MG: 250 CAPSULE ORAL at 21:52

## 2021-09-17 RX ADMIN — CEFTRIAXONE 1000 MG: 1 INJECTION, SOLUTION INTRAVENOUS at 12:41

## 2021-09-17 NOTE — ED NOTES
RN entered room to obtain urine sample  Patient states "earlier you asked me if i'm suicidal, and I want to speak to a crisis worker"  RN asked patient if he was feeling suicidal and he stated yes  RN asked patient when this started and patient states "yesterday"  RN notified Taylor Martin PA-C of same   Caregiver continuously at bedside attentive to patient     Bebo Cook RN  09/17/21 1095

## 2021-09-17 NOTE — ED NOTES
Crisis Worker met with pt who reports having suicidal thoughts for the past couple of days  Pt reports his housemates, and other people in the buildil are talking about him  Pt reports he has not engaged in any activities, or go out in the community for almost a month due to feeling depressed  Pt reports he sees his psychiatrist monthly, and participates in virtual one to one sessions with his therapist but did not expressed any concerns  Pt is complaint with his medications but feel they are no longer working  Pt denies HI  Pt reports, suicidal thoughts are present with a plan to jump off a 3rd floor railing at his residence if he is discharged

## 2021-09-17 NOTE — ED NOTES
Crisis Worker met with pt and presented 201 paper work  Pt reviewed and signed 201 form and is willing to be admitted to an inpatient unit for treatment

## 2021-09-17 NOTE — ED PROVIDER NOTES
History  Chief Complaint   Patient presents with    Altered Mental Status     caregiver reports that this morning patient started with drooling and having periods of "unresponisveness"  patient does not remmeber these episodes  currently knows the year and where he is and why he is here  no other complaints  Patient is a 60 y/o CVA, MI, bipolar disorder that was brought in by caregiver from [place] [address] for unresponsive episode that happened this morning  According to caregiver he was drooling and was not answering questions, but was awake  Patient has no complaints  No nausea, vomiting, diarrhea, chest pain, SOB, or trouble breathing  No recent medication changes  Patient does have an abrasion to his left knee and states he fell yesterday  He denies hitting head, but is on a blood thinner  While patient was waiting for results, he states he has been suicidal for a couple days  He states he has a plan to jump off a railing  He does have a psychiatrist at [facility], but states he thinks he needs to be admitted to [facility]  History provided by:  Patient and caregiver  Altered Mental Status  Presenting symptoms: partial responsiveness    Presenting symptoms: no confusion    Severity:  Mild  Most recent episode: Today  Episode history:  Single  Progression:  Resolved  Chronicity:  New  Context: not alcohol use, not drug use and not recent change in medication    Associated symptoms: no abdominal pain, no bladder incontinence, no difficulty breathing, no fever, no headaches, no light-headedness, no nausea, no rash, no seizures, no slurred speech, no vomiting and no weakness        Prior to Admission Medications   Prescriptions Last Dose Informant Patient Reported?  Taking?   amiodarone 200 mg tablet   Yes No   Sig: Take 200 mg by mouth daily   apixaban (Eliquis) 5 mg   Yes No   Sig: Take 5 mg by mouth 2 (two) times a day   aspirin (ECOTRIN LOW STRENGTH) 81 mg EC tablet   Yes No   Sig: Take 81 mg by mouth daily   atorvastatin (LIPITOR) 40 mg tablet   No No   Sig: Take 1 tablet (40 mg total) by mouth daily with dinner   Patient taking differently: Take 80 mg by mouth daily    carvedilol (COREG) 3 125 mg tablet   No No   Sig: Take 1 tablet (3 125 mg total) by mouth 2 (two) times a day with meals Hold for heart rate less than 50 beats per minute  Hold for systolic blood pressure less than 110 mmHg   clonazePAM (KlonoPIN) 0 5 mg tablet   Yes No   Sig: Take 0 5 mg by mouth 2 (two) times a day as needed for seizures   lurasidone (LATUDA) 80 mg tablet   No No   Sig: Take 1 tablet (80 mg total) by mouth daily with dinner   mirtazapine (REMERON) 7 5 MG tablet   No No   Sig: Take 1 tablet (7 5 mg total) by mouth daily at bedtime   Patient taking differently: Take 30 mg by mouth daily at bedtime    multivitamin-iron-minerals-folic acid (THERAPEUTIC-M) TABS tablet   Yes No   Sig: Take 1 tablet by mouth daily   polyethylene glycol (MIRALAX) 17 g packet   No No   Sig: Take 17 g by mouth daily   senna (SENOKOT) 8 6 mg   Yes No   Sig: Take 17 2 mg by mouth daily as needed for constipation    sertraline (ZOLOFT) 50 mg tablet   Yes No   Sig: Take 50 mg by mouth daily   thiamine (VITAMIN B1) 50 mg tablet   Yes No   Sig: Take 100 mg by mouth daily      Facility-Administered Medications: None       Past Medical History:   Diagnosis Date    Anxiety     Bipolar 1 disorder (HCC)     Bowel habit changes     Chronic pain disorder     Coronary artery disease     [facility]ce[facility] vascular accident[facility] ([facility]     [facility]     Depression     Family hx of colon cancer     father and paternal uncle    Myocardial infarction [facility])     Psychiatric disorder     Psychiatric illness     Schizoaffective disorder, bipolar type ([place] [address] )     Schizoaffective disorder, bipolar type ([facility] )     Stroke ([facility] ) 12/2015    CVA x3   Pt states he is unaware of when he had first two CVA    Suicide attempt [facility])        Past Surgical History:   Procedure Laterality Date    CARDIAC PACEMAKER PLACEMENT      COLONOSCOPY         Family History   Problem Relation Age of Onset    No Known Problems Mother     No Known Problems Father     No Known Problems Sister     No Known Problems Brother     No Known Problems Maternal Aunt     No Known Problems Paternal Aunt     No Known Problems Maternal Uncle     No Known Problems Paternal Uncle     No Known Problems Maternal Grandfather     No Known Problems Maternal Grandmother     No Known Problems Paternal Grandfather     No Known Problems Paternal Grandmother     No Known Problems Cousin     ADD / ADHD Neg Hx     Alcohol abuse Neg Hx     Anxiety disorder Neg Hx     Bipolar disorder Neg Hx     Completed Suicide  Neg Hx     Dementia Neg Hx     Depression Neg Hx     Drug abuse Neg Hx     OCD Neg Hx     Psychiatric Illness Neg Hx     Psychosis Neg Hx     Schizoaffective Disorder  Neg Hx     Schizophrenia Neg Hx     Self-Injury Neg Hx     Suicide Attempts Neg Hx      I have reviewed and agree with the history as documented  E-Cigarette/Vaping    E-Cigarette Use Never User      E-Cigarette/Vaping Substances    Nicotine No     Flavoring No     Other No     Unknown No      Social History     Tobacco Use    Smoking status: Current Every Day Smoker     Packs/day: 1 00     Years: 15 00     Pack years: 15 00     Types: Cigarettes    Smokeless tobacco: Never Used    Tobacco comment: Wishes to quit   Vaping Use    Vaping Use: Never used   Substance Use Topics    Alcohol use: Never     Alcohol/week: 0 0 standard drinks     Comment: pt denies    Drug use: No     Comment: pt denies       Review of Systems   Constitutional: Negative for chills and fever  HENT: Negative  Respiratory: Negative for cough and shortness of breath  Gastrointestinal: Negative for abdominal pain, diarrhea, nausea and vomiting  Genitourinary: Negative for bladder incontinence and dysuria  Musculoskeletal: Negative for back pain and neck pain  Skin: Negative for color change and rash  Neurological: Negative for dizziness, seizures, syncope, facial asymmetry, weakness, light-headedness and headaches  Psychiatric/Behavioral: Negative for confusion and decreased concentration  All other systems reviewed and are negative  Physical Exam  Physical Exam  Vitals and nursing note reviewed  Constitutional:       General: He is not in acute distress  Appearance: Normal appearance  He is well-developed and normal weight  He is not ill-appearing or diaphoretic  Comments: Poor hygiene  HENT:      Head: Normocephalic and atraumatic  Right Ear: Hearing and external ear normal       Left Ear: Hearing and external ear normal       Nose: Nose normal       Mouth/Throat:      Mouth: Mucous membranes are moist    Eyes:      Conjunctiva/sclera: Conjunctivae normal    Cardiovascular:      Rate and Rhythm: Bradycardia present  Heart sounds: Normal heart sounds  Pulmonary:      Effort: Pulmonary effort is normal       Breath sounds: Normal breath sounds  No wheezing, rhonchi or rales  Abdominal:      General: Abdomen is flat  Bowel sounds are normal       Palpations: Abdomen is soft  Tenderness: There is no abdominal tenderness  Musculoskeletal:         General: Normal range of motion  Cervical back: Normal range of motion  Right lower leg: No edema  Left lower leg: No edema  Skin:     General: Skin is warm and dry  Coloration: Skin is not jaundiced or pale  Findings: No rash  Neurological:      Mental Status: He is alert  Sensory: Sensation is intact  Motor: Motor function is intact  Comments: Oriented to person and place   Psychiatric:         Mood and Affect: Mood is depressed  Affect is flat  Speech: Speech normal          Behavior: Behavior is cooperative  Thought Content:  Thought content is not delusional  Thought content includes suicidal ideation  Thought content does not include homicidal ideation  Thought content includes suicidal plan           Vital Signs  ED Triage Vitals [09/17/21 0957]   Temperature Pulse Respirations Blood Pressure SpO2   (!) 97 °F (36 1 °C) 63 18 121/70 96 %      Temp Source Heart Rate Source Patient Position - Orthostatic VS BP Location FiO2 (%)   Temporal Monitor Sitting Left arm --      Pain Score       --           Vitals:    09/17/21 1400 09/17/21 1430 09/17/21 1500 09/17/21 1515   BP: 116/75 114/71 110/68 119/73   Pulse: (!) 52 (!) 52 (!) 51 (!) 51   Patient Position - Orthostatic VS:             Visual Acuity  Visual Acuity      Most Recent Value   L Pupil Size (mm)  3   R Pupil Size (mm)  3          ED Medications  Medications   amiodarone tablet 200 mg (has no administration in time range)   aspirin chewable tablet 81 mg (has no administration in time range)   atorvastatin (LIPITOR) tablet 80 mg (has no administration in time range)   clonazePAM (KlonoPIN) tablet 0 5 mg (has no administration in time range)   apixaban (ELIQUIS) tablet 5 mg (5 mg Oral Given 9/17/21 1909)   lurasidone (LATUDA) tablet 80 mg (80 mg Oral Given 9/17/21 1912)   mirtazapine (REMERON) tablet 30 mg (has no administration in time range)   polyethylene glycol (MIRALAX) packet 17 g (has no administration in time range)   sertraline (ZOLOFT) tablet 50 mg (has no administration in time range)   senna (SENOKOT) tablet 17 2 mg (has no administration in time range)   carvedilol (COREG) tablet 3 125 mg (has no administration in time range)   cephalexin (KEFLEX) capsule 500 mg (has no administration in time range)   sodium chloride 0 9 % bolus 1,000 mL (0 mL Intravenous Stopped 9/17/21 1137)   cefTRIAXone (ROCEPHIN) IVPB (premix in dextrose) 1,000 mg 50 mL (0 mg Intravenous Stopped 9/17/21 1311)       Diagnostic Studies  Results Reviewed     Procedure Component Value Units Date/Time    Troponin I repeat in 3hrs [[id number]]  (Abnormal) Collected: 09/17/21 1326    Lab Status: Final result Specimen: Blood from Arm, Left Updated: 09/17/21 1502     Troponin I 0 13 ng/mL     Novel Coronavirus (Covid-19),PCR [facility] - 2 Hour Stat [[id number]]  (Normal) Collected: 09/17/21 1225    Lab Status: Final result Specimen: Nares from Nasopharyngeal Swab Updated: 09/17/21 1345     SARS-CoV-2 Negative    Narrative: The specimen collection materials, transport medium, and/or testing methodology utilized in the production of these test results have been proven to be reliable in a limited validation with an abbreviated program under the Emergency Utilization Authorization provided by the FDA  Testing reported as "Presumptive positive" will be confirmed with secondary testing to ensure result accuracy  Clinical caution and judgement should be used with the interpretation of these results with consideration of the clinical impression and other laboratory testing  Testing reported as "Positive" or "Negative" has been proven to be accurate according to standard laboratory validation requirements  All testing is performed with control materials showing appropriate reactivity at standard intervals  Ethanol [[id number]]  (Normal) Collected: 09/17/21 1225    Lab Status: Final result Specimen: Blood from Arm, Left Updated: 09/17/21 1320     Ethanol Lvl <3 mg/dL     Rapid drug screen, urine [[id number]]  (Abnormal) Collected: 09/17/21 1150    Lab Status: Final result Specimen: Urine, Clean Catch Updated: 09/17/21 1306     Amph/Meth UR Negative     Barbiturate Ur Negative     Benzodiazepine Urine Positive     Cocaine Urine Negative     Methadone Urine Negative     Opiate Urine Negative     PCP Ur Negative     THC Urine Negative     Oxycodone Urine Negative    Narrative:      Presumptive report  If requested, specimen will be sent to reference lab for confirmation  FOR MEDICAL PURPOSES ONLY     IF CONFIRMATION NEEDED PLEASE CONTACT THE LAB WITHIN 5 DAYS  Drug Screen Cutoff Levels:  AMPHETAMINE/METHAMPHETAMINES  1000 ng/mL  BARBITURATES     200 ng/mL  BENZODIAZEPINES     200 ng/mL  COCAINE      300 ng/mL  METHADONE      300 ng/mL  OPIATES      300 ng/mL  PHENCYCLIDINE     25 ng/mL  THC       50 ng/mL  OXYCODONE      100 ng/mL    Urine Microscopic [[id number]]  (Abnormal) Collected: 09/17/21 1150    Lab Status: Final result Specimen: Urine, Clean Catch Updated: 09/17/21 1219     RBC, UA 0-1 /hpf      WBC, UA 10-20 /hpf      Epithelial Cells None Seen /hpf      Bacteria, UA Innumerable /hpf      OTHER OBSERVATIONS Renal Epithelial Cells Present     MUCUS THREADS Occasional    Urine culture [[id number]] Collected: 09/17/21 1150    Lab Status:  In process Specimen: Urine, Clean Catch Updated: 09/17/21 1219    UA w Reflex to Microscopic w Reflex to Culture [[id number]]  (Abnormal) Collected: 09/17/21 1150    Lab Status: Final result Specimen: Urine, Clean Catch Updated: 09/17/21 1204     Color, UA Yellow     Clarity, UA Slightly Cloudy     Specific Gravity, UA 1 025     pH, UA 6 0     Leukocytes, UA Trace     Nitrite, UA Negative     Protein, UA Negative mg/dl      Glucose, UA Negative mg/dl      Ketones, UA Trace mg/dl      Urobilinogen, UA 0 2 E U /dl      Bilirubin, UA Negative     Blood, UA Negative    Comprehensive metabolic panel [[id number]]  (Abnormal) Collected: 09/17/21 1036    Lab Status: Final result Specimen: Blood from Arm, Left Updated: 09/17/21 1122     Sodium 142 mmol/L      Potassium 3 6 mmol/L      Chloride 106 mmol/L      CO2 27 mmol/L      ANION GAP 9 mmol/L      BUN 14 mg/dL      Creatinine 1 31 mg/dL      Glucose 90 mg/dL      Calcium 8 8 mg/dL      AST 29 U/L      ALT 27 U/L      Alkaline Phosphatase 60 U/L      Total Protein 7 3 g/dL      Albumin 3 7 g/dL      Total Bilirubin 0 91 mg/dL      eGFR 60 ml/min/1 73sq m     Narrative:      Lis guidelines for Chronic Kidney Disease (CKD):     Stage 1 with normal or high GFR (GFR > 90 mL/min/1 73 square meters)    Stage 2 Mild CKD (GFR = 60-89 mL/min/1 73 square meters)    Stage 3A Moderate CKD (GFR = 45-59 mL/min/1 73 square meters)    Stage 3B Moderate CKD (GFR = 30-44 mL/min/1 73 square meters)    Stage 4 Severe CKD (GFR = 15-29 mL/min/1 73 square meters)    Stage 5 End Stage CKD (GFR <15 mL/min/1 73 square meters)  Note: GFR calculation is accurate only with a steady state creatinine    Troponin I [[id number]]  (Abnormal) Collected: 09/17/21 1036    Lab Status: Final result Specimen: Blood from Arm, Left Updated: 09/17/21 1121     Troponin I 0 13 ng/mL     Protime-INR [[id number]]  (Abnormal) Collected: 09/17/21 1036    Lab Status: Final result Specimen: Blood from Arm, Left Updated: 09/17/21 1112     Protime 17 0 seconds      INR 1 40    APTT [[id number]]  (Normal) Collected: 09/17/21 1036    Lab Status: Final result Specimen: Blood from Arm, Left Updated: 09/17/21 1112     PTT 27 seconds     CBC and differential [[id number]]  (Abnormal) Collected: 09/17/21 1036    Lab Status: Final result Specimen: Blood from Arm, Left Updated: 09/17/21 1046     WBC 11 59 Thousand/uL      RBC 4 56 Million/uL      Hemoglobin 14 2 g/dL      Hematocrit 44 5 %      MCV 98 fL      MCH 31 1 pg      MCHC 31 9 g/dL      RDW 12 9 %      MPV 10 2 fL      Platelets 566 Thousands/uL      nRBC 0 /100 WBCs      Neutrophils Relative 75 %      Immat GRANS % 0 %      Lymphocytes Relative 15 %      Monocytes Relative 8 %      Eosinophils Relative 1 %      Basophils Relative 1 %      Neutrophils Absolute 8 70 Thousands/µL      Immature Grans Absolute 0 04 Thousand/uL      Lymphocytes Absolute 1 70 Thousands/µL      Monocytes Absolute 0 97 Thousand/µL      Eosinophils Absolute 0 09 Thousand/µL      Basophils Absolute 0 09 Thousands/µL                  CT head without contrast   Final Result by Brian Ivey MD (09/17 1139)      No acute intracranial abnormality  Old bilateral infarcts  Workstation performed: [id number]         XR chest 1 view portable   Final Result by Raeann Huber MD (09/17 1108)   No acute cardiopulmonary disease  Findings are stable            Workstation performed: [id number]                    Procedures  ECG 12 Lead Documentation Only    Date/Time: 9/17/2021 4:05 PM  Performed by: Emelina Hawkins PA-C  Authorized by: Emelina Hawkins PA-C     Indications / Diagnosis:  Weakness  ECG reviewed by me, the ED Provider: yes    Patient location:  ED  Previous ECG:     Previous ECG:  Compared to current    Similarity:  No change  Rate:     ECG rate:  55  Rhythm:     Rhythm: sinus bradycardia    Conduction:     Conduction: normal    ST segments:     ST segments:  Normal  T waves:     T waves: non-specific               ED Course  ED Course as of Sep 17 2019   Fri Sep 17, 2021   1134 Has h/o elevated troponin, no changes on EKG, will continue to monitor  Troponin I(!): 0 13   1224 Patient now complaining of suicidal thoughts for a couple days  He states he has a plan to throw himself over a railing  Patient still undergoing medical clearance  Dg 30 notified  2018 Care transferred to Dr Aleah Rubio  SBIRT 20yo+      Most Recent Value   SBIRT (22 yo +)   In order to provide better care to our patients, we are screening all of our patients for alcohol and drug use  Would it be okay to ask you these screening questions? No Filed at: 09/17/2021 1038              Patient medically cleared for behavioral health evaluation  MDM    Disposition  Final diagnoses: Altered mental status   UTI (urinary tract infection)   Depression   Elevated troponin - chronic       Time reflects when diagnosis was documented in both MDM as applicable and the Disposition within this note     Time User Action Codes Description Comment    9/17/2021 12:26 PM Giles Reynaga Add [R41 82] Altered mental status     9/17/2021 12:26 PM Lenon Kiss Add [N39 0] UTI (urinary tract infection)     9/17/2021 12:26 PM Lenon Kiss Add [F32 9] Depression     9/17/2021 12:26 PM Lenon Kiss Add [R77 8] Elevated troponin     9/17/2021  8:19 PM Lenon Kiss Modify [R77 8] Elevated troponin chronic  ED Disposition     ED Disposition Condition Date/Time Comment    Transfer to [facility] Sep 17, 2021  3:17 PM Jim Dianateddy should be transferred out to  and has been medically cleared  Follow-up Information    None         Patient's Medications   Discharge Prescriptions    No medications on file     No discharge procedures on file      PDMP Review     None          ED Provider  Electronically Signed by           Tina Elizabeth PA-C  09/17/21 2019

## 2021-09-17 NOTE — ED NOTES
Bed Search:  [facility]) no beds  Ke[facility] Palacios) no beds  [facility] Nena Ley) no beds  [facility] Nichole Trejo) no beds  Brandyn Bras - no answer

## 2021-09-17 NOTE — ED NOTES
Latuda not available   Pharmacy working on supplying the medication     Stanford Devine, JULIANO  09/17/21 4274

## 2021-09-18 VITALS
OXYGEN SATURATION: 97 % | SYSTOLIC BLOOD PRESSURE: 98 MMHG | HEART RATE: 49 BPM | DIASTOLIC BLOOD PRESSURE: 61 MMHG | RESPIRATION RATE: 14 BRPM | TEMPERATURE: 97 F

## 2021-09-18 RX ADMIN — ASPIRIN 81 MG CHEWABLE TABLET 81 MG: 81 TABLET CHEWABLE at 08:14

## 2021-09-18 RX ADMIN — SERTRALINE HYDROCHLORIDE 50 MG: 50 TABLET ORAL at 08:15

## 2021-09-18 RX ADMIN — CEPHALEXIN 500 MG: 250 CAPSULE ORAL at 08:14

## 2021-09-18 RX ADMIN — MIRTAZAPINE 30 MG: 15 TABLET, FILM COATED ORAL at 01:13

## 2021-09-18 RX ADMIN — CLONAZEPAM 0.5 MG: 0.5 TABLET ORAL at 08:14

## 2021-09-18 RX ADMIN — APIXABAN 5 MG: 5 TABLET, FILM COATED ORAL at 08:14

## 2021-09-18 RX ADMIN — AMIODARONE HYDROCHLORIDE 200 MG: 200 TABLET ORAL at 08:15

## 2021-09-18 RX ADMIN — ATORVASTATIN CALCIUM 80 MG: 40 TABLET, FILM COATED ORAL at 08:14

## 2021-09-18 NOTE — EMTALA/ACUTE CARE TRANSFER
[facility] EMERGENCY DEPARTMENT  [address] [place] [address]  Dept: [phone]      EMTALA TRANSFER CONSENT    NAME Doug Robbins                                         1965                              MRN [id number]    I have been informed of my rights regarding examination, treatment, and transfer   by Dr Jacinta Hagen,     Benefits: Continuity of care    Risks:        Consent for Transfer:  I acknowledge that my medical condition has been evaluated and explained to me by the emergency department physician or other qualified medical person and/or my attending physician, who has recommended that I be transferred to the service of  Accepting Physician: Dr Bree Crespo at [address] Name, [address] : [facility]  The above potential benefits of such transfer, the potential risks associated with such transfer, and the probable risks of not being transferred have been explained to me, and I fully understand them  The doctor has explained that, in my case, the benefits of transfer outweigh the risks  I agree to be transferred  I authorize the performance of emergency medical procedures and treatments upon me in both transit and upon arrival at the receiving facility  Additionally, I authorize the release of any and all medical records to the receiving facility and request they be transported with me, if possible  I understand that the safest mode of transportation during a medical emergency is an ambulance and that the Hospital advocates the use of this mode of transport  Risks of traveling to the receiving facility by car, including absence of medical control, life sustaining equipment, such as oxygen, and medical personnel has been explained to me and I fully understand them  (KATELIN CORRECT BOX BELOW)  [  ]  I consent to the stated transfer and to be transported by ambulance/helicopter    [  ]  I consent to the stated transfer, but refuse transportation by ambulance and accept full responsibility for my transportation by car  I understand the risks of non-ambulance transfers and I exonerate the Hospital and its staff from any deterioration in my condition that results from this refusal     X___________________________________________    DATE  21  TIME________  Signature of patient or legally responsible individual signing on patient behalf           RELATIONSHIP TO PATIENT_________________________          Provider Certification    NAME Ramírez Sharp                                         1965                              MRN [id number]    A medical screening exam was performed on the above named patient  Based on the examination:    Condition Necessitating Transfer The primary encounter diagnosis was Altered mental status  Diagnoses of UTI (urinary tract infection), Depression, and Elevated troponin were also pertinent to this visit  Patient Condition: The patient has been stabilized such that within reasonable medical probability, no material deterioration of the patient condition or the condition of the unborn child(kartik) is likely to result from the transfer    Reason for Transfer: Level of Care needed not available at this facility    Transfer Requirements: [facility]   · Space available and qualified personnel available for treatment as acknowledged by Tommy Flowers [phone]  · Agreed to accept transfer and to provide appropriate medical treatment as acknowledged by       Dr Lubna Tran  · Appropriate medical records of the examination and treatment of the patient are provided at the time of transfer   [address], [address] _______  · Transfer will be performed by qualified personnel from [facility]  and appropriate transfer equipment as required, including the use of necessary and appropriate life support measures      Provider Certification: I have examined the patient and explained the following risks and benefits of being transferred/refusing transfer to the patient/family:  The patient is stable for psychiatric transfer because they are medically stable, and is protected from harming him/herself or others during transport      Based on these reasonable risks and benefits to the patient and/or the unborn child(kartik), and based upon the information available at the time of the patients examination, I certify that the medical benefits reasonably to be expected from the provision of appropriate medical treatments at another medical facility outweigh the increasing risks, if any, to the individuals medical condition, and in the case of labor to the unborn child, from effecting the transfer      X____________________________________________ DATE 09/17/21        TIME_______      ORIGINAL - SEND TO MEDICAL RECORDS   COPY - SEND WITH PATIENT DURING TRANSFER

## 2021-09-18 NOTE — ED NOTES
Patient is accepted at [facility]  Patient is accepted by Dr Fay Carpio per Benny Evart at [facility]  Transportation is arranged with [facility]  Transportation is scheduled Date and Time: Per Anderson

## 2021-09-18 NOTE — ED NOTES
Crisis Worker notified Charge Nurse, Ricardo Morales, pt is accepted at [facility], and transport is scheduled for tomorrow 9/18 pending confirmation from [email or web address]  Crisis to contact [facility] with ETA

## 2021-09-18 NOTE — ED NOTES
Crisis Worker spoke with Stanley Grajeda from [facility] stating that coordination of benefits is not needed for pt as insurance is 100% Medicare

## 2021-09-18 NOTE — ED NOTES
Pt not straight cath; pt ambulated to bathroom and able to urinate himself        Serina Lr RN  09/17/21 4520

## 2021-09-18 NOTE — ED NOTES
Crisis Worker spoke with Bola Mccurdy, from [facility] to transport pt to [facility] in the morning (9/18/21)  Crisis to contact [facility] with ETA

## 2021-09-18 NOTE — ED NOTES
Crisis Worker spoke with Yessi You, from [facility], who confirmed pt is accepted for in patient treatment   Per Yessi You, if not able to transport pt tonight, [facility] will accept admission for tomorrow (9/18)

## 2021-09-18 NOTE — ED NOTES
Spoke with Fatuma Jon from [facility]  Pt will be transport to [facility] at 9:30AM with CTS  Spoke with the answering service for [facility]  Waiting for return call at this time to inform them of ETA

## 2021-09-19 LAB — BACTERIA UR CULT: NORMAL

## 2022-04-18 ENCOUNTER — IN-CLINIC DEVICE VISIT (OUTPATIENT)
Dept: CARDIOLOGY CLINIC | Facility: CLINIC | Age: 57
End: 2022-04-18
Payer: MEDICARE

## 2022-04-18 ENCOUNTER — OFFICE VISIT (OUTPATIENT)
Dept: CARDIOLOGY CLINIC | Facility: CLINIC | Age: 57
End: 2022-04-18
Payer: MEDICARE

## 2022-04-18 VITALS
HEART RATE: 71 BPM | HEIGHT: 71 IN | BODY MASS INDEX: 23.8 KG/M2 | SYSTOLIC BLOOD PRESSURE: 96 MMHG | WEIGHT: 170 LBS | DIASTOLIC BLOOD PRESSURE: 58 MMHG | OXYGEN SATURATION: 97 %

## 2022-04-18 DIAGNOSIS — I25.119 CORONARY ARTERY DISEASE INVOLVING NATIVE HEART WITH ANGINA PECTORIS, UNSPECIFIED VESSEL OR LESION TYPE (HCC): ICD-10-CM

## 2022-04-18 DIAGNOSIS — I10 ESSENTIAL HYPERTENSION: ICD-10-CM

## 2022-04-18 DIAGNOSIS — E78.49 OTHER HYPERLIPIDEMIA: ICD-10-CM

## 2022-04-18 DIAGNOSIS — I63.9 CEREBROVASCULAR ACCIDENT (CVA), UNSPECIFIED MECHANISM (HCC): ICD-10-CM

## 2022-04-18 DIAGNOSIS — I47.2 VENTRICULAR TACHYCARDIA (HCC): ICD-10-CM

## 2022-04-18 DIAGNOSIS — Z95.810 AICD (AUTOMATIC CARDIOVERTER/DEFIBRILLATOR) PRESENT: Primary | ICD-10-CM

## 2022-04-18 DIAGNOSIS — I25.10 CORONARY ARTERY DISEASE INVOLVING NATIVE HEART WITHOUT ANGINA PECTORIS, UNSPECIFIED VESSEL OR LESION TYPE: Primary | ICD-10-CM

## 2022-04-18 DIAGNOSIS — I21.4 NSTEMI (NON-ST ELEVATED MYOCARDIAL INFARCTION) (HCC): ICD-10-CM

## 2022-04-18 PROBLEM — I47.20 VENTRICULAR TACHYCARDIA: Status: ACTIVE | Noted: 2022-04-18

## 2022-04-18 PROCEDURE — 99214 OFFICE O/P EST MOD 30 MIN: CPT | Performed by: INTERNAL MEDICINE

## 2022-04-18 PROCEDURE — 93000 ELECTROCARDIOGRAM COMPLETE: CPT | Performed by: INTERNAL MEDICINE

## 2022-04-18 PROCEDURE — 93282 PRGRMG EVAL IMPLANTABLE DFB: CPT | Performed by: INTERNAL MEDICINE

## 2022-04-18 RX ORDER — ARIPIPRAZOLE 5 MG/1
TABLET ORAL
COMMUNITY
Start: 2022-01-19

## 2022-04-18 RX ORDER — ATORVASTATIN CALCIUM 40 MG/1
40 TABLET, FILM COATED ORAL
Qty: 90 TABLET | Refills: 3 | Status: SHIPPED | OUTPATIENT
Start: 2022-04-18

## 2022-04-18 NOTE — ASSESSMENT & PLAN NOTE
Hyperlipidemia, stable  Patient will continue atorvastatin at 40 mg daily  The patient was apparently not taking this medication I will arrange for follow-up lab 6-8 weeks after we starting atorvastatin

## 2022-04-18 NOTE — LETTER
April 18, 2022     Sharmin Flower MD  [address] [facility],[address], [address] [id number]    Patient: Avis Casas   YOB: 1965   Date of Visit: 4/18/2022       Dear Dr Jeffrey Castillo: Thank you for referring Eri Burgos to me for evaluation  Below are my notes for this consultation  If you have questions, please do not hesitate to call me  I look forward to following your patient along with you  Sincerely,        Chetna Baron MD        CC: No Recipients  Chetna Baron MD  4/18/2022  1:43 PM  Sign when Signing Visit  Assessment/Plan:    Other hyperlipidemia    Hyperlipidemia, stable  Patient will continue atorvastatin at 40 mg daily  The patient was apparently not taking this medication I will arrange for follow-up lab 6-8 weeks after we starting atorvastatin  Essential hypertension    Hypertension, stable and adequately controlled  Ventricular tachycardia ([address] )    The patient had a history of ventricular arrhythmia for which she is taking amiodarone 200 mg daily  We will continue this regimen  Coronary artery disease involving native heart with angina pectoris, unspecified vessel or lesion type [facility])    Coronary artery disease, stable  No symptoms of angina or signs of heart failure  The patient is not taking aspirin at this time because does of concurrent therapy with Eliquis 5 mg twice a day  CVA (cerebral vascular accident) [facility])    The patient has a history of CVA and has been on anticoagulation we will continue the same regimen         Diagnoses and all orders for this visit:    Coronary artery disease involving native heart without angina pectoris, unspecified vessel or lesion type  -     POCT ECG    Essential hypertension    Other hyperlipidemia    Ventricular tachycardia (HCC)    Coronary artery disease involving native heart with angina pectoris, unspecified vessel or lesion type [facility])    Cerebrovascular accident (CVA), unspecified mechanism ([facility] )    NSTEMI (non-ST elevated myocardial infarction) ([facility])  -     atorvastatin (LIPITOR) 40 mg tablet; Take 1 tablet (40 mg total) by mouth daily with dinner    Other orders  -     ARIPiprazole (ABILIFY) 5 mg tablet  -     carbidopa-levodopa (SINEMET)  mg per tablet; Take 1 tablet by mouth 4 (four) times a day          Subjective:   Feels well  Patient ID: Ventura Cruz is a 61 y o  male  The patient presented to this office for the purpose of cardiac follow-up  He is known to have history of prior myocardial infarction with evidence of moderate to severe coronary disease in the right coronary system and some other atherosclerosis in the other vessels  That myocardial infarction was complicated ventricular arrhythmia and the patient an ICD  The patient has also been treated for hypertension, hyperlipidemia and anxiety disorder  At this point the patient is feeling rather well denying any symptoms of chest pain, shortness of breath, palpitation, dizziness or lightheadedness  He has no leg edema  The following portions of the patient's history were reviewed and updated as appropriate: allergies, current medications, past family history, past medical history, past social history, past surgical history and problem list     Review of Systems   Respiratory: Negative for apnea, cough, chest tightness, shortness of breath and wheezing  Cardiovascular: Negative for chest pain, palpitations and leg swelling  Gastrointestinal: Negative for abdominal pain  Neurological: Negative for dizziness and light-headedness  Psychiatric/Behavioral: Negative  Objective:  Stable cardiac-wise  BP 96/58   Pulse 71   Ht 5' 11" (1 803 m)   Wt 77 1 kg (170 lb)   SpO2 97%   BMI 23 71 kg/m²          Physical Exam  Vitals reviewed  Constitutional:       General: He is not in acute distress  Appearance: He is well-developed  He is not diaphoretic     HENT:      Head: Normocephalic  Eyes:      Pupils: Pupils are equal, round, and reactive to light  Neck:      Thyroid: No thyromegaly  Vascular: No JVD  Cardiovascular:      Rate and Rhythm: Normal rate and regular rhythm  Heart sounds: S1 normal and S2 normal  No murmur heard  No friction rub  No gallop  Pulmonary:      Effort: Pulmonary effort is normal  No respiratory distress  Breath sounds: Normal breath sounds  No wheezing or rales  Chest:      Chest wall: No tenderness  Abdominal:      Palpations: Abdomen is soft  Musculoskeletal:         General: No tenderness or deformity  Normal range of motion  Cervical back: Normal range of motion  Right lower leg: No edema  Left lower leg: No edema  Skin:     General: Skin is warm and dry  Neurological:      Mental Status: He is alert and oriented to person, place, and time     Psychiatric:         Mood and Affect: Mood normal          Behavior: Behavior normal

## 2022-04-18 NOTE — ASSESSMENT & PLAN NOTE
Coronary artery disease, stable  No symptoms of angina or signs of heart failure  The patient is not taking aspirin at this time because does of concurrent therapy with Eliquis 5 mg twice a day

## 2022-04-18 NOTE — PROGRESS NOTES
Results for orders placed or performed in visit on 04/18/22   Cardiac EP device report    Narrative    BCI SINGLE ICD/ [address] INTERROGATED IN THE [facility] OFFICE  PT NEW TO DEVICE CLINIC  BATTERY VOLTAGE ADEQUATE (12 YRS)  <1%  ALL LEAD PARAMETERS WITHIN NORMAL LIMITS  NO SIGNIFICANT HIGH RATE EPISODES  DECREASE MADE TO AMPLITUDE TO PROMOTE DEVICE LONGEVITY WHILE MAINTAINING AN APPROPRIATE SAFETY MARGIN  NORMAL DEVICE FUNCTION   GV

## 2022-04-18 NOTE — LETTER
April 18, 2022     Sobia Gooden MD  [address] [facility],[address], [address] [address]    Patient: Hien Berger   YOB: 1965   Date of Visit: 4/18/2022       Dear Dr Leroy Solomon: Thank you for referring Saskia Mcgovern to me for evaluation  Below are my notes for this consultation  If you have questions, please do not hesitate to call me  I look forward to following your patient along with you  Sincerely,        Monique Gurrola MD        CC: No Recipients  Monique Gurrola MD  4/18/2022  1:41 PM  Sign when Signing Visit  Assessment/Plan:    Other hyperlipidemia    Hyperlipidemia, stable  Patient will continue atorvastatin at 40 mg daily  The patient was apparently not taking this medication I will arrange for follow-up lab 6-8 weeks after we starting atorvastatin  Essential hypertension    Hypertension, stable and adequately controlled  Ventricular tachycardia ([address] )    The patient had a history of ventricular arrhythmia for which she is taking amiodarone 200 mg daily  We will continue this regimen  Coronary artery disease involving native heart with angina pectoris, unspecified vessel or lesion type [facility])    Coronary artery disease, stable  No symptoms of angina or signs of heart failure  The patient is not taking aspirin at this time because does of concurrent therapy with Eliquis 5 mg twice a day  CVA (cerebral vascular accident) [facility])    The patient has a history of CVA and has been on anticoagulation we will continue the same regimen         Diagnoses and all orders for this visit:    Coronary artery disease involving native heart without angina pectoris, unspecified vessel or lesion type  -     POCT ECG    Essential hypertension    Other hyperlipidemia    Ventricular tachycardia (HCC)    Coronary artery disease involving native heart with angina pectoris, unspecified vessel or lesion type [facility])    Cerebrovascular accident (CVA), unspecified mechanism ([facility] )    NSTEMI (non-ST elevated myocardial infarction) ([facility])  -     atorvastatin (LIPITOR) 40 mg tablet; Take 1 tablet (40 mg total) by mouth daily with dinner    Other orders  -     ARIPiprazole (ABILIFY) 5 mg tablet  -     carbidopa-levodopa (SINEMET)  mg per tablet; Take 1 tablet by mouth 4 (four) times a day          Subjective:   Feels well  Patient ID: Philip Harper is a 61 y o  male  The patient presented to this office for the purpose of cardiac follow-up  He is known to have history of prior myocardial infarction with evidence of moderate to severe coronary disease in the right coronary system and some other atherosclerosis in the other vessels  That myocardial infarction was complicated ventricular arrhythmia and the patient an ICD  The patient has also been treated for hypertension, hyperlipidemia and anxiety disorder  At this point the patient is feeling rather well denying any symptoms of chest pain, shortness of breath, palpitation, dizziness or lightheadedness  He has no leg edema  The following portions of the patient's history were reviewed and updated as appropriate: allergies, current medications, past family history, past medical history, past social history, past surgical history and problem list     Review of Systems   Respiratory: Negative for apnea, cough, chest tightness, shortness of breath and wheezing  Cardiovascular: Negative for chest pain, palpitations and leg swelling  Gastrointestinal: Negative for abdominal pain  Neurological: Negative for dizziness and light-headedness  Psychiatric/Behavioral: Negative  Objective:  Stable cardiac-wise  BP 96/58   Pulse 71   Ht 5' 11" (1 803 m)   Wt 77 1 kg (170 lb)   SpO2 97%   BMI 23 71 kg/m²          Physical Exam  Vitals reviewed  Constitutional:       General: He is not in acute distress  Appearance: He is well-developed  He is not diaphoretic     HENT:      Head: Normocephalic  Eyes:      Pupils: Pupils are equal, round, and reactive to light  Neck:      Thyroid: No thyromegaly  Vascular: No JVD  Cardiovascular:      Rate and Rhythm: Normal rate and regular rhythm  Heart sounds: S1 normal and S2 normal  No murmur heard  No friction rub  No gallop  Pulmonary:      Effort: Pulmonary effort is normal  No respiratory distress  Breath sounds: Normal breath sounds  No wheezing or rales  Chest:      Chest wall: No tenderness  Abdominal:      Palpations: Abdomen is soft  Musculoskeletal:         General: No tenderness or deformity  Normal range of motion  Cervical back: Normal range of motion  Right lower leg: No edema  Left lower leg: No edema  Skin:     General: Skin is warm and dry  Neurological:      Mental Status: He is alert and oriented to person, place, and time     Psychiatric:         Mood and Affect: Mood normal          Behavior: Behavior normal

## 2022-04-18 NOTE — PROGRESS NOTES
Assessment/Plan:    Other hyperlipidemia    Hyperlipidemia, stable  Patient will continue atorvastatin at 40 mg daily  The patient was apparently not taking this medication I will arrange for follow-up lab 6-8 weeks after we starting atorvastatin  Essential hypertension    Hypertension, stable and adequately controlled  Ventricular tachycardia ([place] [address] )    The patient had a history of ventricular arrhythmia for which she is taking amiodarone 200 mg daily  We will continue this regimen  Coronary artery disease involving native heart with angina pectoris, unspecified vessel or lesion type [facility])    Coronary artery disease, stable  No symptoms of angina or signs of heart failure  The patient is not taking aspirin at this time because does of concurrent therapy with Eliquis 5 mg twice a day  CVA (cerebral vascular accident) [facility])    The patient has a history of CVA and has been on anticoagulation we will continue the same regimen  Diagnoses and all orders for this visit:    Coronary artery disease involving native heart without angina pectoris, unspecified vessel or lesion type  -     POCT ECG    Essential hypertension    Other hyperlipidemia    Ventricular tachycardia (HCC)    Coronary artery disease involving native heart with angina pectoris, unspecified vessel or lesion type [facility])    Cerebrovascular accident (CVA), unspecified mechanism ([place] [address] )    NSTEMI (non-ST elevated myocardial infarction) ([place] [address] )  -     atorvastatin (LIPITOR) 40 mg tablet; Take 1 tablet (40 mg total) by mouth daily with dinner    Other orders  -     ARIPiprazole (ABILIFY) 5 mg tablet  -     carbidopa-levodopa (SINEMET)  mg per tablet; Take 1 tablet by mouth 4 (four) times a day          Subjective:   Feels well  Patient ID: Sina Hamman is a 61 y o  male  The patient presented to this office for the purpose of cardiac follow-up    He is known to have history of prior myocardial infarction with evidence of moderate to severe coronary disease in the right coronary system and some other atherosclerosis in the other vessels  That myocardial infarction was complicated ventricular arrhythmia and the patient an ICD  The patient has also been treated for hypertension, hyperlipidemia and anxiety disorder  At this point the patient is feeling rather well denying any symptoms of chest pain, shortness of breath, palpitation, dizziness or lightheadedness  He has no leg edema  The following portions of the patient's history were reviewed and updated as appropriate: allergies, current medications, past family history, past medical history, past social history, past surgical history and problem list     Review of Systems   Respiratory: Negative for apnea, cough, chest tightness, shortness of breath and wheezing  Cardiovascular: Negative for chest pain, palpitations and leg swelling  Gastrointestinal: Negative for abdominal pain  Neurological: Negative for dizziness and light-headedness  Psychiatric/Behavioral: Negative  Objective:  Stable cardiac-wise  BP 96/58   Pulse 71   Ht 5' 11" (1 803 m)   Wt 77 1 kg (170 lb)   SpO2 97%   BMI 23 71 kg/m²          Physical Exam  Vitals reviewed  Constitutional:       General: He is not in acute distress  Appearance: He is well-developed  He is not diaphoretic  HENT:      Head: Normocephalic  Eyes:      Pupils: Pupils are equal, round, and reactive to light  Neck:      Thyroid: No thyromegaly  Vascular: No JVD  Cardiovascular:      Rate and Rhythm: Normal rate and regular rhythm  Heart sounds: S1 normal and S2 normal  No murmur heard  No friction rub  No gallop  Pulmonary:      Effort: Pulmonary effort is normal  No respiratory distress  Breath sounds: Normal breath sounds  No wheezing or rales  Chest:      Chest wall: No tenderness  Abdominal:      Palpations: Abdomen is soft     Musculoskeletal:         General: No tenderness or deformity  Normal range of motion  Cervical back: Normal range of motion  Right lower leg: No edema  Left lower leg: No edema  Skin:     General: Skin is warm and dry  Neurological:      Mental Status: He is alert and oriented to person, place, and time     Psychiatric:         Mood and Affect: Mood normal          Behavior: Behavior normal

## 2022-04-18 NOTE — ASSESSMENT & PLAN NOTE
The patient had a history of ventricular arrhythmia for which she is taking amiodarone 200 mg daily  We will continue this regimen

## 2022-08-30 ENCOUNTER — HOSPITAL ENCOUNTER (EMERGENCY)
Facility: HOSPITAL | Age: 57
End: 2022-09-01
Attending: EMERGENCY MEDICINE
Payer: COMMERCIAL

## 2022-08-30 DIAGNOSIS — R45.851 SUICIDAL IDEATION: Primary | ICD-10-CM

## 2022-08-30 DIAGNOSIS — N39.0 URINARY TRACT INFECTION: ICD-10-CM

## 2022-08-30 LAB
ALBUMIN SERPL BCP-MCNC: 3.3 G/DL (ref 3.5–5)
ALP SERPL-CCNC: 89 U/L (ref 46–116)
ALT SERPL W P-5'-P-CCNC: 22 U/L (ref 12–78)
ANION GAP SERPL CALCULATED.3IONS-SCNC: 7 MMOL/L (ref 4–13)
AST SERPL W P-5'-P-CCNC: 43 U/L (ref 5–45)
BASOPHILS # BLD AUTO: 0.06 THOUSANDS/ΜL (ref 0–0.1)
BASOPHILS NFR BLD AUTO: 1 % (ref 0–1)
BILIRUB SERPL-MCNC: 0.5 MG/DL (ref 0.2–1)
BUN SERPL-MCNC: 18 MG/DL (ref 5–25)
CALCIUM ALBUM COR SERPL-MCNC: 9 MG/DL (ref 8.3–10.1)
CALCIUM SERPL-MCNC: 8.4 MG/DL (ref 8.3–10.1)
CHLORIDE SERPL-SCNC: 105 MMOL/L (ref 96–108)
CO2 SERPL-SCNC: 29 MMOL/L (ref 21–32)
CREAT SERPL-MCNC: 1.07 MG/DL (ref 0.6–1.3)
EOSINOPHIL # BLD AUTO: 0.12 THOUSAND/ΜL (ref 0–0.61)
EOSINOPHIL NFR BLD AUTO: 2 % (ref 0–6)
ERYTHROCYTE [DISTWIDTH] IN BLOOD BY AUTOMATED COUNT: 13.3 % (ref 11.6–15.1)
ETHANOL EXG-MCNC: 0 MG/DL
GFR SERPL CREATININE-BSD FRML MDRD: 76 ML/MIN/1.73SQ M
GLUCOSE SERPL-MCNC: 107 MG/DL (ref 65–140)
HCT VFR BLD AUTO: 43.2 % (ref 36.5–49.3)
HGB BLD-MCNC: 13.5 G/DL (ref 12–17)
IMM GRANULOCYTES # BLD AUTO: 0.02 THOUSAND/UL (ref 0–0.2)
IMM GRANULOCYTES NFR BLD AUTO: 0 % (ref 0–2)
LYMPHOCYTES # BLD AUTO: 1.29 THOUSANDS/ΜL (ref 0.6–4.47)
LYMPHOCYTES NFR BLD AUTO: 16 % (ref 14–44)
MCH RBC QN AUTO: 30 PG (ref 26.8–34.3)
MCHC RBC AUTO-ENTMCNC: 31.3 G/DL (ref 31.4–37.4)
MCV RBC AUTO: 96 FL (ref 82–98)
MONOCYTES # BLD AUTO: 0.54 THOUSAND/ΜL (ref 0.17–1.22)
MONOCYTES NFR BLD AUTO: 7 % (ref 4–12)
NEUTROPHILS # BLD AUTO: 5.99 THOUSANDS/ΜL (ref 1.85–7.62)
NEUTS SEG NFR BLD AUTO: 74 % (ref 43–75)
NRBC BLD AUTO-RTO: 0 /100 WBCS
PLATELET # BLD AUTO: 281 THOUSANDS/UL (ref 149–390)
PMV BLD AUTO: 9.7 FL (ref 8.9–12.7)
POTASSIUM SERPL-SCNC: 3.9 MMOL/L (ref 3.5–5.3)
PROT SERPL-MCNC: 6.8 G/DL (ref 6.4–8.4)
RBC # BLD AUTO: 4.5 MILLION/UL (ref 3.88–5.62)
SARS-COV-2 RNA RESP QL NAA+PROBE: NEGATIVE
SODIUM SERPL-SCNC: 141 MMOL/L (ref 135–147)
TSH SERPL DL<=0.05 MIU/L-ACNC: 1.01 UIU/ML (ref 0.45–4.5)
WBC # BLD AUTO: 8.02 THOUSAND/UL (ref 4.31–10.16)

## 2022-08-30 PROCEDURE — U0003 INFECTIOUS AGENT DETECTION BY NUCLEIC ACID (DNA OR RNA); SEVERE ACUTE RESPIRATORY SYNDROME CORONAVIRUS 2 (SARS-COV-2) (CORONAVIRUS DISEASE [COVID-19]), AMPLIFIED PROBE TECHNIQUE, MAKING USE OF HIGH THROUGHPUT TECHNOLOGIES AS DESCRIBED BY CMS-2020-01-R: HCPCS | Performed by: EMERGENCY MEDICINE

## 2022-08-30 PROCEDURE — 99285 EMERGENCY DEPT VISIT HI MDM: CPT | Performed by: EMERGENCY MEDICINE

## 2022-08-30 PROCEDURE — 93005 ELECTROCARDIOGRAM TRACING: CPT

## 2022-08-30 PROCEDURE — 84443 ASSAY THYROID STIM HORMONE: CPT | Performed by: EMERGENCY MEDICINE

## 2022-08-30 PROCEDURE — 82075 ASSAY OF BREATH ETHANOL: CPT | Performed by: EMERGENCY MEDICINE

## 2022-08-30 PROCEDURE — 99285 EMERGENCY DEPT VISIT HI MDM: CPT

## 2022-08-30 PROCEDURE — U0005 INFEC AGEN DETEC AMPLI PROBE: HCPCS | Performed by: EMERGENCY MEDICINE

## 2022-08-30 PROCEDURE — 85025 COMPLETE CBC W/AUTO DIFF WBC: CPT | Performed by: EMERGENCY MEDICINE

## 2022-08-30 PROCEDURE — 36415 COLL VENOUS BLD VENIPUNCTURE: CPT | Performed by: EMERGENCY MEDICINE

## 2022-08-30 PROCEDURE — 80053 COMPREHEN METABOLIC PANEL: CPT | Performed by: EMERGENCY MEDICINE

## 2022-08-30 NOTE — ED NOTES
61year old male Patient presented to the ED for Sucidial Thoughts with a plan  Patient stated with in the past couple of days he gets random of thoughts to die and he wants to walk into the middle of the street and get hit by a car and die  Patient stated he has attempted Sucide in the past by attempting to overdose  Patient currently resides in a group home and does not feel safe in the home  Patient denies any past or present Homicidal ideation  Patient reports no auditory or visual hallucinations  Patient denies any substance use and abuse

## 2022-08-30 NOTE — ED PROVIDER NOTES
History  Chief Complaint   Patient presents with    Psychiatric Evaluation     Patient presents to ED for SI w/ plan from [employer]  Patient is a 48-year-old male with a past medical history significant for coronary artery disease, history of MI, ventricular tachycardia on amiodarone and is status post ICD, currently on anticoagulation, hypertension, hyperlipidemia, prior CVA, bipolar disorder who is brought in from [facility] facility secondary to suicidal ideations  Patient reports that over the last 3 days he has been having suicidal thoughts and he wants to jump into traffic  Denies any homicidal ideations, auditory visual hallucinations  Denies any other complaints  Prior to Admission Medications   Prescriptions Last Dose Informant Patient Reported? Taking? ARIPiprazole (ABILIFY) 5 mg tablet  Outside Facility (Specify) Yes Yes   amiodarone 200 mg tablet  Outside Facility (Specify) Yes Yes   Sig: Take 200 mg by mouth daily   apixaban (ELIQUIS) 5 mg  Outside Facility (Specify) Yes Yes   Sig: Take 5 mg by mouth 2 (two) times a day   atorvastatin (LIPITOR) 40 mg tablet   No Yes   Sig: Take 1 tablet (40 mg total) by mouth daily with dinner   carbidopa-levodopa (SINEMET)  mg per tablet  Outside Facility (Specify) Yes Yes   Sig: Take 1 tablet by mouth 4 (four) times a day   clonazePAM (KlonoPIN) 0 5 mg tablet  Outside Facility (Specify) Yes Yes   Sig: Take 0 25 mg by mouth 2 (two) times a day as needed for seizures Takes 0 25 by mouth twice daily      lurasidone (LATUDA) 80 mg tablet  Outside Facility (Specify) No No   Sig: Take 1 tablet (80 mg total) by mouth daily with dinner   Patient not taking: Reported on 4/18/2022    mirtazapine (REMERON) 7 5 MG tablet  Outside Facility (Specify) No Yes   Sig: Take 1 tablet (7 5 mg total) by mouth daily at bedtime   Patient taking differently: Take 30 mg by mouth daily at bedtime   sertraline (ZOLOFT) 50 mg tablet  Outside Facility (Specify) Yes Yes Sig: Take 50 mg by mouth daily      Facility-Administered Medications: None       Past Medical History:   Diagnosis Date    Anxiety     Bipolar 1 disorder (HCC)     Bowel habit changes     Chronic pain disorder     Coronary artery disease     [place] [address] [place]     [place] [address] [place]     Depression     Family hx of colon cancer     father and paternal uncle    Myocardial infarction [facility])     Psychiatric disorder     Psychiatric illness     Schizoaffective disorder, bipolar type ([place] [address] )     Schizoaffective disorder, bipolar type ([place] [address] )     St[place] ([place] [address] ) 12/2015    CVA x3  Pt states he is unaware of when he had first two CVA    Suicide attempt [facility])        Past Surgical History:   Procedure Laterality Date    CARDIAC PACEMAKER PLACEMENT      COLONOSCOPY         Family History   Problem Relation Age of Onset    No Known Problems Mother     No Known Problems Father     No Known Problems Sister     No Known Problems Brother     No Known Problems Maternal Aunt     No Known Problems Paternal Aunt     No Known Problems Maternal Uncle     No Known Problems Paternal Uncle     No Known Problems Maternal Grandfather     No Known Problems Maternal Grandmother     No Known Problems Paternal Grandfather     No Known Problems Paternal Grandmother     No Known Problems Cousin     ADD / ADHD Neg Hx     Alcohol abuse Neg Hx     Anxiety disorder Neg Hx     Bipolar disorder Neg Hx     Completed Suicide  Neg Hx     Dementia Neg Hx     Depression Neg Hx     Drug abuse Neg Hx     OCD Neg Hx     Psychiatric Illness Neg Hx     Psychosis Neg Hx     Schizoaffective Disorder  Neg Hx     Schizophrenia Neg Hx     Self-Injury Neg Hx     Suicide Attempts Neg Hx      I have reviewed and agree with the history as documented      E-Cigarette/Vaping    E-Cigarette Use Never User      E-Cigarette/Vaping Substances    Nicotine No     Flavoring No     Other No     Unknown No      Social History     Tobacco Use    Smoking status: Current Every Day Smoker     Packs/day: 1 00     Years: 15 00     Pack years: 15 00     Types: Cigarettes    Smokeless tobacco: Never Used    Tobacco comment: Wishes to quit   Vaping Use    Vaping Use: Never used   Substance Use Topics    Alcohol use: Never     Alcohol/week: 0 0 standard drinks     Comment: pt denies    Drug use: No     Comment: pt denies       Review of Systems   Constitutional: Negative for chills and fever  HENT: Negative for congestion and rhinorrhea  Eyes: Negative for photophobia and visual disturbance  Respiratory: Negative for cough and shortness of breath  Cardiovascular: Negative for chest pain and palpitations  Gastrointestinal: Negative for abdominal pain, constipation, diarrhea, nausea and vomiting  Genitourinary: Negative for dysuria, flank pain and hematuria  Musculoskeletal: Negative for back pain and neck pain  Skin: Negative for color change and pallor  Neurological: Negative for dizziness, weakness, light-headedness, numbness and headaches  Psychiatric/Behavioral: Positive for suicidal ideas  Physical Exam  Physical Exam  Vitals and nursing note reviewed  Constitutional:       General: He is not in acute distress  Appearance: Normal appearance  He is not ill-appearing, toxic-appearing or diaphoretic  HENT:      Head: Normocephalic and atraumatic  Mouth/Throat:      Mouth: Mucous membranes are moist    Eyes:      Conjunctiva/sclera: Conjunctivae normal       Pupils: Pupils are equal, round, and reactive to light  Cardiovascular:      Rate and Rhythm: Normal rate and regular rhythm  Pulses: Normal pulses  Heart sounds: Normal heart sounds  No murmur heard  Pulmonary:      Effort: Pulmonary effort is normal  No respiratory distress  Breath sounds: Normal breath sounds  No stridor  No wheezing, rhonchi or rales  Chest:      Chest wall: No tenderness  Abdominal:      General: Bowel sounds are normal  There is no distension  Palpations: Abdomen is soft  Tenderness: There is no abdominal tenderness  There is no guarding or rebound  Musculoskeletal:      Cervical back: Neck supple  Right lower leg: No edema  Left lower leg: No edema  Skin:     General: Skin is warm and dry  Neurological:      General: No focal deficit present  Mental Status: He is alert and oriented to person, place, and time  Mental status is at baseline  Psychiatric:         Mood and Affect: Affect is flat  Thought Content: Thought content includes suicidal ideation  Thought content does not include homicidal ideation  Thought content includes suicidal plan  Thought content does not include homicidal plan           Vital Signs  ED Triage Vitals   Temperature Pulse Respirations Blood Pressure SpO2   08/30/22 1614 08/30/22 1605 08/30/22 1605 08/30/22 1605 08/30/22 1605   97 8 °F (36 6 °C) 58 20 111/66 98 %      Temp src Heart Rate Source Patient Position - Orthostatic VS BP Location FiO2 (%)   -- 08/30/22 1605 08/30/22 1605 08/30/22 1605 --    Monitor Lying Left arm       Pain Score       08/30/22 1605       No Pain           Vitals:    08/30/22 1605 08/30/22 1615   BP: 111/66 111/66   Pulse: 58 57   Patient Position - Orthostatic VS: Lying          Visual Acuity      ED Medications  Medications   amiodarone tablet 200 mg (has no administration in time range)   apixaban (ELIQUIS) tablet 5 mg (has no administration in time range)   ARIPiprazole (ABILIFY) tablet 5 mg (has no administration in time range)   atorvastatin (LIPITOR) tablet 40 mg (has no administration in time range)   carbidopa-levodopa (SINEMET)  mg per tablet 1 tablet (has no administration in time range)   clonazePAM (KlonoPIN) tablet 0 25 mg (has no administration in time range)   mirtazapine (REMERON) tablet 30 mg (has no administration in time range)   sertraline (ZOLOFT) tablet 50 mg (has no administration in time range)       Diagnostic Studies  Results Reviewed     Procedure Component Value Units Date/Time    COVID only [[id number]]  (Normal) Collected: 08/30/22 1630    Lab Status: Final result Specimen: Nasopharyngeal Swab Updated: 08/30/22 1728     SARS-CoV-2 Negative    Narrative:      FOR PEDIATRIC PATIENTS - copy/paste COVID Guidelines URL to browser: [email or web address]  Evrent[employer]    SARS-CoV-2 assay is a Nucleic Acid Amplification assay intended for the  qualitative detection of nucleic acid from SARS-CoV-2 in nasopharyngeal  swabs  Results are for the presumptive identification of SARS-CoV-2 RNA  Positive results are indicative of infection with SARS-CoV-2, the virus  causing COVID-19, but do not rule out bacterial infection or co-infection  with other viruses  Laboratories within the [place] and its  territories are required to report all positive results to the appropriate  public health authorities  Negative results do not preclude SARS-CoV-2  infection and should not be used as the sole basis for treatment or other  patient management decisions  Negative results must be combined with  clinical observations, patient history, and epidemiological information  This test has not been FDA cleared or approved  This test has been authorized by FDA under an Emergency Use Authorization  (EUA)  This test is only authorized for the duration of time the  declaration that circumstances exist justifying the authorization of the  emergency use of an in vitro diagnostic tests for detection of SARS-CoV-2  virus and/or diagnosis of COVID-19 infection under section 564(b)(1) of  the Act, 21 U  S C  700YER-8(Z)(4), unless the authorization is terminated  or revoked sooner  The test has been validated but independent review by FDA  and CLIA is pending  Test performed using [employer]:  This RT-PCR assay targets N2,  a region unique to SARS-CoV-2  A conserved region in the E-gene was chosen  for pan-Sarbecovirus detection which includes SARS-CoV-2  TSH [[id number]]  (Normal) Collected: 08/30/22 1646    Lab Status: Final result Specimen: Blood from Arm, Left Updated: 08/30/22 1723     TSH 3RD GENERATON 1 006 uIU/mL     Narrative:      Patients undergoing fluorescein dye angiography may retain small amounts of fluorescein in the body for 48-72 hours post procedure  Samples containing fluorescein can produce falsely depressed TSH values  If the patient had this procedure,a specimen should be resubmitted post fluorescein clearance        Comprehensive metabolic panel [[id number]]  (Abnormal) Collected: 08/30/22 1646    Lab Status: Final result Specimen: Blood from Arm, Left Updated: 08/30/22 1716     Sodium 141 mmol/L      Potassium 3 9 mmol/L      Chloride 105 mmol/L      CO2 29 mmol/L      ANION GAP 7 mmol/L      BUN 18 mg/dL      Creatinine 1 07 mg/dL      Glucose 107 mg/dL      Calcium 8 4 mg/dL      Corrected Calcium 9 0 mg/dL      AST 43 U/L      ALT 22 U/L      Alkaline Phosphatase 89 U/L      Total Protein 6 8 g/dL      Albumin 3 3 g/dL      Total Bilirubin 0 50 mg/dL      eGFR 76 ml/min/1 73sq m     Narrative:      [place] guidelines for Chronic Kidney Disease (CKD):     Stage 1 with normal or high GFR (GFR > 90 mL/min/1 73 square meters)    Stage 2 Mild CKD (GFR = 60-89 mL/min/1 73 square meters)    Stage 3A Moderate CKD (GFR = 45-59 mL/min/1 73 square meters)    Stage 3B Moderate CKD (GFR = 30-44 mL/min/1 73 square meters)    Stage 4 Severe CKD (GFR = 15-29 mL/min/1 73 square meters)    Stage 5 End Stage CKD (GFR <15 mL/min/1 73 square meters)  Note: GFR calculation is accurate only with a steady state creatinine    CBC and differential [[id number]]  (Abnormal) Collected: 08/30/22 1646    Lab Status: Final result Specimen: Blood from Arm, Left Updated: 08/30/22 1655     WBC 8 02 Thousand/uL      RBC 4 50 Million/uL      Hemoglobin 13 5 g/dL      Hematocrit 43 2 %      MCV 96 fL      MCH 30 0 pg      MCHC 31 3 g/dL      RDW 13 3 %      MPV 9 7 fL      Platelets 135 Thousands/uL      nRBC 0 /100 WBCs      Neutrophils Relative 74 %      Immat GRANS % 0 %      Lymphocytes Relative 16 %      Monocytes Relative 7 %      Eosinophils Relative 2 %      Basophils Relative 1 %      Neutrophils Absolute 5 99 Thousands/µL      Immature Grans Absolute 0 02 Thousand/uL      Lymphocytes Absolute 1 29 Thousands/µL      Monocytes Absolute 0 54 Thousand/µL      Eosinophils Absolute 0 12 Thousand/µL      Basophils Absolute 0 06 Thousands/µL     POCT alcohol breath test [[id number]]  (Normal) Resulted: 08/30/22 1628    Lab Status: Final result Updated: 08/30/22 1629     EXTBreath Alcohol 0 00    Rapid drug screen, urine [[id number]]     Lab Status: No result Specimen: Urine     UA w Reflex to Microscopic w Reflex to Culture [[id number]]     Lab Status: No result Specimen: Urine                  No orders to display              Procedures  ECG 12 Lead Documentation Only    Date/Time: 8/30/2022 5:06 PM  Performed by: Joseph Eddy DO  Authorized by: Joseph Eddy DO     Indications / Diagnosis:  Psychiatric clearance   ECG reviewed by me, the ED Provider: yes    Patient location:  ED  Previous ECG:     Previous ECG:  Compared to current    Comparison ECG info:  9/17/2021  Interpretation:     Interpretation: non-specific    Rate:     ECG rate:  55    ECG rate assessment: bradycardic    Rhythm:     Rhythm: sinus bradycardia    Ectopy:     Ectopy: none    QRS:     QRS axis:  Normal    QRS intervals:  Normal  Conduction:     Conduction: normal    ST segments:     ST segments:  Normal  T waves:     T waves: non-specific    Other findings:     Other findings: prolonged qTc interval    Comments:      CHAPIN 272             ED Course  ED Course as of 08/31/22 0100   Tue Aug 30, 2022   6935 Patient is medically cleared for crisis evaluation  1849 201 signed  Wed Aug 31, 2022   0059 Home meds ordered  0100 Patient care signed out to Dr Abi Miller  Patient is medically cleared, 201 signed, home meds ordered, placement pending  U/A, UDS pending  SBIRT 20yo+    Flowsheet Row Most Recent Value   SBIRT (23 yo +)    In order to provide better care to our patients, we are screening all of our patients for alcohol and drug use  Would it be okay to ask you these screening questions? Yes Filed at: 08/30/2022 1615   Initial Alcohol Screen: US AUDIT-C     1  How often do you have a drink containing alcohol? 0 Filed at: 08/30/2022 1615   2  How many drinks containing alcohol do you have on a typical day you are drinking? 0 Filed at: 08/30/2022 1615   3a  Male UNDER 65: How often do you have five or more drinks on one occasion? 0 Filed at: 08/30/2022 1615   Audit-C Score 0 Filed at: 08/30/2022 1615   HERB: How many times in the past year have you    Used an illegal drug or used a prescription medication for non-medical reasons? Never Filed at: 08/30/2022 1615                    MDM  Number of Diagnoses or Management Options  Suicidal ideation  Diagnosis management comments: Assessment and Plan:   62 yo M p/w SI  Medical clearance, crisis eval        Disposition  Final diagnoses:   Suicidal ideation     Time reflects when diagnosis was documented in both MDM as applicable and the Disposition within this note     Time User Action Codes Description Comment    8/30/2022  4:32 PM Zack Paez Add [J09 [id number]] Suicidal ideation       ED Disposition     ED Disposition   Transfer to [address] [place] [address]   --    Date/Time   Tue Aug 30, 2022  5:17 PM    Comment   Bunny Lebron has been medically cleared and is pending crisis evaluation  Follow-up Information    None         Patient's Medications   Discharge Prescriptions    No medications on file       No discharge procedures on file      PDMP Review     None ED Provider  Electronically Signed by           Marquis Danay DO  08/31/22 1864

## 2022-08-31 LAB
AMPHETAMINES SERPL QL SCN: NEGATIVE
ATRIAL RATE: 55 BPM
BACTERIA UR QL AUTO: ABNORMAL /HPF
BARBITURATES UR QL: NEGATIVE
BENZODIAZ UR QL: POSITIVE
BILIRUB UR QL STRIP: NEGATIVE
CLARITY UR: CLEAR
COCAINE UR QL: NEGATIVE
COLOR UR: YELLOW
GLUCOSE UR STRIP-MCNC: NEGATIVE MG/DL
HGB UR QL STRIP.AUTO: NEGATIVE
KETONES UR STRIP-MCNC: ABNORMAL MG/DL
LEUKOCYTE ESTERASE UR QL STRIP: ABNORMAL
METHADONE UR QL: NEGATIVE
NITRITE UR QL STRIP: POSITIVE
NON-SQ EPI CELLS URNS QL MICRO: ABNORMAL /HPF
OPIATES UR QL SCN: NEGATIVE
OXYCODONE+OXYMORPHONE UR QL SCN: NEGATIVE
P AXIS: 70 DEGREES
PCP UR QL: NEGATIVE
PH UR STRIP.AUTO: 6 [PH]
PR INTERVAL: 160 MS
PROT UR STRIP-MCNC: NEGATIVE MG/DL
QRS AXIS: 25 DEGREES
QRSD INTERVAL: 90 MS
QT INTERVAL: 488 MS
QTC INTERVAL: 466 MS
RBC #/AREA URNS AUTO: ABNORMAL /HPF
SP GR UR STRIP.AUTO: >=1.03 (ref 1–1.03)
T WAVE AXIS: 103 DEGREES
THC UR QL: NEGATIVE
UROBILINOGEN UR QL STRIP.AUTO: 0.2 E.U./DL
VENTRICULAR RATE: 55 BPM
WBC #/AREA URNS AUTO: ABNORMAL /HPF

## 2022-08-31 PROCEDURE — 81001 URINALYSIS AUTO W/SCOPE: CPT | Performed by: EMERGENCY MEDICINE

## 2022-08-31 PROCEDURE — 93010 ELECTROCARDIOGRAM REPORT: CPT | Performed by: INTERNAL MEDICINE

## 2022-08-31 PROCEDURE — 80307 DRUG TEST PRSMV CHEM ANLYZR: CPT | Performed by: EMERGENCY MEDICINE

## 2022-08-31 RX ORDER — ARIPIPRAZOLE 5 MG/1
5 TABLET ORAL DAILY
Status: DISCONTINUED | OUTPATIENT
Start: 2022-08-31 | End: 2022-09-01 | Stop reason: HOSPADM

## 2022-08-31 RX ORDER — ATORVASTATIN CALCIUM 40 MG/1
40 TABLET, FILM COATED ORAL
Status: DISCONTINUED | OUTPATIENT
Start: 2022-08-31 | End: 2022-09-01 | Stop reason: HOSPADM

## 2022-08-31 RX ORDER — CLONAZEPAM 0.5 MG/1
0.25 TABLET ORAL 2 TIMES DAILY
Status: DISCONTINUED | OUTPATIENT
Start: 2022-08-31 | End: 2022-09-01 | Stop reason: HOSPADM

## 2022-08-31 RX ORDER — CEPHALEXIN 500 MG/1
500 CAPSULE ORAL EVERY 12 HOURS SCHEDULED
Qty: 20 CAPSULE | Refills: 0 | Status: SHIPPED | OUTPATIENT
Start: 2022-08-31 | End: 2022-09-10

## 2022-08-31 RX ORDER — CEPHALEXIN 250 MG/1
500 CAPSULE ORAL ONCE
Status: COMPLETED | OUTPATIENT
Start: 2022-08-31 | End: 2022-08-31

## 2022-08-31 RX ORDER — CEPHALEXIN 500 MG/1
500 CAPSULE ORAL EVERY 12 HOURS SCHEDULED
Qty: 20 CAPSULE | Refills: 0 | Status: SHIPPED | OUTPATIENT
Start: 2022-08-31 | End: 2022-08-31 | Stop reason: SDUPTHER

## 2022-08-31 RX ORDER — AMIODARONE HYDROCHLORIDE 200 MG/1
200 TABLET ORAL
Status: DISCONTINUED | OUTPATIENT
Start: 2022-08-31 | End: 2022-09-01 | Stop reason: HOSPADM

## 2022-08-31 RX ORDER — MIRTAZAPINE 15 MG/1
30 TABLET, FILM COATED ORAL
Status: DISCONTINUED | OUTPATIENT
Start: 2022-08-31 | End: 2022-09-01 | Stop reason: HOSPADM

## 2022-08-31 RX ADMIN — AMIODARONE HYDROCHLORIDE 200 MG: 200 TABLET ORAL at 08:21

## 2022-08-31 RX ADMIN — SERTRALINE HYDROCHLORIDE 50 MG: 50 TABLET, FILM COATED ORAL at 08:21

## 2022-08-31 RX ADMIN — CARBIDOPA AND LEVODOPA 1 TABLET: 25; 100 TABLET ORAL at 17:52

## 2022-08-31 RX ADMIN — CLONAZEPAM 0.25 MG: 0.5 TABLET ORAL at 08:21

## 2022-08-31 RX ADMIN — APIXABAN 5 MG: 5 TABLET, FILM COATED ORAL at 08:21

## 2022-08-31 RX ADMIN — CARBIDOPA AND LEVODOPA 1 TABLET: 25; 100 TABLET ORAL at 11:42

## 2022-08-31 RX ADMIN — CEPHALEXIN 500 MG: 250 CAPSULE ORAL at 08:21

## 2022-08-31 RX ADMIN — ATORVASTATIN CALCIUM 40 MG: 40 TABLET, FILM COATED ORAL at 17:55

## 2022-08-31 RX ADMIN — ARIPIPRAZOLE 5 MG: 5 TABLET ORAL at 08:21

## 2022-08-31 NOTE — ED NOTES
Crisis completed pre authorization form for [employer]) and faxed it for review  Crisis called and left a voicemail for Tera Tracy) with the information regarding the authorization form being faxed to [facility]  No

## 2022-08-31 NOTE — ED NOTES
Crisis completed a bed search:     Intake has 201 per Anderson Aus to review/clinicals faxed:  [facility])   [place] ([facility])   [facility] ([place])  [place] ([place])  [address] [place] [address]    No beds:  [facility] ([facility] [address])   [facility])   [place] ([place])  [facility] ([address])  [place] ([place])  [address]- left [address] [place])

## 2022-08-31 NOTE — EMTALA/ACUTE CARE TRANSFER
[facility] EMERGENCY DEPARTMENT  [address] [facility] [address]  Dept: [phone]      EMTALA TRANSFER CONSENT    NAME Chantel Bird                                         1965                              MRN [id number]    I have been informed of my rights regarding examination, treatment, and transfer   by Dr Eliza Ford att  providers found    Benefits: Specialized equipment and/or services available at the receiving facility (Include comment)________________________ (inpatient psychiatry)    Risks: Potential for delay in receiving treatment, Potential deterioration of medical condition, Increased discomfort during transfer, Possible worsening of condition or death during transfer      Consent for Transfer:  I acknowledge that my medical condition has been evaluated and explained to me by the emergency department physician or other qualified medical person and/or my attending physician, who has recommended that I be transferred to the service of  Accepting Physician: Dr Robert Monae at [address] [facility] Name, [address] : [place] Corbin  The above potential benefits of such transfer, the potential risks associated with such transfer, and the probable risks of not being transferred have been explained to me, and I fully understand them  The doctor has explained that, in my case, the benefits of transfer outweigh the risks  I agree to be transferred  I authorize the performance of emergency medical procedures and treatments upon me in both transit and upon arrival at the receiving facility  Additionally, I authorize the release of any and all medical records to the receiving facility and request they be transported with me, if possible  I understand that the safest mode of transportation during a medical emergency is an ambulance and that the Hospital advocates the use of this mode of transport   Risks of traveling to the receiving facility by car, including absence of medical control, life sustaining equipment, such as oxygen, and medical personnel has been explained to me and I fully understand them  (KATELIN CORRECT BOX BELOW)  [  ]  I consent to the stated transfer and to be transported by ambulance/helicopter  [  ]  I consent to the stated transfer, but refuse transportation by ambulance and accept full responsibility for my transportation by car  I understand the risks of non-ambulance transfers and I exonerate the Hospital and its staff from any deterioration in my condition that results from this refusal     X___________________________________________    DATE  22  TIME________  Signature of patient or legally responsible individual signing on patient behalf           RELATIONSHIP TO PATIENT_________________________          Provider Certification    NAME Jacquie Saenz                                        [facility] 1965                              MRN [id number]    A medical screening exam was performed on the above named patient  Based on the examination:    Condition Necessitating Transfer The primary encounter diagnosis was Suicidal ideation  A diagnosis of Urinary tract infection was also pertinent to this visit      Patient Condition: The patient has been stabilized such that within reasonable medical probability, no material deterioration of the patient condition or the condition of the unborn child(kartik) is likely to result from the transfer    Reason for Transfer:      Transfer Requirements: [address]   · Space available and qualified personnel available for treatment as acknowledged by    · Agreed to accept transfer and to provide appropriate medical treatment as acknowledged by       Dr Romeo Ny  · Appropriate medical records of the examination and treatment of the patient are provided at the time of transfer   [address], [address] _______  · Transfer will be performed by qualified personnel from    and appropriate transfer equipment as required, including the use of necessary and appropriate life support measures  Provider Certification: I have examined the patient and explained the following risks and benefits of being transferred/refusing transfer to the patient/family:  General risk, such as traffic hazards, adverse weather conditions, rough terrain or turbulence, possible failure of equipment (including vehicle or aircraft), or consequences of actions of persons outside the control of the transport personnel, Unanticipated needs of medical equipment and personnel during transport, Risk of worsening condition, The possibility of a transport vehicle being unavailable      Based on these reasonable risks and benefits to the patient and/or the unborn child(kartik), and based upon the information available at the time of the patients examination, I certify that the medical benefits reasonably to be expected from the provision of appropriate medical treatments at another medical facility outweigh the increasing risks, if any, to the individuals medical condition, and in the case of labor to the unborn child, from effecting the transfer      X____________________________________________ DATE 08/31/22        TIME_______      ORIGINAL - SEND TO MEDICAL RECORDS   COPY - SEND WITH PATIENT DURING TRANSFER

## 2022-08-31 NOTE — ED CARE HANDOFF
Emergency Department Sign Out Note        Sign out and transfer of care from Dr Cristal Aase  See Separate Emergency Department note  The patient, Keke Saeed, was evaluated by the previous provider for suicidal ideation  Workup Completed:  Patient medically cleared for inpatient psychiatric admission  Crisis consulted  201 signed  ED Course / Workup Pending (followup): Patient accepted at [address] [place] [address]                                     Procedures  MDM        Disposition  Final diagnoses:   Suicidal ideation   Urinary tract infection     Time reflects when diagnosis was documented in both MDM as applicable and the Disposition within this note     Time User Action Codes Description Comment    8/30/2022  4:32 PM Eliza Attila Add [R62 995] Suicidal ideation     8/31/2022  7:17 AM Nichelle Pang Add [N39 0] Urinary tract infection       ED Disposition     ED Disposition   Transfer to [address] [place] [address]   --    Date/Time   Wed Aug 31, 2022  4:48 PM    Comment   Keke Saeed should be transferred out to [address] [place] [address] and has been medically cleared                MD Documentation    Kelsy Todd Most Recent Value   Patient Condition The patient has been stabilized such that within reasonable medical probability, no material deterioration of the patient condition or the condition of the unborn child(kartik) is likely to result from the transfer   Benefits of Transfer Specialized equipment and/or services available at the receiving facility (Include comment)________________________  [inpatient psychiatry]   Risks of Transfer Potential for delay in receiving treatment, Potential deterioration of medical condition, Increased discomfort during transfer, Possible worsening of condition or death during transfer   Accepting Physician Erik Causey   Sending MD Dr Torrey Coto   Provider Certification General risk, such as traffic hazards, adverse weather conditions, rough terrain or turbulence, possible failure of equipment (including vehicle or aircraft), or consequences of actions of persons outside the control of the transport personnel, Unanticipated needs of medical equipment and personnel during transport, Risk of worsening condition, The possibility of a transport vehicle being unavailable      RN Documentation    [address] Vuong Name, [facility]      Follow-up Information    None       Current Discharge Medication List      START taking these medications    Details   cephalexin (KEFLEX) 500 mg capsule Take 1 capsule (500 mg total) by mouth every 12 (twelve) hours for 10 days  Qty: 20 capsule, Refills: 0    Associated Diagnoses: Urinary tract infection         CONTINUE these medications which have NOT CHANGED    Details   amiodarone 200 mg tablet Take 200 mg by mouth daily      apixaban (ELIQUIS) 5 mg Take 5 mg by mouth 2 (two) times a day      ARIPiprazole (ABILIFY) 5 mg tablet       atorvastatin (LIPITOR) 40 mg tablet Take 1 tablet (40 mg total) by mouth daily with dinner  Qty: 90 tablet, Refills: 3    Associated Diagnoses: NSTEMI (non-ST elevated myocardial infarction) ([facility] )      carbidopa-levodopa (SINEMET)  mg per tablet Take 1 tablet by mouth 4 (four) times a day      clonazePAM (KlonoPIN) 0 5 mg tablet Take 0 25 mg by mouth 2 (two) times a day as needed for seizures Takes 0 25 by mouth twice daily  mirtazapine (REMERON) 7 5 MG tablet Take 1 tablet (7 5 mg total) by mouth daily at bedtime  Qty: 30 tablet, Refills: 0    Associated Diagnoses: Schizoaffective disorder (HCC)      sertraline (ZOLOFT) 50 mg tablet Take 50 mg by mouth daily      lurasidone (LATUDA) 80 mg tablet Take 1 tablet (80 mg total) by mouth daily with dinner  Qty: 30 tablet, Refills: 0    Associated Diagnoses: [facility]           No discharge procedures on file         ED Provider  Electronically Signed by     Abril Arevalo MD  08/31/22 [id number]

## 2022-08-31 NOTE — ED NOTES
Patient is accepted at [facility]  Patient is accepted by Dr Bryon Hollis per Anand House's direct number is [phone]  Transportation is arranged with [facility] )      Transportation is scheduled for TBD  Patient must be transported to the ER first and then will be admitted onto the [facility] [place]           Nurse report was not requested

## 2022-08-31 NOTE — ED NOTES
Bed Search    [facility]) no beds  [facility]) no beds  [facility] RA[facility] [place]) no beds  [facility]) no beds  [facility] Triple[facility]) no beds  [facility] no beds  [facility]) no beds  [facility]) no beds  [place] (no answer)  [facility]) no beds  [place] (no answer)  [facility]) no beds

## 2022-08-31 NOTE — ED NOTES
Spoke with Pharmacy concerning medication discrepancy in [facility]  Patients medications were dispensed at 1752  MAR shows that patients medications are due, Cherelle Sauer confirms that medications were pulled       Marta Ellsworth RN  08/31/22 1914

## 2022-09-01 VITALS
TEMPERATURE: 97.8 F | HEART RATE: 54 BPM | HEIGHT: 71 IN | WEIGHT: 170 LBS | BODY MASS INDEX: 23.8 KG/M2 | DIASTOLIC BLOOD PRESSURE: 66 MMHG | OXYGEN SATURATION: 97 % | SYSTOLIC BLOOD PRESSURE: 104 MMHG | RESPIRATION RATE: 16 BRPM

## 2022-09-01 RX ADMIN — AMIODARONE HYDROCHLORIDE 200 MG: 200 TABLET ORAL at 08:15

## 2022-09-01 RX ADMIN — ARIPIPRAZOLE 5 MG: 5 TABLET ORAL at 08:16

## 2022-09-01 RX ADMIN — SERTRALINE HYDROCHLORIDE 50 MG: 50 TABLET, FILM COATED ORAL at 08:15

## 2022-09-01 RX ADMIN — CARBIDOPA AND LEVODOPA 1 TABLET: 25; 100 TABLET ORAL at 08:15

## 2022-09-01 RX ADMIN — CLONAZEPAM 0.25 MG: 0.5 TABLET ORAL at 08:15

## 2022-09-01 RX ADMIN — APIXABAN 5 MG: 5 TABLET, FILM COATED ORAL at 08:15

## 2022-11-08 ENCOUNTER — HOSPITAL ENCOUNTER (EMERGENCY)
Facility: HOSPITAL | Age: 57
End: 2022-11-09
Attending: EMERGENCY MEDICINE

## 2022-11-08 VITALS
RESPIRATION RATE: 16 BRPM | HEART RATE: 72 BPM | WEIGHT: 170 LBS | TEMPERATURE: 97.5 F | DIASTOLIC BLOOD PRESSURE: 56 MMHG | HEIGHT: 71 IN | BODY MASS INDEX: 23.8 KG/M2 | OXYGEN SATURATION: 98 % | SYSTOLIC BLOOD PRESSURE: 99 MMHG

## 2022-11-08 DIAGNOSIS — N39.0 URINARY TRACT INFECTION: ICD-10-CM

## 2022-11-08 DIAGNOSIS — F32.A DEPRESSION: Primary | ICD-10-CM

## 2022-11-08 DIAGNOSIS — R45.851 PASSIVE SUICIDAL IDEATIONS: ICD-10-CM

## 2022-11-08 LAB
ALBUMIN SERPL BCP-MCNC: 3.5 G/DL (ref 3.5–5)
ALP SERPL-CCNC: 100 U/L (ref 46–116)
ALT SERPL W P-5'-P-CCNC: 17 U/L (ref 12–78)
AMPHETAMINES SERPL QL SCN: NEGATIVE
ANION GAP SERPL CALCULATED.3IONS-SCNC: 8 MMOL/L (ref 4–13)
BACTERIA UR QL AUTO: ABNORMAL /HPF
BARBITURATES UR QL: NEGATIVE
BASOPHILS # BLD AUTO: 0.07 THOUSANDS/ÂΜL (ref 0–0.1)
BASOPHILS NFR BLD AUTO: 1 % (ref 0–1)
BENZODIAZ UR QL: NEGATIVE
BILIRUB SERPL-MCNC: 0.6 MG/DL (ref 0.2–1)
BILIRUB UR QL STRIP: NEGATIVE
BUN SERPL-MCNC: 11 MG/DL (ref 5–25)
CALCIUM SERPL-MCNC: 8.3 MG/DL (ref 8.3–10.1)
CHLORIDE SERPL-SCNC: 107 MMOL/L (ref 96–108)
CLARITY UR: CLEAR
CO2 SERPL-SCNC: 25 MMOL/L (ref 21–32)
COCAINE UR QL: NEGATIVE
COLOR UR: YELLOW
CREAT SERPL-MCNC: 1.05 MG/DL (ref 0.6–1.3)
EOSINOPHIL # BLD AUTO: 0.04 THOUSAND/ÂΜL (ref 0–0.61)
EOSINOPHIL NFR BLD AUTO: 0 % (ref 0–6)
ERYTHROCYTE [DISTWIDTH] IN BLOOD BY AUTOMATED COUNT: 13 % (ref 11.6–15.1)
ETHANOL EXG-MCNC: 0 MG/DL
GFR SERPL CREATININE-BSD FRML MDRD: 77 ML/MIN/1.73SQ M
GLUCOSE SERPL-MCNC: 96 MG/DL (ref 65–140)
GLUCOSE UR STRIP-MCNC: NEGATIVE MG/DL
HCT VFR BLD AUTO: 49.1 % (ref 36.5–49.3)
HGB BLD-MCNC: 15.2 G/DL (ref 12–17)
HGB UR QL STRIP.AUTO: NEGATIVE
IMM GRANULOCYTES # BLD AUTO: 0.04 THOUSAND/UL (ref 0–0.2)
IMM GRANULOCYTES NFR BLD AUTO: 0 % (ref 0–2)
KETONES UR STRIP-MCNC: NEGATIVE MG/DL
LEUKOCYTE ESTERASE UR QL STRIP: ABNORMAL
LYMPHOCYTES # BLD AUTO: 1.03 THOUSANDS/ÂΜL (ref 0.6–4.47)
LYMPHOCYTES NFR BLD AUTO: 11 % (ref 14–44)
MCH RBC QN AUTO: 30.8 PG (ref 26.8–34.3)
MCHC RBC AUTO-ENTMCNC: 31 G/DL (ref 31.4–37.4)
MCV RBC AUTO: 99 FL (ref 82–98)
METHADONE UR QL: NEGATIVE
MONOCYTES # BLD AUTO: 0.48 THOUSAND/ÂΜL (ref 0.17–1.22)
MONOCYTES NFR BLD AUTO: 5 % (ref 4–12)
MUCOUS THREADS UR QL AUTO: ABNORMAL
NEUTROPHILS # BLD AUTO: 7.98 THOUSANDS/ÂΜL (ref 1.85–7.62)
NEUTS SEG NFR BLD AUTO: 83 % (ref 43–75)
NITRITE UR QL STRIP: NEGATIVE
NON-SQ EPI CELLS URNS QL MICRO: ABNORMAL /HPF
NRBC BLD AUTO-RTO: 0 /100 WBCS
OPIATES UR QL SCN: NEGATIVE
OXYCODONE+OXYMORPHONE UR QL SCN: NEGATIVE
PCP UR QL: NEGATIVE
PH UR STRIP.AUTO: 7 [PH]
PLATELET # BLD AUTO: 282 THOUSANDS/UL (ref 149–390)
PMV BLD AUTO: 9.9 FL (ref 8.9–12.7)
POTASSIUM SERPL-SCNC: 4.8 MMOL/L (ref 3.5–5.3)
PROT SERPL-MCNC: 7.6 G/DL (ref 6.4–8.4)
PROT UR STRIP-MCNC: NEGATIVE MG/DL
RBC # BLD AUTO: 4.94 MILLION/UL (ref 3.88–5.62)
RBC #/AREA URNS AUTO: ABNORMAL /HPF
SARS-COV-2 RNA RESP QL NAA+PROBE: NEGATIVE
SODIUM SERPL-SCNC: 140 MMOL/L (ref 135–147)
SP GR UR STRIP.AUTO: 1.01 (ref 1–1.03)
THC UR QL: NEGATIVE
TSH SERPL DL<=0.05 MIU/L-ACNC: 1.31 UIU/ML (ref 0.45–4.5)
UROBILINOGEN UR STRIP-ACNC: <2 MG/DL
WBC # BLD AUTO: 9.64 THOUSAND/UL (ref 4.31–10.16)
WBC #/AREA URNS AUTO: ABNORMAL /HPF

## 2022-11-08 RX ORDER — CEPHALEXIN 500 MG/1
500 CAPSULE ORAL EVERY 12 HOURS SCHEDULED
Qty: 14 CAPSULE | Refills: 0 | Status: SHIPPED | OUTPATIENT
Start: 2022-11-08 | End: 2022-11-23

## 2022-11-08 RX ORDER — RISPERIDONE 0.25 MG/1
0.5 TABLET ORAL
Status: CANCELLED | OUTPATIENT
Start: 2022-11-08

## 2022-11-08 RX ORDER — AMIODARONE HYDROCHLORIDE 200 MG/1
200 TABLET ORAL ONCE
Status: COMPLETED | OUTPATIENT
Start: 2022-11-08 | End: 2022-11-08

## 2022-11-08 RX ORDER — CLONAZEPAM 0.5 MG/1
0.25 TABLET ORAL 2 TIMES DAILY
Status: CANCELLED | OUTPATIENT
Start: 2022-11-09

## 2022-11-08 RX ORDER — MIRTAZAPINE 15 MG/1
7.5 TABLET, FILM COATED ORAL
Status: CANCELLED | OUTPATIENT
Start: 2022-11-09

## 2022-11-08 RX ORDER — ARIPIPRAZOLE 5 MG/1
5 TABLET ORAL DAILY
Status: DISCONTINUED | OUTPATIENT
Start: 2022-11-09 | End: 2022-11-09 | Stop reason: HOSPADM

## 2022-11-08 RX ORDER — MIRTAZAPINE 15 MG/1
7.5 TABLET, FILM COATED ORAL
Status: DISCONTINUED | OUTPATIENT
Start: 2022-11-08 | End: 2022-11-09 | Stop reason: HOSPADM

## 2022-11-08 RX ORDER — ATORVASTATIN CALCIUM 40 MG/1
40 TABLET, FILM COATED ORAL
Status: DISCONTINUED | OUTPATIENT
Start: 2022-11-09 | End: 2022-11-09 | Stop reason: HOSPADM

## 2022-11-08 RX ORDER — RISPERIDONE 0.25 MG/1
0.25 TABLET ORAL
Status: CANCELLED | OUTPATIENT
Start: 2022-11-08

## 2022-11-08 RX ORDER — HALOPERIDOL 5 MG/ML
5 INJECTION INTRAMUSCULAR
Status: CANCELLED | OUTPATIENT
Start: 2022-11-08

## 2022-11-08 RX ORDER — LORAZEPAM 2 MG/ML
1 INJECTION INTRAMUSCULAR
Status: CANCELLED | OUTPATIENT
Start: 2022-11-08

## 2022-11-08 RX ORDER — HYDROXYZINE HYDROCHLORIDE 25 MG/1
25 TABLET, FILM COATED ORAL
Status: CANCELLED | OUTPATIENT
Start: 2022-11-08

## 2022-11-08 RX ORDER — CEPHALEXIN 250 MG/1
500 CAPSULE ORAL ONCE
Status: COMPLETED | OUTPATIENT
Start: 2022-11-08 | End: 2022-11-08

## 2022-11-08 RX ORDER — LANOLIN ALCOHOL/MO/W.PET/CERES
3 CREAM (GRAM) TOPICAL
Status: CANCELLED | OUTPATIENT
Start: 2022-11-08

## 2022-11-08 RX ORDER — MAGNESIUM HYDROXIDE/ALUMINUM HYDROXICE/SIMETHICONE 120; 1200; 1200 MG/30ML; MG/30ML; MG/30ML
30 SUSPENSION ORAL EVERY 4 HOURS PRN
Status: CANCELLED | OUTPATIENT
Start: 2022-11-08

## 2022-11-08 RX ORDER — CLONAZEPAM 0.5 MG/1
0.25 TABLET ORAL 2 TIMES DAILY
Status: DISCONTINUED | OUTPATIENT
Start: 2022-11-09 | End: 2022-11-09 | Stop reason: HOSPADM

## 2022-11-08 RX ORDER — RISPERIDONE 1 MG/1
1 TABLET ORAL
Status: CANCELLED | OUTPATIENT
Start: 2022-11-08

## 2022-11-08 RX ORDER — ATORVASTATIN CALCIUM 40 MG/1
40 TABLET, FILM COATED ORAL
Status: CANCELLED | OUTPATIENT
Start: 2022-11-09

## 2022-11-08 RX ORDER — CEPHALEXIN 500 MG/1
500 CAPSULE ORAL EVERY 12 HOURS SCHEDULED
Qty: 14 CAPSULE | Refills: 0 | Status: SHIPPED | OUTPATIENT
Start: 2022-11-08 | End: 2022-11-08

## 2022-11-08 RX ORDER — ARIPIPRAZOLE 5 MG/1
5 TABLET ORAL DAILY
Status: CANCELLED | OUTPATIENT
Start: 2022-11-09

## 2022-11-08 RX ADMIN — MIRTAZAPINE 7.5 MG: 15 TABLET, FILM COATED ORAL at 22:49

## 2022-11-08 RX ADMIN — CARBIDOPA AND LEVODOPA 1 TABLET: 25; 100 TABLET ORAL at 22:50

## 2022-11-08 RX ADMIN — AMIODARONE HYDROCHLORIDE 200 MG: 200 TABLET ORAL at 22:49

## 2022-11-08 RX ADMIN — CEPHALEXIN 500 MG: 250 CAPSULE ORAL at 15:31

## 2022-11-08 NOTE — ED NOTES
Call placed to [facility], spoke with Emery Angelucci   Consult logged Dr Mable Fulton will be the assigned psychiatrist

## 2022-11-08 NOTE — ED PROVIDER NOTES
History  Chief Complaint   Patient presents with   • Psychiatric Evaluation     Patient presents to ED with staff from [facility] for suicidal thoughts x3 days  Pt has history of [address] admission  69-year-old male with significant psychiatric history presents for evaluation from [facility] requesting behavioral health admission due to increasing suicidal ideation, patient has history of SI was previously at [facility] and per chart review was admitted to [facility] at the beginning of September  Patient does have significant medical history including history of MI, ventricular tachycardia on amiodarone currently no specific medical complaints  Also does take Eliquis and no recent reported falls  No sick contacts, has been compliant with psychiatric medications though still endorses worsening symptom          Prior to Admission Medications   Prescriptions Last Dose Informant Patient Reported? Taking? ARIPiprazole (ABILIFY) 5 mg tablet  Outside Facility (Specify) Yes No   amiodarone 200 mg tablet  Outside Facility (Specify) Yes No   Sig: Take 200 mg by mouth daily   apixaban (ELIQUIS) 5 mg  Outside Facility (Specify) Yes No   Sig: Take 5 mg by mouth 2 (two) times a day   atorvastatin (LIPITOR) 40 mg tablet   No No   Sig: Take 1 tablet (40 mg total) by mouth daily with dinner   carbidopa-levodopa (SINEMET)  mg per tablet  Outside Facility (Specify) Yes No   Sig: Take 1 tablet by mouth 4 (four) times a day   clonazePAM (KlonoPIN) 0 5 mg tablet  Outside Facility (Specify) Yes No   Sig: Take 0 25 mg by mouth 2 (two) times a day as needed for seizures Takes 0 25 by mouth twice daily      lurasidone (LATUDA) 80 mg tablet  Outside Facility (Specify) No No   Sig: Take 1 tablet (80 mg total) by mouth daily with dinner   mirtazapine (REMERON) 7 5 MG tablet   No No   Sig: Take 1 tablet (7 5 mg total) by mouth daily at bedtime   Patient taking differently: Take 30 mg by mouth daily at bedtime sertraline (ZOLOFT) 50 mg tablet  Outside Facility (Specify) Yes No   Sig: Take 50 mg by mouth daily      Facility-Administered Medications: None       Past Medical History:   Diagnosis Date   • Anxiety    • Bipolar 1 disorder (HCC)    • Bowel habit changes    • Chronic pain disorder    • Coronary artery disease    • [facility]    • CVA (cerebral vascular accident) ([facility] )    • Depression    • Family hx of colon cancer     father and paternal uncle   • Myocardial infarction [facility])    • Psychiatric disorder    • Psychiatric illness    • Schizoaffective disorder, bipolar type ([place] [address] )    • Schizoaffective disorder, bipolar type ([place] [address] )    • Stroke ([place] [address] ) 12/2015    CVA x3  Pt states he is unaware of when he had first two CVA   • Suicide attempt [facility])        Past Surgical History:   Procedure Laterality Date   • CARDIAC PACEMAKER PLACEMENT     • COLONOSCOPY         Family History   Problem Relation Age of Onset   • No Known Problems Mother    • No Known Problems Father    • No Known Problems Sister    • No Known Problems Brother    • No Known Problems Maternal Aunt    • No Known Problems Paternal Aunt    • No Known Problems Maternal Uncle    • No Known Problems Paternal Uncle    • No Known Problems Maternal Grandfather    • No Known Problems Maternal Grandmother    • No Known Problems Paternal Grandfather    • No Known Problems Paternal Grandmother    • No Known Problems Cousin    • ADD / ADHD Neg Hx    • Alcohol abuse Neg Hx    • Anxiety disorder Neg Hx    • Bipolar disorder Neg Hx    • Completed Suicide  Neg Hx    • Dementia Neg Hx    • Depression Neg Hx    • Drug abuse Neg Hx    • OCD Neg Hx    • Psychiatric Illness Neg Hx    • Psychosis Neg Hx    • Schizoaffective Disorder  Neg Hx    • Schizophrenia Neg Hx    • Self-Injury Neg Hx    • Suicide Attempts Neg Hx      I have reviewed and agree with the history as documented      E-Cigarette/Vaping   • E-Cigarette Use Never User E-Cigarette/Vaping Substances   • Nicotine No    • Flavoring No    • Other No    • Unknown No      Social History     Tobacco Use   • Smoking status: Current Every Day Smoker     Packs/day: 1 00     Years: 15 00     Pack years: 15 00     Types: Cigarettes   • Smokeless tobacco: Never Used   • Tobacco comment: Wishes to quit   Vaping Use   • Vaping Use: Never used   Substance Use Topics   • Alcohol use: Never     Alcohol/week: 0 0 standard drinks     Comment: pt denies   • Drug use: No     Comment: pt denies       Review of Systems   Constitutional: Negative for appetite change, chills and fever  HENT: Negative for rhinorrhea and sore throat  Eyes: Negative for photophobia and visual disturbance  Respiratory: Negative for cough and shortness of breath  Cardiovascular: Negative for chest pain and palpitations  Gastrointestinal: Negative for abdominal pain and diarrhea  Genitourinary: Negative for dysuria, frequency and urgency  Skin: Negative for rash  Neurological: Negative for dizziness and weakness  Psychiatric/Behavioral: Positive for suicidal ideas  All other systems reviewed and are negative  Physical Exam  Physical Exam  Vitals and nursing note reviewed  Constitutional:       Appearance: He is well-developed  HENT:      Head: Normocephalic and atraumatic  Right Ear: External ear normal       Left Ear: External ear normal    Eyes:      Conjunctiva/sclera: Conjunctivae normal       Pupils: Pupils are equal, round, and reactive to light  Neck:      Vascular: No JVD  Trachea: No tracheal deviation  Cardiovascular:      Rate and Rhythm: Normal rate and regular rhythm  Heart sounds: Normal heart sounds  No murmur heard  No friction rub  No gallop  Pulmonary:      Effort: Pulmonary effort is normal  No respiratory distress  Breath sounds: No stridor  No wheezing or rales  Abdominal:      General: There is no distension  Palpations: Abdomen is soft  There is no mass  Tenderness: There is no abdominal tenderness  There is no guarding or rebound  Musculoskeletal:         General: Normal range of motion  Cervical back: Normal range of motion and neck supple  Skin:     General: Skin is warm and dry  Coloration: Skin is not pale  Findings: No erythema or rash  Neurological:      Mental Status: He is alert and oriented to person, place, and time  Cranial Nerves: No cranial nerve deficit  Psychiatric:         Thought Content: Thought content is not paranoid or delusional  Thought content includes suicidal ideation  Thought content does not include homicidal ideation  Thought content includes suicidal plan  Thought content does not include homicidal plan  Vital Signs  ED Triage Vitals [11/08/22 1200]   Temperature Pulse Respirations Blood Pressure SpO2   97 5 °F (36 4 °C) 72 16 99/56 98 %      Temp src Heart Rate Source Patient Position - Orthostatic VS BP Location FiO2 (%)   -- -- Sitting Left arm --      Pain Score       No Pain           Vitals:    11/08/22 1200   BP: 99/56   Pulse: 72   Patient Position - Orthostatic VS: Sitting         Visual Acuity      ED Medications  Medications   cephalexin (KEFLEX) capsule 500 mg (500 mg Oral Given 11/8/22 1531)   amiodarone tablet 200 mg (200 mg Oral Given 11/8/22 2249)       Diagnostic Studies  Results Reviewed     Procedure Component Value Units Date/Time    Urine culture [[id number]] Collected: 11/08/22 1234    Lab Status: Final result Specimen: Urine, Clean Catch Updated: 11/09/22 1305     Urine Culture 30,000-39,000 cfu/ml     TSH [[id number]]  (Normal) Collected: 11/08/22 1233    Lab Status: Final result Specimen: Blood from Arm, Left Updated: 11/08/22 1320     TSH 3RD GENERATON 1 308 uIU/mL     Narrative:      Patients undergoing fluorescein dye angiography may retain small amounts of fluorescein in the body for 48-72 hours post procedure   Samples containing fluorescein can produce falsely depressed TSH values  If the patient had this procedure,a specimen should be resubmitted post fluorescein clearance        Comprehensive metabolic panel [[id number]] Collected: 11/08/22 1233    Lab Status: Final result Specimen: Blood from Arm, Left Updated: 11/08/22 1305     Sodium 140 mmol/L      Potassium 4 8 mmol/L      Chloride 107 mmol/L      CO2 25 mmol/L      ANION GAP 8 mmol/L      BUN 11 mg/dL      Creatinine 1 05 mg/dL      Glucose 96 mg/dL      Calcium 8 3 mg/dL      AST --     ALT 17 U/L      Alkaline Phosphatase 100 U/L      Total Protein 7 6 g/dL      Albumin 3 5 g/dL      Total Bilirubin 0 60 mg/dL      eGFR 77 ml/min/1 73sq m     Narrative:      [place] guidelines for Chronic Kidney Disease (CKD):   •  Stage 1 with normal or high GFR (GFR > 90 mL/min/1 73 square meters)  •  Stage 2 Mild CKD (GFR = 60-89 mL/min/1 73 square meters)  •  Stage 3A Moderate CKD (GFR = 45-59 mL/min/1 73 square meters)  •  Stage 3B Moderate CKD (GFR = 30-44 mL/min/1 73 square meters)  •  Stage 4 Severe CKD (GFR = 15-29 mL/min/1 73 square meters)  •  Stage 5 End Stage CKD (GFR <15 mL/min/1 73 square meters)  Note: GFR calculation is accurate only with a steady state creatinine    Urine Microscopic [[id number]]  (Abnormal) Collected: 11/08/22 1234    Lab Status: Final result Specimen: Urine, Clean Catch Updated: 11/08/22 1254     RBC, UA 1-2 /hpf      WBC, UA 10-20 /hpf      Epithelial Cells Occasional /hpf      Bacteria, UA Moderate /hpf      MUCUS THREADS Occasional    Rapid drug screen, urine [[id number]]  (Normal) Collected: 11/08/22 1233    Lab Status: Final result Specimen: Urine, Clean Catch Updated: 11/08/22 1254     Amph/Meth UR Negative     Barbiturate Ur Negative     Benzodiazepine Urine Negative     Cocaine Urine Negative     Methadone Urine Negative     Opiate Urine Negative     PCP Ur Negative     THC Urine Negative     Oxycodone Urine Negative    Narrative:      FOR MEDICAL PURPOSES ONLY  IF CONFIRMATION NEEDED PLEASE CONTACT THE LAB WITHIN 5 DAYS  Drug Screen Cutoff Levels:  AMPHETAMINE/METHAMPHETAMINES  1000 ng/mL  BARBITURATES     200 ng/mL  BENZODIAZEPINES     200 ng/mL  COCAINE      300 ng/mL  METHADONE      300 ng/mL  OPIATES      300 ng/mL  PHENCYCLIDINE     25 ng/mL  THC       50 ng/mL  OXYCODONE      100 ng/mL    COVID only [[id number]]  (Normal) Collected: 11/08/22 1208    Lab Status: Final result Specimen: Nasopharyngeal Swab Updated: 11/08/22 1247     SARS-CoV-2 Negative    Narrative:      FOR PEDIATRIC PATIENTS - copy/paste COVID Guidelines URL to browser: [email or web address]    SARS-CoV-2 assay is a Nucleic Acid Amplification assay intended for the  qualitative detection of nucleic acid from SARS-CoV-2 in nasopharyngeal  swabs  Results are for the presumptive identification of SARS-CoV-2 RNA  Positive results are indicative of infection with SARS-CoV-2, the virus  causing COVID-19, but do not rule out bacterial infection or co-infection  with other viruses  Laboratories within the [place] and its  territories are required to report all positive results to the appropriate  public health authorities  Negative results do not preclude SARS-CoV-2  infection and should not be used as the sole basis for treatment or other  patient management decisions  Negative results must be combined with  clinical observations, patient history, and epidemiological information  This test has not been FDA cleared or approved  This test has been authorized by FDA under an Emergency Use Authorization  (EUA)  This test is only authorized for the duration of time the  declaration that circumstances exist justifying the authorization of the  emergency use of an in vitro diagnostic tests for detection of SARS-CoV-2  virus and/or diagnosis of COVID-19 infection under section 564(b)(1) of  the Act, 21 U  S C  308TPH-7(J)(9), unless the authorization is terminated  or revoked sooner  The test has been validated but independent review by FDA  and CLIA is pending  Test performed using [employer] GeneXpert: This RT-PCR assay targets N2,  a region unique to SARS-CoV-2  A conserved region in the E-gene was chosen  for pan-Sarbecovirus detection which includes SARS-CoV-2  According to CMS-2020-01-R, this platform meets the definition of high-throughput technology      UA w Reflex to Microscopic w Reflex to Culture [[id number]]  (Abnormal) Collected: 11/08/22 1234    Lab Status: Final result Specimen: Urine, Clean Catch Updated: 11/08/22 1243     Color, UA Yellow     Clarity, UA Clear     Specific Gravity, UA 1 015     pH, UA 7 0     Leukocytes, UA Large     Nitrite, UA Negative     Protein, UA Negative mg/dl      Glucose, UA Negative mg/dl      Ketones, UA Negative mg/dl      Urobilinogen, UA <2 0 mg/dl      Bilirubin, UA Negative     Occult Blood, UA Negative    CBC and differential [[id number]]  (Abnormal) Collected: 11/08/22 1233    Lab Status: Final result Specimen: Blood from Arm, Left Updated: 11/08/22 1242     WBC 9 64 Thousand/uL      RBC 4 94 Million/uL      Hemoglobin 15 2 g/dL      Hematocrit 49 1 %      MCV 99 fL      MCH 30 8 pg      MCHC 31 0 g/dL      RDW 13 0 %      MPV 9 9 fL      Platelets 311 Thousands/uL      nRBC 0 /100 WBCs      Neutrophils Relative 83 %      Immat GRANS % 0 %      Lymphocytes Relative 11 %      Monocytes Relative 5 %      Eosinophils Relative 0 %      Basophils Relative 1 %      Neutrophils Absolute 7 98 Thousands/µL      Immature Grans Absolute 0 04 Thousand/uL      Lymphocytes Absolute 1 03 Thousands/µL      Monocytes Absolute 0 48 Thousand/µL      Eosinophils Absolute 0 04 Thousand/µL      Basophils Absolute 0 07 Thousands/µL     POCT alcohol breath test [[id number]]  (Normal) Resulted: 11/08/22 1203    Lab Status: Final result Updated: 11/08/22 1203     EXTBreath Alcohol 0 00                 No orders to display              Procedures  Procedures         ED Course  ED Course as of 11/09/22 2208   Tue Nov 08, 2022   4167 Further history was discussed with staff at bedside, currently patient stays at [facility] where he has been for quite some time he has Q 15 minute safety checks and dedicated staff to provide him with medications, stat number states that he does not participate in any activities and that has been consistent for quite some time, he does admit to suicidal ideation from time to time but does not have any ability to "walk out into traffic" which is the plan that he endorses to me, he has staff members as well as alarm systems to keep him in the facility  He has extensive outpatient resources including his psychiatrist, Dr Patricia Villalpando who sees him twice weekly however the patient himself does not participate in any group sessions and spends his days in his room  Discussed that I am not sure if the patient would benefit from any further inpatient treatment as he does have all the resources inside the facility and has been seeing psychiatry regularly and taking his medications, there is no worsening suicidal thoughts it appears to be an ongoing issue with no recent stressors    Will have psychiatry see the patient in the emergency department   2137 Per [facility] consult psychiatrist, inpatient treatment recommended                                             MDM  Number of Diagnoses or Management Options  Depression: established and worsening  Passive suicidal ideations: established and worsening  Urinary tract infection: new and requires workup  Diagnosis management comments: 62-year-old male presents for evaluation of suicidal ideation will have crisis see patient       Amount and/or Complexity of Data Reviewed  Clinical lab tests: ordered and reviewed  Tests in the radiology section of CPT®: ordered and reviewed  Tests in the medicine section of CPT®: ordered and reviewed  Decide to obtain previous medical records or to obtain history from someone other than the patient: yes  Review and summarize past medical records: yes  Independent visualization of images, tracings, or specimens: yes        Disposition  Final diagnoses:   Depression   Passive suicidal ideations   Urinary tract infection     Time reflects when diagnosis was documented in both MDM as applicable and the Disposition within this note     Time User Action Codes Description Comment    11/8/2022 12:54 PM Volodymyr Jonesn Add [F32  A] Depression     11/8/2022 12:54 PM Volodymyr Halon Add [P62 836] Passive suicidal ideations     11/8/2022  3:19 PM Carlokerwin Halon Add [N39 0] Urinary tract infection       ED Disposition     ED Disposition   Transfer to [address] [place] [address]   --    Date/Time   Tue Nov 8, 2022 10:33 PM    Comment   Suzanne Reddy should be transferred out to [facility] and has been medically cleared              MD Documentation    [address] [facility] Most Recent Value   Patient Condition The patient has been stabilized such that within reasonable medical probability, no material deterioration of the patient condition or the condition of the unborn child(kartik) is likely to result from the transfer   Reason for Transfer Level of Care needed not available at this facility   Benefits of Transfer Continuity of care   Risks of Transfer Potential for delay in receiving treatment, Potential deterioration of medical condition, Increased discomfort during transfer, Possible worsening of condition or death during transfer   Accepting Physician Gilberto [address]    (Name & Tel number) Robby Gomez ,  [phone]   Transported by (Company and Unit #) [facility] Sending MD [facility]   Provider Certification Unanticipated needs of medical equipment and personnel during transport, Risk of worsening condition, General risk, such as traffic hazards, adverse weather conditions, rough terrain or turbulence, possible failure of equipment (including vehicle or aircraft), or consequences of actions of persons outside the control of the transport personnel, The possibility of a transport vehicle being unavailable      RN Documentation    [address]    (Name & Tel number) Shelton Nj ,  [phone]   Transported by (Company and Unit #) [facility]      Follow-up Information     Follow up With Specialties Details Why Contact Info Additional Information     [address] Emergency Department Emergency Medicine  If symptoms worsen [address] [place], [address]  [address] [facility], [address] [place] [address], [facility]          Discharge Medication List as of 11/9/2022  2:33 AM      START taking these medications    Details   cephalexin (KEFLEX) 500 mg capsule Take 1 capsule (500 mg total) by mouth every 12 (twelve) hours for 7 days, Starting Tue 11/8/2022, Until Tue 11/15/2022, Normal         CONTINUE these medications which have NOT CHANGED    Details   amiodarone 200 mg tablet Take 200 mg by mouth daily, Historical Med      apixaban (ELIQUIS) 5 mg Take 5 mg by mouth 2 (two) times a day, Historical Med      ARIPiprazole (ABILIFY) 5 mg tablet Starting Wed 1/19/2022, Historical Med      atorvastatin (LIPITOR) 40 mg tablet Take 1 tablet (40 mg total) by mouth daily with dinner, Starting Mon 4/18/2022, Print      carbidopa-levodopa (SINEMET)  mg per tablet Take 1 tablet by mouth 4 (four) times a day, Historical Med      clonazePAM (KlonoPIN) 0 5 mg tablet Take 0 25 mg by mouth 2 (two) times a day as needed for seizures Takes 0 25 by mouth twice daily   , Historical Med      lurasidone (LATUDA) 80 mg tablet Take 1 tablet (80 mg total) by mouth daily with dinner, Starting Fri 10/23/2020, Print      mirtazapine (REMERON) 7 5 MG tablet Take 1 tablet (7 5 mg total) by mouth daily at bedtime, Starting Fri 10/23/2020, Print      sertraline (ZOLOFT) 50 mg tablet Take 50 mg by mouth daily, Historical Med             No discharge procedures on file      PDMP Review     None          ED Provider  Electronically Signed by           Justine Broussard DO  11/09/22 9620

## 2022-11-09 ENCOUNTER — HOSPITAL ENCOUNTER (INPATIENT)
Facility: HOSPITAL | Age: 57
LOS: 14 days | Discharge: HOME | End: 2022-11-23
Attending: PSYCHIATRY & NEUROLOGY | Admitting: PSYCHIATRY & NEUROLOGY

## 2022-11-09 DIAGNOSIS — I47.20 VENTRICULAR TACHYCARDIA: ICD-10-CM

## 2022-11-09 DIAGNOSIS — R45.851 PASSIVE SUICIDAL IDEATIONS: ICD-10-CM

## 2022-11-09 DIAGNOSIS — F41.9 ANXIETY: ICD-10-CM

## 2022-11-09 DIAGNOSIS — G20 PARKINSON'S DISEASE (HCC): ICD-10-CM

## 2022-11-09 DIAGNOSIS — G47.00 INSOMNIA: ICD-10-CM

## 2022-11-09 DIAGNOSIS — E55.9 VITAMIN D DEFICIENCY: ICD-10-CM

## 2022-11-09 DIAGNOSIS — F25.0 SCHIZOAFFECTIVE DISORDER, BIPOLAR TYPE (HCC): Primary | ICD-10-CM

## 2022-11-09 DIAGNOSIS — I25.119 CORONARY ARTERY DISEASE INVOLVING NATIVE HEART WITH ANGINA PECTORIS, UNSPECIFIED VESSEL OR LESION TYPE (HCC): ICD-10-CM

## 2022-11-09 DIAGNOSIS — I10 ESSENTIAL HYPERTENSION: ICD-10-CM

## 2022-11-09 DIAGNOSIS — I63.9 CEREBROVASCULAR ACCIDENT (CVA), UNSPECIFIED MECHANISM (HCC): ICD-10-CM

## 2022-11-09 DIAGNOSIS — I21.4 NSTEMI (NON-ST ELEVATED MYOCARDIAL INFARCTION) (HCC): ICD-10-CM

## 2022-11-09 LAB
25(OH)D3 SERPL-MCNC: 15.1 NG/ML (ref 30–100)
ANION GAP SERPL CALCULATED.3IONS-SCNC: 5 MMOL/L (ref 4–13)
BACTERIA UR CULT: NORMAL
BASOPHILS # BLD AUTO: 0.06 THOUSANDS/ÂΜL (ref 0–0.1)
BASOPHILS NFR BLD AUTO: 1 % (ref 0–1)
BUN SERPL-MCNC: 13 MG/DL (ref 5–25)
CALCIUM SERPL-MCNC: 8.4 MG/DL (ref 8.4–10.2)
CHLORIDE SERPL-SCNC: 105 MMOL/L (ref 96–108)
CHOLEST SERPL-MCNC: 96 MG/DL
CO2 SERPL-SCNC: 29 MMOL/L (ref 21–32)
CREAT SERPL-MCNC: 0.93 MG/DL (ref 0.6–1.3)
EOSINOPHIL # BLD AUTO: 0.17 THOUSAND/ÂΜL (ref 0–0.61)
EOSINOPHIL NFR BLD AUTO: 2 % (ref 0–6)
ERYTHROCYTE [DISTWIDTH] IN BLOOD BY AUTOMATED COUNT: 13.1 % (ref 11.6–15.1)
FOLATE SERPL-MCNC: 6.1 NG/ML (ref 3.1–17.5)
GFR SERPL CREATININE-BSD FRML MDRD: 90 ML/MIN/1.73SQ M
GLUCOSE P FAST SERPL-MCNC: 84 MG/DL (ref 65–99)
GLUCOSE SERPL-MCNC: 84 MG/DL (ref 65–140)
HCT VFR BLD AUTO: 44 % (ref 36.5–49.3)
HDLC SERPL-MCNC: 51 MG/DL
HGB BLD-MCNC: 13.5 G/DL (ref 12–17)
IMM GRANULOCYTES # BLD AUTO: 0.03 THOUSAND/UL (ref 0–0.2)
IMM GRANULOCYTES NFR BLD AUTO: 0 % (ref 0–2)
LDLC SERPL CALC-MCNC: 35 MG/DL (ref 0–100)
LYMPHOCYTES # BLD AUTO: 1.72 THOUSANDS/ÂΜL (ref 0.6–4.47)
LYMPHOCYTES NFR BLD AUTO: 24 % (ref 14–44)
MCH RBC QN AUTO: 31 PG (ref 26.8–34.3)
MCHC RBC AUTO-ENTMCNC: 30.7 G/DL (ref 31.4–37.4)
MCV RBC AUTO: 101 FL (ref 82–98)
MONOCYTES # BLD AUTO: 0.63 THOUSAND/ÂΜL (ref 0.17–1.22)
MONOCYTES NFR BLD AUTO: 9 % (ref 4–12)
NEUTROPHILS # BLD AUTO: 4.63 THOUSANDS/ÂΜL (ref 1.85–7.62)
NEUTS SEG NFR BLD AUTO: 64 % (ref 43–75)
NONHDLC SERPL-MCNC: 45 MG/DL
NRBC BLD AUTO-RTO: 0 /100 WBCS
PLATELET # BLD AUTO: 225 THOUSANDS/UL (ref 149–390)
PMV BLD AUTO: 10.6 FL (ref 8.9–12.7)
POTASSIUM SERPL-SCNC: 4.1 MMOL/L (ref 3.5–5.3)
RBC # BLD AUTO: 4.35 MILLION/UL (ref 3.88–5.62)
RPR SER QL: NORMAL
SODIUM SERPL-SCNC: 139 MMOL/L (ref 135–147)
TRIGL SERPL-MCNC: 48 MG/DL
VIT B12 SERPL-MCNC: 499 PG/ML (ref 100–900)
WBC # BLD AUTO: 7.24 THOUSAND/UL (ref 4.31–10.16)

## 2022-11-09 PROCEDURE — GZ59ZZZ INDIVIDUAL PSYCHOTHERAPY, PSYCHOPHYSIOLOGICAL: ICD-10-PCS | Performed by: INTERNAL MEDICINE

## 2022-11-09 PROCEDURE — GZHZZZZ GROUP PSYCHOTHERAPY: ICD-10-PCS | Performed by: INTERNAL MEDICINE

## 2022-11-09 RX ORDER — CEPHALEXIN 250 MG/1
500 CAPSULE ORAL EVERY 12 HOURS SCHEDULED
Status: DISCONTINUED | OUTPATIENT
Start: 2022-11-09 | End: 2022-11-10

## 2022-11-09 RX ORDER — MIRTAZAPINE 15 MG/1
7.5 TABLET, FILM COATED ORAL
Status: DISCONTINUED | OUTPATIENT
Start: 2022-11-09 | End: 2022-11-17

## 2022-11-09 RX ORDER — MAGNESIUM HYDROXIDE/ALUMINUM HYDROXICE/SIMETHICONE 120; 1200; 1200 MG/30ML; MG/30ML; MG/30ML
30 SUSPENSION ORAL EVERY 4 HOURS PRN
Status: DISCONTINUED | OUTPATIENT
Start: 2022-11-09 | End: 2022-11-23 | Stop reason: HOSPADM

## 2022-11-09 RX ORDER — AMIODARONE HYDROCHLORIDE 100 MG/1
200 TABLET ORAL DAILY
Status: DISCONTINUED | OUTPATIENT
Start: 2022-11-09 | End: 2022-11-23 | Stop reason: HOSPADM

## 2022-11-09 RX ORDER — HYDROXYZINE HYDROCHLORIDE 25 MG/1
25 TABLET, FILM COATED ORAL
Status: DISCONTINUED | OUTPATIENT
Start: 2022-11-09 | End: 2022-11-23 | Stop reason: HOSPADM

## 2022-11-09 RX ORDER — RISPERIDONE 1 MG/1
1 TABLET ORAL
Status: DISCONTINUED | OUTPATIENT
Start: 2022-11-09 | End: 2022-11-23 | Stop reason: HOSPADM

## 2022-11-09 RX ORDER — RISPERIDONE 0.25 MG/1
0.25 TABLET ORAL
Status: DISCONTINUED | OUTPATIENT
Start: 2022-11-09 | End: 2022-11-23 | Stop reason: HOSPADM

## 2022-11-09 RX ORDER — RISPERIDONE 0.5 MG/1
0.5 TABLET ORAL
Status: DISCONTINUED | OUTPATIENT
Start: 2022-11-09 | End: 2022-11-23 | Stop reason: HOSPADM

## 2022-11-09 RX ORDER — CLONAZEPAM 0.5 MG/1
0.25 TABLET ORAL 2 TIMES DAILY
Status: DISCONTINUED | OUTPATIENT
Start: 2022-11-09 | End: 2022-11-11

## 2022-11-09 RX ORDER — LORAZEPAM 2 MG/ML
1 INJECTION INTRAMUSCULAR
Status: DISCONTINUED | OUTPATIENT
Start: 2022-11-09 | End: 2022-11-23 | Stop reason: HOSPADM

## 2022-11-09 RX ORDER — ARIPIPRAZOLE 5 MG/1
5 TABLET ORAL DAILY
Status: DISCONTINUED | OUTPATIENT
Start: 2022-11-09 | End: 2022-11-10

## 2022-11-09 RX ORDER — HALOPERIDOL 5 MG/ML
5 INJECTION INTRAMUSCULAR
Status: DISCONTINUED | OUTPATIENT
Start: 2022-11-09 | End: 2022-11-09

## 2022-11-09 RX ORDER — NICOTINE 21 MG/24HR
1 PATCH, TRANSDERMAL 24 HOURS TRANSDERMAL DAILY
Status: DISCONTINUED | OUTPATIENT
Start: 2022-11-09 | End: 2022-11-23 | Stop reason: HOSPADM

## 2022-11-09 RX ORDER — MELATONIN
1000 DAILY
Status: DISCONTINUED | OUTPATIENT
Start: 2023-01-13 | End: 2022-11-23 | Stop reason: HOSPADM

## 2022-11-09 RX ORDER — ERGOCALCIFEROL 1.25 MG/1
50000 CAPSULE ORAL WEEKLY
Status: DISCONTINUED | OUTPATIENT
Start: 2022-11-10 | End: 2022-11-23 | Stop reason: HOSPADM

## 2022-11-09 RX ORDER — ATORVASTATIN CALCIUM 40 MG/1
40 TABLET, FILM COATED ORAL
Status: DISCONTINUED | OUTPATIENT
Start: 2022-11-09 | End: 2022-11-23 | Stop reason: HOSPADM

## 2022-11-09 RX ORDER — LANOLIN ALCOHOL/MO/W.PET/CERES
3 CREAM (GRAM) TOPICAL
Status: DISCONTINUED | OUTPATIENT
Start: 2022-11-09 | End: 2022-11-17

## 2022-11-09 RX ADMIN — CLONAZEPAM 0.25 MG: 0.5 TABLET ORAL at 08:16

## 2022-11-09 RX ADMIN — APIXABAN 5 MG: 5 TABLET, FILM COATED ORAL at 08:14

## 2022-11-09 RX ADMIN — MIRTAZAPINE 7.5 MG: 15 TABLET, FILM COATED ORAL at 21:00

## 2022-11-09 RX ADMIN — SERTRALINE HYDROCHLORIDE 50 MG: 50 TABLET ORAL at 08:15

## 2022-11-09 RX ADMIN — CLONAZEPAM 0.25 MG: 0.5 TABLET ORAL at 17:30

## 2022-11-09 RX ADMIN — CARBIDOPA AND LEVODOPA 1 TABLET: 25; 100 TABLET ORAL at 08:14

## 2022-11-09 RX ADMIN — CARBIDOPA AND LEVODOPA 1 TABLET: 25; 100 TABLET ORAL at 12:30

## 2022-11-09 RX ADMIN — CEPHALEXIN 500 MG: 250 CAPSULE ORAL at 14:10

## 2022-11-09 RX ADMIN — Medication 3 MG: at 21:00

## 2022-11-09 RX ADMIN — APIXABAN 5 MG: 5 TABLET, FILM COATED ORAL at 17:30

## 2022-11-09 RX ADMIN — ATORVASTATIN CALCIUM 40 MG: 40 TABLET, FILM COATED ORAL at 16:26

## 2022-11-09 RX ADMIN — AMIODARONE HYDROCHLORIDE 200 MG: 100 TABLET ORAL at 14:10

## 2022-11-09 RX ADMIN — ARIPIPRAZOLE 5 MG: 5 TABLET ORAL at 08:14

## 2022-11-09 RX ADMIN — LURASIDONE HYDROCHLORIDE 20 MG: 40 TABLET, FILM COATED ORAL at 16:26

## 2022-11-09 RX ADMIN — CARBIDOPA AND LEVODOPA 1 TABLET: 25; 100 TABLET ORAL at 21:00

## 2022-11-09 RX ADMIN — CARBIDOPA AND LEVODOPA 1 TABLET: 25; 100 TABLET ORAL at 17:31

## 2022-11-09 RX ADMIN — CEPHALEXIN 500 MG: 250 CAPSULE ORAL at 21:45

## 2022-11-09 NOTE — H&P
Psychiatric Evaluation - Behavioral Health     This note was not shared with the patient due to reasonable likelihood of causing patient harm     Identification Data:Eleazar Benítez 61 y o  male MRN: [id number]  Unit/Bed#[facility] 205-02 Encounter: [id number]    Chief Complaint: depression and suicidal ideation    History of Present Illness     Hien Berger is a 61 y o  male with a history of Schizoaffective Disorder and cognitive disorder, complicated with chronic multiple medical conditions including his history of CVA, coronary artery disease, ventricular tachycardia, urinary infection who was admitted to the inpatient older adult psychiatric unit on a voluntary 201 commitment basis due to depression, anxiety, unstable mood, inability to care for self and suicidal ideation  He presented to ED from [facility] complaining of increased suicidal ideation plan to walk into traffic and get hit by a car  On evaluation in the inpatient psychiatric unit Eleazar presents as very limited historian but able to answer questions concrete but goal-directed way  Patient states "my life is stressful" in general that he is not doing well and he has been feeling severely depressed for the past week  This happened in the past that has culminated in hurting himself  Denies any current suicide active plan or intent to hurt himself and is able to contract for safety presently  Patient denies any homicidal ideation or hallucination but appears suspicious and internally preoccupied  He admits that he has history of multiple past psychiatric admissions as well as suicide attempt in the past  Denies any recent alcohol or illicit drug use  No acute withdrawal symptoms noted  He agreed to be compliant with medication and treatment plan in the unit      Psychiatric Review Of Systems:    Sleep changes: decreased  Appetite changes:decreased  Weight changes: no  Energy: decreased  Interest/pleasure/: decreased  Anhedonia: yes  Anxiety: no  Enma: past manic episodes  Guilt:  no  Hopeless:  no  Self injurious behavior/risky behavior: no  Suicidal ideation: yes, no plan  Homicidal ideation: no  Auditory hallucinations: yes, chronic auditory hallucinations  Visual hallucinations: no  Delusional thinking: paranoid thoughts  Eating disorder history: no  Obsessive/compulsive symptoms: no    Historical Information     Past Psychiatric History:     Past Inpatient Psychiatric Treatment:   Multiple past inpatient psychiatric admissions  Past Outpatient Psychiatric Treatment:    Currently in outpatient psychiatric treatment with a psychiatrist  Past Suicide Attempts: yes, h/o OD, CO poisoning, self electrocution  Past Violent Behavior: yes  Past Psychiatric Medication Trials: multiple psychiatric medication trials     Substance Abuse History:    Social History     Tobacco History     Smoking Status  Current Every Day Smoker Smoking Frequency  1 pack/day for 15 years (15 pk yrs) Smoking Tobacco Type  Cigarettes    Smokeless Tobacco Use  Never Used    Tobacco Comment  Wishes to quit          Alcohol History     Alcohol Use Status  Never Comment  pt denies          Drug Use     Drug Use Status  No Comment  pt denies          Sexual Activity     Sexually Active  Not Currently          Activities of Daily Living    Not Asked               Additional Substance Use Detail     Questions Responses    Substance Use Assessment Denies substance use within the past 12 months    Alcohol Use Frequency Denies use in past 12 months    Cannabis frequency Denies use in past 12 months    Heroin Frequency Denies use in past 12 months    Cocaine frequency Denies past use in past 12 months    Crack Cocaine Frequency Denies use in past 12 months    Methamphetamine Frequency Denies use in past 12 months    Narcotic Frequency Denies use in past 12 months    Benzodiazepine Frequency Denies use in past 12 months    Amphetamine frequency Denies use in past 12 months Barbituate Frequency Denies use use in past 12 months    Inhalant frequency Denies use in past 12 months    Hallucinogen frequency Denies use in past 12 months    Ecstasy frequency Denies use past 12 months    Other drug frequency Denies use in past 12 months    Opiate frequency Denies use in past 12 months    Last reviewed by Antonio Sherman RN on 11/9/2022        I have assessed this patient for substance use within the past 12 months    Alcohol use: denies current use  Recreational drug use: denies recent use    Family Psychiatric History: Sister with h/o bipolar disorder    Social History:    Education: high school diploma/GED  Marital History:   Living Arrangement: lives in a group home  Occupational History: on permanent disability  Functioning Relationships: limited support system  Legal History: none   History: yes    Traumatic History:   Denies    Past Medical History:      Past Medical History:   Diagnosis Date   • Anxiety    • Bipolar 1 disorder ([facility] )    • Bowel habit changes    • Chronic pain disorder    • Coronary artery disease    • CVA [facility]cere[facility] vascular accident[facility] )    • [facility]cere[facility] )    • Depression    • Family hx of colon cancer     father and paternal uncle   • Myocardial infarction [facility])    • Psychiatric disorder    • Psychiatric illness    • Schizoaffective disorder, bipolar type ([facility] )    • Schizoaffective disorder, bipolar type ([facility] )    • Stroke ([facility] ) 12/2015    CVA x3  Pt states he is unaware of when he had first two CVA   • Suicide attempt [facility])      Past Surgical History:   Procedure Laterality Date   • CARDIAC PACEMAKER PLACEMENT     • COLONOSCOPY         Medical Review Of Systems:    Pertinent items are noted in HPI  Allergies: Allergies   Allergen Reactions   • Haldol [Haloperidol] Other (See Comments)     Muscle/jaw tightness  Difficulty swallowing   • Prolixin [Fluphenazine] Other (See Comments)     Muscle/jaw tightness  Difficulty swallowing     • Thorazine [Chlorpromazine] Other (See Comments)     Muscle/jaw tightness  Difficulty swallowing  Medications: All current active medications have been reviewed  OBJECTIVE:    Vital signs in last 24 hours:    Temp:  [97 7 °F (36 5 °C)-97 9 °F (36 6 °C)] 97 7 °F (36 5 °C)  HR:  [50-56] 56  Resp:  [16-20] 16  BP: ()/(58-72) 127/72    No intake or output data in the 24 hours ending 11/09/22 1316     Mental Status Evaluation:    Appearance:  disheveled, looks older than stated age   Behavior:  guarded, psychomotor retardation   Speech:  decreased rate   Mood:  depressed   Affect:  blunted   Language: naming objects   Thought Process:  decreased rate of thoughts, concrete   Associations: concrete associations   Thought Content:  some paranoia   Perceptual Disturbances: appears preoccupied   Risk Potential: Suicidal ideation - Yes, without plan  Homicidal ideation - None  Potential for aggression - No   Sensorium:  oriented to person and place   Memory:  recent and remote memory: unable to assess due to lack of cooperation   Consciousness:  alert and awake   Attention: attention span and concentration appear shorter than expected for age   Intellect: average   Fund of Knowledge: awareness of current events: limited   Insight:  limited   Judgment: limited   Muscle Strength Muscle Tone:   not examined   Gait/Station: in bed   Motor Activity: no abnormal movements       Laboratory Results:   I have personally reviewed all pertinent laboratory/tests results    Most Recent Labs:   Lab Results   Component Value Date    WBC 7 24 11/09/2022    RBC 4 35 11/09/2022    HGB 13 5 11/09/2022    HCT 44 0 11/09/2022     11/09/2022    RDW 13 1 11/09/2022    NEUTROABS 4 63 11/09/2022    SODIUM 139 11/09/2022    K 4 1 11/09/2022     11/09/2022    CO2 29 11/09/2022    BUN 13 11/09/2022    CREATININE 0 93 11/09/2022    GLUC 84 11/09/2022    GLUF 84 11/09/2022    CALCIUM 8 4 11/09/2022    AST  11/08/2022      Comment:      No Result; if an AST is required for patient care, it must be ordered separately  Specimen collection should occur prior to Sulfasalazine administration due to the potential for falsely depressed results  ALT 17 11/08/2022    ALKPHOS 100 11/08/2022    TP 7 6 11/08/2022    ALB 3 5 11/08/2022    TBILI 0 60 11/08/2022    CHOLESTEROL 96 11/09/2022    HDL 51 11/09/2022    TRIG 48 11/09/2022    LDLCALC 35 11/09/2022    NONHDLC 45 11/09/2022    LITHIUM 0 7 09/05/2019    [id number] 1 308 11/08/2022    FREET4 1 14 08/10/2020    RPR Non-Reactive 08/25/2016    HGBA1C 5 5 09/27/2022     09/27/2022       Imaging Studies:   No results found  Code Status: Prior  Advance Directive and Living Will: <no information>    Assessment/Plan   Principal Problem:    Schizoaffective disorder ([facility] )  Active Problems:    History of MI (myocardial infarction)    History of CVA (cerebrovascular accident)    Other hyperlipidemia    Major neurocognitive disorder, due to vascular disease, without behavioral disturbance, mild    CVA (cerebral vascular accident) ([facility] )    Other chronic pain    Essential hypertension    Arthritis    Coronary artery disease involving native heart with angina pectoris, unspecified vessel or lesion type [facility])      Patient Strengths: negotiates basic needs, patient is on a voluntary commitment     Patient Barriers: chronic mental illness, poor insight, poor physical health, poor self-care    Treatment Plan:     Planned Treatment and Medication Changes: All current active medications have been reviewed  Encourage group therapy, milieu therapy and occupational therapy  Behavioral Health checks every 7 minutes  Ct with Latuda, Abilify, Zoloft, Remeron  Will obtain collateral information from group home    Risks / Benefits of Treatment:    Risks, benefits, and possible side effects of medications explained to patient   Patient has limited understanding of risks and benefits of treatment at this time, but agrees to take medications as prescribed  Counseling / Coordination of Care:    Patient's presentation on admission and proposed treatment plan discussed with treatment team   Diagnosis, medication changes and treatment plan reviewed with patient  Events leading to admission reviewed with patient      Inpatient Psychiatric Certification:    Estimated length of stay: 10 midnights      Nhan Banda MD 11/09/22

## 2022-11-09 NOTE — PLAN OF CARE
Problem: Risk for Self Injury/Neglect  Goal: Treatment Goal: Remain safe during length of stay, learn and adopt new coping skills, and be free of self-injurious ideation, impulses and acts at the time of discharge  Outcome: Progressing  Goal: Refrain from harming self  Description: Interventions:  - Monitor patient closely, per order  - Develop a trusting relationship  - Supervise medication ingestion, monitor effects and side effects   Outcome: Progressing  Goal: Recognize maladaptive responses and adopt new coping mechanisms  Outcome: Progressing  Goal: Complete daily ADLs, including personal hygiene independently, as able  Description: Interventions:  - Observe, teach, and assist patient with ADLS  - Monitor and promote a balance of rest/activity, with adequate nutrition and elimination  Outcome: Progressing     Problem: Depression  Goal: Treatment Goal: Demonstrate behavioral control of depressive symptoms, verbalize feelings of improved mood/affect, and adopt new coping skills prior to discharge  Outcome: Progressing  Goal: Refrain from isolation  Description: Interventions:  - Develop a trusting relationship   - Encourage socialization   Outcome: Progressing  Goal: Refrain from self-neglect  Outcome: Progressing  Goal: Complete daily ADLs, including personal hygiene independently, as able  Description: Interventions:  - Observe, teach, and assist patient with ADLS  -  Monitor and promote a balance of rest/activity, with adequate nutrition and elimination   Outcome: Progressing     Problem: Anxiety  Goal: Anxiety is at manageable level  Description: Interventions:  - Assess and monitor patient's anxiety level  - Monitor for signs and symptoms (heart palpitations, chest pain, shortness of breath, headaches, nausea, feeling jumpy, restlessness, irritable, apprehensive)  - Collaborate with interdisciplinary team and initiate plan and interventions as ordered    - Crane patient to unit/surroundings  - Explain treatment plan  - Encourage participation in care  - Encourage verbalization of concerns/fears  - Identify coping mechanisms  - Assist in developing anxiety-reducing skills  - Administer/offer alternative therapies  - Limit or eliminate stimulants  Outcome: Progressing

## 2022-11-09 NOTE — PROGRESS NOTES
11/09/22 9134   Activity/Group Checklist   Group Community meeting   Attendance Did not attend  (Pt offered grp; pt remained in his bed)
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
## 2022-11-09 NOTE — NURSING NOTE
Patient observed sleeping on and off throughout the day  Patient pleasant, drowsy, polite, and cooperative  Med compliant  Presents depressed and tired  Denies SI/HI, AH/VH, anxiety, depression,and pain  Patient withdrawn to self in room currently  NO complaints or concerns voiced by this patient at this time  Will continue to monitor  Q7min checks are in progress

## 2022-11-09 NOTE — PROGRESS NOTES
11/09/22 1030   Activity/Group Checklist   Group Admission/Discharge   Attendance Attended   Attendance Duration (min) 16-30   Interactions Interacted appropriately   Affect/Mood Appropriate;Blunted/flat   Goals Achieved Identified feelings; Identified triggers; Identified relapse prevention strategies; Discussed coping strategies; Identified resources and support systems; Able to listen to others; Able to engage in interactions; Able to self-disclose; Able to recieve feedback   Patient agreeable to meeting with RT and complete his self assessment and relapse prevention plan  Patient was blunted and laying in bed tired from early morning admission  Patient shared he is in hospital due to suicidal thoughts  He is unsure what we can help him with currently and he is unhappy with the way things have been going for him  He was unable to give reason why his depression was increasing which is what lead to the suicidal thoughts  At discharge he is hoping to feel better and be able to do better  He shared likes of reading, music, computers, social media, outdoors, watching tv and he is a [employer] fan

## 2022-11-09 NOTE — PROGRESS NOTES
11/09/22   Team Meeting   Meeting Type Daily Rounds   Team Members Present   Team Members Present Physician;Nurse;; Occupational Therapist   Physician Team Member Dr Amrita Diane MD   Nursing Team Member Tanesha Torres RN; Sona Cuello RN, [facility]   Social Work Team Member Nettie BOYD   OT Team Member Inés Hernandez   Patient/Family Present   Patient Present No   Patient's Family Present No     New admit  Severe dep ; minimal anx  Dx bipolar d/o, dep, schizoaffective d/o  Had S/I; planned to walk into traffic  Hx of hospitalizations
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
## 2022-11-09 NOTE — EMTALA/ACUTE CARE TRANSFER
[facility] EMERGENCY DEPARTMENT  [address]  [facility] [address]  Dept: [phone]      EMTALA TRANSFER CONSENT    NAME Ana Laura Walls                                         1965                              MRN [id number]    I have been informed of my rights regarding examination, treatment, and transfer   by Dr Burleson att  providers found    Benefits: Continuity of care    Risks: Potential for delay in receiving treatment, Potential deterioration of medical condition, Increased discomfort during transfer, Possible worsening of condition or death during transfer      Transfer Request   I acknowledge that my medical condition has been evaluated and explained to me by the emergency department physician or other qualified medical person and/or my attending physician who has recommended and offered to me further medical examination and treatment  I understand the Hospital's obligation with respect to the treatment and stabilization of my emergency medical condition  I nevertheless request to be transferred  I release the Hospital, the doctor, and any other persons caring for me from all responsibility or liability for any injury or ill effects that may result from my transfer and agree to accept all responsibility for the consequences of my choice to transfer, rather than receive stabilizing treatment at the Hospital  I understand that because the transfer is my request, my insurance may not provide reimbursement for the services  The Hospital will assist and direct me and my family in how to make arrangements for transfer, but the hospital is not liable for any fees charged by the transport service  In spite of this understanding, I refuse to consent to further medical examination and treatment which has been offered to me, and request transfer to  [address] Name, [facility]   I authorize the performance of emergency medical procedures and treatments upon me in both transit and upon arrival at the receiving facility  Additionally, I authorize the release of any and all medical records to the receiving facility and request they be transported with me, if possible  I authorize the performance of emergency medical procedures and treatments upon me in both transit and upon arrival at the receiving facility  Additionally, I authorize the release of any and all medical records to the receiving facility and request they be transported with me, if possible  I understand that the safest mode of transportation during a medical emergency is an ambulance and that the Hospital advocates the use of this mode of transport  Risks of traveling to the receiving facility by car, including absence of medical control, life sustaining equipment, such as oxygen, and medical personnel has been explained to me and I fully understand them  (KATELIN CORRECT BOX BELOW)  [  ]  I consent to the stated transfer and to be transported by ambulance/helicopter  [  ]  I consent to the stated transfer, but refuse transportation by ambulance and accept full responsibility for my transportation by car  I understand the risks of non-ambulance transfers and I exonerate the Hospital and its staff from any deterioration in my condition that results from this refusal     X___________________________________________    DATE  22  TIME________  Signature of patient or legally responsible individual signing on patient behalf           RELATIONSHIP TO PATIENT_________________________          Provider Certification    NAME Ivon Hardy                                        [facility] 1965                              MRN [id number]    A medical screening exam was performed on the above named patient  Based on the examination:    Condition Necessitating Transfer The primary encounter diagnosis was Depression   Diagnoses of Passive suicidal ideations and Urinary tract infection were also pertinent to this visit  Patient Condition: The patient has been stabilized such that within reasonable medical probability, no material deterioration of the patient condition or the condition of the unborn child(kartik) is likely to result from the transfer    Reason for Transfer: Level of Care needed not available at this facility    Transfer Requirements: [address] [place] [address]   · Space available and qualified personnel available for treatment as acknowledged by Diaz Alfonso ; [phone]  · Agreed to accept transfer and to provide appropriate medical treatment as acknowledged by       Luis Daniel Mcdonald  · Appropriate medical records of the examination and treatment of the patient are provided at the time of transfer   [address] [facility], [address] _______  · Transfer will be performed by qualified personnel from [facility]  and appropriate transfer equipment as required, including the use of necessary and appropriate life support measures      Provider Certification: I have examined the patient and explained the following risks and benefits of being transferred/refusing transfer to the patient/family:  Unanticipated needs of medical equipment and personnel during transport, Risk of worsening condition, General risk, such as traffic hazards, adverse weather conditions, rough terrain or turbulence, possible failure of equipment (including vehicle or aircraft), or consequences of actions of persons outside the control of the transport personnel, The possibility of a transport vehicle being unavailable      Based on these reasonable risks and benefits to the patient and/or the unborn child(kartik), and based upon the information available at the time of the patient’s examination, I certify that the medical benefits reasonably to be expected from the provision of appropriate medical treatments at another medical facility outweigh the increasing risks, if any, to the individual’s medical condition, and in the case of labor to the unborn child, from effecting the transfer      X____________________________________________ DATE 11/09/22        TIME_______      ORIGINAL - SEND TO MEDICAL RECORDS   COPY - SEND WITH PATIENT DURING TRANSFER

## 2022-11-09 NOTE — ED NOTES
Patient is accepted at [facility]  Patient is accepted by Dr Jeremi Cuellar per [address]  Transportation is arranged with [facility]  Transportation is scheduled for 0215  Patient may go to the floor at anytime, avoiding change of shift  Nurse report is to be called to [phone] prior to patient transfer

## 2022-11-09 NOTE — PROGRESS NOTES
11/09/22 1330   Activity/Group Checklist   Group   (Self Care and Art Therapy Processing)   Attendance Did not attend  (AT group offered,PT elected to remain in room)

## 2022-11-09 NOTE — PLAN OF CARE
Problem: Risk for Self Injury/Neglect  Goal: Refrain from harming self  Description: Interventions:  - Monitor patient closely, per order  - Develop a trusting relationship  - Supervise medication ingestion, monitor effects and side effects   Outcome: Progressing     Problem: Depression  Goal: Complete daily ADLs, including personal hygiene independently, as able  Description: Interventions:  - Observe, teach, and assist patient with ADLS  -  Monitor and promote a balance of rest/activity, with adequate nutrition and elimination   Outcome: Progressing     Problem: Anxiety  Goal: Anxiety is at manageable level  Description: Interventions:  - Assess and monitor patient's anxiety level  - Monitor for signs and symptoms (heart palpitations, chest pain, shortness of breath, headaches, nausea, feeling jumpy, restlessness, irritable, apprehensive)  - Collaborate with interdisciplinary team and initiate plan and interventions as ordered    - [place] patient to unit/surroundings  - Explain treatment plan  - Encourage participation in care  - Encourage verbalization of concerns/fears  - Identify coping mechanisms  - Assist in developing anxiety-reducing skills  - Administer/offer alternative therapies  - Limit or eliminate stimulants  Outcome: Progressing

## 2022-11-09 NOTE — TREATMENT PLAN
TREATMENT PLAN REVIEW - [address] 61 y o  1965 male MRN: [id number]    [address]  Room / Bed: [place] [address] Encounter: [id number]          Admit Date/Time:  11/9/2022  3:32 AM    Treatment Team: Attending Provider: Ariella Plascencia MD; Recreational Therapist: Rosaline Blanchard; Certified Nursing Assistant: Ree Yeung; Patient Care Assistant: Richard Talley; Care Manager: Fariba Uriostegui RN; Patient Care Assistant: Latesha Burger; Patient Care Assistant: Dat Acuña; Registered Nurse: Dk Macedo RN; Licensed Practical Nurse: Estefanía Vargas LPN    Diagnosis: Principal Problem:    Schizoaffective disorder [facility])  Active Problems:    History of MI (myocardial infarction)    History of CVA (cerebrovascular accident)    Other hyperlipidemia    Major neurocognitive disorder, due to vascular disease, without behavioral disturbance, mild    CVA (cerebral vascular accident) [facility])    Other chronic pain    Essential hypertension    Arthritis    Coronary artery disease involving native heart with angina pectoris, unspecified vessel or lesion type [facility])      Patient Strengths/Assets: negotiates basic needs, patient is on a voluntary commitment    Patient Barriers/Limitations: impaired cognition, poor insight, poor physical health, self-care deficit    Short Term Goals: decrease in depressive symptoms, decrease in anxiety symptoms, decrease in paranoid thoughts, decrease in suicidal thoughts, ability to stay safe on the unit, improvement in reality testing, improvement in reasoning ability, improvement in self care, sleep improvement, improvement in appetite, mood stabilization, increase in group attendance, increase in socialization with peers on the unit, acceptance of need for psychiatric treatment, acceptance of psychiatric medications    Long Term Goals: improvement in depression, improvement in anxiety, stabilization of mood, free of suicidal thoughts, improvement in reasoning ability, acceptance of need for psychiatric medications, acceptance of need for psychiatric treatment, acceptance of need for psychiatric follow up after discharge, adequate self care, adequate sleep, adequate appetite, adequate oral intake, appropriate interaction with peers    Progress Towards Goals: starting psychiatric medications as prescribed    Recommended Treatment: medication management, patient medication education, group therapy, milieu therapy, continued Behavioral Health psychiatric evaluation/assessment process, medical follow up with medical team    Treatment Frequency: daily medication monitoring, group and milieu therapy daily, monitoring through interdisciplinary rounds, monitoring through weekly patient care conferences    Expected Discharge Date: 10 midnights     Discharge Plan: will be determined    Treatment Plan Created/Updated By: Tatum Harden MD

## 2022-11-09 NOTE — NURSING NOTE
[facility] admitted to [facility], [address] [place] [address] from [facility] with a valid 201  Physical assessment completed, no wounds or weapons noted  Refused shower, no signs of infestation noted  Bill of Rights and HIPPA regulations reviewed and signed  Admission medication and diet ordered  Oriented to unit  Started on Q 7 minute safety checks  Fluids at bedside  Personal property recorded  Affect flat  Appears depressed  Refusing any medications currently  Started on q 7 minute safety checks

## 2022-11-09 NOTE — H&P
Marcial Benítez#  :1965 Nayla Leigh  BKR:[id number]    NBJ:[id number]  Adm Date: 2022 8147  3:32 AM   ATT PHY: [facility], [address]         Chief Complaint: worsening depression with suicidal ideations    History of Presenting Illness: Ivon Hardy is a(n) 61 y o  male who is admitted to [place] [address] on voluntary 201 commitment basis  Patient originally presents to [place] [address] ED on 2022 for worsening depression with suicidal ideations  Patient examined at bedside  Patient chart indicated patient taking clonazepam as needed for seizures  Patient denies history of seizures and reports he is only taking for anxiety  Patient denies any complaints at this time  Allergies   Allergen Reactions   • Haldol [Haloperidol] Other (See Comments)     Muscle/jaw tightness  Difficulty swallowing   • Prolixin [Fluphenazine] Other (See Comments)     Muscle/jaw tightness  Difficulty swallowing     • Thorazine [Chlorpromazine] Other (See Comments)     Muscle/jaw tightness  Difficulty swallowing           Current Facility-Administered Medications on File Prior to Encounter   Medication Dose Route Frequency Provider Last Rate Last Admin   • [COMPLETED] amiodarone tablet 200 mg  200 mg Oral Once Herminio Lass, DO   200 mg at 22 2249   • [COMPLETED] cephalexin (KEFLEX) capsule 500 mg  500 mg Oral Once Herminio Lass, DO   500 mg at 22 1531   • [DISCONTINUED] apixaban (ELIQUIS) tablet 5 mg  5 mg Oral BID Herminio Lass, DO       • [DISCONTINUED] ARIPiprazole (ABILIFY) tablet 5 mg  5 mg Oral Daily Herminio Lass, DO       • [DISCONTINUED] atorvastatin (LIPITOR) tablet 40 mg  40 mg Oral Daily With [facility], DO       • [DISCONTINUED] carbidopa-levodopa (SINEMET)  mg per tablet 1 tablet  1 tablet Oral 4x Daily Herminio Lass, DO   1 tablet at 22 2250   • [DISCONTINUED] clonazePAM (KlonoPIN) tablet 0 25 mg  0 25 mg Oral BID Seb Lion DO       • [DISCONTINUED] lurasidone (LATUDA) tablet 20 mg  20 mg Oral Daily With [facility]       • [DISCONTINUED] mirtazapine (REMERON) tablet 7 5 mg  7 5 mg Oral HS Seb Lion DO   7 5 mg at 11/08/22 2439   • [DISCONTINUED] sertraline (ZOLOFT) tablet 50 mg  50 mg Oral Daily Seb Lion DO         Current Outpatient Medications on File Prior to Encounter   Medication Sig Dispense Refill   • cephalexin (KEFLEX) 500 mg capsule Take 1 capsule (500 mg total) by mouth every 12 (twelve) hours for 7 days 14 capsule 0   • amiodarone 200 mg tablet Take 200 mg by mouth daily     • apixaban (ELIQUIS) 5 mg Take 5 mg by mouth 2 (two) times a day     • ARIPiprazole (ABILIFY) 5 mg tablet      • atorvastatin (LIPITOR) 40 mg tablet Take 1 tablet (40 mg total) by mouth daily with dinner 90 tablet 3   • carbidopa-levodopa (SINEMET)  mg per tablet Take 1 tablet by mouth 4 (four) times a day     • clonazePAM (KlonoPIN) 0 5 mg tablet Take 0 25 mg by mouth 2 (two) times a day as needed for seizures Takes 0 25 by mouth twice daily        • lurasidone (LATUDA) 80 mg tablet Take 1 tablet (80 mg total) by mouth daily with dinner 30 tablet 0   • mirtazapine (REMERON) 7 5 MG tablet Take 1 tablet (7 5 mg total) by mouth daily at bedtime (Patient taking differently: Take 30 mg by mouth daily at bedtime) 30 tablet 0   • sertraline (ZOLOFT) 50 mg tablet Take 50 mg by mouth daily       Active Ambulatory Problems     Diagnosis Date Noted   • [facility] ) 05/26/2016   • Dental abscess 08/25/2016   • Encounter for medical assessment 12/11/2018   • History of MI (myocardial infarction) 12/11/2018   • History of CVA (cerebrovascular accident) 12/11/2018   • Other hyperlipidemia 12/11/2018   • NSTEMI (non-ST elevated myocardial infarction) ([facility] ) 09/05/2019   • Smoking 09/05/2019   • Stress-induced cardiomyopathy 09/11/2019   • Constipation 09/11/2019   • Medical clearance for psychiatric admission 08/12/2020   • Major neurocognitive disorder, due to vascular disease, without behavioral disturbance, mild 08/13/2020   • CVA (cerebral vascular accident) ([place] [address] ) 09/24/2020   • Fall against object 10/11/2020   • Other chronic pain 10/18/2020   • Essential hypertension 10/20/2020   • Arthritis 10/20/2020   • Brief psychotic disorder ([email or web address] ) 10/20/2020   • Ventricular tachycardia 04/18/2022   • Coronary artery disease involving native heart with angina pectoris, unspecified vessel or lesion type ([place] [address] ) 04/18/2022     Resolved Ambulatory Problems     Diagnosis Date Noted   • No Resolved Ambulatory Problems     Past Medical History:   Diagnosis Date   • Anxiety    • Bipolar 1 disorder ([place] [address] )    • Bowel habit changes    • Chronic pain disorder    • Coronary artery disease    • Depression    • Family hx of colon cancer    • Myocardial infarction [facility])    • Psychiatric disorder    • Psychiatric illness    • Schizoaffective disorder, bipolar type ([place] [address] )    • Schizoaffective disorder, bipolar type ([place] [address] )    • Stroke ([place] [address] ) 12/2015   • Suicide attempt [facility])      Past Surgical History:   Procedure Laterality Date   • CARDIAC PACEMAKER PLACEMENT     • COLONOSCOPY       Social History:   Social History     Socioeconomic History   • Marital status: Single     Spouse name: None   • Number of children: None   • Years of education: None   • Highest education level: None   Occupational History   • None   Tobacco Use   • Smoking status: Current Every Day Smoker     Packs/day: 1 00     Years: 15 00     Pack years: 15 00     Types: Cigarettes   • Smokeless tobacco: Never Used   • Tobacco comment: Wishes to quit   Vaping Use   • Vaping Use: Never used   Substance and Sexual Activity   • Alcohol use: Never     Alcohol/week: 0 0 standard drinks     Comment: pt denies   • Drug use: No     Comment: pt denies   • Sexual activity: Not Currently   Other Topics Concern   • None   Social History Narrative   • None     Social Determinants of Health     Financial Resource Strain: Not on file   Food Insecurity: Not on file   Transportation Needs: Not on file   Physical Activity: Not on file   Stress: Not on file   Social Connections: Not on file   Intimate Partner Violence: Not on file   Housing Stability: Not on file       Family History:   Family History   Problem Relation Age of Onset   • No Known Problems Mother    • No Known Problems Father    • No Known Problems Sister    • No Known Problems Brother    • No Known Problems Maternal Aunt    • No Known Problems Paternal Aunt    • No Known Problems Maternal Uncle    • No Known Problems Paternal Uncle    • No Known Problems Maternal Grandfather    • No Known Problems Maternal Grandmother    • No Known Problems Paternal Grandfather    • No Known Problems Paternal Grandmother    • No Known Problems Cousin    • ADD / ADHD Neg Hx    • Alcohol abuse Neg Hx    • Anxiety disorder Neg Hx    • Bipolar disorder Neg Hx    • Completed Suicide  Neg Hx    • Dementia Neg Hx    • Depression Neg Hx    • Drug abuse Neg Hx    • OCD Neg Hx    • Psychiatric Illness Neg Hx    • Psychosis Neg Hx    • Schizoaffective Disorder  Neg Hx    • Schizophrenia Neg Hx    • Self-Injury Neg Hx    • Suicide Attempts Neg Hx      Review of Systems   Constitutional: Negative for chills and fever  HENT: Negative for ear pain and sore throat  Eyes: Negative for pain and visual disturbance  Respiratory: Negative for cough and shortness of breath  Cardiovascular: Negative for chest pain and palpitations  Gastrointestinal: Negative for abdominal pain and vomiting  Genitourinary: Negative for difficulty urinating, dysuria, frequency and hematuria  Musculoskeletal: Positive for arthralgias  Negative for back pain  Skin: Negative for color change and rash  Neurological: Negative for seizures and headaches  Psychiatric/Behavioral: Positive for dysphoric mood  All other systems reviewed and are negative      Physical Exam Constitutional: Awake, alert, in no acute distress  Head: Normocephalic and atraumatic  Mouth/Throat: Oropharynx is clear and moist     Eyes: Conjunctivae and EOM are normal    Neck: Neck supple  No tracheal deviation present  No thyromegaly present  Cardiovascular: Normal rate, regular rhythm  Pulmonary/Chest: Effort normal and breath sounds normal    Abdominal: Soft  Bowel sounds are normal  No distension  No tenderness  Neurological: Awake, alert, moving all extremities, no acute focal deficits  Musculoskeletal:  Nontender spine  Skin: Skin is warm and dry  Mojgan Lopezr is a(n) 61 y o  male with schizoaffective disorder  1  Cardiac with history of HTN, HLD, CAD, ventricular tachycardia s/p ICD, Takotsubo cardiomyopathy  Continue atorvastatin 40 mg daily, amiodarone 200 mg daily, Eliquis 5 mg twice daily  2  Hx of CVA/apical mural thrombus  Continue Eliquis 5 mg twice daily, atorvastatin 40 mg daily  3  DJD/OA  Patient may take Tylenol as needed  4  Tobacco abuse  NRT  5  Vitamin-D deficiency  Patient started on vitamin-D2 27651 units weekly for 10 weeks followed by vitamin D3 1000 units daily  6  Acute cystitis  Urine culture pending  Patient is on cephalexin 500 mg every 12 hours x 7 days  7  Psych with history of schizoaffective disorder  This is being managed by the psych team      Prognosis: Fair  Discharge Plan: In progress  Advanced Directives: The patient states his POA is his sister, Katie Calderon, whose number is [phone]  Patient reports he has a living will with advanced directives  Will discuss with patient's sister regarding code status  I have spent more than 50 minutes gathering data, doing physical examination, and discussing the advanced directives, which was witnessed by caring staff  The patient was discussed with Dr Christiano Armstrong and he is in agreement with the above note

## 2022-11-09 NOTE — PLAN OF CARE
Problem: Ineffective Coping  Goal: Participates in unit activities  Description: Interventions:  - Provide therapeutic environment   - Provide required programming   - Redirect inappropriate behaviors   Outcome: Not Progressing   Patient had early admission and patient has been withdrawn and sleeping most of the day

## 2022-11-10 RX ORDER — ARIPIPRAZOLE 10 MG/1
10 TABLET ORAL DAILY
Status: DISCONTINUED | OUTPATIENT
Start: 2022-11-11 | End: 2022-11-13

## 2022-11-10 RX ADMIN — CLONAZEPAM 0.25 MG: 0.5 TABLET ORAL at 09:19

## 2022-11-10 RX ADMIN — APIXABAN 5 MG: 5 TABLET, FILM COATED ORAL at 09:19

## 2022-11-10 RX ADMIN — CARBIDOPA AND LEVODOPA 1 TABLET: 25; 100 TABLET ORAL at 16:53

## 2022-11-10 RX ADMIN — SERTRALINE HYDROCHLORIDE 50 MG: 50 TABLET ORAL at 09:19

## 2022-11-10 RX ADMIN — CARBIDOPA AND LEVODOPA 1 TABLET: 25; 100 TABLET ORAL at 21:10

## 2022-11-10 RX ADMIN — CEPHALEXIN 500 MG: 250 CAPSULE ORAL at 09:19

## 2022-11-10 RX ADMIN — ERGOCALCIFEROL 50000 UNITS: 1.25 CAPSULE ORAL at 09:18

## 2022-11-10 RX ADMIN — ARIPIPRAZOLE 5 MG: 5 TABLET ORAL at 09:19

## 2022-11-10 RX ADMIN — APIXABAN 5 MG: 5 TABLET, FILM COATED ORAL at 16:54

## 2022-11-10 RX ADMIN — CLONAZEPAM 0.25 MG: 0.5 TABLET ORAL at 16:54

## 2022-11-10 RX ADMIN — MIRTAZAPINE 7.5 MG: 15 TABLET, FILM COATED ORAL at 21:10

## 2022-11-10 RX ADMIN — ATORVASTATIN CALCIUM 40 MG: 40 TABLET, FILM COATED ORAL at 16:53

## 2022-11-10 RX ADMIN — Medication 3 MG: at 21:10

## 2022-11-10 RX ADMIN — CARBIDOPA AND LEVODOPA 1 TABLET: 25; 100 TABLET ORAL at 09:19

## 2022-11-10 RX ADMIN — CARBIDOPA AND LEVODOPA 1 TABLET: 25; 100 TABLET ORAL at 12:05

## 2022-11-10 NOTE — CASE MANAGEMENT
[facility] [place]  [phone] and spoke with staff nurse, Aurora Barrera to obtain  Collaborating information  In addition to health service providers, noted in the patient's Psychosocial, the nurse stated that over the course of the patient's stay, he present with a similar behavioral pattern, most recently became overly anxious, worrisome, fearful and withdrawn to room, refusing to come to the community room for meals, eating most meals in his room  Did acknowledge accuracy related to conflict with residents, though stated that the issue has been primarily with his roommate, rather than multiple peers  She stated that a room change will be processed while the patient is hospitalized  She is hopeful that will be helpful to his recovery  Aurora Barrera requested that the facility CM's be notified of patient's discharge in order for facility scheduling of f/u med mgmt and therapy appt's  CM numbers provided  for Marcellus and Essie at the above noted facility number and ext [phone] and [phone] respectively  Fx: [phone]

## 2022-11-10 NOTE — NURSING NOTE
Out to community room for snack only  Slept during all other times this evening  Pleasant and cooperative with care  Rated anxiety low and depression high  Denied suicidal ideations  No complaints of pain  Medication education given, good understanding  No acute behaviors  Maintained on q 7 minute safety checks

## 2022-11-10 NOTE — PROGRESS NOTES
11/10/22 2108   Activity/Group Checklist   Group Community meeting   Attendance Did not attend  (Pt offered grp; pt remained in his room)

## 2022-11-10 NOTE — PROGRESS NOTES
11/10/22 1258   Team Meeting   Meeting Type Tx Team Meeting   Initial Conference Date 11/09/22   Next Conference Date 12/09/22   Team Members Present   Team Members Present Physician;Nurse;   Physician Team Member Dr Jasper Galan Team Member Nirmal Armas RN   Care Management Team Member Florentin Sharma RN   Patient/Family Present   Patient Present Yes   Patient's Family Present No     Treatment Team met with patient for review of Treatment Plan  Goals, Objectives, Strengths, Limitations and Interventions were reviewed  The patient reported understanding and agreement and signed the Treatment Plan document

## 2022-11-10 NOTE — CASE MANAGEMENT
CM spoke with patient's sister, Kin Veronika, as contact, to obtain any information helpful to the patient's overall care  Much of information provided can be obtained via review of the Psychosocial Eval  Additionally, the sister did report that the patient's current facility discharged the patient approx 2 years ago while patient was being treated at a [facility] for medical issues  The sister stated that the 'motive' for d/c was the patient's repeated/frequent need for psych in-patient treatments  However, the sister, an involved advocate, utilized legal support, with resulting readmission to the sending facility  Phone number provided for staff contact  The sister requested no further information and the call ended mutually

## 2022-11-10 NOTE — CASE MANAGEMENT
PC rec'd from [place], nurse at [facility]: [phone], at address of [facility] and provider of Psychiatric services for that facility  Confirmation of patient's hospitalization provided to caller  Phone number to nurses's station and general information provided  No further information requested

## 2022-11-10 NOTE — NURSING NOTE
Patient withdrawn to room throughout the day  Pt declined breakfast  Compliant with medications  Amiodarone held due to BP/HR  M D  PA-c aware and ordered parameters  Patient is blunted, depressed and disheveled  Patient rates anxiety 4/10 and moderate depression  Denies SI/HI/AVH  Patient encouraged to come out for lunch meal as he did not eat or drink much since breakfast  Patient ate everything at lunch meal   Q 7 min safety checks maintained  Will continue to monitor for safety and provide support/therapeutic milieu

## 2022-11-10 NOTE — PROGRESS NOTES
Progress Note - Carlo [address] 61 y o  male MRN: [id number]   Unit/Bed#: Deja Veloz 205-02 Encounter: [id number]    Behavior over the last 24 hours: unchanged  Candie Dewitt is seen in his room and resting in bed  Blunted affect,  Poor eye contact, slow to respond, poor historian  Cannot recall his medications  I have requested an update list from [facility] and awaiting fax  Discharge summary from Psych admission in September in [place] was discontinued and Abilify optimized, so I will for now dc Latuda and increase abilify  He presents depressed, despondent and states while he feels safe here, would have suicidal thoughts if back at [facility]  May be some paranoia toward other residents at facility? But denies any hallucinations and no other overt psychosis noted  Has been isolating to room  Will make med adjustments once med list received from [facility]  Still does not participate much in milieu      Sleep: hypersomnia  Appetite: poor  Medication side effects: No   ROS: all other systems are negative    Mental Status Evaluation:    Appearance:  poor hygiene   Behavior:  cooperative   Speech:  normal rate, normal volume   Mood:  depressed   Affect:  blunted   Thought Process:  goal directed   Associations: intact associations   Thought Content:  no overt delusions   Perceptual Disturbances: no auditory hallucinations   Risk Potential: Suicidal ideation - None  Homicidal ideation - None  Potential for aggression - No   Sensorium:  oriented to person   Memory:  recent memory impaired   Consciousness:  alert and awake   Attention: decreased concentration and decreased attention span   Insight:  poor   Judgment: poor   Gait/Station: in bed   Motor Activity: no abnormal movements     Vital signs in last 24 hours:    Temp:  [97 7 °F (36 5 °C)-98 4 °F (36 9 °C)] 97 7 °F (36 5 °C)  HR:  [51-62] 51  Resp:  [17-18] 18  BP: (85-93)/(50-55) 93/50    Laboratory results: I have personally reviewed all pertinent laboratory/tests results  Progress Toward Goals: no improvement    Assessment/Plan   Principal Problem:    Schizoaffective disorder (HCC)  Active Problems:    History of MI (myocardial infarction)    History of CVA (cerebrovascular accident)    Other hyperlipidemia    Major neurocognitive disorder, due to vascular disease, without behavioral disturbance, mild    CVA (cerebral vascular accident) ([facility] )    Other chronic pain    Essential hypertension    Arthritis    Coronary artery disease involving native heart with angina pectoris, unspecified vessel or lesion type ([facility] )    Recommended Treatment:     Planned medication and treatment changes: All current active medications have been reviewed  Encourage group therapy, milieu therapy and occupational therapy  Behavioral Health checks every 7 minutes    Requires continued inpatient treatment due to chronic illness and high risk of decompensation if discharged before long term stability is achieved      Increase Abilify     Discontinue Latuda    Current Facility-Administered Medications   Medication Dose Route Frequency Provider Last Rate   • aluminum-magnesium hydroxide-simethicone  30 mL Oral Q4H PRN Arpita Ibarra MD     • amiodarone  200 mg Oral Daily Emperatriz Marti PA-C     • apixaban  5 mg Oral BID Arpita Ibarra MD     • ARIPiprazole  5 mg Oral Daily Arpita Ibarra MD     • atorvastatin  40 mg Oral Daily With Dinner Arpita Ibarra MD     • carbidopa-levodopa  1 tablet Oral 4x Daily Arpita Ibarra MD     • cephalexin  500 mg Oral Q12H [address] Emperatriz Marti PA-C     • [START ON 1/13/2023] cholecalciferol  1,000 Units Oral Daily Emperatriz Marti PA-C     • clonazePAM  0 25 mg Oral BID Arpita Ibarra MD     • ergocalciferol  50,000 Units Oral Weekly Emperatriz Matri PA-C     • hydrOXYzine HCL  25 mg Oral Q6H PRN Max 4/day Arpita Ibarra MD     • influenza vaccine  0 5 mL Intramuscular Once Savana Shaver MD     • LORazepam  1 mg Intramuscular Q6H PRN Max 3/day Mendez Ying MD     • lurasidone  20 mg Oral Daily With Mackenzie Sequeira MD     • melatonin  3 mg Oral HS Mendez Ying MD     • mirtazapine  7 5 mg Oral HS Mendez Ying MD     • nicotine  1 patch Transdermal Daily Emperatriz Marti PA-C     • nicotine polacrilex  4 mg Oral Q2H PRN Emperatriz Marti PA-C     • risperiDONE  0 25 mg Oral Q4H PRN Max 6/day Mnedez Ying MD     • risperiDONE  0 5 mg Oral Q4H PRN Max 3/day Mendez Ying MD     • risperiDONE  1 mg Oral Q2H PRN Max 3/day Mendez Ying MD     • sertraline  50 mg Oral Daily Mendez Ying MD         Risks / Benefits of Treatment:    Risks, benefits, and possible side effects of medications explained to patient and patient verbalizes understanding and agreement for treatment  Counseling / Coordination of Care:    Patient's progress discussed with staff in treatment team meeting  Medications, treatment progress and treatment plan reviewed with patient      ERNIE Toribio 11/10/22

## 2022-11-10 NOTE — PROGRESS NOTES
11/10/22 1300   Activity/Group Checklist   Group   (Pet Therapy and Art Therapy Collaboration)   Attendance Did not attend  (Group offered, PT elected to remain in room)

## 2022-11-10 NOTE — PLAN OF CARE
Problem: Risk for Self Injury/Neglect  Goal: Treatment Goal: Remain safe during length of stay, learn and adopt new coping skills, and be free of self-injurious ideation, impulses and acts at the time of discharge  Outcome: Progressing  Goal: Refrain from harming self  Description: Interventions:  - Monitor patient closely, per order  - Develop a trusting relationship  - Supervise medication ingestion, monitor effects and side effects   Outcome: Progressing  Goal: Recognize maladaptive responses and adopt new coping mechanisms  Outcome: Progressing     Problem: Depression  Goal: Treatment Goal: Demonstrate behavioral control of depressive symptoms, verbalize feelings of improved mood/affect, and adopt new coping skills prior to discharge  Outcome: Progressing  Goal: Refrain from isolation  Description: Interventions:  - Develop a trusting relationship   - Encourage socialization   Outcome: Progressing  Goal: Refrain from self-neglect  Outcome: Progressing  Goal: Complete daily ADLs, including personal hygiene independently, as able  Description: Interventions:  - Observe, teach, and assist patient with ADLS  -  Monitor and promote a balance of rest/activity, with adequate nutrition and elimination   Outcome: Progressing     Problem: Anxiety  Goal: Anxiety is at manageable level  Description: Interventions:  - Assess and monitor patient's anxiety level  - Monitor for signs and symptoms (heart palpitations, chest pain, shortness of breath, headaches, nausea, feeling jumpy, restlessness, irritable, apprehensive)  - Collaborate with interdisciplinary team and initiate plan and interventions as ordered    - [place] patient to unit/surroundings  - Explain treatment plan  - Encourage participation in care  - Encourage verbalization of concerns/fears  - Identify coping mechanisms  - Assist in developing anxiety-reducing skills  - Administer/offer alternative therapies  - Limit or eliminate stimulants  Outcome: Progressing

## 2022-11-10 NOTE — PROGRESS NOTES
Progress Note Niesha Damon 61 y o  male MRN: [id number]    Unit/Bed#Frida Chappell 205-02 Encounter: [id number]        Subjective:   Patient seen and examined at bedside after reviewing the chart and discussing the case with the caring staff  Patient examined at bedside  Patient has no acute concerns  Amiodarone held this morning due to low BP and HR  Patient asymptomatic  Physical Exam   Vitals: Blood pressure 93/50, pulse (!) 51, temperature 97 7 °F (36 5 °C), temperature source Temporal, resp  rate 18, height 5' 11" (1 803 m), weight 73 8 kg (162 lb 12 8 oz), SpO2 96 %  ,Body mass index is 22 71 kg/m²  Constitutional: Patient in no acute distress  HEENT: PERR, EOMI, MMM  Cardiovascular: Bradycardic and regular rhythm  Pulmonary/Chest: Effort normal and breath sounds normal    Abdomen: Non distended  Assessment/Plan:  Crystal Norman is a(n) 61 y o  male with schizoaffective disorder        1  Cardiac with history of HTN, HLD, CAD, ventricular tachycardia s/p ICD, Takotsubo cardiomyopathy  Continue atorvastatin 40 mg daily, amiodarone 200 mg daily with holding parameters, Eliquis 5 mg twice daily  2  Hx of CVA/apical mural thrombus  Continue Eliquis 5 mg twice daily, atorvastatin 40 mg daily  3  DJD/OA  Patient may take Tylenol as needed  4  Tobacco abuse  NRT  5  Vitamin-D deficiency  Patient started on vitamin-D2 09207 units weekly for 10 weeks followed by vitamin D3 1000 units daily  6  Acute cystitis  Urine culture with 30-36580 cfu/ml mixed contaminants x3  Patient denies urinary symptoms  D/c cephalexin  The patient was discussed with Dr Marylin Reagan and he is in agreement with the above note

## 2022-11-10 NOTE — PROGRESS NOTES
11/10/22   Team Meeting   Meeting Type Daily Rounds   Team Members Present   Team Members Present Physician;Nurse;; Occupational Therapist   Physician Team Member Dr Domo Cantu MD   Nursing Team Member [facility] RN; [facility] RN, [facility]; Joshua Zhao RN, [facility]   Social Work Team Member [facility]   OT Team Member Deidra Sanford   Patient/Family Present   Patient Present No   Patient's Family Present No     Pt limited historian  bx stable  Did not eat breakfast; reported not being hungry  Med compliant  anx 4/10; dep moderate  Denied all else  Pleasant last night  Delayed responses  Return to [facility] upon d/c
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
## 2022-11-11 LAB
ATRIAL RATE: 53 BPM
P AXIS: 72 DEGREES
PR INTERVAL: 158 MS
QRS AXIS: 30 DEGREES
QRSD INTERVAL: 96 MS
QT INTERVAL: 474 MS
QTC INTERVAL: 444 MS
T WAVE AXIS: 97 DEGREES
VENTRICULAR RATE: 53 BPM

## 2022-11-11 RX ORDER — LAMOTRIGINE 25 MG/1
25 TABLET ORAL
Status: DISCONTINUED | OUTPATIENT
Start: 2022-11-11 | End: 2022-11-16

## 2022-11-11 RX ADMIN — MIRTAZAPINE 7.5 MG: 15 TABLET, FILM COATED ORAL at 21:12

## 2022-11-11 RX ADMIN — CARBIDOPA AND LEVODOPA 1 TABLET: 25; 100 TABLET ORAL at 17:14

## 2022-11-11 RX ADMIN — CLONAZEPAM 0.25 MG: 0.5 TABLET ORAL at 09:10

## 2022-11-11 RX ADMIN — CARBIDOPA AND LEVODOPA 1 TABLET: 25; 100 TABLET ORAL at 09:10

## 2022-11-11 RX ADMIN — ARIPIPRAZOLE 10 MG: 10 TABLET ORAL at 09:10

## 2022-11-11 RX ADMIN — APIXABAN 5 MG: 5 TABLET, FILM COATED ORAL at 17:14

## 2022-11-11 RX ADMIN — APIXABAN 5 MG: 5 TABLET, FILM COATED ORAL at 09:10

## 2022-11-11 RX ADMIN — LAMOTRIGINE 25 MG: 25 TABLET ORAL at 21:12

## 2022-11-11 RX ADMIN — CARBIDOPA AND LEVODOPA 1 TABLET: 25; 100 TABLET ORAL at 21:12

## 2022-11-11 RX ADMIN — CARBIDOPA AND LEVODOPA 1 TABLET: 25; 100 TABLET ORAL at 13:11

## 2022-11-11 RX ADMIN — Medication 3 MG: at 21:12

## 2022-11-11 RX ADMIN — SERTRALINE HYDROCHLORIDE 50 MG: 50 TABLET ORAL at 09:10

## 2022-11-11 RX ADMIN — ATORVASTATIN CALCIUM 40 MG: 40 TABLET, FILM COATED ORAL at 17:14

## 2022-11-11 NOTE — PROGRESS NOTES
Progress Note - Carlo [address] 61 y o  male MRN: [id number]   Unit/Bed#: Shan Ramirez 205-02 Encounter: [id number]    Behavior over the last 24 hours: unchanged  Kalin Restrepo is seen in his room  Continues to present flat and depressed, slow to respond  He cannot provide too much history regarding medication history  He does state he has history of Bipolar disorder and past manic episodes, but currently depressed  Denies hallucinations, admits to paranoia  Starting to feel less paranoid  We discussed mood stabilizer, states he was on lithium but does believe was stopped due to kidney issues, but not sure  He is willing to start Lamictal and will continue to increase Zoloft  Feels safe here, not sure he would be safe if discharged due to intermittent suicidal thoughts  Did discontinue clonazepam 0 25 mg as he does spend much of the day in bed  Will monitor for increase in anxiety or need for PRN medications  Does not come to groups      Sleep: hypersomnia  Appetite: fair  Medication side effects: No   ROS: all other systems are negative    Mental Status Evaluation:    Appearance:  dressed in hospital attire   Behavior:  guarded   Speech:  slow, scant, soft   Mood:  depressed   Affect:  blunted   Thought Process:  goal directed   Associations: intact associations   Thought Content:  no overt delusions   Perceptual Disturbances: none   Risk Potential: Suicidal ideation - None  Homicidal ideation - None  Potential for aggression - No   Sensorium:  oriented to person, place and time/date   Memory:  recent and remote memory grossly intact   Consciousness:  alert and awake   Attention: decreased concentration and decreased attention span   Insight:  limited   Judgment: limited   Gait/Station: normal gait/station, normal balance   Motor Activity: no abnormal movements     Vital signs in last 24 hours:    Temp:  [97 6 °F (36 4 °C)-98 1 °F (36 7 °C)] 97 6 °F (36 4 °C)  HR:  [55-62] 55  Resp:  [16-18] 16  BP: ()/(55-68) 104/63    Laboratory results: I have personally reviewed all pertinent laboratory/tests results  Progress Toward Goals: progressing    Assessment/Plan   Principal Problem:    Schizoaffective disorder (HCC)  Active Problems:    History of MI (myocardial infarction)    History of CVA (cerebrovascular accident)    Other hyperlipidemia    Major neurocognitive disorder, due to vascular disease, without behavioral disturbance, mild    CVA [facility]cere[facility] vascular accident) ([facility] )    Other chronic pain    Essential hypertension    Arthritis    Coronary artery disease involving native heart with angina pectoris, unspecified vessel or lesion type ([facility] )    Recommended Treatment:     Planned medication and treatment changes: All current active medications have been reviewed  Encourage group therapy, milieu therapy and occupational therapy  Behavioral Health checks every 7 minutes    Requires continued inpatient treatment due to chronic illness and high risk of decompensation if discharged before long term stability is achieved      Continue current medications:  Current Facility-Administered Medications   Medication Dose Route Frequency Provider Last Rate   • aluminum-magnesium hydroxide-simethicone  30 mL Oral Q4H PRN Diane Banks MD     • amiodarone  200 mg Oral Daily Emperatriz Marti PA-C     • apixaban  5 mg Oral BID Diane Banks MD     • ARIPiprazole  10 mg Oral Daily ERNIE Vaughan     • atorvastatin  40 mg Oral Daily With Dinner Diane Banks MD     • carbidopa-levodopa  1 tablet Oral 4x Daily Diane Banks MD     • [START ON 1/13/2023] cholecalciferol  1,000 Units Oral Daily Emperatriz Marti PA-C     • ergocalciferol  50,000 Units Oral Weekly Emperatriz Marti PA-C     • hydrOXYzine HCL  25 mg Oral Q6H PRN Max 4/day Diane Banks MD     • influenza vaccine  0 5 mL Intramuscular Once Yaritza Almanzar MD     • lamoTRIgine  25 mg Oral HS ERNIE Vaughan     • LORazepam 1 mg Intramuscular Q6H PRN Max 3/day Carmel Lentz MD     • melatonin  3 mg Oral HS Carmel Lentz MD     • mirtazapine  7 5 mg Oral HS Carmel Lentz MD     • nicotine  1 patch Transdermal Daily Emperatriz Marti PA-C     • nicotine polacrilex  4 mg Oral Q2H PRN Emperatriz Marti PA-C     • risperiDONE  0 25 mg Oral Q4H PRN Max 6/day Carmel Lentz MD     • risperiDONE  0 5 mg Oral Q4H PRN Max 3/day Carmel Lentz MD     • risperiDONE  1 mg Oral Q2H PRN Max 3/day Carmel Lentz MD     • sertraline  50 mg Oral Daily Carmel Lentz MD         Risks / Benefits of Treatment:    Risks, benefits, and possible side effects of medications explained to patient and patient verbalizes understanding and agreement for treatment  Counseling / Coordination of Care:    Patient's progress discussed with staff in treatment team meeting  Medications, treatment progress and treatment plan reviewed with patient      ERNIE Arenas 11/11/22

## 2022-11-11 NOTE — PROGRESS NOTES
11/11/22   Team Meeting   Meeting Type Daily Rounds   Team Members Present   Team Members Present Physician;Nurse;; Occupational Therapist   Physician Team Member Dr Ta Santana MD   Nursing Team Member Smith HENAO; Lavell Fernández RN, [facility]   Social Work Team Member Nettie KAY   OT Team Member Freedom Tracy   Patient/Family Present   Patient Present No   Patient's Family Present No     Pt dep, withdrawn, 'bland'  Did not rate anx and dep  Sleeping a lot  Refused breakfast (not hungry), attended lunch and dinner with encouragement, pm snack attended no prompting needed

## 2022-11-11 NOTE — PLAN OF CARE
Problem: Risk for Self Injury/Neglect  Goal: Treatment Goal: Remain safe during length of stay, learn and adopt new coping skills, and be free of self-injurious ideation, impulses and acts at the time of discharge  Outcome: Progressing  Goal: Refrain from harming self  Description: Interventions:  - Monitor patient closely, per order  - Develop a trusting relationship  - Supervise medication ingestion, monitor effects and side effects   Outcome: Progressing  Goal: Recognize maladaptive responses and adopt new coping mechanisms  Outcome: Progressing     Problem: Depression  Goal: Treatment Goal: Demonstrate behavioral control of depressive symptoms, verbalize feelings of improved mood/affect, and adopt new coping skills prior to discharge  Outcome: Progressing  Goal: Refrain from isolation  Description: Interventions:  - Develop a trusting relationship   - Encourage socialization   Outcome: Progressing  Goal: Refrain from self-neglect  Outcome: Progressing  Goal: Complete daily ADLs, including personal hygiene independently, as able  Description: Interventions:  - Observe, teach, and assist patient with ADLS  -  Monitor and promote a balance of rest/activity, with adequate nutrition and elimination   Outcome: Progressing     Problem: Anxiety  Goal: Anxiety is at manageable level  Description: Interventions:  - Assess and monitor patient's anxiety level  - Monitor for signs and symptoms (heart palpitations, chest pain, shortness of breath, headaches, nausea, feeling jumpy, restlessness, irritable, apprehensive)  - Collaborate with interdisciplinary team and initiate plan and interventions as ordered    - [place] patient to unit/surroundings  - Explain treatment plan  - Encourage participation in care  - Encourage verbalization of concerns/fears  - Identify coping mechanisms  - Assist in developing anxiety-reducing skills  - Administer/offer alternative therapies  - Limit or eliminate stimulants  Outcome: Progressing     Problem: Ineffective Coping  Goal: Participates in unit activities  Description: Interventions:  - Provide therapeutic environment   - Provide required programming   - Redirect inappropriate behaviors   Outcome: Progressing

## 2022-11-11 NOTE — NURSING NOTE
Patient withdrawn in bed most of shift  Out to community room for evening snack  Controlled  Denies anxiety, depression or suicidal ideations  No acute behaviors  Complaint with medications  Maintained on q 7 minute safety checks

## 2022-11-11 NOTE — PROGRESS NOTES
Progress Note Elan Gorman 61 y o  male MRN: [id number]    Unit/Bed#Vidhya Roe 205-02 Encounter: [id number]        Subjective:   Patient seen and examined at bedside after reviewing the chart and discussing the case with the caring staff  Patient examined at bedside  Patient has no acute concerns  Amiodarone held this morning due to low BP and HR  Patient asymptomatic  Physical Exam   Vitals: Blood pressure 104/63, pulse 55, temperature 97 6 °F (36 4 °C), temperature source Temporal, resp  rate 16, height 5' 11" (1 803 m), weight 73 8 kg (162 lb 12 8 oz), SpO2 95 %  ,Body mass index is 22 71 kg/m²  Constitutional: Patient in no acute distress  HEENT: PERR, EOMI, MMM  Cardiovascular: Bradycardic and regular rhythm  Pulmonary/Chest: Effort normal and breath sounds normal    Abdomen: Non distended  Assessment/Plan:  Crescencio Varner is a(n) 61 y o  male with schizoaffective disorder        1  Cardiac with history of HTN, HLD, CAD, ventricular tachycardia s/p ICD, Takotsubo cardiomyopathy  Continue atorvastatin 40 mg daily, amiodarone 200 mg daily with holding parameters, Eliquis 5 mg twice daily  2  Hx of CVA/apical mural thrombus  Continue Eliquis 5 mg twice daily, atorvastatin 40 mg daily  3  DJD/OA  Patient may take Tylenol as needed  4  Tobacco abuse  NRT  5  Vitamin-D deficiency  Patient started on vitamin-D2 90650 units weekly for 10 weeks followed by vitamin D3 1000 units daily  The patient was discussed with Dr Ratna Enriquez and he is in agreement with the above note

## 2022-11-11 NOTE — PROGRESS NOTES
11/11/22 [id number]   Activity/Group Checklist   Group Community meeting   Attendance Did not attend  (Pt sleeping)

## 2022-11-11 NOTE — NURSING NOTE
Patient remains quiet, withdrawn, minimally responsive to approach  Frequently observed lying in bed; limited one or two word answers  Difficult to obtain levels of depression/anxiety, just stating "yes" in response to inquiry  Denies A/V hallucinations  Reports intermittent feelings of paranoia  No current thoughts of self or other- directed harm  Cooperative with medication administration  Interest/ motivation very limited; no group attendance or participation  Will continue to encourage and support increased interaction  Monitor response to prescribed rx

## 2022-11-12 RX ADMIN — ARIPIPRAZOLE 10 MG: 10 TABLET ORAL at 08:48

## 2022-11-12 RX ADMIN — CARBIDOPA AND LEVODOPA 1 TABLET: 25; 100 TABLET ORAL at 12:08

## 2022-11-12 RX ADMIN — ATORVASTATIN CALCIUM 40 MG: 40 TABLET, FILM COATED ORAL at 17:05

## 2022-11-12 RX ADMIN — APIXABAN 5 MG: 5 TABLET, FILM COATED ORAL at 17:05

## 2022-11-12 RX ADMIN — NICOTINE 1 PATCH: 21 PATCH, EXTENDED RELEASE TRANSDERMAL at 08:46

## 2022-11-12 RX ADMIN — LAMOTRIGINE 25 MG: 25 TABLET ORAL at 21:02

## 2022-11-12 RX ADMIN — CARBIDOPA AND LEVODOPA 1 TABLET: 25; 100 TABLET ORAL at 21:02

## 2022-11-12 RX ADMIN — Medication 3 MG: at 21:02

## 2022-11-12 RX ADMIN — CARBIDOPA AND LEVODOPA 1 TABLET: 25; 100 TABLET ORAL at 17:05

## 2022-11-12 RX ADMIN — APIXABAN 5 MG: 5 TABLET, FILM COATED ORAL at 08:48

## 2022-11-12 RX ADMIN — SERTRALINE HYDROCHLORIDE 75 MG: 50 TABLET ORAL at 08:48

## 2022-11-12 RX ADMIN — CARBIDOPA AND LEVODOPA 1 TABLET: 25; 100 TABLET ORAL at 08:48

## 2022-11-12 RX ADMIN — MIRTAZAPINE 7.5 MG: 15 TABLET, FILM COATED ORAL at 21:02

## 2022-11-12 NOTE — NURSING NOTE
Pt is flat & withdrawn to his room in between meals  Pt is cooperative & compliant with medications  Pt up ad navarro & gait is steady  Pt c/o depression 8/10 & anxiety 5/10  Pt denies any hallucinations, suicidal or homicidal ideations  Q 7 min checks maintained to monitor pt's behavior & safety  Tremors noted bilateral hands  Amiodarone held this AM due to BP - 97/60

## 2022-11-12 NOTE — NURSING NOTE
Pt quiet and withdrawn to room  Pt states depression and anxiety as "a little bit"  Denies SI/Hi and hallucinations  Pt remained in bed throughout the evening and did not come out for PM snack  Med compliant with all HS medications  Will continue to monitor

## 2022-11-12 NOTE — PLAN OF CARE
Problem: Risk for Self Injury/Neglect  Goal: Treatment Goal: Remain safe during length of stay, learn and adopt new coping skills, and be free of self-injurious ideation, impulses and acts at the time of discharge  Outcome: Progressing  Goal: Refrain from harming self  Description: Interventions:  - Monitor patient closely, per order  - Develop a trusting relationship  - Supervise medication ingestion, monitor effects and side effects   Outcome: Progressing     Problem: Depression  Goal: Refrain from isolation  Description: Interventions:  - Develop a trusting relationship   - Encourage socialization   Outcome: Not Progressing     Problem: Anxiety  Goal: Anxiety is at manageable level  Description: Interventions:  - Assess and monitor patient's anxiety level  - Monitor for signs and symptoms (heart palpitations, chest pain, shortness of breath, headaches, nausea, feeling jumpy, restlessness, irritable, apprehensive)  - Collaborate with interdisciplinary team and initiate plan and interventions as ordered    - [place] patient to unit/surroundings  - Explain treatment plan  - Encourage participation in care  - Encourage verbalization of concerns/fears  - Identify coping mechanisms  - Assist in developing anxiety-reducing skills  - Administer/offer alternative therapies  - Limit or eliminate stimulants  Outcome: Progressing

## 2022-11-12 NOTE — PROGRESS NOTES
Progress Note May Tadeo 61 y o  male MRN: [id number]    Unit/Bed#Aminta Finn 205-02 Encounter: [id number]        Subjective:   Patient seen and examined at bedside after reviewing the chart and discussing the case with the caring staff  Patient examined at bedside  Patient has no acute concerns  Physical Exam   Vitals: Blood pressure 130/71, pulse 72, temperature 98 6 °F (37 °C), temperature source Temporal, resp  rate 18, height 5' 11" (1 803 m), weight 73 8 kg (162 lb 12 8 oz), SpO2 95 %  ,Body mass index is 22 71 kg/m²  Constitutional: Patient in no acute distress  HEENT: PERR, EOMI, MMM  Cardiovascular: Bradycardic and regular rhythm  Pulmonary/Chest: Effort normal and breath sounds normal    Abdomen: Non distended  Assessment/Plan:  Niesha Garcia is a(n) 61 y o  male with schizoaffective disorder        1  Cardiac with history of HTN, HLD, CAD, ventricular tachycardia s/p ICD, Takotsubo cardiomyopathy  Continue atorvastatin 40 mg daily, amiodarone 200 mg daily with holding parameters, Eliquis 5 mg twice daily  2  Hx of CVA/apical mural thrombus  Continue Eliquis 5 mg twice daily, atorvastatin 40 mg daily  3  DJD/OA  Patient may take Tylenol as needed  4  Tobacco abuse  NRT  5  Vitamin-D deficiency  Patient started on vitamin-D2 52554 units weekly for 10 weeks followed by vitamin D3 1000 units daily

## 2022-11-12 NOTE — NURSING NOTE
Pt slept all night with no signs of pain or distress observed  Q7 safety checks maintained  Will continue to monitor

## 2022-11-12 NOTE — PROGRESS NOTES
Progress Note - Behavioral Health   Bronwyn Cherry 61 y o  male MRN: [id number]  Unit/Bed#Agustin Garnett 205-02 Encounter: [id number]    Assessment/Plan   Principal Problem:    Schizoaffective disorder ([facility] )  Active Problems:    History of MI (myocardial infarction)    History of CVA (cerebrovascular accident)    Other hyperlipidemia    Major neurocognitive disorder, due to vascular disease, without behavioral disturbance, mild    CVA (cerebral vascular accident) ([facility] )    Other chronic pain    Essential hypertension    Arthritis    Coronary artery disease involving native heart with angina pectoris, unspecified vessel or lesion type ([facility] 75 )    Recommended Treatment:     All current active medications have been reviewed  Encourage group therapy, milieu therapy and occupational therapy  Behavioral Health checks every 7 minutes  Requires continued inpatient treatment due to chronic illness and high risk of decompensation if discharged before long term stability is achieved  Requires continued inpatient treatment while awaiting placement due to chronic illness and high risk of decompensation if discharged to an unstructured environment  Medical management per SLIM    Legal Status: 201  ----------------------------------------      Subjective:  Patient was seen and examined today at the bedside during morning rounds  Patient is guarded and looks paranoid and scared from strangers  He is trying to avoid eye contact and his speech is very sparse  He reported that he feels something bad will happen to him or someone is going to hurt him but he cannot specify when or how  He is compliant with medications and denies side effects    He does not recall why he was admitted to the hospital   He denies any problem that occurred the group home prior to admission    Behavior over the last 24 hours:  unchanged  Sleep: normal  Appetite: normal  Medication side effects: No  ROS: no complaints and all other systems are negative    Mental Status Evaluation:  Appearance:  age appropriate, dressed in hospital attire   Behavior:  guarded   Speech:  slow   Mood:  anxious   Affect:  constricted   Thought Process:  perseverative   Associations: blocking, thought blocking   Thought Content:  paranoid thoughts   Perceptual Disturbances: appears responding to internal stimuli   Risk Potential: Suicidal ideation - None at present  Homicidal ideation - None at present  Potential for aggression - Not at present   Sensorium:  oriented to person, place and time/date   Memory:  recent and remote memory: unable to assess due to lack of cooperation   Consciousness:  alert and awake   Attention/Concentration: attention span and concentration appear shorter than expected for age   Insight:  limited   Judgment: limited   Gait/Station: in bed   Motor Activity: no abnormal movements     Medications:   all current active meds have been reviewed, continue current psychiatric medications and current meds:   Current Facility-Administered Medications   Medication Dose Route Frequency   • aluminum-magnesium hydroxide-simethicone (MYLANTA) oral suspension 30 mL  30 mL Oral Q4H PRN   • amiodarone tablet 200 mg  200 mg Oral Daily   • apixaban (ELIQUIS) tablet 5 mg  5 mg Oral BID   • ARIPiprazole (ABILIFY) tablet 10 mg  10 mg Oral Daily   • atorvastatin (LIPITOR) tablet 40 mg  40 mg Oral Daily With Dinner   • carbidopa-levodopa (SINEMET)  mg per tablet 1 tablet  1 tablet Oral 4x Daily   • [START ON 1/13/2023] cholecalciferol (VITAMIN D3) tablet 1,000 Units  1,000 Units Oral Daily   • ergocalciferol (VITAMIN D2) capsule 50,000 Units  50,000 Units Oral Weekly   • hydrOXYzine HCL (ATARAX) tablet 25 mg  25 mg Oral Q6H PRN Max 4/day   • influenza vaccine, recombinant, quadrivalent (FLUBLOK) IM injection 0 5 mL  0 5 mL Intramuscular Once   • lamoTRIgine (LaMICtal) tablet 25 mg  25 mg Oral HS   • LORazepam (ATIVAN) injection 1 mg  1 mg Intramuscular Q6H PRN Max 3/day   • melatonin tablet 3 mg  3 mg Oral HS   • mirtazapine (REMERON) tablet 7 5 mg  7 5 mg Oral HS   • nicotine (NICODERM CQ) 21 mg/24 hr TD 24 hr patch 1 patch  1 patch Transdermal Daily   • nicotine polacrilex (NICORETTE) gum 4 mg  4 mg Oral Q2H PRN   • risperiDONE (RisperDAL) tablet 0 25 mg  0 25 mg Oral Q4H PRN Max 6/day   • risperiDONE (RisperDAL) tablet 0 5 mg  0 5 mg Oral Q4H PRN Max 3/day   • risperiDONE (RisperDAL) tablet 1 mg  1 mg Oral Q2H PRN Max 3/day   • sertraline (ZOLOFT) tablet 75 mg  75 mg Oral Daily     Current Facility-Administered Medications   Medication Dose Route Frequency Provider Last Rate   • aluminum-magnesium hydroxide-simethicone  30 mL Oral Q4H PRN Mendez Ying MD     • amiodarone  200 mg Oral Daily Emperatriz Marti PA-C     • apixaban  5 mg Oral BID Mendez Ying MD     • ARIPiprazole  10 mg Oral Daily ERNIE Toribio     • atorvastatin  40 mg Oral Daily With Dinner Mendez Ying MD     • carbidopa-levodopa  1 tablet Oral 4x Daily Mendez Ying MD     • [START ON 1/13/2023] cholecalciferol  1,000 Units Oral Daily Emperatriz Marti PA-C     • ergocalciferol  50,000 Units Oral Weekly Emperatriz Marti PA-C     • hydrOXYzine HCL  25 mg Oral Q6H PRN Max 4/day Mendez Ying MD     • influenza vaccine  0 5 mL Intramuscular Once Savana Shaver MD     • lamoTRIgine  25 mg Oral HS ERNIE Toribio     • LORazepam  1 mg Intramuscular Q6H PRN Max 3/day Mendez Ying MD     • melatonin  3 mg Oral HS Mendez Ying MD     • mirtazapine  7 5 mg Oral HS Mendez Ying MD     • nicotine  1 patch Transdermal Daily Emperatriz Marti PA-C     • nicotine polacrilex  4 mg Oral Q2H PRN Emperatriz Marti PA-C     • risperiDONE  0 25 mg Oral Q4H PRN Max 6/day Mendez iYng MD     • risperiDONE  0 5 mg Oral Q4H PRN Max 3/day Mendez Ying MD     • risperiDONE  1 mg Oral Q2H PRN Max 3/day Mendez Ying MD     • sertraline  75 mg Oral Daily ERNIE Toribio         Labs:  I have personally reviewed all pertinent laboratory/tests results  Most Recent Labs:   Lab Results   Component Value Date    WBC 7 24 11/09/2022    RBC 4 35 11/09/2022    HGB 13 5 11/09/2022    HCT 44 0 11/09/2022     11/09/2022    RDW 13 1 11/09/2022    NEUTROABS 4 63 11/09/2022    SODIUM 139 11/09/2022    K 4 1 11/09/2022     11/09/2022    CO2 29 11/09/2022    BUN 13 11/09/2022    CREATININE 0 93 11/09/2022    GLUC 84 11/09/2022    CALCIUM 8 4 11/09/2022    AST  11/08/2022      Comment:      No Result; if an AST is required for patient care, it must be ordered separately  Specimen collection should occur prior to Sulfasalazine administration due to the potential for falsely depressed results  ALT 17 11/08/2022    ALKPHOS 100 11/08/2022    TP 7 6 11/08/2022    ALB 3 5 11/08/2022    TBILI 0 60 11/08/2022    CHOLESTEROL 96 11/09/2022    HDL 51 11/09/2022    TRIG 48 11/09/2022    LDLCALC 35 11/09/2022    NONHDLC 45 11/09/2022    LITHIUM 0 7 09/05/2019    STW3QCSNOHYY 1 308 11/08/2022    FREET4 1 14 08/10/2020    RPR Non-Reactive 11/09/2022    HGBA1C 5 5 09/27/2022     09/27/2022       Progress Toward Goals: improving slowly    Risks / Benefits of Treatment:    Risks, benefits, and possible side effects of medications explained to patient  Patient has limited understanding of risks and benefits of treatment at this time, but agrees to take medications as prescribed  Counseling / Coordination of Care: Total floor / unit time spent today 35 minutes  Greater than 50% of total time was spent with the patient and / or family counseling and / or coordination of care  A description of counseling / coordination of care:  Patient's progress discussed with staff in treatment team meeting  Medications, treatment progress and treatment plan reviewed with patient  Recent stressors including housing issues and limited support discussed with patient    Educated on importance of medication and treatment compliance  Reassurance and supportive therapy provided  Encouraged participation in milieu and group therapy on the unit      Ashley Deleon MD 11/12/22

## 2022-11-13 RX ADMIN — ARIPIPRAZOLE 10 MG: 10 TABLET ORAL at 08:09

## 2022-11-13 RX ADMIN — SERTRALINE HYDROCHLORIDE 75 MG: 50 TABLET ORAL at 08:09

## 2022-11-13 RX ADMIN — CARBIDOPA AND LEVODOPA 1 TABLET: 25; 100 TABLET ORAL at 08:09

## 2022-11-13 RX ADMIN — Medication 3 MG: at 21:09

## 2022-11-13 RX ADMIN — CARBIDOPA AND LEVODOPA 1 TABLET: 25; 100 TABLET ORAL at 21:09

## 2022-11-13 RX ADMIN — CARBIDOPA AND LEVODOPA 1 TABLET: 25; 100 TABLET ORAL at 12:24

## 2022-11-13 RX ADMIN — MIRTAZAPINE 7.5 MG: 15 TABLET, FILM COATED ORAL at 21:08

## 2022-11-13 RX ADMIN — LAMOTRIGINE 25 MG: 25 TABLET ORAL at 21:09

## 2022-11-13 RX ADMIN — APIXABAN 5 MG: 5 TABLET, FILM COATED ORAL at 17:16

## 2022-11-13 RX ADMIN — ATORVASTATIN CALCIUM 40 MG: 40 TABLET, FILM COATED ORAL at 16:07

## 2022-11-13 RX ADMIN — CARBIDOPA AND LEVODOPA 1 TABLET: 25; 100 TABLET ORAL at 17:16

## 2022-11-13 RX ADMIN — APIXABAN 5 MG: 5 TABLET, FILM COATED ORAL at 08:09

## 2022-11-13 NOTE — NURSING NOTE
Pt slept through the night with no signs of pain or distress observed  Q7 safety checks maintained  Will continue to monitor

## 2022-11-13 NOTE — NURSING NOTE
Pt withdrawn to room  Endorsed depression and anxiety at 4/10, denied HI/SI and hallucinations  Compliant with all HS medications  Pt currently sleeping with no signs of pain or distress observed  Q7 safety checks maintained  Will continue to monitor

## 2022-11-13 NOTE — PLAN OF CARE
Problem: Risk for Self Injury/Neglect  Goal: Refrain from harming self  Description: Interventions:  - Monitor patient closely, per order  - Develop a trusting relationship  - Supervise medication ingestion, monitor effects and side effects   Outcome: Progressing  Goal: Recognize maladaptive responses and adopt new coping mechanisms  Outcome: Progressing     Problem: Depression  Goal: Refrain from isolation  Description: Interventions:  - Develop a trusting relationship   - Encourage socialization   Outcome: Progressing  Goal: Refrain from self-neglect  Outcome: Progressing  Goal: Complete daily ADLs, including personal hygiene independently, as able  Description: Interventions:  - Observe, teach, and assist patient with ADLS  -  Monitor and promote a balance of rest/activity, with adequate nutrition and elimination   Outcome: Progressing     Problem: Anxiety  Goal: Anxiety is at manageable level  Description: Interventions:  - Assess and monitor patient's anxiety level  - Monitor for signs and symptoms (heart palpitations, chest pain, shortness of breath, headaches, nausea, feeling jumpy, restlessness, irritable, apprehensive)  - Collaborate with interdisciplinary team and initiate plan and interventions as ordered    - [place] patient to unit/surroundings  - Explain treatment plan  - Encourage participation in care  - Encourage verbalization of concerns/fears  - Identify coping mechanisms  - Assist in developing anxiety-reducing skills  - Administer/offer alternative therapies  - Limit or eliminate stimulants  Outcome: Progressing     Problem: DISCHARGE PLANNING - CARE MANAGEMENT  Goal: Discharge to post-acute care or home with appropriate resources  Description: INTERVENTIONS:  - Conduct assessment to determine patient/family and health care team treatment goals, and need for post-acute services based on payer coverage, community resources, and patient preferences, and barriers to discharge  - Address psychosocial, clinical, and financial barriers to discharge as identified in assessment in conjunction with the patient/family and health care team  - Arrange appropriate level of post-acute services according to patient’s   needs and preference and payer coverage in collaboration with the physician and health care team  - Communicate with and update the patient/family, physician, and health care team regarding progress on the discharge plan  - Arrange appropriate transportation to post-acute venues  Outcome: Progressing

## 2022-11-13 NOTE — NURSING NOTE
Pt up ad navarro & gait is steady  Pt c/o depression 4/10 & anxiety 4/10  Pt denies any hallucinations, suicidal or homicidal ideations  Q 7 min checks maintained to monitor pt's behavior & safety  Pt is flat & withdrawn to his room in between meals  Pt does not socialize with other patients  Pt is cooperative & compliant with medications  No tremors noted today of hands  Amiodarone held this AM due to Apical HR -52 @ that time

## 2022-11-13 NOTE — PROGRESS NOTES
Progress Note Jesus Canada 61 y o  male MRN: [id number]    Unit/Bed#Ulysses G[facility] 205-02 Encounter: [id number]        Subjective:   Patient seen and examined at bedside after reviewing the chart and discussing the case with the caring staff  Patient examined at bedside  Patient has no acute concerns  Physical Exam   Vitals: Blood pressure 100/55, pulse (!) 50, temperature 97 6 °F (36 4 °C), temperature source Temporal, resp  rate 18, height 5' 11" (1 803 m), weight 73 2 kg (161 lb 6 oz), SpO2 95 %  ,Body mass index is 22 51 kg/m²  Constitutional: Patient in no acute distress  HEENT: PERR, EOMI, MMM  Cardiovascular: Bradycardic and regular rhythm  Pulmonary/Chest: Effort normal and breath sounds normal    Abdomen: Non distended  Assessment/Plan:  Hien Berger is a(n) 61 y o  male with schizoaffective disorder        1  Cardiac with history of HTN, HLD, CAD, ventricular tachycardia s/p ICD, Takotsubo cardiomyopathy  Continue atorvastatin 40 mg daily, amiodarone 200 mg daily with holding parameters, Eliquis 5 mg twice daily  2  Hx of CVA/apical mural thrombus  Continue Eliquis 5 mg twice daily, atorvastatin 40 mg daily  3  DJD/OA  Patient may take Tylenol as needed  4  Tobacco abuse  NRT  5  Vitamin-D deficiency  Patient started on vitamin-D2 71765 units weekly for 10 weeks followed by vitamin D3 1000 units daily

## 2022-11-14 LAB — GLUCOSE SERPL-MCNC: 117 MG/DL (ref 65–140)

## 2022-11-14 RX ADMIN — Medication 3 MG: at 20:47

## 2022-11-14 RX ADMIN — APIXABAN 5 MG: 5 TABLET, FILM COATED ORAL at 17:19

## 2022-11-14 RX ADMIN — CARBIDOPA AND LEVODOPA 1 TABLET: 25; 100 TABLET ORAL at 11:51

## 2022-11-14 RX ADMIN — ATORVASTATIN CALCIUM 40 MG: 40 TABLET, FILM COATED ORAL at 16:07

## 2022-11-14 RX ADMIN — APIXABAN 5 MG: 5 TABLET, FILM COATED ORAL at 08:56

## 2022-11-14 RX ADMIN — CARBIDOPA AND LEVODOPA 1 TABLET: 25; 100 TABLET ORAL at 17:19

## 2022-11-14 RX ADMIN — CARBIDOPA AND LEVODOPA 1 TABLET: 25; 100 TABLET ORAL at 20:48

## 2022-11-14 RX ADMIN — AMIODARONE HYDROCHLORIDE 200 MG: 100 TABLET ORAL at 08:56

## 2022-11-14 RX ADMIN — LORAZEPAM 1 MG: 2 INJECTION INTRAMUSCULAR; INTRAVENOUS at 18:32

## 2022-11-14 RX ADMIN — SERTRALINE HYDROCHLORIDE 75 MG: 50 TABLET ORAL at 08:56

## 2022-11-14 RX ADMIN — CARBIDOPA AND LEVODOPA 1 TABLET: 25; 100 TABLET ORAL at 08:56

## 2022-11-14 RX ADMIN — LAMOTRIGINE 25 MG: 25 TABLET ORAL at 20:47

## 2022-11-14 RX ADMIN — ARIPIPRAZOLE 15 MG: 10 TABLET ORAL at 08:56

## 2022-11-14 RX ADMIN — MIRTAZAPINE 7.5 MG: 15 TABLET, FILM COATED ORAL at 20:48

## 2022-11-14 RX ADMIN — HYDROXYZINE HYDROCHLORIDE 25 MG: 25 TABLET, FILM COATED ORAL at 17:38

## 2022-11-14 RX ADMIN — RISPERIDONE 1 MG: 1 TABLET ORAL at 19:45

## 2022-11-14 NOTE — NURSING NOTE
Pt was pleasant during assessment but flat affect  States depression and anxiety as 4/10, denies SI/HI and hallucinations  + for PM snack  Showered this evening  Voices no complaints at this time  Compliant with all HS medications  Q7 safety checks maintained  Will continue to monitor

## 2022-11-14 NOTE — PLAN OF CARE
Problem: Risk for Self Injury/Neglect  Goal: Refrain from harming self  Description: Interventions:  - Monitor patient closely, per order  - Develop a trusting relationship  - Supervise medication ingestion, monitor effects and side effects   Outcome: Progressing     Problem: Depression  Goal: Complete daily ADLs, including personal hygiene independently, as able  Description: Interventions:  - Observe, teach, and assist patient with ADLS  -  Monitor and promote a balance of rest/activity, with adequate nutrition and elimination   Outcome: Progressing     Problem: Anxiety  Goal: Anxiety is at manageable level  Description: Interventions:  - Assess and monitor patient's anxiety level  - Monitor for signs and symptoms (heart palpitations, chest pain, shortness of breath, headaches, nausea, feeling jumpy, restlessness, irritable, apprehensive)  - Collaborate with interdisciplinary team and initiate plan and interventions as ordered    - [place] patient to unit/surroundings  - Explain treatment plan  - Encourage participation in care  - Encourage verbalization of concerns/fears  - Identify coping mechanisms  - Assist in developing anxiety-reducing skills  - Administer/offer alternative therapies  - Limit or eliminate stimulants  Outcome: Progressing

## 2022-11-14 NOTE — PROGRESS NOTES
11/14/22 0226   Activity/Group Checklist   Group Community meeting   Attendance Did not attend  (Pt offered grp; pt remained in his room)

## 2022-11-14 NOTE — PLAN OF CARE
Problem: Risk for Self Injury/Neglect  Goal: Treatment Goal: Remain safe during length of stay, learn and adopt new coping skills, and be free of self-injurious ideation, impulses and acts at the time of discharge  Outcome: Progressing  Goal: Refrain from harming self  Description: Interventions:  - Monitor patient closely, per order  - Develop a trusting relationship  - Supervise medication ingestion, monitor effects and side effects   Outcome: Progressing  Goal: Recognize maladaptive responses and adopt new coping mechanisms  Outcome: Progressing     Problem: Depression  Goal: Treatment Goal: Demonstrate behavioral control of depressive symptoms, verbalize feelings of improved mood/affect, and adopt new coping skills prior to discharge  Outcome: Progressing  Goal: Refrain from isolation  Description: Interventions:  - Develop a trusting relationship   - Encourage socialization   Outcome: Progressing     Problem: Anxiety  Goal: Anxiety is at manageable level  Description: Interventions:  - Assess and monitor patient's anxiety level  - Monitor for signs and symptoms (heart palpitations, chest pain, shortness of breath, headaches, nausea, feeling jumpy, restlessness, irritable, apprehensive)  - Collaborate with interdisciplinary team and initiate plan and interventions as ordered    - [place] patient to unit/surroundings  - Explain treatment plan  - Encourage participation in care  - Encourage verbalization of concerns/fears  - Identify coping mechanisms  - Assist in developing anxiety-reducing skills  - Administer/offer alternative therapies  - Limit or eliminate stimulants  Outcome: Progressing     Problem: Ineffective Coping  Goal: Participates in unit activities  Description: Interventions:  - Provide therapeutic environment   - Provide required programming   - Redirect inappropriate behaviors   Outcome: Progressing     Problem: DISCHARGE PLANNING - CARE MANAGEMENT  Goal: Discharge to post-acute care or home with appropriate resources  Description: INTERVENTIONS:  - Conduct assessment to determine patient/family and health care team treatment goals, and need for post-acute services based on payer coverage, community resources, and patient preferences, and barriers to discharge  - Address psychosocial, clinical, and financial barriers to discharge as identified in assessment in conjunction with the patient/family and health care team  - Arrange appropriate level of post-acute services according to patient’s   needs and preference and payer coverage in collaboration with the physician and health care team  - Communicate with and update the patient/family, physician, and health care team regarding progress on the discharge plan  - Arrange appropriate transportation to post-acute venues  Outcome: Progressing

## 2022-11-14 NOTE — PROGRESS NOTES
11/14/22   Team Meeting   Meeting Type Daily Rounds   Team Members Present   Team Members Present Physician;Nurse;; Occupational Therapist   Physician Team Member Dr Ang Angela MD   Nursing Team Member Cherylene Smaller RN   Social Work Team Member [id number] BOYD   OT Team Member Salenata Alicja   Patient/Family Present   Patient Present No   Patient's Family Present No     Anx and dep sat  anx 4/10 and no dep Sunday  Short with responses, withdrawn  Denied anx dep this am  Out of room only for meals

## 2022-11-14 NOTE — PROGRESS NOTES
Progress Note - Carlo [address] 61 y o  male MRN: [id number]   Unit/Bed#: Karis Orozco 205-02 Encounter: [id number]    Behavior over the last 24 hours: unchanged  Yamile Casa is seen in his room, resting in bed  He continues guarded and suspicious  Isolative and poor eye contact  Denies current suicidal thoughts, but continues to state he would not be safe if discharged and ongoing paranoia toward other residents of [place]  [employer]  Tolerating increase of Abilify started this morning and Lamictal initiated on Friday , with no noted adverse effects  Limited participation in milieu  Sleep: slept off and on  Appetite: fair  Medication side effects: No   ROS: no complaints, all other systems are negative    Mental Status Evaluation:    Appearance:  marginal hygiene   Behavior:  psychomotor retardation   Speech:  slow, soft   Mood:  depressed   Affect:  flat   Thought Process:  goal directed   Associations: intact associations   Thought Content:  no overt delusions   Perceptual Disturbances: none   Risk Potential: Suicidal ideation - Yes, fleeting suicidal thoughts, contracts for safety on the unit  Homicidal ideation - None  Potential for aggression - No   Sensorium:  oriented to person, place and time/date   Memory:  recent memory impaired   Consciousness:  alert and awake   Attention: decreased concentration and decreased attention span   Insight:  poor   Judgment: poor   Gait/Station: slow gait   Motor Activity: no abnormal movements     Vital signs in last 24 hours:    Temp:  [97 4 °F (36 3 °C)-98 6 °F (37 °C)] 97 4 °F (36 3 °C)  HR:  [58-64] 58  Resp:  [18] 18  BP: ()/(54-66) 110/66    Laboratory results: I have personally reviewed all pertinent laboratory/tests results        Progress Toward Goals: progressing    Assessment/Plan   Principal Problem:    Schizoaffective disorder (HCC)  Active Problems:    History of MI (myocardial infarction)    History of CVA (cerebrovascular accident)    Other hyperlipidemia    Major neurocognitive disorder, due to vascular disease, without behavioral disturbance, mild    CVA (cerebral vascular accident) ([facility] [address] )    Other chronic pain    Essential hypertension    Arthritis    Coronary artery disease involving native heart with angina pectoris, unspecified vessel or lesion type [facility]    Recommended Treatment:     Planned medication and treatment changes: All current active medications have been reviewed  Encourage group therapy, milieu therapy and occupational therapy  Behavioral Health checks every 7 minutes    Requires continued inpatient treatment due to chronic illness and high risk of decompensation if discharged before long term stability is achieved      Continue current medications:  Current Facility-Administered Medications   Medication Dose Route Frequency Provider Last Rate   • aluminum-magnesium hydroxide-simethicone  30 mL Oral Q4H PRN Donte Paniagua MD     • amiodarone  200 mg Oral Daily Emperatriz Marti PA-C     • apixaban  5 mg Oral BID Donte Paniagua MD     • ARIPiprazole  15 mg Oral Daily [facility] Silvia Friend MD     • atorvastatin  40 mg Oral Daily With Dinner Donte Paniagua MD     • carbidopa-levodopa  1 tablet Oral 4x Daily Donte Paniagua MD     • [START ON 1/13/2023] cholecalciferol  1,000 Units Oral Daily Emperatriz Marti PA-C     • ergocalciferol  50,000 Units Oral Weekly Emperatriz Marti PA-C     • hydrOXYzine HCL  25 mg Oral Q6H PRN Max 4/day Donte Paniagua MD     • influenza vaccine  0 5 mL Intramuscular Once Geovanna Tapia MD     • lamoTRIgine  25 mg Oral HS ERNIE Luois     • LORazepam  1 mg Intramuscular Q6H PRN Max 3/day Donte Paniagua MD     • melatonin  3 mg Oral HS Donte Paniagua MD     • mirtazapine  7 5 mg Oral HS Donte Paniagua MD     • nicotine  1 patch Transdermal Daily Emperatriz Marti PA-C     • nicotine polacrilex  4 mg Oral Q2H PRN Emperatriz Marti PA-C     • risperiDONE  0 25 mg Oral Q4H PRN Max 6/day Donte Paniagua MD     • risperiDONE  0 5 mg Oral Q4H PRN Max 3/day Donte Paniagua MD     • risperiDONE  1 mg Oral Q2H PRN Max 3/day Donte Paniagua MD     • sertraline  75 mg Oral Daily ERNIE Louis         Risks / Benefits of Treatment:    Risks, benefits, and possible side effects of medications explained to patient and patient verbalizes understanding and agreement for treatment  Counseling / Coordination of Care:    Patient's progress discussed with staff in treatment team meeting  Medications, treatment progress and treatment plan reviewed with patient      ERNIE Louis 11/14/22

## 2022-11-14 NOTE — PROGRESS NOTES
11/14/22 2856   Activity/Group Checklist   Group   (Community Building Self Reflection Art Therapy)   Attendance Did not attend  (AT group offered, PT elected to remain in room)

## 2022-11-14 NOTE — PROGRESS NOTES
Progress Note Jerome Toledo 61 y o  male MRN: [id number]    Unit/Bed#Navi Fried 205-02 Encounter: [id number]        Subjective:   Patient seen and examined at bedside after reviewing the chart and discussing the case with the caring staff  Patient examined at bedside  Patient has no acute concerns  Physical Exam   Vitals: Blood pressure 110/66, pulse 58, temperature (!) 97 4 °F (36 3 °C), temperature source Temporal, resp  rate 18, height 5' 11" (1 803 m), weight 73 2 kg (161 lb 6 oz), SpO2 97 %  ,Body mass index is 22 51 kg/m²  Constitutional: Patient in no acute distress  HEENT: PERR, EOMI, MMM  Cardiovascular: Bradycardic and regular rhythm  Pulmonary/Chest: Effort normal and breath sounds normal    Abdomen: Non distended  Assessment/Plan:  Nayla Mederos is a(n) 61 y o  male with schizoaffective disorder        1  Cardiac with history of HTN, HLD, CAD, ventricular tachycardia s/p ICD, Takotsubo cardiomyopathy  Continue atorvastatin 40 mg daily, amiodarone 200 mg daily with holding parameters, Eliquis 5 mg twice daily  2  Hx of CVA/apical mural thrombus  Continue Eliquis 5 mg twice daily, atorvastatin 40 mg daily  3  DJD/OA  Patient may take Tylenol as needed  4  Tobacco abuse  NRT  5  Vitamin-D deficiency  Patient started on vitamin-D2 57055 units weekly for 10 weeks followed by vitamin D3 1000 units daily

## 2022-11-14 NOTE — NURSING NOTE
Patient was found by other staff member in his room in the corner with his blankets wrapped around him and having severe tremors  Patient was walked to the seclusion room for observation  Upon going into the seclusion room this nurse observed this patient having more severe tremors than baseline  This nurse took patients VS /81 HR 76 SPO2 95%RA temp 98 3 and   Patient states that he "feels like he is going to snap"  This nurse asked if he was angry or anxious  Patient stated that he was anxious  This nurse offered him IM 1mg of Ativan and patient stated "that will help"  PRN 1mg Ativan IM given in the left deltoid, tolerated well  [place] [address]  Currently patient sitting in the seclusion room with door open  Patient was made aware that he can leave if he wants to and that he is not locked in but in her so he can be observed to help him  Patient verbalized understanding  No other complaints or concerns voiced by this patient at this time  Will continue to monitor  Q7min checks are in progress

## 2022-11-14 NOTE — PROGRESS NOTES
Progress Note - Behavioral Health   [facility] 61 y o  male MRN: [id number]  Unit/Bed#Hemal Han 205-02 Encounter: [id number]    Assessment/Plan   Principal Problem:    Schizoaffective disorder ([facility] )  Active Problems:    History of MI (myocardial infarction)    History of CVA (cerebrovascular accident)    Other hyperlipidemia    Major neurocognitive disorder, due to vascular disease, without behavioral disturbance, mild    CVA (cerebral vascular accident) ([facility] )    Other chronic pain    Essential hypertension    Arthritis    Coronary artery disease involving native heart with angina pectoris, unspecified vessel or lesion type ([facility])    Recommended Treatment:   Increase Abilify 15 mg  All current active medications have been reviewed  Encourage group therapy, milieu therapy and occupational therapy  Behavioral Health checks every 7 minutes  Requires continued inpatient treatment due to chronic illness and high risk of decompensation if discharged before long term stability is achieved  Requires continued inpatient treatment while awaiting placement due to chronic illness and high risk of decompensation if discharged to an unstructured environment  Medical management per SLIM    Legal Status: 201  ----------------------------------------      Subjective:  Patient was seen and examined today at the bedside during morning rounds  He continues to be paranoid and isolated to his room and not participating in the milieu therapy       Behavior over the last 24 hours:  unchanged  Sleep: normal  Appetite: normal  Medication side effects: No  ROS: no complaints and all other systems are negative    Mental Status Evaluation:  Appearance:  age appropriate, dressed in hospital attire   Behavior:  guarded   Speech:  slow   Mood:  anxious   Affect:  constricted   Thought Process:  perseverative   Associations: blocking, thought blocking   Thought Content:  paranoid thoughts   Perceptual Disturbances: appears responding to internal stimuli   Risk Potential: Suicidal ideation - None at present  Homicidal ideation - None at present  Potential for aggression - Not at present   Sensorium:  oriented to person, place and time/date   Memory:  recent and remote memory: unable to assess due to lack of cooperation   Consciousness:  alert and awake   Attention/Concentration: attention span and concentration appear shorter than expected for age   Insight:  limited   Judgment: limited   Gait/Station: in bed   Motor Activity: no abnormal movements     Medications:   all current active meds have been reviewed, continue current psychiatric medications and current meds:   Current Facility-Administered Medications   Medication Dose Route Frequency   • aluminum-magnesium hydroxide-simethicone (MYLANTA) oral suspension 30 mL  30 mL Oral Q4H PRN   • amiodarone tablet 200 mg  200 mg Oral Daily   • apixaban (ELIQUIS) tablet 5 mg  5 mg Oral BID   • [START ON 11/14/2022] ARIPiprazole (ABILIFY) tablet 15 mg  15 mg Oral Daily   • atorvastatin (LIPITOR) tablet 40 mg  40 mg Oral Daily With Dinner   • carbidopa-levodopa (SINEMET)  mg per tablet 1 tablet  1 tablet Oral 4x Daily   • [START ON 1/13/2023] cholecalciferol (VITAMIN D3) tablet 1,000 Units  1,000 Units Oral Daily   • ergocalciferol (VITAMIN D2) capsule 50,000 Units  50,000 Units Oral Weekly   • hydrOXYzine HCL (ATARAX) tablet 25 mg  25 mg Oral Q6H PRN Max 4/day   • influenza vaccine, recombinant, quadrivalent (FLUBLOK) IM injection 0 5 mL  0 5 mL Intramuscular Once   • lamoTRIgine (LaMICtal) tablet 25 mg  25 mg Oral HS   • LORazepam (ATIVAN) injection 1 mg  1 mg Intramuscular Q6H PRN Max 3/day   • melatonin tablet 3 mg  3 mg Oral HS   • mirtazapine (REMERON) tablet 7 5 mg  7 5 mg Oral HS   • nicotine (NICODERM CQ) 21 mg/24 hr TD 24 hr patch 1 patch  1 patch Transdermal Daily   • nicotine polacrilex (NICORETTE) gum 4 mg  4 mg Oral Q2H PRN   • risperiDONE (RisperDAL) tablet 0 25 mg  0 25 mg Oral Q4H PRN Max 6/day   • risperiDONE (RisperDAL) tablet 0 5 mg  0 5 mg Oral Q4H PRN Max 3/day   • risperiDONE (RisperDAL) tablet 1 mg  1 mg Oral Q2H PRN Max 3/day   • sertraline (ZOLOFT) tablet 75 mg  75 mg Oral Daily     Current Facility-Administered Medications   Medication Dose Route Frequency Provider Last Rate   • aluminum-magnesium hydroxide-simethicone  30 mL Oral Q4H PRN Tanya Alexander MD     • amiodarone  200 mg Oral Daily Emperatriz Marti PA-C     • apixaban  5 mg Oral BID Tanya Alexander MD     • [START ON 11/14/2022] ARIPiprazole  15 mg Oral Daily [facility] Subhash Uribe MD     • atorvastatin  40 mg Oral Daily With Dinner Tanya Alexander MD     • carbidopa-levodopa  1 tablet Oral 4x Daily Tanya Alexander MD     • [START ON 1/13/2023] cholecalciferol  1,000 Units Oral Daily Emperatriz Marti PA-C     • ergocalciferol  50,000 Units Oral Weekly Emperatriz Marti PA-C     • hydrOXYzine HCL  25 mg Oral Q6H PRN Max 4/day Tanya Alexander MD     • influenza vaccine  0 5 mL Intramuscular Once Oli Garber MD     • lamoTRIgine  25 mg Oral HS ERNIE Fitzpatrick     • LORazepam  1 mg Intramuscular Q6H PRN Max 3/day Tanya Alexander MD     • melatonin  3 mg Oral HS Tanya Alexander MD     • mirtazapine  7 5 mg Oral HS Tanya Alexander MD     • nicotine  1 patch Transdermal Daily Emperatriz Marti PA-C     • nicotine polacrilex  4 mg Oral Q2H PRN Emperatriz Marti PA-C     • risperiDONE  0 25 mg Oral Q4H PRN Max 6/day Tanya Alexander MD     • risperiDONE  0 5 mg Oral Q4H PRN Max 3/day Tanya Alexander MD     • risperiDONE  1 mg Oral Q2H PRN Max 3/day Tanya Alexander MD     • sertraline  75 mg Oral Daily ERNEI Fitzpatrick         Labs:  I have personally reviewed all pertinent laboratory/tests results  Most Recent Labs:   Lab Results   Component Value Date    WBC 7 24 11/09/2022    RBC 4 35 11/09/2022    HGB 13 5 11/09/2022    HCT 44 0 11/09/2022     11/09/2022    RDW 13 1 11/09/2022    NEUTROABS 4 63 11/09/2022    SODIUM 139 11/09/2022    K 4 1 11/09/2022     11/09/2022    CO2 29 11/09/2022    BUN 13 11/09/2022    CREATININE 0 93 11/09/2022    GLUC 84 11/09/2022    CALCIUM 8 4 11/09/2022    AST  11/08/2022      Comment:      No Result; if an AST is required for patient care, it must be ordered separately  Specimen collection should occur prior to Sulfasalazine administration due to the potential for falsely depressed results  ALT 17 11/08/2022    ALKPHOS 100 11/08/2022    TP 7 6 11/08/2022    ALB 3 5 11/08/2022    TBILI 0 60 11/08/2022    CHOLESTEROL 96 11/09/2022    HDL 51 11/09/2022    TRIG 48 11/09/2022    LDLCALC 35 11/09/2022    NONHDLC 45 11/09/2022    LITHIUM 0 7 09/05/2019    PRC8XGVYLMUL 1 308 11/08/2022    FREET4 1 14 08/10/2020    RPR Non-Reactive 11/09/2022    HGBA1C 5 5 09/27/2022     09/27/2022       Progress Toward Goals: improving slowly    Risks / Benefits of Treatment:    Risks, benefits, and possible side effects of medications explained to patient  Patient has limited understanding of risks and benefits of treatment at this time, but agrees to take medications as prescribed  Counseling / Coordination of Care: Total floor / unit time spent today 35 minutes  Greater than 50% of total time was spent with the patient and / or family counseling and / or coordination of care  A description of counseling / coordination of care:  Patient's progress discussed with staff in treatment team meeting  Medications, treatment progress and treatment plan reviewed with patient  Recent stressors including housing issues and limited support discussed with patient  Educated on importance of medication and treatment compliance  Reassurance and supportive therapy provided  Encouraged participation in milieu and group therapy on the unit      Carol Garner MD 11/13/22

## 2022-11-14 NOTE — PLAN OF CARE
Problem: Ineffective Coping  Goal: Participates in unit activities  Description: Interventions:  - Provide therapeutic environment   - Provide required programming   - Redirect inappropriate behaviors   Outcome: Not Progressing   Patient remains withdrawn to his room and even with encouragement and reminders is not willing to join groups

## 2022-11-14 NOTE — NURSING NOTE
Patient sleeping during breakfast and am med pass  Patient easily aroused and med compliant  Pleasant, calm, and cooperative  Denies SI/HI, AH/VH, and pain  Patient complains of 4/10 anxiety and depression  Patient went back to sleep after taking his medications  No complaints or concerns voiced by this patient at this time  Currently patient sleeping in bed  Will continue to monitor  Q7min checks are in progress

## 2022-11-15 RX ORDER — SERTRALINE HYDROCHLORIDE 100 MG/1
100 TABLET, FILM COATED ORAL DAILY
Status: DISCONTINUED | OUTPATIENT
Start: 2022-11-16 | End: 2022-11-23 | Stop reason: HOSPADM

## 2022-11-15 RX ADMIN — Medication 3 MG: at 21:14

## 2022-11-15 RX ADMIN — AMIODARONE HYDROCHLORIDE 200 MG: 100 TABLET ORAL at 08:45

## 2022-11-15 RX ADMIN — ATORVASTATIN CALCIUM 40 MG: 40 TABLET, FILM COATED ORAL at 16:55

## 2022-11-15 RX ADMIN — CARBIDOPA AND LEVODOPA 1 TABLET: 25; 100 TABLET ORAL at 17:12

## 2022-11-15 RX ADMIN — CARBIDOPA AND LEVODOPA 1 TABLET: 25; 100 TABLET ORAL at 08:45

## 2022-11-15 RX ADMIN — CARBIDOPA AND LEVODOPA 1 TABLET: 25; 100 TABLET ORAL at 12:21

## 2022-11-15 RX ADMIN — MIRTAZAPINE 7.5 MG: 15 TABLET, FILM COATED ORAL at 21:13

## 2022-11-15 RX ADMIN — CARBIDOPA AND LEVODOPA 1 TABLET: 25; 100 TABLET ORAL at 21:13

## 2022-11-15 RX ADMIN — LAMOTRIGINE 25 MG: 25 TABLET ORAL at 21:14

## 2022-11-15 RX ADMIN — APIXABAN 5 MG: 5 TABLET, FILM COATED ORAL at 08:45

## 2022-11-15 RX ADMIN — SERTRALINE HYDROCHLORIDE 75 MG: 50 TABLET ORAL at 08:45

## 2022-11-15 RX ADMIN — APIXABAN 5 MG: 5 TABLET, FILM COATED ORAL at 17:12

## 2022-11-15 RX ADMIN — ARIPIPRAZOLE 15 MG: 10 TABLET ORAL at 08:45

## 2022-11-15 NOTE — PLAN OF CARE
Problem: Risk for Self Injury/Neglect  Goal: Refrain from harming self  Description: Interventions:  - Monitor patient closely, per order  - Develop a trusting relationship  - Supervise medication ingestion, monitor effects and side effects   Outcome: Progressing     Problem: Depression  Goal: Complete daily ADLs, including personal hygiene independently, as able  Description: Interventions:  - Observe, teach, and assist patient with ADLS  -  Monitor and promote a balance of rest/activity, with adequate nutrition and elimination   Outcome: Progressing     Problem: Anxiety  Goal: Anxiety is at manageable level  Description: Interventions:  - Assess and monitor patient's anxiety level  - Monitor for signs and symptoms (heart palpitations, chest pain, shortness of breath, headaches, nausea, feeling jumpy, restlessness, irritable, apprehensive)  - Collaborate with interdisciplinary team and initiate plan and interventions as ordered    - [place] patient to unit/surroundings  - Explain treatment plan  - Encourage participation in care  - Encourage verbalization of concerns/fears  - Identify coping mechanisms  - Assist in developing anxiety-reducing skills  - Administer/offer alternative therapies  - Limit or eliminate stimulants  Outcome: Progressing

## 2022-11-15 NOTE — PROGRESS NOTES
Progress Note - Carlo [address] 61 y o  male MRN: [id number]   Unit/Bed#: Navi Mayers 205-02 Encounter: [id number]    Behavior over the last 24 hours: aleida Pollard reports having a panic attack last evening  He reports he does have these, at least once a month of late  He reports this panic attack was no worse than usual   And was resolved with ativan, although the fearfulness lingered  He is seen in his room and continues isolative, guarded, poorly motivated  He feels "a little " better than on admission with regards to his depression, and no suicidal thoughts here  However, he is still fearful about returning to [place]  [employer]  Will increase Zoloft for depression and anxiety  Limited participation in milieu  Sleep: slept off and on  Appetite: fair  Medication side effects: No   ROS: no complaints, all other systems are negative    Mental Status Evaluation:    Appearance:  marginal hygiene   Behavior:  cooperative, calm   Speech:  scant, soft   Mood:  depressed, anxious   Affect:  blunted   Thought Process:  goal directed   Associations: intact associations   Thought Content:  no overt delusions   Perceptual Disturbances: none   Risk Potential: Suicidal ideation - None  Homicidal ideation - None  Potential for aggression - No   Sensorium:  oriented to person and place   Memory:  recent memory mildly impaired   Consciousness:  alert and awake   Attention: decreased concentration and decreased attention span   Insight:  limited   Judgment: limited   Gait/Station: slow gait   Motor Activity: no abnormal movements     Vital signs in last 24 hours:    Temp:  [97 5 °F (36 4 °C)-99 6 °F (37 6 °C)] 97 5 °F (36 4 °C)  HR:  [61-75] 75  Resp:  [16-18] 18  BP: (107-122)/(60-76) 107/60    Laboratory results: I have personally reviewed all pertinent laboratory/tests results        Progress Toward Goals: progressing    Assessment/Plan   Principal Problem:    Schizoaffective disorder [facility])  Active Problems:    History of MI (myocardial infarction)    History of CVA (cerebrovascular accident)    Other hyperlipidemia    Major neurocognitive disorder, due to vascular disease, without behavioral disturbance, mild    CVA (cerebral vascular accident) ([facility] )    Other chronic pain    Essential hypertension    Arthritis    Coronary artery disease involving native heart with angina pectoris, unspecified vessel or lesion type ([facility] )    Recommended Treatment:     Planned medication and treatment changes: All current active medications have been reviewed  Encourage group therapy, milieu therapy and occupational therapy  Behavioral Health checks every 7 minutes    Requires continued inpatient treatment due to chronic illness and high risk of decompensation if discharged before long term stability is achieved      Increase Zoloft 100 mg     Current Facility-Administered Medications   Medication Dose Route Frequency Provider Last Rate   • aluminum-magnesium hydroxide-simethicone  30 mL Oral Q4H PRN Diane Banks MD     • amiodarone  200 mg Oral Daily Emperatriz Marti PA-C     • apixaban  5 mg Oral BID Diane Banks MD     • ARIPiprazole  15 mg Oral Daily [facility] Betsey Jacinto MD     • atorvastatin  40 mg Oral Daily With Dinner Diane Banks MD     • carbidopa-levodopa  1 tablet Oral 4x Daily Diane Banks MD     • [START ON 1/13/2023] cholecalciferol  1,000 Units Oral Daily Emperatriz Marti PA-C     • ergocalciferol  50,000 Units Oral Weekly Emperatriz Marti PA-C     • hydrOXYzine HCL  25 mg Oral Q6H PRN Max 4/day Diane Banks MD     • influenza vaccine  0 5 mL Intramuscular Once Yaritza Almanzar MD     • lamoTRIgine  25 mg Oral HS Eben Jackson, CRNP     • LORazepam  1 mg Intramuscular Q6H PRN Max 3/day Diane Banks MD     • melatonin  3 mg Oral HS Diane Banks MD     • mirtazapine  7 5 mg Oral HS Diane Banks MD     • nicotine  1 patch Transdermal Daily Emperatriz GRANDE LEANDRA Marti     • nicotine polacrilex  4 mg Oral Q2H PRN Emperatriz GRANDE LEANDRA Marti     • risperiDONE  0 25 mg Oral Q4H PRN Max 6/day Sudhir Saenz MD     • risperiDONE  0 5 mg Oral Q4H PRN Max 3/day Sudhir Saenz MD     • risperiDONE  1 mg Oral Q2H PRN Max 3/day Sudhir Saenz MD     • [START ON 11/16/2022] sertraline  100 mg Oral Daily ERNIE Cox         Risks / Benefits of Treatment:    Risks, benefits, and possible side effects of medications explained to patient and patient verbalizes understanding and agreement for treatment  Counseling / Coordination of Care:    Patient's progress discussed with staff in treatment team meeting  Medications, treatment progress and treatment plan reviewed with patient      ERNIE Cox 11/15/22

## 2022-11-15 NOTE — PROGRESS NOTES
11/15/22 7841   Activity/Group Checklist   Group Community meeting   Attendance Did not attend  (Pt offered grp; pt remained in bed)

## 2022-11-15 NOTE — PROGRESS NOTES
11/15/22   Team Meeting   Meeting Type Daily Rounds   Team Members Present   Team Members Present Physician;Nurse;; Occupational Therapist;   Physician Team Member Dr Umm Medeiros MD   Nursing Team Member [facility] RN; Damien Ward RN, [facility]   Care Management Team Member [facility] Work Team Member [id number]   OT Team Member Laurie Has   Patient/Family Present   Patient Present No   Patient's Family Present No     Pt was huddled in his room and not responding to staff after dinner last night  Later said he felt "like snapping " Ativan given  Pt requested to go to seclusion room, was agitated, med given  2200 back to room, then slept through the night  Today, anx and dep 4/10  Denied pain

## 2022-11-15 NOTE — PLAN OF CARE
Problem: Risk for Self Injury/Neglect  Goal: Treatment Goal: Remain safe during length of stay, learn and adopt new coping skills, and be free of self-injurious ideation, impulses and acts at the time of discharge  Outcome: Progressing  Goal: Refrain from harming self  Description: Interventions:  - Monitor patient closely, per order  - Develop a trusting relationship  - Supervise medication ingestion, monitor effects and side effects   Outcome: Progressing  Goal: Recognize maladaptive responses and adopt new coping mechanisms  Outcome: Progressing     Problem: Depression  Goal: Treatment Goal: Demonstrate behavioral control of depressive symptoms, verbalize feelings of improved mood/affect, and adopt new coping skills prior to discharge  Outcome: Progressing  Goal: Refrain from isolation  Description: Interventions:  - Develop a trusting relationship   - Encourage socialization   Outcome: Progressing  Goal: Refrain from self-neglect  Outcome: Progressing  Goal: Complete daily ADLs, including personal hygiene independently, as able  Description: Interventions:  - Observe, teach, and assist patient with ADLS  -  Monitor and promote a balance of rest/activity, with adequate nutrition and elimination   Outcome: Progressing     Problem: Anxiety  Goal: Anxiety is at manageable level  Description: Interventions:  - Assess and monitor patient's anxiety level  - Monitor for signs and symptoms (heart palpitations, chest pain, shortness of breath, headaches, nausea, feeling jumpy, restlessness, irritable, apprehensive)  - Collaborate with interdisciplinary team and initiate plan and interventions as ordered    - [place] patient to unit/surroundings  - Explain treatment plan  - Encourage participation in care  - Encourage verbalization of concerns/fears  - Identify coping mechanisms  - Assist in developing anxiety-reducing skills  - Administer/offer alternative therapies  - Limit or eliminate stimulants  Outcome: Progressing

## 2022-11-15 NOTE — NURSING NOTE
Patient complaint with medications  Returned to bedroom from quiet room at 2215  Rated anxiety at this time 4/10 and depression 3/10  Denies any suicidal ideations or homicidal ideations  Maintained on q 7 minute safety checks

## 2022-11-15 NOTE — NURSING NOTE
Patient requesting to stay in quiet room  Stated he does not feel any better  Agitated Behavioral Scale 34  Risperdal 1 mg given PO at 1945  Monitored q 7 minute safety checks and video monitoring continues

## 2022-11-15 NOTE — NURSING NOTE
Patient observed in room sleeping through breakfast  Patient easily awakened  Med compliant  Calm and cooperative  Patient states that he "feels a lot better than yesterday" and stated that "that shot worked really well"  Denies SI/HI, AH/VH, and pain  Patient rated his anxiety and depression a 4/10  Currently patient is sleeping in room with no complaints or concerns voiced at this time  Will continue to monitor  Q7min checks are in progress

## 2022-11-15 NOTE — PROGRESS NOTES
Progress Note Ruslan Zayas 61 y o  male MRN: [id number]    Unit/Bed#Raghav Rodriguez 205-02 Encounter: [id number]        Subjective:   Patient seen and examined at bedside after reviewing the chart and discussing the case with the caring staff  Patient examined at bedside  Patient has no acute concerns  Physical Exam   Vitals: Blood pressure 107/60, pulse 75, temperature 97 5 °F (36 4 °C), temperature source Temporal, resp  rate 18, height 5' 11" (1 803 m), weight 73 2 kg (161 lb 6 oz), SpO2 98 %  ,Body mass index is 22 51 kg/m²  Constitutional: Patient in no acute distress  HEENT: PERR, EOMI, MMM  Cardiovascular: Bradycardic and regular rhythm  Pulmonary/Chest: Effort normal and breath sounds normal    Abdomen: Non distended  Assessment/Plan:  Merlinda Coupe is a(n) 61 y o  male with schizoaffective disorder        1  Cardiac with history of HTN, HLD, CAD, ventricular tachycardia s/p ICD, Takotsubo cardiomyopathy  Continue atorvastatin 40 mg daily, amiodarone 200 mg daily with holding parameters, Eliquis 5 mg twice daily  2  Hx of CVA/apical mural thrombus  Continue Eliquis 5 mg twice daily, atorvastatin 40 mg daily  3  DJD/OA  Patient may take Tylenol as needed  4  Tobacco abuse  NRT  5  Vitamin-D deficiency  Patient started on vitamin-D2 25467 units weekly for 10 weeks followed by vitamin D3 1000 units daily

## 2022-11-16 RX ORDER — LAMOTRIGINE 25 MG/1
50 TABLET ORAL
Status: DISCONTINUED | OUTPATIENT
Start: 2022-11-16 | End: 2022-11-21

## 2022-11-16 RX ADMIN — SERTRALINE 100 MG: 100 TABLET, FILM COATED ORAL at 08:45

## 2022-11-16 RX ADMIN — AMIODARONE HYDROCHLORIDE 200 MG: 100 TABLET ORAL at 08:45

## 2022-11-16 RX ADMIN — CARBIDOPA AND LEVODOPA 1 TABLET: 25; 100 TABLET ORAL at 08:45

## 2022-11-16 RX ADMIN — HYDROXYZINE HYDROCHLORIDE 25 MG: 25 TABLET, FILM COATED ORAL at 16:32

## 2022-11-16 RX ADMIN — Medication 3 MG: at 22:33

## 2022-11-16 RX ADMIN — APIXABAN 5 MG: 5 TABLET, FILM COATED ORAL at 08:45

## 2022-11-16 RX ADMIN — LAMOTRIGINE 50 MG: 25 TABLET ORAL at 22:37

## 2022-11-16 RX ADMIN — CARBIDOPA AND LEVODOPA 1 TABLET: 25; 100 TABLET ORAL at 22:33

## 2022-11-16 RX ADMIN — CARBIDOPA AND LEVODOPA 1 TABLET: 25; 100 TABLET ORAL at 17:02

## 2022-11-16 RX ADMIN — ATORVASTATIN CALCIUM 40 MG: 40 TABLET, FILM COATED ORAL at 16:32

## 2022-11-16 RX ADMIN — APIXABAN 5 MG: 5 TABLET, FILM COATED ORAL at 17:02

## 2022-11-16 RX ADMIN — RISPERIDONE 0.5 MG: 0.5 TABLET ORAL at 16:32

## 2022-11-16 RX ADMIN — MIRTAZAPINE 7.5 MG: 15 TABLET, FILM COATED ORAL at 22:33

## 2022-11-16 RX ADMIN — HYDROXYZINE HYDROCHLORIDE 25 MG: 25 TABLET, FILM COATED ORAL at 22:33

## 2022-11-16 RX ADMIN — CARBIDOPA AND LEVODOPA 1 TABLET: 25; 100 TABLET ORAL at 12:50

## 2022-11-16 RX ADMIN — ARIPIPRAZOLE 15 MG: 10 TABLET ORAL at 08:45

## 2022-11-16 NOTE — PROGRESS NOTES
Progress Note Buster Jeffries 61 y o  male MRN: [id number]    Unit/Bed#Jaren Rios 205-02 Encounter: [id number]        Subjective:   Patient seen and examined at bedside after reviewing the chart and discussing the case with the caring staff  Patient examined at bedside  Patient has no acute concerns  Physical Exam   Vitals: Blood pressure 118/75, pulse 57, temperature 98 1 °F (36 7 °C), temperature source Temporal, resp  rate 16, height 5' 11" (1 803 m), weight 73 2 kg (161 lb 6 oz), SpO2 97 %  ,Body mass index is 22 51 kg/m²  Constitutional: Patient in no acute distress  HEENT: PERR, EOMI, MMM  Cardiovascular: Bradycardic and regular rhythm  Pulmonary/Chest: Effort normal and breath sounds normal    Abdomen: Non distended  Assessment/Plan:  Rachele Edouard is a(n) 61 y o  male with schizoaffective disorder        1  Cardiac with history of HTN, HLD, CAD, ventricular tachycardia s/p ICD, Takotsubo cardiomyopathy  Continue atorvastatin 40 mg daily, amiodarone 200 mg daily with holding parameters, Eliquis 5 mg twice daily  2  Hx of CVA/apical mural thrombus  Continue Eliquis 5 mg twice daily, atorvastatin 40 mg daily  3  DJD/OA  Patient may take Tylenol as needed  4  Tobacco abuse  NRT  5  Vitamin-D deficiency  Patient started on vitamin-D2 56015 units weekly for 10 weeks followed by vitamin D3 1000 units daily

## 2022-11-16 NOTE — PROGRESS NOTES
11/16/22 6301   Activity/Group Checklist   Group   (Thought Processing Art Therapy)   Attendance Did not attend  (AT group offered,, PT elected to remain in room)

## 2022-11-16 NOTE — PROGRESS NOTES
11/16/22 0930   Activity/Group Checklist   Group   (1:1 check in with RT/DERS)   Attendance Attended   Attendance Duration (min) 0-15   Interactions Interacted appropriately   Affect/Mood Appropriate   Goals Achieved Identified feelings; Identified triggers; Discussed coping strategies; Able to listen to others; Able to engage in interactions; Able to reflect/comment on own behavior;Able to self-disclose; Able to recieve feedback   Patient agreeable to meeting with RT 1:1; patient not agreeable to completing DERS  Patient felt anxious when first question asked  Patient shared that he has been feeling better and his depression and anxiety are 3/10  Patient was in bed curled up with his eyes closed  He was at ease in the conversation with RT; but not with answering questions about his feelings and how he deals with them  He shared that he is a loner and does not feel comfortable being around others

## 2022-11-16 NOTE — PLAN OF CARE
Problem: Ineffective Coping  Goal: Participates in unit activities  Description: Interventions:  - Provide therapeutic environment   - Provide required programming   - Redirect inappropriate behaviors   Outcome: Not Progressing   Patient still prefers to remain in his room; he is good with 1:1 interactions

## 2022-11-16 NOTE — PROGRESS NOTES
11/16/22 5947   Activity/Group Checklist   Group Community meeting   Attendance Did not attend  (pt offere grp; pt remained in bed)

## 2022-11-16 NOTE — NURSING NOTE
Candie Dewitt was observed withdrawn to self  Pt has poor hygiene and is disheveled  Pt is flat and depressed  Pt is organized in thought  Pt did not get up for breakfast but stated he will eat lunch  Pt c/o anxiety and depression  Pt denies SI/HI/AVh  Support offered  Will continue to monitor

## 2022-11-16 NOTE — NURSING NOTE
Patient spent the evening mostly in his room resting  Easily engaged with minimal/brief responses to questions  Rates anxiety and depression as 3/10  Denies SI/HI and AH/VH  Compliant with medications  Maintained on Q 7 minute checks

## 2022-11-16 NOTE — PROGRESS NOTES
11/16/22   Team Meeting   Meeting Type Daily Rounds   Team Members Present   Team Members Present Physician;Nurse;; ;Occupational Therapist   Physician Team Member Dr She Johnson MD   Nursing Team Member Destiny El RN; Quentin Sandy RN, [facility]   Care Management Team Member Shaq [facility]   Social Work Team Member [id number] [facility] Team Member Nora Schwartz   Patient/Family Present   Patient Present No   Patient's Family Present No     Pt reported dep in am  Did not attend breakfast; told staff he will come to lunch  Yesterday low BP; this am /75  Tasneem Terrazas Rested in room in afternoon

## 2022-11-16 NOTE — PROGRESS NOTES
11/15/22 4470   Activity/Group Checklist   Group   (Reflecting on the Future Art Therapy)   Attendance Did not attend  (AT group offered, PT elected to remain in room)

## 2022-11-16 NOTE — PLAN OF CARE
Problem: Risk for Self Injury/Neglect  Goal: Treatment Goal: Remain safe during length of stay, learn and adopt new coping skills, and be free of self-injurious ideation, impulses and acts at the time of discharge  Outcome: Progressing  Goal: Refrain from harming self  Description: Interventions:  - Monitor patient closely, per order  - Develop a trusting relationship  - Supervise medication ingestion, monitor effects and side effects   Outcome: Progressing  Goal: Recognize maladaptive responses and adopt new coping mechanisms  Outcome: Progressing     Problem: Depression  Goal: Treatment Goal: Demonstrate behavioral control of depressive symptoms, verbalize feelings of improved mood/affect, and adopt new coping skills prior to discharge  Outcome: Progressing  Goal: Refrain from isolation  Description: Interventions:  - Develop a trusting relationship   - Encourage socialization   Outcome: Progressing  Goal: Refrain from self-neglect  Outcome: Progressing  Goal: Complete daily ADLs, including personal hygiene independently, as able  Description: Interventions:  - Observe, teach, and assist patient with ADLS  -  Monitor and promote a balance of rest/activity, with adequate nutrition and elimination   Outcome: Progressing     Problem: Anxiety  Goal: Anxiety is at manageable level  Description: Interventions:  - Assess and monitor patient's anxiety level  - Monitor for signs and symptoms (heart palpitations, chest pain, shortness of breath, headaches, nausea, feeling jumpy, restlessness, irritable, apprehensive)  - Collaborate with interdisciplinary team and initiate plan and interventions as ordered    - [place] patient to unit/surroundings  - Explain treatment plan  - Encourage participation in care  - Encourage verbalization of concerns/fears  - Identify coping mechanisms  - Assist in developing anxiety-reducing skills  - Administer/offer alternative therapies  - Limit or eliminate stimulants  Outcome: Progressing     Problem: Ineffective Coping  Goal: Participates in unit activities  Description: Interventions:  - Provide therapeutic environment   - Provide required programming   - Redirect inappropriate behaviors   Outcome: Progressing     Problem: DISCHARGE PLANNING - CARE MANAGEMENT  Goal: Discharge to post-acute care or home with appropriate resources  Description: INTERVENTIONS:  - Conduct assessment to determine patient/family and health care team treatment goals, and need for post-acute services based on payer coverage, community resources, and patient preferences, and barriers to discharge  - Address psychosocial, clinical, and financial barriers to discharge as identified in assessment in conjunction with the patient/family and health care team  - Arrange appropriate level of post-acute services according to patient’s   needs and preference and payer coverage in collaboration with the physician and health care team  - Communicate with and update the patient/family, physician, and health care team regarding progress on the discharge plan  - Arrange appropriate transportation to post-acute venues  Outcome: Progressing

## 2022-11-17 RX ORDER — BUSPIRONE HYDROCHLORIDE 5 MG/1
5 TABLET ORAL 3 TIMES DAILY
Status: DISCONTINUED | OUTPATIENT
Start: 2022-11-17 | End: 2022-11-18

## 2022-11-17 RX ORDER — LANOLIN ALCOHOL/MO/W.PET/CERES
6 CREAM (GRAM) TOPICAL
Status: DISCONTINUED | OUTPATIENT
Start: 2022-11-17 | End: 2022-11-23 | Stop reason: HOSPADM

## 2022-11-17 RX ADMIN — Medication 6 MG: at 21:24

## 2022-11-17 RX ADMIN — CARBIDOPA AND LEVODOPA 1 TABLET: 25; 100 TABLET ORAL at 08:30

## 2022-11-17 RX ADMIN — ERGOCALCIFEROL 50000 UNITS: 1.25 CAPSULE ORAL at 08:30

## 2022-11-17 RX ADMIN — APIXABAN 5 MG: 5 TABLET, FILM COATED ORAL at 16:50

## 2022-11-17 RX ADMIN — ATORVASTATIN CALCIUM 40 MG: 40 TABLET, FILM COATED ORAL at 16:18

## 2022-11-17 RX ADMIN — ARIPIPRAZOLE 15 MG: 10 TABLET ORAL at 08:30

## 2022-11-17 RX ADMIN — BUSPIRONE HYDROCHLORIDE 5 MG: 5 TABLET ORAL at 16:18

## 2022-11-17 RX ADMIN — LAMOTRIGINE 50 MG: 25 TABLET ORAL at 21:24

## 2022-11-17 RX ADMIN — APIXABAN 5 MG: 5 TABLET, FILM COATED ORAL at 08:30

## 2022-11-17 RX ADMIN — BUSPIRONE HYDROCHLORIDE 5 MG: 5 TABLET ORAL at 12:17

## 2022-11-17 RX ADMIN — CARBIDOPA AND LEVODOPA 1 TABLET: 25; 100 TABLET ORAL at 12:17

## 2022-11-17 RX ADMIN — BUSPIRONE HYDROCHLORIDE 5 MG: 5 TABLET ORAL at 21:24

## 2022-11-17 RX ADMIN — CARBIDOPA AND LEVODOPA 1 TABLET: 25; 100 TABLET ORAL at 16:50

## 2022-11-17 RX ADMIN — CARBIDOPA AND LEVODOPA 1 TABLET: 25; 100 TABLET ORAL at 21:24

## 2022-11-17 RX ADMIN — SERTRALINE 100 MG: 100 TABLET, FILM COATED ORAL at 08:30

## 2022-11-17 NOTE — PROGRESS NOTES
11/17/22 4175   Activity/Group Checklist   Group Community meeting   Attendance Did not attend  (Pt offered grp; pt remained in room)

## 2022-11-17 NOTE — PLAN OF CARE
Problem: Risk for Self Injury/Neglect  Goal: Treatment Goal: Remain safe during length of stay, learn and adopt new coping skills, and be free of self-injurious ideation, impulses and acts at the time of discharge  Outcome: Progressing  Goal: Refrain from harming self  Description: Interventions:  - Monitor patient closely, per order  - Develop a trusting relationship  - Supervise medication ingestion, monitor effects and side effects   Outcome: Progressing  Goal: Recognize maladaptive responses and adopt new coping mechanisms  Outcome: Progressing     Problem: Depression  Goal: Treatment Goal: Demonstrate behavioral control of depressive symptoms, verbalize feelings of improved mood/affect, and adopt new coping skills prior to discharge  Outcome: Progressing  Goal: Refrain from isolation  Description: Interventions:  - Develop a trusting relationship   - Encourage socialization   Outcome: Progressing  Goal: Refrain from self-neglect  Outcome: Progressing  Goal: Complete daily ADLs, including personal hygiene independently, as able  Description: Interventions:  - Observe, teach, and assist patient with ADLS  -  Monitor and promote a balance of rest/activity, with adequate nutrition and elimination   Outcome: Progressing     Problem: Anxiety  Goal: Anxiety is at manageable level  Description: Interventions:  - Assess and monitor patient's anxiety level  - Monitor for signs and symptoms (heart palpitations, chest pain, shortness of breath, headaches, nausea, feeling jumpy, restlessness, irritable, apprehensive)  - Collaborate with interdisciplinary team and initiate plan and interventions as ordered    - [place] patient to unit/surroundings  - Explain treatment plan  - Encourage participation in care  - Encourage verbalization of concerns/fears  - Identify coping mechanisms  - Assist in developing anxiety-reducing skills  - Administer/offer alternative therapies  - Limit or eliminate stimulants  Outcome: Progressing     Problem: Ineffective Coping  Goal: Participates in unit activities  Description: Interventions:  - Provide therapeutic environment   - Provide required programming   - Redirect inappropriate behaviors   Outcome: Progressing     Problem: DISCHARGE PLANNING - CARE MANAGEMENT  Goal: Discharge to post-acute care or home with appropriate resources  Description: INTERVENTIONS:  - Conduct assessment to determine patient/family and health care team treatment goals, and need for post-acute services based on payer coverage, community resources, and patient preferences, and barriers to discharge  - Address psychosocial, clinical, and financial barriers to discharge as identified in assessment in conjunction with the patient/family and health care team  - Arrange appropriate level of post-acute services according to patient’s   needs and preference and payer coverage in collaboration with the physician and health care team  - Communicate with and update the patient/family, physician, and health care team regarding progress on the discharge plan  - Arrange appropriate transportation to post-acute venues  Outcome: Progressing

## 2022-11-17 NOTE — PROGRESS NOTES
11/17/22   Team Meeting   Meeting Type Daily Rounds   Team Members Present   Team Members Present Physician;Nurse;; ;Occupational Therapist   Physician Team Member Dr Nhan Banda MD   Nursing Team Member Alie Michelle RN; Donovan Saavedra RN, [facility]   Care Management Team Member Nathalie Orozco [facility]   Social Work Team Member Sol BOYD   OT Team Member Fauzia Blake   Patient/Family Present   Patient Present No   Patient's Family Present No     Pt withdrawn to room  Pleasant  Need for med for moderate to severe anx ordered

## 2022-11-17 NOTE — NUTRITION
11/17/22 1522   Biochemical Data,Medical Tests, and Procedures   Biochemical Data/Medical Tests/Procedures Lab values reviewed; Meds reviewed   Labs (Comment) 11/8 CMP WNL  11/9 BMP WNL, LIPIDS WNL, Vit D:15 1   Meds (Comment) amiodarone, eliquis, buspar, Vit D, melatonin, risperdal, zoloft   Nutrition-Focused Physical Exam   Nutrition-Focused Physical Exam Findings RN skin assessment reviewed; No skin issues documented   Nutrition-Focused Physical Exam Findings smoker; LBM 11/16   Medical-Related Concerns anxiety, bipolar 1 disorder, CAD, CVA, depression, MI, psychiatric disorder, suicide attempt   Adequacy of Intake   Nutrition Modality PO   Feeding Route   PO Independent   Current PO Intake   Current Diet Order Regular diet thin liquids   Current Meal Intake %;0-25%   Estimated calorie intake compared to estimated need Nutrient needs met  PES Statement   Problem No nutrition diagnosis   Recommendations/Interventions   Malnutrition/BMI Present No  (does not meet criteria)   Summary Length of stay  Regular diet thin liquids  Meal completions %, often does not consume breakfast  Patient states his appetite is okay  Per notes resides at group home  He states he does not follow a diet plan and his meals are provided by his facility  He states he is not a big breakfast eater  11/12/#; 9/1/#; 4/18/#; he reports no weight changes  Skin intact     Interventions/Recommendations Continue current diet order   Education Assessment   Education Education not indicated at this time   Nutrition Complexity Risk   Nutrition complexity level Low risk   Follow up date 12/01/22

## 2022-11-17 NOTE — PROGRESS NOTES
11/17/22 1720   Activity/Group Checklist   Group Pet therapy   Attendance Did not attend  (Pt offered grp; pt remained in room)

## 2022-11-17 NOTE — PROGRESS NOTES
Progress Note - Carlo [address] 61 y o  male MRN: [id number]   Unit/Bed#: Jodi Malagon 205-02 Encounter: [id number]    Behavior over the last 24 hours: juan manuel Dickerson has been experiencing increase in anxiety as evidenced by increase in requests for PRN medications yesterday  Continues isolative, guarded, apprehensive, poorly motivated, and unable to contract for safety outside the hospital   Will start Buspar for anxiety  Limited participation in milieu  Sleep: slept off and on  Appetite: fair  Medication side effects: No   ROS: no complaints, all other systems are negative    Mental Status Evaluation:    Appearance:  marginal hygiene   Behavior:  guarded   Speech:  normal rate, normal volume   Mood:  dysphoric   Affect:  blunted   Thought Process:  goal directed   Associations: concrete associations   Thought Content:  no overt delusions   Perceptual Disturbances: no auditory hallucinations, no visual hallucinations   Risk Potential: Suicidal ideation - None at present, but would not feel safe if discharged  Homicidal ideation - None  Potential for aggression - No   Sensorium:  oriented to person and place   Memory:  recent memory moderately impaired   Consciousness:  alert and awake   Attention: decreased concentration and decreased attention span   Insight:  limited   Judgment: limited   Gait/Station: normal gait/station, normal balance   Motor Activity: no abnormal movements     Vital signs in last 24 hours:    Temp:  [97 6 °F (36 4 °C)-98 5 °F (36 9 °C)] 97 6 °F (36 4 °C)  HR:  [56-82] 56  Resp:  [16-18] 16  BP: ()/(55-73) 90/55    Laboratory results: I have personally reviewed all pertinent laboratory/tests results        Progress Toward Goals: progressing    Assessment/Plan   Principal Problem:    Schizoaffective disorder (HCC)  Active Problems:    History of MI (myocardial infarction)    History of CVA (cerebrovascular accident)    Other hyperlipidemia    Major neurocognitive disorder, due to vascular disease, without behavioral disturbance, mild    CVA (cerebral vascular accident) ([facility] [address] )    Other chronic pain    Essential hypertension    Arthritis    Coronary artery disease involving native heart with angina pectoris, unspecified vessel or lesion type [facility] )    Recommended Treatment:     Planned medication and treatment changes: All current active medications have been reviewed  Encourage group therapy, milieu therapy and occupational therapy  Behavioral Health checks every 7 minutes    Requires continued inpatient treatment due to chronic illness and high risk of decompensation if discharged before long term stability is achieved      Add Buspar    Current Facility-Administered Medications   Medication Dose Route Frequency Provider Last Rate   • aluminum-magnesium hydroxide-simethicone  30 mL Oral Q4H PRN Olinda Rios MD     • amiodarone  200 mg Oral Daily Emperatriz Marti PA-C     • apixaban  5 mg Oral BID Olinda Rios MD     • ARIPiprazole  15 mg Oral Daily [facility] Bulah Gilford, MD     • atorvastatin  40 mg Oral Daily With Dinner Olinda Rios MD     • carbidopa-levodopa  1 tablet Oral 4x Daily Olinda Rios MD     • [START ON 1/13/2023] cholecalciferol  1,000 Units Oral Daily Emperatriz Marti PA-C     • ergocalciferol  50,000 Units Oral Weekly Emperatriz Marti PA-C     • hydrOXYzine HCL  25 mg Oral Q6H PRN Max 4/day Olinda Rios MD     • influenza vaccine  0 5 mL Intramuscular Once Ricky King MD     • lamoTRIgine  50 mg Oral HS Koreen Cockayne, CRNP     • LORazepam  1 mg Intramuscular Q6H PRN Max 3/day Olinda Rios MD     • melatonin  3 mg Oral HS Olinda Rios MD     • mirtazapine  7 5 mg Oral HS Olinda Rios MD     • nicotine  1 patch Transdermal Daily Emperatriz Marti PA-C     • nicotine polacrilex  4 mg Oral Q2H PRN Emperatriz Marti PA-C     • risperiDONE  0 25 mg Oral Q4H PRN Max 6/day Olinda Rios MD • risperiDONE  0 5 mg Oral Q4H PRN Max 3/day Sivakumar Page MD     • risperiDONE  1 mg Oral Q2H PRN Max 3/day Sivakumar Page MD     • sertraline  100 mg Oral Daily ERNIE Nguyễn         Risks / Benefits of Treatment:    Risks, benefits, and possible side effects of medications explained to patient and patient verbalizes understanding and agreement for treatment  Counseling / Coordination of Care:    Patient's progress discussed with staff in treatment team meeting  Medications, treatment progress and treatment plan reviewed with patient      ERNIE Nguyễn 11/17/22

## 2022-11-17 NOTE — NURSING NOTE
Patient withdrawn to room, resting in bed  Social with staff, does not interact with peers  Patient rates anxiety/depression at 4/10, denies SI/HI, hallucinations  Remains medication compliant and on 7" checks for safety and behaviors

## 2022-11-17 NOTE — NURSING NOTE
Patient withdrawn to room this evening  Came out for snack and then returned to room  Rates anxiety 10/10 and depression 5/10  Flat affect  Offered and accepted Atarax 25 MG PO PRN at 22:35 for anxiety  Cooperative with care  0/10 pain

## 2022-11-18 RX ORDER — BUSPIRONE HYDROCHLORIDE 15 MG/1
7.5 TABLET ORAL 3 TIMES DAILY
Status: DISCONTINUED | OUTPATIENT
Start: 2022-11-18 | End: 2022-11-23 | Stop reason: HOSPADM

## 2022-11-18 RX ADMIN — APIXABAN 5 MG: 5 TABLET, FILM COATED ORAL at 17:37

## 2022-11-18 RX ADMIN — AMIODARONE HYDROCHLORIDE 200 MG: 100 TABLET ORAL at 08:23

## 2022-11-18 RX ADMIN — ARIPIPRAZOLE 15 MG: 10 TABLET ORAL at 08:23

## 2022-11-18 RX ADMIN — LAMOTRIGINE 50 MG: 25 TABLET ORAL at 21:17

## 2022-11-18 RX ADMIN — CARBIDOPA AND LEVODOPA 1 TABLET: 25; 100 TABLET ORAL at 08:23

## 2022-11-18 RX ADMIN — BUSPIRONE HYDROCHLORIDE 7.5 MG: 15 TABLET ORAL at 21:17

## 2022-11-18 RX ADMIN — CARBIDOPA AND LEVODOPA 1 TABLET: 25; 100 TABLET ORAL at 17:37

## 2022-11-18 RX ADMIN — CARBIDOPA AND LEVODOPA 1 TABLET: 25; 100 TABLET ORAL at 12:26

## 2022-11-18 RX ADMIN — BUSPIRONE HYDROCHLORIDE 5 MG: 5 TABLET ORAL at 08:23

## 2022-11-18 RX ADMIN — Medication 6 MG: at 21:17

## 2022-11-18 RX ADMIN — SERTRALINE 100 MG: 100 TABLET, FILM COATED ORAL at 08:25

## 2022-11-18 RX ADMIN — BUSPIRONE HYDROCHLORIDE 7.5 MG: 15 TABLET ORAL at 16:10

## 2022-11-18 RX ADMIN — APIXABAN 5 MG: 5 TABLET, FILM COATED ORAL at 08:24

## 2022-11-18 RX ADMIN — CARBIDOPA AND LEVODOPA 1 TABLET: 25; 100 TABLET ORAL at 21:17

## 2022-11-18 RX ADMIN — ATORVASTATIN CALCIUM 40 MG: 40 TABLET, FILM COATED ORAL at 16:15

## 2022-11-18 NOTE — NURSING NOTE
Patient is withdrawn to room this evening, cooperative and med compliant  Rates anxiety 4/10 and denies all other psych symptoms  Will CTM  Continuous patient safety checks ongoing

## 2022-11-18 NOTE — PROGRESS NOTES
Progress Note Jaycob Bahena 61 y o  male MRN: [id number]    Unit/Bed#Delaney Chappell 205-02 Encounter: [id number]        Subjective:   Patient seen and examined at bedside after reviewing the chart and discussing the case with the caring staff  Patient examined at bedside  Patient has no acute complaints  Physical Exam   Vitals: Blood pressure 108/60, pulse 78, temperature 98 5 °F (36 9 °C), temperature source Temporal, resp  rate 15, height 5' 11" (1 803 m), weight 73 2 kg (161 lb 6 oz), SpO2 98 %  ,Body mass index is 22 51 kg/m²  Constitutional: Patient in no acute distress  HEENT: PERR, EOMI, MMM  Cardiovascular: Bradycardic and regular rhythm  Pulmonary/Chest: Effort normal and breath sounds normal    Abdomen: Non distended  Assessment/Plan:  Mayra Bradley is a(n) 61 y o  male with schizoaffective disorder        1  Cardiac with history of HTN, HLD, CAD, ventricular tachycardia s/p ICD, Takotsubo cardiomyopathy  Continue atorvastatin 40 mg daily, amiodarone 200 mg daily with holding parameters, Eliquis 5 mg twice daily  2  Hx of CVA/apical mural thrombus  Continue Eliquis 5 mg twice daily, atorvastatin 40 mg daily  3  DJD/OA  Patient may take Tylenol as needed  4  Tobacco abuse  NRT  5  Vitamin-D deficiency  Patient started on vitamin-D2 32517 units weekly for 10 weeks followed by vitamin D3 1000 units daily  The patient was discussed with Dr Anna Mortensen and he is in agreement with the above note

## 2022-11-18 NOTE — PROGRESS NOTES
Progress Note - Carlo [address] 61 y o  male MRN: [id number]   Unit/Bed#: Maida Mean 205-02 Encounter: [id number]    Behavior over the last 24 hours: unchanged  Herminio Jarvis is seen in his room and continues poorly motivated and isolative and guarded  He does brighten slightly on approach and reports some improvement in mood  No recent reports of panic like symptoms  Ongoing anxiety,especially around other patients  Will increase Buspar 7 5 mg TID  Sleep: hypersomnia  Appetite: poor  Medication side effects: No   ROS: no complaints, all other systems are negative    Mental Status Evaluation:    Appearance:  disheveled, marginal hygiene   Behavior:  calm   Speech:  slow, soft   Mood:  depressed   Affect:  flat   Thought Process:  goal directed   Associations: concrete associations   Thought Content:  mild paranoia   Perceptual Disturbances: none   Risk Potential: Suicidal ideation - None  Homicidal ideation - None  Potential for aggression - No   Sensorium:  oriented to person and place   Memory:  recent memory impaired   Consciousness:  alert and awake   Attention: decreased concentration and decreased attention span   Insight:  limited   Judgment: limited   Gait/Station: normal gait/station, normal balance   Motor Activity: no abnormal movements     Vital signs in last 24 hours:    Temp:  [98 2 °F (36 8 °C)-99 1 °F (37 3 °C)] 98 5 °F (36 9 °C)  HR:  [69-78] 78  Resp:  [15-18] 15  BP: (100-109)/(59-61) 108/60    Laboratory results: I have personally reviewed all pertinent laboratory/tests results        Progress Toward Goals: progressing    Assessment/Plan   Principal Problem:    Schizoaffective disorder (HCC)  Active Problems:    History of MI (myocardial infarction)    History of CVA (cerebrovascular accident)    Other hyperlipidemia    Major neurocognitive disorder, due to vascular disease, without behavioral disturbance, mild    CVA (cerebral vascular accident) ([facility] [address] )    Other chronic pain Essential hypertension    Arthritis    Coronary artery disease involving native heart with angina pectoris, unspecified vessel or lesion type [facility])    Recommended Treatment:     Planned medication and treatment changes: All current active medications have been reviewed  Encourage group therapy, milieu therapy and occupational therapy  Behavioral Health checks every 7 minutes    Requires continued inpatient treatment due to chronic illness and high risk of decompensation if discharged before long term stability is achieved      Continue current medications:  Current Facility-Administered Medications   Medication Dose Route Frequency Provider Last Rate   • aluminum-magnesium hydroxide-simethicone  30 mL Oral Q4H PRN Stephen España MD     • amiodarone  200 mg Oral Daily Emperatriz Marti PA-C     • apixaban  5 mg Oral BID Stephen España MD     • ARIPiprazole  15 mg Oral Daily Vijay Reyes MD     • atorvastatin  40 mg Oral Daily With Dinner Stephen España MD     • busPIRone  5 mg Oral TID ERNIE Degroot     • carbidopa-levodopa  1 tablet Oral 4x Daily Stephen España MD     • [START ON 1/13/2023] cholecalciferol  1,000 Units Oral Daily Emperatriz Marti PA-C     • ergocalciferol  50,000 Units Oral Weekly Emperatriz Marti PA-C     • hydrOXYzine HCL  25 mg Oral Q6H PRN Max 4/day Stephen España MD     • influenza vaccine  0 5 mL Intramuscular Once Ana Limon MD     • lamoTRIgine  50 mg Oral HS ERNIE Degroot     • LORazepam  1 mg Intramuscular Q6H PRN Max 3/day Stephen España MD     • melatonin  6 mg Oral HS ERNIE Degroot     • nicotine  1 patch Transdermal Daily Emperatriz Marti PA-C     • nicotine polacrilex  4 mg Oral Q2H PRN Emperatriz Marti PA-C     • risperiDONE  0 25 mg Oral Q4H PRN Max 6/day Stephen España MD     • risperiDONE  0 5 mg Oral Q4H PRN Max 3/day Stephen España MD     • risperiDONE  1 mg Oral Q2H PRN Max 3/day Stephen España MD     • sertraline  100 mg Oral Daily ERNIE Fitzpatrick         Risks / Benefits of Treatment:    Risks, benefits, and possible side effects of medications explained to patient and patient verbalizes understanding and agreement for treatment  Counseling / Coordination of Care:    Patient's progress discussed with staff in treatment team meeting  Medications, treatment progress and treatment plan reviewed with patient      ERNIE Fitzpatrick 11/18/22

## 2022-11-18 NOTE — NURSING NOTE
Patient appears to have slept thru the night, No acute behaviors observed or concerns voiced  Will CTM  Continuous patient safety checks in progress

## 2022-11-18 NOTE — NURSING NOTE
Pt up ad navarro & gait is steady  Pt c/o depression 4/10 & anxiety 4/10  Pt denies any hallucinations, suicidal or homicidal ideations  Q 7 min checks maintained to monitor pt's behavior & safety  Pt is flat & withdrawn to his room in between meals  Pt does not socialize with other patients  Pt is cooperative & compliant with medications   Pt refused breakfast this AM

## 2022-11-18 NOTE — PROGRESS NOTES
11/18/22   Team Meeting   Meeting Type Daily Rounds   Team Members Present   Team Members Present Physician;Nurse;;; Occupational Therapist   Physician Team Member Dr Tere Jarvis MD; [address]   Nursing Team Member Fausto Chua RN; Anival Flores RN,    Care Management Team Member Eloina Garcia [facility]   Social Work Team Member [id number]   OT Team Member Caitlin Casey [facility]   Patient/Family Present   Patient Present No   Patient's Family Present No     Pt flat affect  4/10 anx  Declined breakfast; normal for pt  Out of room for meals only  Naps during day; sleeps pm  Cooperative and polite  D/C discussed for next week; mid-week to [place]  [employer]

## 2022-11-18 NOTE — PLAN OF CARE
Problem: Risk for Self Injury/Neglect  Goal: Refrain from harming self  Description: Interventions:  - Monitor patient closely, per order  - Develop a trusting relationship  - Supervise medication ingestion, monitor effects and side effects   Outcome: Progressing     Problem: Depression  Goal: Refrain from isolation  Description: Interventions:  - Develop a trusting relationship   - Encourage socialization   Outcome: Not Progressing     Problem: Ineffective Coping  Goal: Participates in unit activities  Description: Interventions:  - Provide therapeutic environment   - Provide required programming   - Redirect inappropriate behaviors   Outcome: Not Progressing

## 2022-11-18 NOTE — PROGRESS NOTES
11/18/22 0508   Activity/Group Checklist   Group Community meeting   Attendance Did not attend  (Pt offered grp; pt remained in room)

## 2022-11-19 RX ADMIN — Medication 6 MG: at 21:06

## 2022-11-19 RX ADMIN — ATORVASTATIN CALCIUM 40 MG: 40 TABLET, FILM COATED ORAL at 17:05

## 2022-11-19 RX ADMIN — APIXABAN 5 MG: 5 TABLET, FILM COATED ORAL at 08:29

## 2022-11-19 RX ADMIN — BUSPIRONE HYDROCHLORIDE 7.5 MG: 15 TABLET ORAL at 21:06

## 2022-11-19 RX ADMIN — CARBIDOPA AND LEVODOPA 1 TABLET: 25; 100 TABLET ORAL at 12:00

## 2022-11-19 RX ADMIN — CARBIDOPA AND LEVODOPA 1 TABLET: 25; 100 TABLET ORAL at 08:29

## 2022-11-19 RX ADMIN — BUSPIRONE HYDROCHLORIDE 7.5 MG: 15 TABLET ORAL at 08:29

## 2022-11-19 RX ADMIN — APIXABAN 5 MG: 5 TABLET, FILM COATED ORAL at 17:05

## 2022-11-19 RX ADMIN — AMIODARONE HYDROCHLORIDE 200 MG: 100 TABLET ORAL at 08:29

## 2022-11-19 RX ADMIN — ARIPIPRAZOLE 15 MG: 10 TABLET ORAL at 08:29

## 2022-11-19 RX ADMIN — BUSPIRONE HYDROCHLORIDE 7.5 MG: 15 TABLET ORAL at 17:05

## 2022-11-19 RX ADMIN — CARBIDOPA AND LEVODOPA 1 TABLET: 25; 100 TABLET ORAL at 21:06

## 2022-11-19 RX ADMIN — CARBIDOPA AND LEVODOPA 1 TABLET: 25; 100 TABLET ORAL at 17:05

## 2022-11-19 RX ADMIN — SERTRALINE 100 MG: 100 TABLET, FILM COATED ORAL at 08:29

## 2022-11-19 RX ADMIN — LAMOTRIGINE 50 MG: 25 TABLET ORAL at 21:06

## 2022-11-19 NOTE — PROGRESS NOTES
Progress Note Samantha Child 61 y o  male MRN: [id number]    Unit/Bed#Lisa Rangel 205-02 Encounter: [id number]        Subjective:   Patient seen and examined at bedside after reviewing the chart and discussing the case with the caring staff  Patient examined at bedside  Patient has no acute complaints  Physical Exam   Vitals: Blood pressure 123/70, pulse 55, temperature 97 8 °F (36 6 °C), temperature source Temporal, resp  rate 18, height 5' 11" (1 803 m), weight 73 kg (160 lb 15 oz), SpO2 98 %  ,Body mass index is 22 45 kg/m²  Constitutional: Patient in no acute distress  HEENT: PERR, EOMI, MMM  Cardiovascular: Bradycardic and regular rhythm  Pulmonary/Chest: Effort normal and breath sounds normal    Abdomen: Non distended  Assessment/Plan:  Marjorie Hayward is a(n) 61 y o  male with schizoaffective disorder        1  Cardiac with history of HTN, HLD, CAD, ventricular tachycardia s/p ICD, Takotsubo cardiomyopathy  Continue atorvastatin 40 mg daily, amiodarone 200 mg daily with holding parameters, Eliquis 5 mg twice daily  2  Hx of CVA/apical mural thrombus  Continue Eliquis 5 mg twice daily, atorvastatin 40 mg daily  3  DJD/OA  Patient may take Tylenol as needed  4  Tobacco abuse  NRT  5  Vitamin-D deficiency  Patient started on vitamin-D2 27124 units weekly for 10 weeks followed by vitamin D3 1000 units daily

## 2022-11-19 NOTE — NURSING NOTE
Pt slept through the night  Woke up about [phone] and asked for a tablet  BHT told pt she would get it for him once she was finished getting bloodwork on other pts  Pt then came to nurses and asked  Nurse told pt he was told he had to wait  Other BHT handed him the tablet at that time and as pt walked away he was heard mumbling under his breath, "Fucking bitches " Nurses acknowledged his remark and advised pt of inappropriateness of remark and that having the tablet was a privilege  Pt walked into room and closed the door  Will continue to monitor

## 2022-11-19 NOTE — PLAN OF CARE
Problem: Risk for Self Injury/Neglect  Goal: Treatment Goal: Remain safe during length of stay, learn and adopt new coping skills, and be free of self-injurious ideation, impulses and acts at the time of discharge  Outcome: Progressing  Goal: Refrain from harming self  Description: Interventions:  - Monitor patient closely, per order  - Develop a trusting relationship  - Supervise medication ingestion, monitor effects and side effects   Outcome: Progressing     Problem: Depression  Goal: Refrain from isolation  Description: Interventions:  - Develop a trusting relationship   - Encourage socialization   Outcome: Not Progressing     Problem: Ineffective Coping  Goal: Participates in unit activities  Description: Interventions:  - Provide therapeutic environment   - Provide required programming   - Redirect inappropriate behaviors   Outcome: Progressing     Problem: Nutrition/Hydration-ADULT  Goal: Nutrient/Hydration intake appropriate for improving, restoring or maintaining nutritional needs  Description: Monitor and assess patient's nutrition/hydration status for malnutrition  Collaborate with interdisciplinary team and initiate plan and interventions as ordered  Monitor patient's weight and dietary intake as ordered or per policy  Utilize nutrition screening tool and intervene as necessary  Determine patient's food preferences and provide high-protein, high-caloric foods as appropriate       INTERVENTIONS:  - Monitor oral intake, urinary output, labs, and treatment plans  - Assess nutrition and hydration status and recommend course of action  - Evaluate amount of meals eaten  - Assist patient with eating if necessary   - Allow adequate time for meals  - Recommend/ encourage appropriate diets, oral nutritional supplements, and vitamin/mineral supplements  - Order, calculate, and assess calorie counts as needed  - Recommend, monitor, and adjust tube feedings and TPN/PPN based on assessed needs  - Assess need for intravenous fluids  - Provide specific nutrition/hydration education as appropriate  - Include patient/family/caregiver in decisions related to nutrition  Outcome: Progressing

## 2022-11-19 NOTE — PROGRESS NOTES
Progress Note - Behavioral Health   Mayi Reading 61 y o  male MRN: [id number]  Unit/Bed#Suzi Cedillo 205-02 Encounter: [id number]      Subjective:  Patient evaluated this morning for continuity of care  Patient was discussed at length with nursing and treatment team  Per nursing, patient has been withdrawn, reporting depression and anxiety, selective with meals, has been sleeping, compliant with medication  Patient was calm and cooperative during interview  Reports feeling depressed and anxious  He reports depression as a 5/10 and anxiety as a 5/10  Mayi Reading denies suicidal or homicidal ideation; contracts for safety  Reports being isolative  He reports having good sleep and appetite  He denies adverse effects to medications        Current Medications:  Current Facility-Administered Medications   Medication Dose Route Frequency Provider Last Rate   • aluminum-magnesium hydroxide-simethicone  30 mL Oral Q4H PRN Yeyo Ruby MD     • amiodarone  200 mg Oral Daily Emperatriz Marti PA-C     • apixaban  5 mg Oral BID Yeyo Ruby MD     • ARIPiprazole  15 mg Oral Daily [facility] Yao Holcomb MD     • atorvastatin  40 mg Oral Daily With Dinner Yeyo Ruby MD     • busPIRone  7 5 mg Oral TID ERNIE Trejo     • carbidopa-levodopa  1 tablet Oral 4x Daily Yeyo Ruby MD     • [START ON 1/13/2023] cholecalciferol  1,000 Units Oral Daily Emperatriz Marti PA-C     • ergocalciferol  50,000 Units Oral Weekly Epmeratriz Marti PA-C     • hydrOXYzine HCL  25 mg Oral Q6H PRN Max 4/day Yeyo Ruby MD     • influenza vaccine  0 5 mL Intramuscular Once Ambreen Velasco MD     • lamoTRIgine  50 mg Oral HS ERNIE Trejo     • LORazepam  1 mg Intramuscular Q6H PRN Max 3/day Yeyo Ruby MD     • melatonin  6 mg Oral HS ERNIE Trejo     • nicotine  1 patch Transdermal Daily Emperatriz Marti PA-C     • nicotine polacrilex  4 mg Oral Q2H PRN Emperatriz Marti PA-C     • risperiDONE  0 25 mg Oral Q4H PRN Max 6/day Lori Hernandez MD     • risperiDONE  0 5 mg Oral Q4H PRN Max 3/day Lori Hernandez MD     • risperiDONE  1 mg Oral Q2H PRN Max 3/day Lori Hernandez MD     • sertraline  100 mg Oral Daily ERNIE Bose           Vital signs in last 24 hours:  Temp:  [97 8 °F (36 6 °C)-99 2 °F (37 3 °C)] 97 8 °F (36 6 °C)  HR:  [55-75] 55  Resp:  [16-18] 18  BP: (107-137)/(64-76) 123/70    Laboratory results:  I have personally reviewed all pertinent laboratory/tests results  Mental Status Evaluation:  Appearance:  appears stated age   Behavior:  calm and cooperative   Speech:  soft and scant   Mood:  "Depressed, anxious"   Affect:  Constricted   Thought Process:  Organized   Thought Content:  no delusions elicited   Perceptual Disturbances: Denies auditory or visual hallucinations   Risk Potential: Denies suicidal or homicidal ideation   Sensorium:  person, place and year   Cognition:  Appears grossly intact   Consciousness:  alert and awake   Attention: attention span appeared shorter than expected for age   Insight:  limited   Judgment: limited   Gait/Station: not assessed   Motor Activity: no abnormal movements           Assessment/Plan   Principal Problem:    Schizoaffective disorder ([facility] [address] )  Active Problems:    History of MI (myocardial infarction)    History of CVA (cerebrovascular accident)    Other hyperlipidemia    Major neurocognitive disorder, due to vascular disease, without behavioral disturbance, mild    CVA (cerebral vascular accident) ([address] )    Other chronic pain    Essential hypertension    Arthritis    Coronary artery disease involving native heart with angina pectoris, unspecified vessel or lesion type ([facility] [address] )        Recommended Treatment:   • Continue to promote patient participation in therapeutic milieu  • Continue medical management per medicine  • Continue previously prescribed psychotropic medication regimen; see above    • Continue behavioral health checks q 7 minutes  • Continue vitals per behavioral health unit protocol  • Discharge date per primary team       Risks, benefits and possible side effects of Medications:   Risks, benefits, and possible side effects of medications explained to patient  Patient has limited understanding of risks and benefits of treatment at this time, but agrees to take medications as prescribed  Cherri Medeiros, DO      This note has been constructed using a voice recognition system  There may be translation, syntax,  or grammatical errors  If you have any questions, please contact the dictating provider      This note was not shared with the patient due to reasonable likelihood of causing patient harm

## 2022-11-19 NOTE — PLAN OF CARE
Problem: Risk for Self Injury/Neglect  Goal: Refrain from harming self  Description: Interventions:  - Monitor patient closely, per order  - Develop a trusting relationship  - Supervise medication ingestion, monitor effects and side effects   Outcome: Progressing     Problem: Depression  Goal: Complete daily ADLs, including personal hygiene independently, as able  Description: Interventions:  - Observe, teach, and assist patient with ADLS  -  Monitor and promote a balance of rest/activity, with adequate nutrition and elimination   Outcome: Progressing

## 2022-11-19 NOTE — NURSING NOTE
Pt withdrawn to room  States depression and anxiety as "high" but refuses PRNs for anxiety  Encouraged pt to have PM snack, pt refused to get OOB  Compliant with all HS medications  Q7 safety checks maintained  Will continue to monitor

## 2022-11-20 RX ADMIN — BUSPIRONE HYDROCHLORIDE 7.5 MG: 15 TABLET ORAL at 21:14

## 2022-11-20 RX ADMIN — ARIPIPRAZOLE 15 MG: 10 TABLET ORAL at 08:49

## 2022-11-20 RX ADMIN — CARBIDOPA AND LEVODOPA 1 TABLET: 25; 100 TABLET ORAL at 08:49

## 2022-11-20 RX ADMIN — BUSPIRONE HYDROCHLORIDE 7.5 MG: 15 TABLET ORAL at 08:49

## 2022-11-20 RX ADMIN — APIXABAN 5 MG: 5 TABLET, FILM COATED ORAL at 08:49

## 2022-11-20 RX ADMIN — CARBIDOPA AND LEVODOPA 1 TABLET: 25; 100 TABLET ORAL at 17:00

## 2022-11-20 RX ADMIN — APIXABAN 5 MG: 5 TABLET, FILM COATED ORAL at 17:00

## 2022-11-20 RX ADMIN — CARBIDOPA AND LEVODOPA 1 TABLET: 25; 100 TABLET ORAL at 12:06

## 2022-11-20 RX ADMIN — SERTRALINE 100 MG: 100 TABLET, FILM COATED ORAL at 08:49

## 2022-11-20 RX ADMIN — ATORVASTATIN CALCIUM 40 MG: 40 TABLET, FILM COATED ORAL at 16:58

## 2022-11-20 RX ADMIN — CARBIDOPA AND LEVODOPA 1 TABLET: 25; 100 TABLET ORAL at 21:14

## 2022-11-20 RX ADMIN — Medication 6 MG: at 21:14

## 2022-11-20 RX ADMIN — LAMOTRIGINE 50 MG: 25 TABLET ORAL at 21:14

## 2022-11-20 RX ADMIN — BUSPIRONE HYDROCHLORIDE 7.5 MG: 15 TABLET ORAL at 16:58

## 2022-11-20 NOTE — NURSING NOTE
Patient withdrawn to room skipping breakfast; walking this afternoon in the case and out for lunch meal  Patient denies SI/HI/AVH  Rates anxiety and depression 4/10 ans states feeling the same since admission  Compliant with medications and denies any pain or discomfort  Affect is flat  Presents as disheveled and odorous  Inquired about shower; pt states having one a few days ago; encouraged self care  Pt verbally understands  Minimally verbal in conversation  Amiodarone held due to HR parameters  Q 7 min safety checks maintained  Will continue to provide support

## 2022-11-20 NOTE — NURSING NOTE
Pt out of room in Milieu for a short period of time this evening   + for PM snack  Pt rates depression and anxiety as 4/10, denies SI/HI, and hallucinations  Compliant with all HS medications  Pt gives very shirt answers, quiet and subdued  Disheveled looking  Pt voices no other complaints  Q7 safety checks maintained  Will continue to monitor

## 2022-11-20 NOTE — PROGRESS NOTES
Progress Note - Behavioral Health   Hien Berger 61 y o  male MRN: [id number]  Unit/Bed#Ulysses Gowers 205-02 Encounter: [id number]      Subjective:  Patient evaluated this morning for continuity of care  Patient was discussed at length with nursing and treatment team  Per nursing, patient has been withdrawn, reporting depression anxiety, slept overnight  Patient was calm and cooperative during interview  Reports some improvements in depression and anxiety today  Reports that it is “a new day”  He rates depression as a 3/10 and anxiety as a 3/10  He reports that he was out on the unit yesterday for some time prior to returning to his room  Hien Berger denies suicidal or homicidal ideation; contracts for safety  Denies auditory or visual hallucinations  Denies adverse effects to medications  Encouraged patient to participate in the therapeutic milieu        Current Medications:  Current Facility-Administered Medications   Medication Dose Route Frequency Provider Last Rate   • aluminum-magnesium hydroxide-simethicone  30 mL Oral Q4H PRN Mouna Malik MD     • amiodarone  200 mg Oral Daily Emperatriz Marti PA-C     • apixaban  5 mg Oral BID Mouna Malik MD     • ARIPiprazole  15 mg Oral Daily [facility] Courtenay Heimlich, MD     • atorvastatin  40 mg Oral Daily With Dinner Mouna Malik MD     • busPIRone  7 5 mg Oral TID ERNIE Daily     • carbidopa-levodopa  1 tablet Oral 4x Daily Mouna Malik MD     • [START ON 1/13/2023] cholecalciferol  1,000 Units Oral Daily Emperatriz Marti PA-C     • ergocalciferol  50,000 Units Oral Weekly Emperatriz Marti PA-C     • hydrOXYzine HCL  25 mg Oral Q6H PRN Max 4/day Mouna Malik MD     • influenza vaccine  0 5 mL Intramuscular Once Amy Segovia MD     • lamoTRIgine  50 mg Oral HS ERNIE Daily     • LORazepam  1 mg Intramuscular Q6H PRN Max 3/day Mouna Malik MD     • melatonin  6 mg Oral HS ERNIE Daily     • nicotine  1 patch Transdermal Daily Emperatriz Marti PA-C     • nicotine polacrilex  4 mg Oral Q2H PRN Emperatriz Marti PA-C     • risperiDONE  0 25 mg Oral Q4H PRN Max 6/day Lori Hernandez MD     • risperiDONE  0 5 mg Oral Q4H PRN Max 3/day Lori Hernandez MD     • risperiDONE  1 mg Oral Q2H PRN Max 3/day Lori Hernandez MD     • sertraline  100 mg Oral Daily ERNIE Bose           Vital signs in last 24 hours:  Temp:  [98 5 °F (36 9 °C)-98 7 °F (37 1 °C)] 98 5 °F (36 9 °C)  HR:  [58-77] 58  Resp:  [17-18] 17  BP: ()/(56-67) 107/67    Laboratory results:  I have personally reviewed all pertinent laboratory/tests results  Mental Status Evaluation:  Appearance:  appears stated age   Behavior:  calm and cooperative   Speech:  soft and scant   Mood:  "Depressed, anxious"   Affect:  Constricted   Thought Process:  Organized   Thought Content:  no delusions elicited   Perceptual Disturbances: Denies auditory or visual hallucinations   Risk Potential: Denies suicidal or homicidal ideation   Sensorium:  person, place, month of year and year   Cognition:  Appears grossly intact   Consciousness:  alert and awake   Attention: attention span appeared shorter than expected for age   Insight:  limited   Judgment: limited   Gait/Station: not assessed   Motor Activity: no abnormal movements         Assessment/Plan   Principal Problem:    Schizoaffective disorder ([facility] [address] )  Active Problems:    History of MI (myocardial infarction)    History of CVA (cerebrovascular accident)    Other hyperlipidemia    Major neurocognitive disorder, due to vascular disease, without behavioral disturbance, mild    CVA (cerebral vascular accident) ([address] )    Other chronic pain    Essential hypertension    Arthritis    Coronary artery disease involving native heart with angina pectoris, unspecified vessel or lesion type ([address] )        Recommended Treatment:   • Continue to promote patient participation in therapeutic milieu    • Continue medical management per medicine  • Continue previously prescribed psychotropic medication regimen; see above  • Continue behavioral health checks q 7 minutes  • Continue vitals per behavioral health unit protocol  • Discharge date per primary team       Risks, benefits and possible side effects of Medications:   Risks, benefits, and possible side effects of medications explained to patient  Patient has limited understanding of risks and benefits of treatment at this time, but agrees to take medications as prescribed  Rebecca Barton, DO      This note has been constructed using a voice recognition system  There may be translation, syntax,  or grammatical errors  If you have any questions, please contact the dictating provider      This note was not shared with the patient due to reasonable likelihood of causing patient harm

## 2022-11-20 NOTE — NURSING NOTE
Pt slept through the night with no signs of pain or distress observed  Q7 safety checks maintained throughout the night  Will continue to monitor

## 2022-11-20 NOTE — PLAN OF CARE
Problem: Risk for Self Injury/Neglect  Goal: Treatment Goal: Remain safe during length of stay, learn and adopt new coping skills, and be free of self-injurious ideation, impulses and acts at the time of discharge  Outcome: Progressing  Goal: Refrain from harming self  Description: Interventions:  - Monitor patient closely, per order  - Develop a trusting relationship  - Supervise medication ingestion, monitor effects and side effects   Outcome: Progressing     Problem: Depression  Goal: Treatment Goal: Demonstrate behavioral control of depressive symptoms, verbalize feelings of improved mood/affect, and adopt new coping skills prior to discharge  Outcome: Progressing  Goal: Refrain from isolation  Description: Interventions:  - Develop a trusting relationship   - Encourage socialization   Outcome: Not Progressing     Problem: Anxiety  Goal: Anxiety is at manageable level  Description: Interventions:  - Assess and monitor patient's anxiety level  - Monitor for signs and symptoms (heart palpitations, chest pain, shortness of breath, headaches, nausea, feeling jumpy, restlessness, irritable, apprehensive)  - Collaborate with interdisciplinary team and initiate plan and interventions as ordered    - [place] patient to unit/surroundings  - Explain treatment plan  - Encourage participation in care  - Encourage verbalization of concerns/fears  - Identify coping mechanisms  - Assist in developing anxiety-reducing skills  - Administer/offer alternative therapies  - Limit or eliminate stimulants  Outcome: Progressing     Problem: Ineffective Coping  Goal: Participates in unit activities  Description: Interventions:  - Provide therapeutic environment   - Provide required programming   - Redirect inappropriate behaviors   Outcome: Progressing     Problem: Nutrition/Hydration-ADULT  Goal: Nutrient/Hydration intake appropriate for improving, restoring or maintaining nutritional needs  Description: Monitor and assess patient's nutrition/hydration status for malnutrition  Collaborate with interdisciplinary team and initiate plan and interventions as ordered  Monitor patient's weight and dietary intake as ordered or per policy  Utilize nutrition screening tool and intervene as necessary  Determine patient's food preferences and provide high-protein, high-caloric foods as appropriate       INTERVENTIONS:  - Monitor oral intake, urinary output, labs, and treatment plans  - Assess nutrition and hydration status and recommend course of action  - Evaluate amount of meals eaten  - Assist patient with eating if necessary   - Allow adequate time for meals  - Recommend/ encourage appropriate diets, oral nutritional supplements, and vitamin/mineral supplements  - Order, calculate, and assess calorie counts as needed  - Recommend, monitor, and adjust tube feedings and TPN/PPN based on assessed needs  - Assess need for intravenous fluids  - Provide specific nutrition/hydration education as appropriate  - Include patient/family/caregiver in decisions related to nutrition  Outcome: Progressing

## 2022-11-20 NOTE — PROGRESS NOTES
Progress Note Jojo Whitaker 61 y o  male MRN: [id number]    Unit/Bed#Taya Larios 205-02 Encounter: [id number]        Subjective:   Patient seen and examined at bedside after reviewing the chart and discussing the case with the caring staff  Patient examined at bedside  Patient has no acute complaints  Physical Exam   Vitals: Blood pressure 111/69, pulse (!) 49, temperature 97 7 °F (36 5 °C), temperature source Temporal, resp  rate 16, height 5' 11" (1 803 m), weight 73 kg (160 lb 15 oz), SpO2 95 %  ,Body mass index is 22 45 kg/m²  Constitutional: Patient in no acute distress  HEENT: PERR, EOMI, MMM  Cardiovascular: Bradycardic and regular rhythm  Pulmonary/Chest: Effort normal and breath sounds normal    Abdomen: Non distended  Assessment/Plan:  Ventura Cruz is a(n) 61 y o  male with schizoaffective disorder        1  Cardiac with history of HTN, HLD, CAD, ventricular tachycardia s/p ICD, Takotsubo cardiomyopathy  Continue atorvastatin 40 mg daily, amiodarone 200 mg daily with holding parameters, Eliquis 5 mg twice daily  2  Hx of CVA/apical mural thrombus  Continue Eliquis 5 mg twice daily, atorvastatin 40 mg daily  3  DJD/OA  Patient may take Tylenol as needed  4  Tobacco abuse  NRT  5  Vitamin-D deficiency  Patient started on vitamin-D2 21347 units weekly for 10 weeks followed by vitamin D3 1000 units daily

## 2022-11-21 RX ORDER — LAMOTRIGINE 100 MG/1
100 TABLET ORAL
Status: DISCONTINUED | OUTPATIENT
Start: 2022-11-21 | End: 2022-11-23 | Stop reason: HOSPADM

## 2022-11-21 RX ADMIN — APIXABAN 5 MG: 5 TABLET, FILM COATED ORAL at 08:41

## 2022-11-21 RX ADMIN — CARBIDOPA AND LEVODOPA 1 TABLET: 25; 100 TABLET ORAL at 21:01

## 2022-11-21 RX ADMIN — CARBIDOPA AND LEVODOPA 1 TABLET: 25; 100 TABLET ORAL at 12:08

## 2022-11-21 RX ADMIN — BUSPIRONE HYDROCHLORIDE 7.5 MG: 15 TABLET ORAL at 17:10

## 2022-11-21 RX ADMIN — BUSPIRONE HYDROCHLORIDE 7.5 MG: 15 TABLET ORAL at 21:01

## 2022-11-21 RX ADMIN — BUSPIRONE HYDROCHLORIDE 7.5 MG: 15 TABLET ORAL at 08:41

## 2022-11-21 RX ADMIN — SERTRALINE 100 MG: 100 TABLET, FILM COATED ORAL at 08:41

## 2022-11-21 RX ADMIN — AMIODARONE HYDROCHLORIDE 200 MG: 100 TABLET ORAL at 08:40

## 2022-11-21 RX ADMIN — ATORVASTATIN CALCIUM 40 MG: 40 TABLET, FILM COATED ORAL at 17:10

## 2022-11-21 RX ADMIN — APIXABAN 5 MG: 5 TABLET, FILM COATED ORAL at 17:10

## 2022-11-21 RX ADMIN — Medication 6 MG: at 21:01

## 2022-11-21 RX ADMIN — CARBIDOPA AND LEVODOPA 1 TABLET: 25; 100 TABLET ORAL at 08:41

## 2022-11-21 RX ADMIN — HYDROXYZINE HYDROCHLORIDE 25 MG: 25 TABLET, FILM COATED ORAL at 17:34

## 2022-11-21 RX ADMIN — LAMOTRIGINE 100 MG: 100 TABLET ORAL at 21:01

## 2022-11-21 RX ADMIN — CARBIDOPA AND LEVODOPA 1 TABLET: 25; 100 TABLET ORAL at 17:10

## 2022-11-21 RX ADMIN — ARIPIPRAZOLE 15 MG: 10 TABLET ORAL at 08:41

## 2022-11-21 NOTE — PROGRESS NOTES
11/21/22 1517   Activity/Group Checklist   Group Community meeting   Attendance Did not attend  (Pt offered grp; pt remained in room)

## 2022-11-21 NOTE — NURSING NOTE
Patient withdrawn to room skipping breakfast  Patient denies SI/HI/AVH  Denies anxiety and depression  Compliant with medications and denies any pain or discomfort  Affect is flat  Presents as disheveled and odorous  Inquired about shower; pt states having one a few days ago; encouraged self care  Pt verbally understands  Minimally verbal in conversation  Q 7 min safety checks maintained  Will continue to provide support

## 2022-11-21 NOTE — SOCIAL WORK
NOHEMI placed call to [facility] to discuss pt's d/c  [facility]  Pt's scripts to be sent to [facility], [address] [place] [address]  Phone - [phone] - [phone]    NOHEMI sent message to [facility] providers to send scripts to [facility] stated she would leave message for staff to return SW's call to determine means and time of pt transport on Wed, 11/23

## 2022-11-21 NOTE — PROGRESS NOTES
Progress Note Jojo Whitaker 61 y o  male MRN: [id number]    Unit/Bed#Taya Larios 205-02 Encounter: [id number]        Subjective:   Patient seen and examined at bedside after reviewing the chart and discussing the case with the caring staff  Patient examined at bedside  Patient has no acute complaints  Physical Exam   Vitals: Blood pressure 109/61, pulse 63, temperature 97 7 °F (36 5 °C), temperature source Temporal, resp  rate 18, height 5' 11" (1 803 m), weight 73 kg (160 lb 15 oz), SpO2 97 %  ,Body mass index is 22 45 kg/m²  Constitutional: Patient in no acute distress  HEENT: PERR, EOMI, MMM  Cardiovascular: Bradycardic and regular rhythm  Pulmonary/Chest: Effort normal and breath sounds normal    Abdomen: Non distended  Assessment/Plan:  Ventrua Cruz is a(n) 61 y o  male with schizoaffective disorder        1  Cardiac with history of HTN, HLD, CAD, ventricular tachycardia s/p ICD, Takotsubo cardiomyopathy  Continue atorvastatin 40 mg daily, amiodarone 200 mg daily with holding parameters, Eliquis 5 mg twice daily  2  Hx of CVA/apical mural thrombus  Continue Eliquis 5 mg twice daily, atorvastatin 40 mg daily  3  DJD/OA  Patient may take Tylenol as needed  4  Tobacco abuse  NRT  5  Vitamin-D deficiency  Patient started on vitamin-D2 03004 units weekly for 10 weeks followed by vitamin D3 1000 units daily

## 2022-11-21 NOTE — PROGRESS NOTES
Progress Note - Carlo [address] 61 y o  male MRN: [id number]   Unit/Bed#: Lety Bazan 205-02 Encounter: [id number]    Behavior over the last 24 hours: slowly improving  Nilesh Hernandez is seen in the hallway, pacing today  He does not remember meeting me, although I met with him every day last week, due to his dementia  He reports doing "better" although he presents flat and despondent and still isolative and poorly motivated  Also appears apprehensive today  Although he has not needed any PRN's for anxiety and no reports of panic like symptoms since early last week  He is denying any suicidal thoughts and reports no concerns about returning to [place]  [employer] [facility] [employer] this week  Will continue to increase lamotrigine to 100 mg HS for bipolar depression  Limited participation in milieu  Sleep: hypersomnia  Appetite: fair  Medication side effects: No   ROS: all other systems are negative    Mental Status Evaluation:    Appearance:  disheveled, dressed in hospital attire, poor hygiene   Behavior:  cooperative, guarded   Speech:  slow, scant, soft   Mood:  depressed   Affect:  blunted   Thought Process:  disorganized   Associations: loose associations   Thought Content:  some paranoia   Perceptual Disturbances: denies auditory hallucinations when asked   Risk Potential: Suicidal ideation - None  Homicidal ideation - None  Potential for aggression - No   Sensorium:  oriented to person   Memory:  recent memory impaired   Consciousness:  alert and awake   Attention: decreased concentration and decreased attention span   Insight:  limited   Judgment: poor   Gait/Station: slow gait   Motor Activity: no abnormal movements     Vital signs in last 24 hours:    Temp:  [97 7 °F (36 5 °C)-98 6 °F (37 °C)] 97 7 °F (36 5 °C)  HR:  [58-63] 63  Resp:  [17-18] 18  BP: ()/(54-61) 109/61    Laboratory results: I have personally reviewed all pertinent laboratory/tests results        Progress Toward Goals: progressing    Assessment/Plan   Principal Problem:    Schizoaffective disorder (HCC)  Active Problems:    History of MI (myocardial infarction)    History of CVA (cerebrovascular accident)    Other hyperlipidemia    Major neurocognitive disorder, due to vascular disease, without behavioral disturbance, mild    CVA (cerebral vascular accident) ([facility] [facility] )    Other chronic pain    Essential hypertension    Arthritis    Coronary artery disease involving native heart with angina pectoris, unspecified vessel or lesion type ([facility] [address] )    Recommended Treatment:     Planned medication and treatment changes: All current active medications have been reviewed  Encourage group therapy, milieu therapy and occupational therapy  Behavioral Health checks every 7 minutes    Requires continued inpatient treatment due to chronic illness and high risk of decompensation if discharged before long term stability is achieved      Increase Lamotrigine 100 mg    Current Facility-Administered Medications   Medication Dose Route Frequency Provider Last Rate   • aluminum-magnesium hydroxide-simethicone  30 mL Oral Q4H PRN Todd Carbajal MD     • amiodarone  200 mg Oral Daily Emperatriz Marti PA-C     • apixaban  5 mg Oral BID Todd Carbajal MD     • ARIPiprazole  15 mg Oral Daily [facility] Elsie De La Cruz MD     • atorvastatin  40 mg Oral Daily With Dinner Todd Carbajal MD     • busPIRone  7 5 mg Oral TID ERNIE Guo     • carbidopa-levodopa  1 tablet Oral 4x Daily Todd Carbajal MD     • [START ON 1/13/2023] cholecalciferol  1,000 Units Oral Daily Emperatriz Marti PA-C     • ergocalciferol  50,000 Units Oral Weekly Emperatriz Marti PA-C     • hydrOXYzine HCL  25 mg Oral Q6H PRN Max 4/day Todd Carbajal MD     • influenza vaccine  0 5 mL Intramuscular Once Dulce Capone MD     • lamoTRIgine  100 mg Oral HS ERNIE Guo     • LORazepam  1 mg Intramuscular Q6H PRN Max 3/day Todd Carbajal MD     • melatonin 6 mg Oral HS ERNIE Webb     • nicotine  1 patch Transdermal Daily Emperatriz Marti PA-C     • nicotine polacrilex  4 mg Oral Q2H PRN Emperatriz Marti PA-C     • risperiDONE  0 25 mg Oral Q4H PRN Max 6/day Yadira Barros MD     • risperiDONE  0 5 mg Oral Q4H PRN Max 3/day Yadira Barros MD     • risperiDONE  1 mg Oral Q2H PRN Max 3/day Yadira Barros MD     • sertraline  100 mg Oral Daily ERNIE Webb         Risks / Benefits of Treatment:    Risks, benefits, and possible side effects of medications explained to patient and patient verbalizes understanding and agreement for treatment  Counseling / Coordination of Care:    Patient's progress discussed with staff in treatment team meeting  Medications, treatment progress and treatment plan reviewed with patient      ERNIE Webb 11/21/22

## 2022-11-21 NOTE — PROGRESS NOTES
Pt withdrawn to room  Paces in afternoon  Out for meals  Pt denied all  Pt reported anx and dep of 3 - 4/10 at times  D/C Wed, 11/23 to [facility]

## 2022-11-21 NOTE — NURSING NOTE
Patient requested/received prn atarax 25 mg for 4/10 moderate anxiety; effective per patient  Not having the racing thoughts and rates anxiety 3/10   Patient currently resting in bed after dinner meal

## 2022-11-21 NOTE — PLAN OF CARE
Problem: Risk for Self Injury/Neglect  Goal: Treatment Goal: Remain safe during length of stay, learn and adopt new coping skills, and be free of self-injurious ideation, impulses and acts at the time of discharge  Outcome: Progressing  Goal: Refrain from harming self  Description: Interventions:  - Monitor patient closely, per order  - Develop a trusting relationship  - Supervise medication ingestion, monitor effects and side effects   Outcome: Progressing  Goal: Recognize maladaptive responses and adopt new coping mechanisms  Outcome: Progressing     Problem: Depression  Goal: Treatment Goal: Demonstrate behavioral control of depressive symptoms, verbalize feelings of improved mood/affect, and adopt new coping skills prior to discharge  Outcome: Progressing  Goal: Refrain from isolation  Description: Interventions:  - Develop a trusting relationship   - Encourage socialization   Outcome: Progressing  Goal: Refrain from self-neglect  Outcome: Progressing  Goal: Complete daily ADLs, including personal hygiene independently, as able  Description: Interventions:  - Observe, teach, and assist patient with ADLS  -  Monitor and promote a balance of rest/activity, with adequate nutrition and elimination   Outcome: Progressing     Problem: Anxiety  Goal: Anxiety is at manageable level  Description: Interventions:  - Assess and monitor patient's anxiety level  - Monitor for signs and symptoms (heart palpitations, chest pain, shortness of breath, headaches, nausea, feeling jumpy, restlessness, irritable, apprehensive)  - Collaborate with interdisciplinary team and initiate plan and interventions as ordered    - [place] patient to unit/surroundings  - Explain treatment plan  - Encourage participation in care  - Encourage verbalization of concerns/fears  - Identify coping mechanisms  - Assist in developing anxiety-reducing skills  - Administer/offer alternative therapies  - Limit or eliminate stimulants  Outcome: Progressing     Problem: Ineffective Coping  Goal: Participates in unit activities  Description: Interventions:  - Provide therapeutic environment   - Provide required programming   - Redirect inappropriate behaviors   Outcome: Progressing     Problem: Nutrition/Hydration-ADULT  Goal: Nutrient/Hydration intake appropriate for improving, restoring or maintaining nutritional needs  Description: Monitor and assess patient's nutrition/hydration status for malnutrition  Collaborate with interdisciplinary team and initiate plan and interventions as ordered  Monitor patient's weight and dietary intake as ordered or per policy  Utilize nutrition screening tool and intervene as necessary  Determine patient's food preferences and provide high-protein, high-caloric foods as appropriate       INTERVENTIONS:  - Monitor oral intake, urinary output, labs, and treatment plans  - Assess nutrition and hydration status and recommend course of action  - Evaluate amount of meals eaten  - Assist patient with eating if necessary   - Allow adequate time for meals  - Recommend/ encourage appropriate diets, oral nutritional supplements, and vitamin/mineral supplements  - Order, calculate, and assess calorie counts as needed  - Recommend, monitor, and adjust tube feedings and TPN/PPN based on assessed needs  - Assess need for intravenous fluids  - Provide specific nutrition/hydration education as appropriate  - Include patient/family/caregiver in decisions related to nutrition  Outcome: Progressing

## 2022-11-21 NOTE — NURSING NOTE
Patient withdrawn to room, did come out for dinner then returned to room to rest in bed  Pleasant and cooperative in interaction  Offers minimal socialization with staff, does not interact with peers  Affect depressed/flat  Patient denies anxiety/depression, SI/HI, hallucinations  Remains medication compliant and on 7" checks for safety and behaviors

## 2022-11-21 NOTE — PLAN OF CARE
Problem: Ineffective Coping  Goal: Participates in unit activities  Description: Interventions:  - Provide therapeutic environment   - Provide required programming   - Redirect inappropriate behaviors   Outcome: Not Progressing   Patient still not joining groups, but has become more visible on the unit and will interact with staff

## 2022-11-22 LAB — SARS-COV-2 RNA RESP QL NAA+PROBE: NEGATIVE

## 2022-11-22 RX ORDER — LAMOTRIGINE 100 MG/1
100 TABLET ORAL
Qty: 30 TABLET | Refills: 0 | Status: SHIPPED | OUTPATIENT
Start: 2022-11-22

## 2022-11-22 RX ORDER — ARIPIPRAZOLE 15 MG/1
15 TABLET ORAL DAILY
Qty: 30 TABLET | Refills: 0 | Status: SHIPPED | OUTPATIENT
Start: 2022-11-23

## 2022-11-22 RX ORDER — SERTRALINE HYDROCHLORIDE 100 MG/1
100 TABLET, FILM COATED ORAL DAILY
Qty: 30 TABLET | Refills: 0 | Status: SHIPPED | OUTPATIENT
Start: 2022-11-23

## 2022-11-22 RX ORDER — MELATONIN
1000 DAILY
Qty: 30 TABLET | Refills: 0 | Status: SHIPPED | OUTPATIENT
Start: 2023-01-13

## 2022-11-22 RX ORDER — ERGOCALCIFEROL 1.25 MG/1
50000 CAPSULE ORAL WEEKLY
Qty: 6 CAPSULE | Refills: 0 | Status: SHIPPED | OUTPATIENT
Start: 2022-11-24 | End: 2023-01-13

## 2022-11-22 RX ORDER — ATORVASTATIN CALCIUM 40 MG/1
40 TABLET, FILM COATED ORAL
Qty: 90 TABLET | Refills: 0 | Status: SHIPPED | OUTPATIENT
Start: 2022-11-22

## 2022-11-22 RX ORDER — BUSPIRONE HYDROCHLORIDE 7.5 MG/1
7.5 TABLET ORAL 3 TIMES DAILY
Qty: 90 TABLET | Refills: 0 | Status: SHIPPED | OUTPATIENT
Start: 2022-11-22

## 2022-11-22 RX ORDER — CHOLECALCIFEROL (VITAMIN D3) 125 MCG
5 CAPSULE ORAL
Qty: 30 TABLET | Refills: 0 | Status: SHIPPED | OUTPATIENT
Start: 2022-11-22

## 2022-11-22 RX ORDER — AMIODARONE HYDROCHLORIDE 200 MG/1
200 TABLET ORAL DAILY
Qty: 30 TABLET | Refills: 0 | Status: SHIPPED | OUTPATIENT
Start: 2022-11-22

## 2022-11-22 RX ADMIN — BUSPIRONE HYDROCHLORIDE 7.5 MG: 15 TABLET ORAL at 08:34

## 2022-11-22 RX ADMIN — AMIODARONE HYDROCHLORIDE 200 MG: 100 TABLET ORAL at 08:34

## 2022-11-22 RX ADMIN — CARBIDOPA AND LEVODOPA 1 TABLET: 25; 100 TABLET ORAL at 15:15

## 2022-11-22 RX ADMIN — CARBIDOPA AND LEVODOPA 1 TABLET: 25; 100 TABLET ORAL at 17:31

## 2022-11-22 RX ADMIN — LAMOTRIGINE 100 MG: 100 TABLET ORAL at 20:31

## 2022-11-22 RX ADMIN — SERTRALINE 100 MG: 100 TABLET, FILM COATED ORAL at 08:34

## 2022-11-22 RX ADMIN — CARBIDOPA AND LEVODOPA 1 TABLET: 25; 100 TABLET ORAL at 08:34

## 2022-11-22 RX ADMIN — APIXABAN 5 MG: 5 TABLET, FILM COATED ORAL at 17:30

## 2022-11-22 RX ADMIN — ARIPIPRAZOLE 15 MG: 10 TABLET ORAL at 08:32

## 2022-11-22 RX ADMIN — CARBIDOPA AND LEVODOPA 1 TABLET: 25; 100 TABLET ORAL at 20:31

## 2022-11-22 RX ADMIN — APIXABAN 5 MG: 5 TABLET, FILM COATED ORAL at 08:34

## 2022-11-22 RX ADMIN — Medication 6 MG: at 20:31

## 2022-11-22 RX ADMIN — BUSPIRONE HYDROCHLORIDE 7.5 MG: 15 TABLET ORAL at 15:15

## 2022-11-22 RX ADMIN — BUSPIRONE HYDROCHLORIDE 7.5 MG: 15 TABLET ORAL at 20:31

## 2022-11-22 RX ADMIN — ATORVASTATIN CALCIUM 40 MG: 40 TABLET, FILM COATED ORAL at 17:31

## 2022-11-22 NOTE — SOCIAL WORK
NOHEMI returned call to Eloise Sender at [facility] to discuss pt's d/c  Eloise Sender said transport would be provided by [facility]; pickup time 2 p m  pending a negative covid test  Eloise Sender stated that a rapid covid test result is acceptable  NOHEMI verified pharmacy ([address])  During call, Eloise Sender indicated scripts were received

## 2022-11-22 NOTE — PROGRESS NOTES
11/21/22 1531   Activity/Group Checklist   Group   (Character Strengths Art Therapy)   Attendance Did not attend  (AT group offered, PT elected to remain in room)

## 2022-11-22 NOTE — CMS CERTIFICATION NOTE
Recertification: Based upon physical, mental and social evaluations, I certify that inpatient psychiatric services continue to be medically necessary for this patient for a duration of 10 midnights for the treatment of Schizoaffective disorder [facility])   Available alternative community resources still do not meet the patient's mental health care needs  I further attest that an established written individualized plan of care has been updated and is outlined in the patient's medical records

## 2022-11-22 NOTE — SOCIAL WORK
SW returned call to Jose Ramon Altamirano @ [address] [place] [address],42-10  Spoke to [place], who verified [facility], [address] [place] [address], [place], [phone], Fax [phone] as the pharmacy they use for pt meds  SW to pass information providers to send pt scripts

## 2022-11-22 NOTE — PROGRESS NOTES
11/22/22   Team Meeting   Meeting Type Daily Rounds   Team Members Present   Team Members Present Physician;Nurse;   Physician Team Member Dr No Vargas Team Member Bola Damon Work Team Member [facility] [place]   Patient/Family Present   Patient Present No   Patient's Family Present No     Pt to d/c Wed to [place]  [facility] [facility]  Pt needing neg covid test for facility  Pt slep well  No breakfast (norm)  Little dep  Denied all else

## 2022-11-22 NOTE — PROGRESS NOTES
11/22/22 1030   Activity/Group Checklist   Group   ([employer] Gratitude Art Therapy)   Attendance   (AT group offered, PT elected to remain in room)

## 2022-11-22 NOTE — NURSING NOTE
Patient resting in room for majority of evening but did come out for snack  Minimal interactions with others  Flat/depressed affect  Reports effective relief of anxiety from Atarax given earlier and currently denies anxiety  Rates depression 3/10  0/10 pain  BM today  Reports sleep and appetite have been good  Compliant with medications  Maintained on Q 7 minute checks

## 2022-11-22 NOTE — PROGRESS NOTES
11/22/22   Team Meeting   Meeting Type Daily Rounds   Team Members Present   Team Members Present Physician;Nurse;   Physician Team Member Dr Sheryn Goodpasture MD   Nursing Team Member Eleni Gonzalez RN   Social Work Team Member Jasmeet Cool [facility]   Patient/Family Present   Patient Present No   Patient's Family Present  --      Pt to d/c to home 2 p m  today  Pleasant, good spirits, calm and cooperative  Slept  Last pm, some anxiety; atarax given 2330

## 2022-11-22 NOTE — PLAN OF CARE
Problem: Risk for Self Injury/Neglect  Goal: Treatment Goal: Remain safe during length of stay, learn and adopt new coping skills, and be free of self-injurious ideation, impulses and acts at the time of discharge  Outcome: Progressing  Goal: Refrain from harming self  Description: Interventions:  - Monitor patient closely, per order  - Develop a trusting relationship  - Supervise medication ingestion, monitor effects and side effects   Outcome: Progressing  Goal: Recognize maladaptive responses and adopt new coping mechanisms  Outcome: Progressing     Problem: Depression  Goal: Treatment Goal: Demonstrate behavioral control of depressive symptoms, verbalize feelings of improved mood/affect, and adopt new coping skills prior to discharge  Outcome: Progressing  Goal: Refrain from isolation  Description: Interventions:  - Develop a trusting relationship   - Encourage socialization   Outcome: Progressing  Goal: Refrain from self-neglect  Outcome: Progressing  Goal: Complete daily ADLs, including personal hygiene independently, as able  Description: Interventions:  - Observe, teach, and assist patient with ADLS  -  Monitor and promote a balance of rest/activity, with adequate nutrition and elimination   Outcome: Progressing     Problem: Anxiety  Goal: Anxiety is at manageable level  Description: Interventions:  - Assess and monitor patient's anxiety level  - Monitor for signs and symptoms (heart palpitations, chest pain, shortness of breath, headaches, nausea, feeling jumpy, restlessness, irritable, apprehensive)  - Collaborate with interdisciplinary team and initiate plan and interventions as ordered    - [place] patient to unit/surroundings  - Explain treatment plan  - Encourage participation in care  - Encourage verbalization of concerns/fears  - Identify coping mechanisms  - Assist in developing anxiety-reducing skills  - Administer/offer alternative therapies  - Limit or eliminate stimulants  Outcome: Progressing     Problem: Ineffective Coping  Goal: Participates in unit activities  Description: Interventions:  - Provide therapeutic environment   - Provide required programming   - Redirect inappropriate behaviors   Outcome: Progressing     Problem: DISCHARGE PLANNING - CARE MANAGEMENT  Goal: Discharge to post-acute care or home with appropriate resources  Description: INTERVENTIONS:  - Conduct assessment to determine patient/family and health care team treatment goals, and need for post-acute services based on payer coverage, community resources, and patient preferences, and barriers to discharge  - Address psychosocial, clinical, and financial barriers to discharge as identified in assessment in conjunction with the patient/family and health care team  - Arrange appropriate level of post-acute services according to patient’s   needs and preference and payer coverage in collaboration with the physician and health care team  - Communicate with and update the patient/family, physician, and health care team regarding progress on the discharge plan  - Arrange appropriate transportation to post-acute venues  Outcome: Progressing     Problem: Nutrition/Hydration-ADULT  Goal: Nutrient/Hydration intake appropriate for improving, restoring or maintaining nutritional needs  Description: Monitor and assess patient's nutrition/hydration status for malnutrition  Collaborate with interdisciplinary team and initiate plan and interventions as ordered  Monitor patient's weight and dietary intake as ordered or per policy  Utilize nutrition screening tool and intervene as necessary  Determine patient's food preferences and provide high-protein, high-caloric foods as appropriate       INTERVENTIONS:  - Monitor oral intake, urinary output, labs, and treatment plans  - Assess nutrition and hydration status and recommend course of action  - Evaluate amount of meals eaten  - Assist patient with eating if necessary   - Allow adequate time for meals  - Recommend/ encourage appropriate diets, oral nutritional supplements, and vitamin/mineral supplements  - Order, calculate, and assess calorie counts as needed  - Recommend, monitor, and adjust tube feedings and TPN/PPN based on assessed needs  - Assess need for intravenous fluids  - Provide specific nutrition/hydration education as appropriate  - Include patient/family/caregiver in decisions related to nutrition  Outcome: Progressing

## 2022-11-22 NOTE — PROGRESS NOTES
11/22/22 0800   Activity/Group Checklist   Group   (Morning Community Meeting and Check-in)   Attendance Did not attend  (Group offered, PT elected to remain in room)

## 2022-11-22 NOTE — PROGRESS NOTES
Progress Note - Carlo 224 61 y o  male MRN: [id number]   Unit/Bed#: Bety Palomo 205-02 Encounter: [id number]    Behavior over the last 24 hours: improved  Leo Mirza is seen in community TV room  Has been out of his room more on unit  He is aware of potential discharge tomorrow  He states he is "not happy", he has to return to [facility] and states "it's time for a change " However, he does continue to report improvement in depression and anxiety  States he has no suicidal thoughts and feels safe to return to [facility]  No overt psychosis noted  Still with poor ADL's and apathy related to cognitive decline  Limited participation in milieu  Sleep: slept off and on  Appetite: fair  Medication side effects: No   ROS: all other systems are negative    Mental Status Evaluation:    Appearance:  marginal hygiene   Behavior:  cooperative   Speech:  slow, scant   Mood:  depressed   Affect:  flat   Thought Process:  decreased rate of thoughts   Associations: concrete associations   Thought Content:  no overt delusions   Perceptual Disturbances: none   Risk Potential: Suicidal ideation - None at present  Homicidal ideation - None  Potential for aggression - No   Sensorium:  oriented to person   Memory:  recent memory impaired   Consciousness:  alert and awake   Attention: decreased concentration and decreased attention span   Insight:  poor   Judgment: poor   Gait/Station: normal gait/station, normal balance   Motor Activity: abnormal movement noted: hand tremor present     Vital signs in last 24 hours:    Temp:  [98 °F (36 7 °C)-99 °F (37 2 °C)] 98 °F (36 7 °C)  HR:  [55-67] 55  Resp:  [16-18] 16  BP: (119-128)/(64-73) 128/64    Laboratory results: I have personally reviewed all pertinent laboratory/tests results        Progress Toward Goals: continues to improve    Assessment/Plan   Principal Problem:    Schizoaffective disorder (HCC)  Active Problems:    History of MI (myocardial infarction)    History of CVA (cerebrovascular accident)    Other hyperlipidemia    Major neurocognitive disorder, due to vascular disease, without behavioral disturbance, mild    CVA (cerebral vascular accident) ([facility] [address] )    Other chronic pain    Essential hypertension    Arthritis    Coronary artery disease involving native heart with angina pectoris, unspecified vessel or lesion type ([facility] )    Recommended Treatment:     Planned medication and treatment changes: All current active medications have been reviewed  Encourage group therapy, milieu therapy and occupational therapy  Behavioral Health checks every 7 minutes    Requires continued inpatient treatment due to chronic illness and high risk of decompensation if discharged before long term stability is achieved      Continue current medications:  Current Facility-Administered Medications   Medication Dose Route Frequency Provider Last Rate   • aluminum-magnesium hydroxide-simethicone  30 mL Oral Q4H PRN Patric Cho MD     • amiodarone  200 mg Oral Daily Emperatriz Marti PA-C     • apixaban  5 mg Oral BID Patric Cho MD     • ARIPiprazole  15 mg Oral Daily [facility] Bri Hamlin MD     • atorvastatin  40 mg Oral Daily With Dinner Patric Cho MD     • busPIRone  7 5 mg Oral TID ERNIE Preston     • carbidopa-levodopa  1 tablet Oral 4x Daily Patric Cho MD     • [START ON 1/13/2023] cholecalciferol  1,000 Units Oral Daily Emperatriz Marti PA-C     • ergocalciferol  50,000 Units Oral Weekly Emperatriz Marti PA-C     • hydrOXYzine HCL  25 mg Oral Q6H PRN Max 4/day Patric Cho MD     • influenza vaccine  0 5 mL Intramuscular Once Dang Day MD     • lamoTRIgine  100 mg Oral HS ERNIE Preston     • LORazepam  1 mg Intramuscular Q6H PRN Max 3/day Patric Cho MD     • melatonin  6 mg Oral HS ERNIE Preston     • nicotine  1 patch Transdermal Daily Emperatriz Marti PA-C     • nicotine polacrilex  4 mg Oral Q2H PRN Emperatriz Marti PA-C     • risperiDONE  0 25 mg Oral Q4H PRN Max 6/day Mendez Ying MD     • risperiDONE  0 5 mg Oral Q4H PRN Max 3/day Mendez Ying MD     • risperiDONE  1 mg Oral Q2H PRN Max 3/day Mendez Ying MD     • sertraline  100 mg Oral Daily ERNIE Toribio         Risks / Benefits of Treatment:    Risks, benefits, and possible side effects of medications explained to patient and patient verbalizes understanding and agreement for treatment  Counseling / Coordination of Care:    Patient's progress discussed with staff in treatment team meeting  Medications, treatment progress and treatment plan reviewed with patient      ERNIE Toribio 11/22/22

## 2022-11-22 NOTE — PROGRESS NOTES
Progress Note Samantha Child 61 y o  male MRN: [id number]    Unit/Bed#Lisa Rangel 205-02 Encounter: [id number]        Subjective:   Patient seen and examined at bedside after reviewing the chart and discussing the case with the caring staff  Patient examined at bedside  Patient has no acute complaints  Patient is a possible discharge tomorrow 11/23/2022  Patient is requesting all his prescriptions  I reviewed and reconciled patient's problem list and medications  Physical Exam   Vitals: Blood pressure 128/64, pulse 55, temperature 98 °F (36 7 °C), temperature source Temporal, resp  rate 16, height 5' 11" (1 803 m), weight 73 kg (160 lb 15 oz), SpO2 100 %  ,Body mass index is 22 45 kg/m²  Constitutional: Patient in no acute distress  HEENT: PERR, EOMI, MMM  Cardiovascular: Bradycardic and regular rhythm  Pulmonary/Chest: Effort normal and breath sounds normal    Abdomen: Non distended  Assessment/Plan:  Marjorie Hayward is a(n) 61 y o  male with schizoaffective disorder        MEDICAL CLEARANCE  Patient is medically cleared for discharge  All scripts will be sent out for the patient  1  Cardiac with history of HTN, HLD, CAD, ventricular tachycardia s/p ICD, Takotsubo cardiomyopathy  Continue atorvastatin 40 mg daily, amiodarone 200 mg daily with holding parameters, Eliquis 5 mg twice daily  2  Hx of CVA/apical mural thrombus  Continue Eliquis 5 mg twice daily, atorvastatin 40 mg daily  3  DJD/OA  Patient may take Tylenol as needed  4  Tobacco abuse  NRT  5  Vitamin-D deficiency  Patient started on vitamin-D2 80726 units weekly for 10 weeks followed by vitamin D3 1000 units daily

## 2022-11-22 NOTE — NURSING NOTE
Patient has been withdrawn to his room except when he came out for his lunch   He stated he did not sleep well last night  He stated he had a little anxiety  Rated depression 3/10  Denied SI,HI, or hallucinations  COVID swab obtained and is negative  Affect is flat  Medication compliant  He is disheveled  Minimally verbal  Q 7 minute safety checks maintained

## 2022-11-22 NOTE — NURSING NOTE
Patient visible in dining room for dinner  He returned back to his room after dinner  Compliant with medications  He stated he was leaving tomorrow but doesn't want to go back to his group home  He understands he cant stay here forever  Q 7 minute safety checks maintained

## 2022-11-22 NOTE — BH TRANSITION RECORD
Contact Information: If you have any questions, concerns, pended studies, tests and/or procedures, or emergencies regarding your inpatient behavioral health visit  Please contact [facility] [phone] and ask to speak to a , nurse or physician  A contact is available 24 hours/ 7 days a week at this number  Summary of Procedures Performed During your Stay:  Below is a list of major procedures performed during your hospital stay and a summary of results:  - No major procedures performed  Pending Studies (From admission, onward)     Start     Ordered    11/22/22 0939  COVID only  STAT        References:    COVID-19, Influenza, and RSV Lab Ordering Algorithm   Question Answer Comment   Is this test for diagnosis or screening? Screening    Symptomatic for COVID-19 as defined by CDC? No    Hospitalized for COVID-19? No    Admitted to ICU for COVID-19? No    Is this the first test you have had for covid-19? No    Does the patient currently work in a healthcare setting with direct patient contact? No    Is this a [facility] employee? No    Resident in a congregate care setting? Yes        11/22/22 0938              Please follow up on the above pending studies with your PCP and/or referring provider

## 2022-11-23 VITALS
SYSTOLIC BLOOD PRESSURE: 98 MMHG | RESPIRATION RATE: 16 BRPM | HEART RATE: 76 BPM | HEIGHT: 71 IN | OXYGEN SATURATION: 98 % | TEMPERATURE: 97.9 F | WEIGHT: 160.94 LBS | BODY MASS INDEX: 22.53 KG/M2 | DIASTOLIC BLOOD PRESSURE: 53 MMHG

## 2022-11-23 RX ADMIN — APIXABAN 5 MG: 5 TABLET, FILM COATED ORAL at 08:02

## 2022-11-23 RX ADMIN — CARBIDOPA AND LEVODOPA 1 TABLET: 25; 100 TABLET ORAL at 13:29

## 2022-11-23 RX ADMIN — ARIPIPRAZOLE 15 MG: 10 TABLET ORAL at 08:02

## 2022-11-23 RX ADMIN — AMIODARONE HYDROCHLORIDE 200 MG: 100 TABLET ORAL at 08:03

## 2022-11-23 RX ADMIN — SERTRALINE 100 MG: 100 TABLET, FILM COATED ORAL at 08:03

## 2022-11-23 RX ADMIN — CARBIDOPA AND LEVODOPA 1 TABLET: 25; 100 TABLET ORAL at 08:02

## 2022-11-23 RX ADMIN — BUSPIRONE HYDROCHLORIDE 7.5 MG: 15 TABLET ORAL at 08:03

## 2022-11-23 NOTE — SOCIAL WORK
NOHEMI faxed result of pt's covid test to [facility], per Telly's request prior to pt's return to the facility

## 2022-11-23 NOTE — PROGRESS NOTES
Pt discharged with following belongings:    Sabrina Muñoz over  [employer] w/ string  White/grey sweatshirt with romero    Pt signed and agreed to belongings

## 2022-11-23 NOTE — PLAN OF CARE
Problem: Risk for Self Injury/Neglect  Goal: Treatment Goal: Remain safe during length of stay, learn and adopt new coping skills, and be free of self-injurious ideation, impulses and acts at the time of discharge  Outcome: Adequate for Discharge  Goal: Refrain from harming self  Description: Interventions:  - Monitor patient closely, per order  - Develop a trusting relationship  - Supervise medication ingestion, monitor effects and side effects   Outcome: Adequate for Discharge  Goal: Recognize maladaptive responses and adopt new coping mechanisms  Outcome: Adequate for Discharge     Problem: Depression  Goal: Treatment Goal: Demonstrate behavioral control of depressive symptoms, verbalize feelings of improved mood/affect, and adopt new coping skills prior to discharge  Outcome: Adequate for Discharge  Goal: Refrain from isolation  Description: Interventions:  - Develop a trusting relationship   - Encourage socialization   Outcome: Adequate for Discharge  Goal: Refrain from self-neglect  Outcome: Adequate for Discharge  Goal: Complete daily ADLs, including personal hygiene independently, as able  Description: Interventions:  - Observe, teach, and assist patient with ADLS  -  Monitor and promote a balance of rest/activity, with adequate nutrition and elimination   Outcome: Adequate for Discharge     Problem: Anxiety  Goal: Anxiety is at manageable level  Description: Interventions:  - Assess and monitor patient's anxiety level  - Monitor for signs and symptoms (heart palpitations, chest pain, shortness of breath, headaches, nausea, feeling jumpy, restlessness, irritable, apprehensive)  - Collaborate with interdisciplinary team and initiate plan and interventions as ordered    - [place] patient to unit/surroundings  - Explain treatment plan  - Encourage participation in care  - Encourage verbalization of concerns/fears  - Identify coping mechanisms  - Assist in developing anxiety-reducing skills  - Administer/offer alternative therapies  - Limit or eliminate stimulants  Outcome: Adequate for Discharge     Problem: Ineffective Coping  Goal: Participates in unit activities  Description: Interventions:  - Provide therapeutic environment   - Provide required programming   - Redirect inappropriate behaviors   Outcome: Adequate for Discharge     Problem: DISCHARGE PLANNING - CARE MANAGEMENT  Goal: Discharge to post-acute care or home with appropriate resources  Description: INTERVENTIONS:  - Conduct assessment to determine patient/family and health care team treatment goals, and need for post-acute services based on payer coverage, community resources, and patient preferences, and barriers to discharge  - Address psychosocial, clinical, and financial barriers to discharge as identified in assessment in conjunction with the patient/family and health care team  - Arrange appropriate level of post-acute services according to patient’s   needs and preference and payer coverage in collaboration with the physician and health care team  - Communicate with and update the patient/family, physician, and health care team regarding progress on the discharge plan  - Arrange appropriate transportation to post-acute venues  Outcome: Adequate for Discharge     Problem: Nutrition/Hydration-ADULT  Goal: Nutrient/Hydration intake appropriate for improving, restoring or maintaining nutritional needs  Description: Monitor and assess patient's nutrition/hydration status for malnutrition  Collaborate with interdisciplinary team and initiate plan and interventions as ordered  Monitor patient's weight and dietary intake as ordered or per policy  Utilize nutrition screening tool and intervene as necessary  Determine patient's food preferences and provide high-protein, high-caloric foods as appropriate       INTERVENTIONS:  - Monitor oral intake, urinary output, labs, and treatment plans  - Assess nutrition and hydration status and recommend course of action  - Evaluate amount of meals eaten  - Assist patient with eating if necessary   - Allow adequate time for meals  - Recommend/ encourage appropriate diets, oral nutritional supplements, and vitamin/mineral supplements  - Order, calculate, and assess calorie counts as needed  - Recommend, monitor, and adjust tube feedings and TPN/PPN based on assessed needs  - Assess need for intravenous fluids  - Provide specific nutrition/hydration education as appropriate  - Include patient/family/caregiver in decisions related to nutrition  Outcome: Adequate for Discharge

## 2022-11-23 NOTE — DISCHARGE INSTR - OTHER ORDERS
You are being discharged to your home at [facility]  [address], [address] [facility]  Phone number, [phone]  Triggers you have identified during your hospitalization that led to your admission suicidal ideation and depressed mood  Coping skills you have identified during your hospitalization include reading magazines, watching TV and listening to music  If you are unable to deal with your distressed mood alone please contact your group home staff [phone], your primary care provider, Dr Cale Baker ([phone]), or [address] and Recovery at [phone]  If that is not effective and you continue to have suicidal ideation or  distressed mood,please contact [address] [place] [address] at [phone], dial [phone] or go to the nearest emergency center  [facility] ([facility]) AT [facility] at [phone],  *National Suicide Prevention Lifeline:  [address] [place] [address] , [address] on Mental Illness ([place]) HELPLINE: 580.935.1538/Website: www octavia org  *Substance Abuse and [facility]) [employer], which is a confidential, free, 24-hour-a-day, 365-day-a-year, information service for individuals and family members facing mental health and/or substance use disorders  This service provides referrals to local treatment facilities, support groups, and community-based organizations  Callers can also order free publications and other information  Call 1-513.622.6176/Website: www [facility] gov  *[facility] 2-1-1: This is a toll free, confidential, 24-hour-a-day service which connects you to a community  in your area who can help you find services and resources that are available to you locally and provide critical services that can improve and save lives    Call: 211  Ordavey Hernández: [email or web address]    Please attend upcoming appointments:    Psychiatry - Dr Penny Brothers   Monday, November 28  11:40 a m   (VIRTUAL APPT)   [phone] Primary Care - Dr Sherren Fry  January 11, 2023  3:00 p m  (IN PERSON APPT)  [address]  [place]  [address]      Everett Brito, our [employer], will be calling you after your discharge, on the phone number that you provided  They will be available as an additional support, if needed  If you wish to speak with one of them, you may contact Balwinder Padron at [phone] or Mohammed Nageotte at [phone]    You are being discharged to

## 2022-11-23 NOTE — SOCIAL WORK
NOHEMI contacted Basim Velasco at [employer] to discuss pt's providers  Pt's psychiatrist is through [facility] and Recovery is Dr Belkis Nicolas [phone]  NOHEMI arranged virtual transition of care appt for pt on Mon, 11/28 @ 11:40 a m  Pt's primary care doctor is Dr Alyse Odonnell at [place] in [address] [place] [address] scheduled transition of care appointment for pt on 1/11/23 @ 3:00 p m

## 2022-11-23 NOTE — PROGRESS NOTES
Progress Note Aden Limon 61 y o  male MRN: [id number]    Unit/Bed#[facility] 205-02 Encounter: [id number]        Subjective:   Patient seen and examined at bedside after reviewing the chart and discussing the case with the caring staff  Patient examined at bedside  Patient has no acute complaints  Patient is being discharged today, Wednesday 11/23/2022  Physical Exam   Vitals: Blood pressure 98/53, pulse 76, temperature 97 9 °F (36 6 °C), temperature source Temporal, resp  rate 16, height 5' 11" (1 803 m), weight 73 kg (160 lb 15 oz), SpO2 98 %  ,Body mass index is 22 45 kg/m²  Constitutional: Patient in no acute distress  HEENT: PERR, EOMI, MMM  Cardiovascular: Bradycardic and regular rhythm  Pulmonary/Chest: Effort normal and breath sounds normal    Abdomen: Non distended  Assessment/Plan:  Ivon Hardy is a(n) 61 y o  male with schizoaffective disorder        MEDICAL CLEARANCE  Patient is medically cleared for discharge  1  Cardiac with history of HTN, HLD, CAD, ventricular tachycardia s/p ICD, Takotsubo cardiomyopathy  Continue atorvastatin 40 mg daily, amiodarone 200 mg daily with holding parameters, Eliquis 5 mg twice daily  2  Hx of CVA/apical mural thrombus  Continue Eliquis 5 mg twice daily, atorvastatin 40 mg daily  3  DJD/OA  Patient may take Tylenol as needed  4  Tobacco abuse  NRT  5  Vitamin-D deficiency  Patient started on vitamin-D2 73611 units weekly for 10 weeks followed by vitamin D3 1000 units daily  The patient was discussed with Dr Radha Brooke and he is in agreement with the above note

## 2022-11-23 NOTE — PLAN OF CARE
Problem: Risk for Self Injury/Neglect  Goal: Treatment Goal: Remain safe during length of stay, learn and adopt new coping skills, and be free of self-injurious ideation, impulses and acts at the time of discharge  Outcome: Adequate for Discharge  Goal: Refrain from harming self  Description: Interventions:  - Monitor patient closely, per order  - Develop a trusting relationship  - Supervise medication ingestion, monitor effects and side effects   Outcome: Adequate for Discharge  Goal: Recognize maladaptive responses and adopt new coping mechanisms  Outcome: Adequate for Discharge     Problem: Depression  Goal: Treatment Goal: Demonstrate behavioral control of depressive symptoms, verbalize feelings of improved mood/affect, and adopt new coping skills prior to discharge  Outcome: Adequate for Discharge  Goal: Refrain from isolation  Description: Interventions:  - Develop a trusting relationship   - Encourage socialization   Outcome: Adequate for Discharge  Goal: Refrain from self-neglect  Outcome: Adequate for Discharge  Goal: Complete daily ADLs, including personal hygiene independently, as able  Description: Interventions:  - Observe, teach, and assist patient with ADLS  -  Monitor and promote a balance of rest/activity, with adequate nutrition and elimination   Outcome: Adequate for Discharge     Problem: Anxiety  Goal: Anxiety is at manageable level  Description: Interventions:  - Assess and monitor patient's anxiety level  - Monitor for signs and symptoms (heart palpitations, chest pain, shortness of breath, headaches, nausea, feeling jumpy, restlessness, irritable, apprehensive)  - Collaborate with interdisciplinary team and initiate plan and interventions as ordered    - [place] patient to unit/surroundings  - Explain treatment plan  - Encourage participation in care  - Encourage verbalization of concerns/fears  - Identify coping mechanisms  - Assist in developing anxiety-reducing skills  - Administer/offer alternative therapies  - Limit or eliminate stimulants  Outcome: Adequate for Discharge     Problem: Ineffective Coping  Goal: Participates in unit activities  Description: Interventions:  - Provide therapeutic environment   - Provide required programming   - Redirect inappropriate behaviors   Outcome: Adequate for Discharge     Problem: DISCHARGE PLANNING - CARE MANAGEMENT  Goal: Discharge to post-acute care or home with appropriate resources  Description: INTERVENTIONS:  - Conduct assessment to determine patient/family and health care team treatment goals, and need for post-acute services based on payer coverage, community resources, and patient preferences, and barriers to discharge  - Address psychosocial, clinical, and financial barriers to discharge as identified in assessment in conjunction with the patient/family and health care team  - Arrange appropriate level of post-acute services according to patient’s   needs and preference and payer coverage in collaboration with the physician and health care team  - Communicate with and update the patient/family, physician, and health care team regarding progress on the discharge plan  - Arrange appropriate transportation to post-acute venues  Outcome: Adequate for Discharge     Problem: Nutrition/Hydration-ADULT  Goal: Nutrient/Hydration intake appropriate for improving, restoring or maintaining nutritional needs  Description: Monitor and assess patient's nutrition/hydration status for malnutrition  Collaborate with interdisciplinary team and initiate plan and interventions as ordered  Monitor patient's weight and dietary intake as ordered or per policy  Utilize nutrition screening tool and intervene as necessary  Determine patient's food preferences and provide high-protein, high-caloric foods as appropriate       INTERVENTIONS:  - Monitor oral intake, urinary output, labs, and treatment plans  - Assess nutrition and hydration status and recommend course of action  - Evaluate amount of meals eaten  - Assist patient with eating if necessary   - Allow adequate time for meals  - Recommend/ encourage appropriate diets, oral nutritional supplements, and vitamin/mineral supplements  - Order, calculate, and assess calorie counts as needed  - Recommend, monitor, and adjust tube feedings and TPN/PPN based on assessed needs  - Assess need for intravenous fluids  - Provide specific nutrition/hydration education as appropriate  - Include patient/family/caregiver in decisions related to nutrition  Outcome: Adequate for Discharge

## 2022-11-23 NOTE — PROGRESS NOTES
11/23/22   Team Meeting   Meeting Type Daily Rounds   Team Members Present   Team Members Present Physician;Nurse;; Occupational Therapist   Physician Team Member Dr Sheryn Goodpasture MD   Nursing Team Member Eleni Gonzalez RN   Social Work Team Member [id number]   OT Team Member Judieth Cranker CTRS   Patient/Family Present   Patient Present No   Patient's Family Present No

## 2022-11-23 NOTE — SOCIAL WORK
NOHEMI contacted Augustin at [facility] [phone] to inform of pt's neg covid test  SW faxed result ([phone])  NOHEMI confirmed pt pickup time at 2:00 p m  Augustin said staff will arrive 2 p m  to provide pt transport

## 2022-11-23 NOTE — NURSING NOTE
Patient more visible in milieu, pleasant and cooperative in interaction  Social with staff, does not interact with peers  Patient endorses "low" anxiety/depression, denies SI/HI, hallucinations  Affect blunted  Remains medication compliant and on 7" checks for safety and behaviors

## 2022-11-23 NOTE — NURSING NOTE
Patient visible on unit  More interactive with others this evening  Blunted affect  Reports that anxiety and depression are 3/10  Denies SI/HI and AH/VH  Compliant with medications  He is hopeful for better sleep tonight    Maintained on Q 7 minute checks

## 2022-11-23 NOTE — NURSING NOTE
Patient discharged, ambulatory, accompanied by staff to car with discharge instructions and all belongings

## 2022-11-23 NOTE — NURSING NOTE
Patient appeared to sleep well throughout the night  No signs of distress noted  Q7 minute checks maintained

## 2022-11-23 NOTE — DISCHARGE SUMMARY
Discharge Summary - [facility] 61 y o  male MRN: [id number]  Unit/Bed#: Matthew Morel 587-22 Encounter: [id number]     Admission Date: 11/9/2022         Discharge Date: 11/23/2022    Attending Psychiatrist: Cain Osorio MD    Reason for Admission/HPI: Encounter for psychological evaluation [Z00 8]  Major depressive disorder [F32 9]    Patient is a 61 y o  male presented with worsening depression, worsening paranoia, and suicidal ideation  According to admission report from Dr De Leon Prophet:    Rachele Edouard is a 61 y o  male with a history of Schizoaffective Disorder and cognitive disorder, complicated with chronic multiple medical conditions including his history of CVA, coronary artery disease, ventricular tachycardia, urinary infection who was admitted to the inpatient older adult psychiatric unit on a voluntary 12 commitment basis due to depression, anxiety, unstable mood, inability to care for self and suicidal ideation  He presented to ED from [facility] complaining of increased suicidal ideation plan to walk into traffic and get hit by a car  On evaluation in the inpatient psychiatric unit Eleazar presents as very limited historian but able to answer questions concrete but goal-directed way  Patient states "my life is stressful" in general that he is not doing well and he has been feeling severely depressed for the past week  This happened in the past that has culminated in hurting himself  Denies any current suicide active plan or intent to hurt himself and is able to contract for safety presently  Patient denies any homicidal ideation or hallucination but appears suspicious and internally preoccupied  He admits that he has history of multiple past psychiatric admissions as well as suicide attempt in the past  Denies any recent alcohol or illicit drug use  No acute withdrawal symptoms noted  He agreed to be compliant with medication and treatment plan in the unit      Hospital Course: The patient was admitted to the inpatient psychiatric unit and started on every 7 minutes precautions  During the hospitalization the patient was attending individual therapy, group therapy, milieu therapy and occupational therapy  Psychiatric medications were titrated over the hospital stay  To address depressive symptoms, mood instability, psychotic symptoms, paranoid ideation and anxiety symptoms the patient was started on antidepressant Zoloft, mood stabilizer Lamictal, antipsychotic medication Abilify, anxiolytic medication Buspar and hypnotic medication Melatonin  Medication doses were titrated during the hospital course  Prior to beginning of treatment medications risks and benefits and possible side effects including risk of rash related to treatment with Lamictal, risk of parkinsonian symptoms, Tardive Dyskinesia and metabolic syndrome related to treatment with antipsychotic medications, risk of cardiovascular events in elderly related to treatment with antipsychotic medications, risk of suicidality and serotonin syndrome related to treatment with antidepressants and risk of impaired next-day mental alertness, complex sleep-related behavior and dependence related to treatment with hypnotic medications were reviewed with the patient  The patient verbalized understanding and agreement for treatment  Patient's symptoms improved gradually over the hospital course  At the end of treatment the patient was doing well  Mood was stable at the time of discharge  The patient denied suicidal ideation, intent or plan at the time of discharge and denied homicidal ideation, intent or plan at the time of discharge  There was no overt psychosis at the time of discharge  Sleep and appetite were improved  The patient was tolerating medications and was not reporting any significant side effects at the time of discharge      Since the patient was doing well at the end of the hospitalization, treatment team felt that the patient could be safely discharged to outpatient care  The outpatient follow up with a psychiatrist was arranged by the unit  upon discharge  Mental Status at time of Discharge:     Appearance:  disheveled   Behavior:  guarded   Speech:  soft   Mood:  anxious   Affect:  constricted and flat   Thought Process:  concrete   Thought Content:  normal   Perceptual Disturbances: None   Risk Potential: Suicidal Ideations none, Homicidal Ideations none and Potential for Aggression No   Sensorium:  person   Cognition:  recent memory impaired   Consciousness:  alert and awake    Attention: attention span appeared shorter than expected for age   Insight:  limited   Judgment: limited   Gait/Station: normal gait/station and normal balance   Motor Activity: abnormal movement noted  tremor of right hand     Admission Diagnosis:Encounter for psychological evaluation [Z00 8]  Major depressive disorder [F32 9]    Discharge Diagnosis:   Principal Problem:    Schizoaffective disorder ([facility] )  Active Problems:    History of MI (myocardial infarction)    History of CVA (cerebrovascular accident)    Other hyperlipidemia    Major neurocognitive disorder, due to vascular disease, without behavioral disturbance, mild    CVA (cerebral vascular accident) ([facility] )    Other chronic pain    Essential hypertension    Arthritis    Coronary artery disease involving native heart with angina pectoris, unspecified vessel or lesion type ([facility] )  Resolved Problems:    * No resolved hospital problems   *        Lab results:  Admission on 11/09/2022   Component Date Value   • Vitamin B-12 11/09/2022 499    • Folate 11/09/2022 6 1    • Vit D, 25-Hydroxy 11/09/2022 15 1 (L)    • RPR 11/09/2022 Non-Reactive    • Sodium 11/09/2022 139    • Potassium 11/09/2022 4 1    • Chloride 11/09/2022 105    • CO2 11/09/2022 29    • ANION GAP 11/09/2022 5    • BUN 11/09/2022 13    • Creatinine 11/09/2022 0 93    • Glucose 11/09/2022 84    • Glucose, Fasting 11/09/2022 84    • Calcium 11/09/2022 8 4    • eGFR 11/09/2022 90    • WBC 11/09/2022 7 24    • RBC 11/09/2022 4 35    • Hemoglobin 11/09/2022 13 5    • Hematocrit 11/09/2022 44 0    • MCV 11/09/2022 101 (H)    • MCH 11/09/2022 31 0    • MCHC 11/09/2022 30 7 (L)    • RDW 11/09/2022 13 1    • MPV 11/09/2022 10 6    • Platelets 31/93/7755 225    • nRBC 11/09/2022 0    • Neutrophils Relative 11/09/2022 64    • Immat GRANS % 11/09/2022 0    • Lymphocytes Relative 11/09/2022 24    • Monocytes Relative 11/09/2022 9    • Eosinophils Relative 11/09/2022 2    • Basophils Relative 11/09/2022 1    • Neutrophils Absolute 11/09/2022 4 63    • Immature Grans Absolute 11/09/2022 0 03    • Lymphocytes Absolute 11/09/2022 1 72    • Monocytes Absolute 11/09/2022 0 63    • Eosinophils Absolute 11/09/2022 0 17    • Basophils Absolute 11/09/2022 0 06    • Cholesterol 11/09/2022 96    • Triglycerides 11/09/2022 48    • HDL, Direct 11/09/2022 51    • LDL Calculated 11/09/2022 35    • Non-HDL-Chol (CHOL-HDL) 11/09/2022 45    • POC Glucose 11/14/2022 117    • SARS-CoV-2 11/22/2022 Negative        Discharge Medications:  Current Discharge Medication List      START taking these medications    Details   busPIRone (BUSPAR) 7 5 mg tablet Take 1 tablet (7 5 mg total) by mouth 3 (three) times a day  Qty: 90 tablet, Refills: 0    Associated Diagnoses: Schizoaffective disorder, bipolar type ([address] ); Anxiety      cholecalciferol (VITAMIN D3) 1,000 units tablet Take 1 tablet (1,000 Units total) by mouth daily Do not start before January 13, 2023  Qty: 30 tablet, Refills: 0    Associated Diagnoses: Vitamin D deficiency      ergocalciferol (VITAMIN D2) 50,000 units Take 1 capsule (50,000 Units total) by mouth once a week for 8 doses Do not start before November 24, 2022    Qty: 6 capsule, Refills: 0    Associated Diagnoses: Vitamin D deficiency      lamoTRIgine (LaMICtal) 100 mg tablet Take 1 tablet (100 mg total) by mouth daily at bedtime  Qty: 30 tablet, Refills: 0    Associated Diagnoses: Schizoaffective disorder, bipolar type ([facility])      melatonin 5 MG TABS Take 1 tablet (5 mg total) by mouth daily at bedtime  Qty: 30 tablet, Refills: 0    Associated Diagnoses: Insomnia            Current Discharge Medication List      STOP taking these medications       cephalexin (KEFLEX) 500 mg capsule Comments:   Reason for Stopping:         clonazePAM (KlonoPIN) 0 5 mg tablet Comments:   Reason for Stopping:         lurasidone (LATUDA) 80 mg tablet Comments:   Reason for Stopping:         mirtazapine (REMERON) 7 5 MG tablet Comments:   Reason for Stopping:              Current Discharge Medication List      CONTINUE these medications which have CHANGED    Details   amiodarone 200 mg tablet Take 1 tablet (200 mg total) by mouth daily  Qty: 30 tablet, Refills: 0    Associated Diagnoses: NSTEMI (non-ST elevated myocardial infarction) ([place] [address] ); Coronary artery disease involving native heart with angina pectoris, unspecified vessel or lesion type ([place] [address] ); Ventricular tachycardia; Essential hypertension      apixaban (ELIQUIS) 5 mg Take 1 tablet (5 mg total) by mouth 2 (two) times a day  Qty: 60 tablet, Refills: 0    Associated Diagnoses: NSTEMI (non-ST elevated myocardial infarction) ([place] [address] ); Ventricular tachycardia; Cerebrovascular accident (CVA), unspecified mechanism ([place] [address] )      ARIPiprazole (ABILIFY) 15 mg tablet Take 1 tablet (15 mg total) by mouth daily Do not start before November 23, 2022    Qty: 30 tablet, Refills: 0    Associated Diagnoses: Schizoaffective disorder, bipolar type ([facility])      atorvastatin (LIPITOR) 40 mg tablet Take 1 tablet (40 mg total) by mouth daily with dinner  Qty: 90 tablet, Refills: 0    Associated Diagnoses: NSTEMI (non-ST elevated myocardial infarction) ([facility])      carbidopa-levodopa (SINEMET)  mg per tablet Take 1 tablet by mouth 4 (four) times a day  Qty: 120 tablet, Refills: 0    Associated Diagnoses: Parkinson's disease ([place] [address] ) sertraline (ZOLOFT) 100 mg tablet Take 1 tablet (100 mg total) by mouth daily Do not start before November 23, 2022  Qty: 30 tablet, Refills: 0    Associated Diagnoses: Schizoaffective disorder, bipolar type [facility])            Current Discharge Medication List           Discharge instructions/Information to patient and family:   See after visit summary for information provided to patient and family  Provisions for Follow-Up Care:  See after visit summary for information related to follow-up care and any pertinent home health orders  Discharge Statement     I spent 35 minutes discharging the patient  This time was spent on the day of discharge  I had direct contact with the patient on the day of discharge  Additional documentation is required if more than 30 minutes were spent on discharge:    I reviewed with Eleazar importance of compliance with medications and outpatient treatment after discharge  I discussed the medication regimen and possible side effects of the medications with Eleazar prior to discharge  At the time of discharge he was tolerating psychiatric medications  I discussed outpatient follow up with Eleazar  I reviewed with Eleazar crisis plan and safety plan upon discharge

## 2022-11-23 NOTE — PROGRESS NOTES
11/23/22 7103   Activity/Group Checklist   Group Community meeting   Attendance Did not attend  (Pt offered grp; pt remain in room)

## 2022-11-26 ENCOUNTER — HOSPITAL ENCOUNTER (EMERGENCY)
Facility: HOSPITAL | Age: 57
Discharge: HOME | End: 2022-11-26
Attending: EMERGENCY MEDICINE

## 2022-11-26 VITALS
BODY MASS INDEX: 22.32 KG/M2 | HEART RATE: 60 BPM | RESPIRATION RATE: 20 BRPM | OXYGEN SATURATION: 99 % | DIASTOLIC BLOOD PRESSURE: 69 MMHG | TEMPERATURE: 97.7 F | SYSTOLIC BLOOD PRESSURE: 115 MMHG | WEIGHT: 160 LBS

## 2022-11-26 DIAGNOSIS — F20.9 SCHIZOPHRENIA (HCC): Primary | ICD-10-CM

## 2022-11-26 LAB — GLUCOSE SERPL-MCNC: 129 MG/DL (ref 65–140)

## 2022-11-26 RX ORDER — BUSPIRONE HYDROCHLORIDE 15 MG/1
7.5 TABLET ORAL ONCE
Status: COMPLETED | OUTPATIENT
Start: 2022-11-26 | End: 2022-11-26

## 2022-11-26 RX ADMIN — CARBIDOPA AND LEVODOPA 1 TABLET: 25; 100 TABLET ORAL at 16:07

## 2022-11-26 RX ADMIN — BUSPIRONE HYDROCHLORIDE 7.5 MG: 15 TABLET ORAL at 16:07

## 2022-11-26 NOTE — DISCHARGE INSTRUCTIONS
Exam normal for the medics and here in the emergency department  Patient states that he feels fine and has no deficits but did not want to speak to the staff at [facility] today

## 2022-11-26 NOTE — ED NOTES
Pt ate meal without any difficulties  Ambulated to bathroom without any difficulties        [address] Wesly Garcia RN  11/26/22 3928

## 2022-11-26 NOTE — ED NOTES
Spoke with patient via phone and completed the crisis intake assessment as well as the safety risk assessment  Patient arrived to the ER via EMS from [facility] with what Juan R Corbin felt like was stroke like symptoms  Patient states " I don't like it there, I could hear them  I just didn't want to answer them or talk to them " Patient denies SI/HI as well as hallucinations and paranoia  "I don't want to go back there, they discharged me to soon  I need to go back " Patient does report some sleep disturbances " I wake up a lot "  No other psychiatric symptoms present  Case was discussed with ER MD who is in agreement with discharge home and follow up with established outpatient mental health providers  Patient wad educated if symptoms continue or worsen to call 911 or go to the nearest ER and is in agreement to do as such

## 2022-11-26 NOTE — ED NOTES
RN spoke with patient privately  Pt states he remembers today's events   Pt tells RN he was not answering staff at his facility today because "I just didn't want to " When asked if he was having trouble speaking, pt states, "No, I just don't like living there and I didn't want to answer them "     Libby Ren, JULIANO  11/26/22 2132

## 2022-11-26 NOTE — ED PROVIDER NOTES
History  Chief Complaint   Patient presents with   • Speech Problem     Pt presents via EMS from [facility]  Per staff at facility, pt was not answering their questions PTA, and they thought he may be having a stroke  Per EMS, pt does have a stroke hx, but has no deficits and has been speaking and acting normal to his baseline, with no complaints other than wanting a soda  Pt recently had a psych admission and had only been back at [facility] 1-2 days, pt does not like living there  Medics states that this patient was transported here from [facility] because he stopped talking to the staff and they thought he might have a facial droop  They are concerned that he might be having a stroke  The medics found him to have no deficits and no complaints  Here he is ambulating normally  Denies complaints  Ate a turkey sandwich  Exam is unremarkable  Patient admits that he does not like where he is currently living and decided that he was not going to speak to the staff today  Prior to Admission Medications   Prescriptions Last Dose Informant Patient Reported? Taking? ARIPiprazole (ABILIFY) 15 mg tablet   No No   Sig: Take 1 tablet (15 mg total) by mouth daily Do not start before November 23, 2022  amiodarone 200 mg tablet   No No   Sig: Take 1 tablet (200 mg total) by mouth daily   apixaban (ELIQUIS) 5 mg   No No   Sig: Take 1 tablet (5 mg total) by mouth 2 (two) times a day   atorvastatin (LIPITOR) 40 mg tablet   No No   Sig: Take 1 tablet (40 mg total) by mouth daily with dinner   busPIRone (BUSPAR) 7 5 mg tablet   No No   Sig: Take 1 tablet (7 5 mg total) by mouth 3 (three) times a day   carbidopa-levodopa (SINEMET)  mg per tablet   No No   Sig: Take 1 tablet by mouth 4 (four) times a day   cholecalciferol (VITAMIN D3) 1,000 units tablet   No No   Sig: Take 1 tablet (1,000 Units total) by mouth daily Do not start before January 13, 2023     ergocalciferol (VITAMIN D2) 50,000 units   No No   Sig: Take 1 capsule (50,000 Units total) by mouth once a week for 8 doses Do not start before November 24, 2022  lamoTRIgine (LaMICtal) 100 mg tablet   No No   Sig: Take 1 tablet (100 mg total) by mouth daily at bedtime   melatonin 5 MG TABS   No No   Sig: Take 1 tablet (5 mg total) by mouth daily at bedtime   sertraline (ZOLOFT) 100 mg tablet   No No   Sig: Take 1 tablet (100 mg total) by mouth daily Do not start before November 23, 2022  Facility-Administered Medications: None       Past Medical History:   Diagnosis Date   • Anxiety    • Bipolar 1 disorder ([facility] )    • Bowel habit changes    • Chronic pain disorder    • Coronary artery disease    • CVA (cerebral vascular accident) ([facility] )    • CVA (cerebral vascular accident) ([facility] )    • Depression    • Family hx of colon cancer     father and paternal uncle   • Myocardial infarction [facility])    • Psychiatric disorder    • Psychiatric illness    • Schizoaffective disorder, bipolar type ([place] [address] )    • Schizoaffective disorder, bipolar type ([place] [address] )    • Stroke ([place] [address] ) 12/2015    CVA x3   Pt states he is unaware of when he had first two CVA   • Suicide attempt [facility])        Past Surgical History:   Procedure Laterality Date   • CARDIAC PACEMAKER PLACEMENT     • COLONOSCOPY         Family History   Problem Relation Age of Onset   • No Known Problems Mother    • No Known Problems Father    • No Known Problems Sister    • No Known Problems Brother    • No Known Problems Maternal Aunt    • No Known Problems Paternal Aunt    • No Known Problems Maternal Uncle    • No Known Problems Paternal Uncle    • No Known Problems Maternal Grandfather    • No Known Problems Maternal Grandmother    • No Known Problems Paternal Grandfather    • No Known Problems Paternal Grandmother    • No Known Problems Cousin    • ADD / ADHD Neg Hx    • Alcohol abuse Neg Hx    • Anxiety disorder Neg Hx    • Bipolar disorder Neg Hx    • Completed Suicide  Neg Hx    • Dementia Neg Hx    • Depression Neg Hx    • Drug abuse Neg Hx    • OCD Neg Hx    • Psychiatric Illness Neg Hx    • Psychosis Neg Hx    • Schizoaffective Disorder  Neg Hx    • Schizophrenia Neg Hx    • Self-Injury Neg Hx    • Suicide Attempts Neg Hx      I have reviewed and agree with the history as documented  E-Cigarette/Vaping   • E-Cigarette Use Never User      E-Cigarette/Vaping Substances   • Nicotine No    • Flavoring No    • Other No    • Unknown No      Social History     Tobacco Use   • Smoking status: Every Day     Packs/day: 1 00     Years: 15 00     Pack years: 15 00     Types: Cigarettes   • Smokeless tobacco: Never   • Tobacco comments:     Wishes to quit   Vaping Use   • Vaping Use: Never used   Substance Use Topics   • Alcohol use: Never     Alcohol/week: 0 0 standard drinks     Comment: pt denies   • Drug use: No     Comment: pt denies       Review of Systems   Unable to perform ROS: Psychiatric disorder       Physical Exam  Physical Exam  Vitals and nursing note reviewed  Constitutional:       General: He is not in acute distress  Appearance: He is well-developed, normal weight and well-nourished  He is not ill-appearing or diaphoretic  HENT:      Head: Normocephalic and atraumatic  Right Ear: External ear normal       Left Ear: External ear normal       Nose: Nose normal       Mouth/Throat:      Mouth: Oropharynx is clear and moist  Mucous membranes are moist       Pharynx: Oropharynx is clear  Eyes:      General: No scleral icterus  Extraocular Movements: EOM normal       Conjunctiva/sclera: Conjunctivae normal       Pupils: Pupils are equal, round, and reactive to light  Neck:      Vascular: No JVD  Cardiovascular:      Rate and Rhythm: Normal rate and regular rhythm  Pulses: Normal pulses and intact distal pulses  Heart sounds: Normal heart sounds  No murmur heard  Pulmonary:      Effort: Pulmonary effort is normal       Breath sounds: Normal breath sounds     Abdominal: General: Bowel sounds are normal       Palpations: Abdomen is soft  There is no mass  Tenderness: There is no abdominal tenderness  There is no guarding or rebound  Musculoskeletal:         General: No tenderness or edema  Normal range of motion  Cervical back: Normal range of motion and neck supple  No tenderness  Right lower leg: No edema  Left lower leg: No edema  Lymphadenopathy:      Cervical: No cervical adenopathy  Skin:     General: Skin is warm and dry  Capillary Refill: Capillary refill takes less than 2 seconds  Findings: No lesion or rash  Neurological:      General: No focal deficit present  Mental Status: He is alert and oriented to person, place, and time  Mental status is at baseline  Cranial Nerves: No cranial nerve deficit  Sensory: No sensory deficit  Motor: No weakness  Coordination: Coordination normal       Gait: Gait normal       Deep Tendon Reflexes: Reflexes are normal and symmetric  Psychiatric:         Mood and Affect: Mood and affect and mood normal          Behavior: Behavior normal          Vital Signs  ED Triage Vitals [11/26/22 1349]   Temperature Pulse Respirations Blood Pressure SpO2   97 7 °F (36 5 °C) 75 20 135/85 96 %      Temp Source Heart Rate Source Patient Position - Orthostatic VS BP Location FiO2 (%)   Oral Monitor Lying Right arm --      Pain Score       No Pain           Vitals:    11/26/22 1349 11/26/22 1500   BP: 135/85 128/74   Pulse: 75 57   Patient Position - Orthostatic VS: Lying          Visual Acuity      ED Medications  Medications - No data to display    Diagnostic Studies  Results Reviewed     None                 No orders to display              Procedures  Procedures         ED Course                               SBIRT 22yo+    Flowsheet Row Most Recent Value   SBIRT (23 yo +)    In order to provide better care to our patients, we are screening all of our patients for alcohol and drug use  Would it be okay to ask you these screening questions? Yes Filed at: 11/26/2022 1402   Initial Alcohol Screen: US AUDIT-C     1  How often do you have a drink containing alcohol? 0 Filed at: 11/26/2022 1402   2  How many drinks containing alcohol do you have on a typical day you are drinking? 0 Filed at: 11/26/2022 1402   3a  Male UNDER 65: How often do you have five or more drinks on one occasion? 0 Filed at: 11/26/2022 1402   3b  FEMALE Any Age, or MALE 65+: How often do you have 4 or more drinks on one occassion? 0 Filed at: 11/26/2022 1402   Audit-C Score 0 Filed at: 11/26/2022 1402   HERB: How many times in the past year have you    Used an illegal drug or used a prescription medication for non-medical reasons? Never Filed at: 11/26/2022 1402                    MDM  Number of Diagnoses or Management Options  Schizophrenia ([facility] ): established and improving     Amount and/or Complexity of Data Reviewed  Review and summarize past medical records: yes        Disposition  Final diagnoses:   Schizophrenia ([facility] )     Time reflects when diagnosis was documented in both MDM as applicable and the Disposition within this note     Time User Action Codes Description Comment    11/26/2022  3:13 PM Kristofer Lilly Add [F20 9] Schizophrenia [facility])       ED Disposition     ED Disposition   Discharge    Condition   Stable    Date/Time   Sat Nov 26, 2022  3:13 PM    Ridge Harper discharge to home/self care  Follow-up Information     Follow up With Specialties Details Why Contact Info    your PCP  Call today for follow up, as needed           Patient's Medications   Discharge Prescriptions    No medications on file       No discharge procedures on file      PDMP Review     None          ED Provider  Electronically Signed by           Gwendolyn Lauren DO  11/26/22 1523

## 2022-11-27 NOTE — ED NOTES
Pt requested to speak to Crisis again  Pt explained on the conclusion of following up outpatient  Pt agreeable and transported out via wheelchair        621 Wesly Garcia RN  11/26/22 3366

## 2023-01-09 ENCOUNTER — OFFICE VISIT (OUTPATIENT)
Dept: CARDIOLOGY CLINIC | Facility: CLINIC | Age: 58
End: 2023-01-09

## 2023-01-09 VITALS
DIASTOLIC BLOOD PRESSURE: 64 MMHG | OXYGEN SATURATION: 97 % | WEIGHT: 160 LBS | BODY MASS INDEX: 22.4 KG/M2 | HEART RATE: 57 BPM | SYSTOLIC BLOOD PRESSURE: 96 MMHG | HEIGHT: 71 IN

## 2023-01-09 DIAGNOSIS — E78.49 OTHER HYPERLIPIDEMIA: ICD-10-CM

## 2023-01-09 DIAGNOSIS — I10 ESSENTIAL HYPERTENSION: ICD-10-CM

## 2023-01-09 DIAGNOSIS — I47.20 VENTRICULAR TACHYCARDIA: ICD-10-CM

## 2023-01-09 DIAGNOSIS — I25.119 CORONARY ARTERY DISEASE INVOLVING NATIVE HEART WITH ANGINA PECTORIS, UNSPECIFIED VESSEL OR LESION TYPE (HCC): Primary | ICD-10-CM

## 2023-01-09 RX ORDER — BUSPIRONE HYDROCHLORIDE 15 MG/1
TABLET ORAL
COMMUNITY
Start: 2022-12-21

## 2023-01-09 NOTE — PROGRESS NOTES
Assessment/Plan:    Essential hypertension  Hypertension, stable  Ventricular tachycardia (HCC)  History of ventricular arrhythmia, stable  The patient is asymptomatic  Coronary artery disease involving native heart with angina pectoris, unspecified vessel or lesion type [facility])  Coronary artery disease with no symptoms of angina or signs of heart failure  Other hyperlipidemia  Hyperlipidemia, stable  Diagnoses and all orders for this visit:    Coronary artery disease involving native heart with angina pectoris, unspecified vessel or lesion type [facility])    Ventricular tachycardia    Essential hypertension    Other hyperlipidemia    Other orders  -     busPIRone (BUSPAR) 15 mg tablet          Subjective: Feels well from the cardiac standpoint  Patient ID: Radha Bell is a 61 y o  male  The patient presented to this office for the purpose of cardiac follow-up  He is known to have a history of coronary artery disease with prior myocardial infarction  He is also known to have a history of hypertension and hyperlipidemia  He had been diagnosed with ventricular arrhythmia and ventricular tachycardia for which she is taking amiodarone and has ICD implant  At this point the patient feels well from the cardiac standpoint denying any symptoms of chest pain, shortness of breath, palpitation, dizziness or lightheadedness  He has no leg edema  The following portions of the patient's history were reviewed and updated as appropriate: allergies, current medications, past family history, past medical history, past social history, past surgical history and problem list     Review of Systems   Respiratory: Negative for apnea, cough, chest tightness, shortness of breath and wheezing  Cardiovascular: Negative for chest pain, palpitations and leg swelling  Gastrointestinal: Negative for abdominal pain  Neurological: Negative for dizziness and light-headedness           Objective: Stable cardiac wise       BP 96/64 (BP Location: Left arm, Patient Position: Sitting, Cuff Size: Standard)   Pulse 57   Ht 5' 11" (1 803 m)   Wt 72 6 kg (160 lb)   SpO2 97%   BMI 22 32 kg/m²          Physical Exam  Vitals reviewed  Constitutional:       General: He is not in acute distress  Appearance: He is well-developed  He is not diaphoretic  HENT:      Head: Normocephalic  Eyes:      Pupils: Pupils are equal, round, and reactive to light  Neck:      Thyroid: No thyromegaly  Vascular: No JVD  Cardiovascular:      Rate and Rhythm: Normal rate and regular rhythm  Heart sounds: S1 normal and S2 normal  No murmur heard  No friction rub  No gallop  Pulmonary:      Effort: Pulmonary effort is normal  No respiratory distress  Breath sounds: Normal breath sounds  No wheezing or rales  Chest:      Chest wall: No tenderness  Abdominal:      Palpations: Abdomen is soft  Musculoskeletal:         General: No tenderness or deformity  Normal range of motion  Cervical back: Normal range of motion  Right lower leg: No edema  Left lower leg: No edema  Skin:     General: Skin is warm and dry  Neurological:      Mental Status: He is alert and oriented to person, place, and time     Psychiatric:         Mood and Affect: Mood normal          Behavior: Behavior normal

## 2023-01-09 NOTE — LETTER
January 9, 2023     [address] [place] [address] U  36     Patient: Rima Mirza   YOB: 1965   Date of Visit: 1/9/2023       Dear Dr Allie Steward: Thank you for referring Krys Cardozo to me for evaluation  Below are my notes for this consultation  If you have questions, please do not hesitate to call me  I look forward to following your patient along with you  Sincerely,        Harshal Lopez MD        CC: No Recipients  Harshal Lopez MD  1/9/2023 11:31 AM  Sign when Signing Visit  Assessment/Plan:    Essential hypertension  Hypertension, stable  Ventricular tachycardia (HCC)  History of ventricular arrhythmia, stable  The patient is asymptomatic  Coronary artery disease involving native heart with angina pectoris, unspecified vessel or lesion type [facility])  Coronary artery disease with no symptoms of angina or signs of heart failure  Other hyperlipidemia  Hyperlipidemia, stable  Diagnoses and all orders for this visit:    Coronary artery disease involving native heart with angina pectoris, unspecified vessel or lesion type [facility])    Ventricular tachycardia    Essential hypertension    Other hyperlipidemia    Other orders  -     busPIRone (BUSPAR) 15 mg tablet         Subjective: Feels well from the cardiac standpoint  Patient ID: Rima Mirza is a 61 y o  male  The patient presented to this office for the purpose of cardiac follow-up  He is known to have a history of coronary artery disease with prior myocardial infarction  He is also known to have a history of hypertension and hyperlipidemia  He had been diagnosed with ventricular arrhythmia and ventricular tachycardia for which she is taking amiodarone and has ICD implant  At this point the patient feels well from the cardiac standpoint denying any symptoms of chest pain, shortness of breath, palpitation, dizziness or lightheadedness  He has no leg edema        The following portions of the patient's history were reviewed and updated as appropriate: allergies, current medications, past family history, past medical history, past social history, past surgical history and problem list     Review of Systems   Respiratory: Negative for apnea, cough, chest tightness, shortness of breath and wheezing  Cardiovascular: Negative for chest pain, palpitations and leg swelling  Gastrointestinal: Negative for abdominal pain  Neurological: Negative for dizziness and light-headedness  Objective: Stable cardiac wise  BP 96/64 (BP Location: Left arm, Patient Position: Sitting, Cuff Size: Standard)   Pulse 57   Ht 5' 11" (1 803 m)   Wt 72 6 kg (160 lb)   SpO2 97%   BMI 22 32 kg/m²         Physical Exam  Vitals reviewed  Constitutional:       General: He is not in acute distress  Appearance: He is well-developed  He is not diaphoretic  HENT:      Head: Normocephalic  Eyes:      Pupils: Pupils are equal, round, and reactive to light  Neck:      Thyroid: No thyromegaly  Vascular: No JVD  Cardiovascular:      Rate and Rhythm: Normal rate and regular rhythm  Heart sounds: S1 normal and S2 normal  No murmur heard  No friction rub  No gallop  Pulmonary:      Effort: Pulmonary effort is normal  No respiratory distress  Breath sounds: Normal breath sounds  No wheezing or rales  Chest:      Chest wall: No tenderness  Abdominal:      Palpations: Abdomen is soft  Musculoskeletal:         General: No tenderness or deformity  Normal range of motion  Cervical back: Normal range of motion  Right lower leg: No edema  Left lower leg: No edema  Skin:     General: Skin is warm and dry  Neurological:      Mental Status: He is alert and oriented to person, place, and time     Psychiatric:         Mood and Affect: Mood normal          Behavior: Behavior normal

## 2024-03-01 ENCOUNTER — APPOINTMENT (EMERGENCY)
Dept: RADIOLOGY | Facility: HOSPITAL | Age: 59
End: 2024-03-01
Payer: MEDICARE

## 2024-03-01 ENCOUNTER — HOSPITAL ENCOUNTER (EMERGENCY)
Facility: HOSPITAL | Age: 59
Discharge: HOME | End: 2024-03-01
Attending: EMERGENCY MEDICINE
Payer: MEDICARE

## 2024-03-01 ENCOUNTER — TELEPHONE (OUTPATIENT)
Dept: NEUROLOGY | Facility: CLINIC | Age: 59
End: 2024-03-01

## 2024-03-01 VITALS
OXYGEN SATURATION: 97 % | WEIGHT: 135 LBS | HEIGHT: 71 IN | RESPIRATION RATE: 18 BRPM | HEART RATE: 60 BPM | BODY MASS INDEX: 18.9 KG/M2 | SYSTOLIC BLOOD PRESSURE: 119 MMHG | TEMPERATURE: 97.6 F | DIASTOLIC BLOOD PRESSURE: 73 MMHG

## 2024-03-01 DIAGNOSIS — L60.0 INGROWN LEFT BIG TOENAIL: ICD-10-CM

## 2024-03-01 DIAGNOSIS — B35.1 ONYCHOMYCOSIS: Primary | ICD-10-CM

## 2024-03-01 LAB
ALBUMIN SERPL BCP-MCNC: 3.6 G/DL (ref 3.5–5)
ALP SERPL-CCNC: 71 U/L (ref 34–104)
ALT SERPL W P-5'-P-CCNC: 10 U/L (ref 7–52)
ANION GAP SERPL CALCULATED.3IONS-SCNC: 4 MMOL/L
AST SERPL W P-5'-P-CCNC: 18 U/L (ref 13–39)
BASOPHILS # BLD AUTO: 0.05 THOUSANDS/ÂΜL (ref 0–0.1)
BASOPHILS NFR BLD AUTO: 1 % (ref 0–1)
BILIRUB SERPL-MCNC: 0.58 MG/DL (ref 0.2–1)
BUN SERPL-MCNC: 22 MG/DL (ref 5–25)
CALCIUM SERPL-MCNC: 9 MG/DL (ref 8.4–10.2)
CHLORIDE SERPL-SCNC: 106 MMOL/L (ref 96–108)
CO2 SERPL-SCNC: 28 MMOL/L (ref 21–32)
CREAT SERPL-MCNC: 1.06 MG/DL (ref 0.6–1.3)
EOSINOPHIL # BLD AUTO: 0.25 THOUSAND/ÂΜL (ref 0–0.61)
EOSINOPHIL NFR BLD AUTO: 3 % (ref 0–6)
ERYTHROCYTE [DISTWIDTH] IN BLOOD BY AUTOMATED COUNT: 12.1 % (ref 11.6–15.1)
GFR SERPL CREATININE-BSD FRML MDRD: 76 ML/MIN/1.73SQ M
GLUCOSE SERPL-MCNC: 80 MG/DL (ref 65–140)
HCT VFR BLD AUTO: 39 % (ref 36.5–49.3)
HGB BLD-MCNC: 12.2 G/DL (ref 12–17)
IMM GRANULOCYTES # BLD AUTO: 0.03 THOUSAND/UL (ref 0–0.2)
IMM GRANULOCYTES NFR BLD AUTO: 0 % (ref 0–2)
LYMPHOCYTES # BLD AUTO: 1.96 THOUSANDS/ÂΜL (ref 0.6–4.47)
LYMPHOCYTES NFR BLD AUTO: 27 % (ref 14–44)
MCH RBC QN AUTO: 31 PG (ref 26.8–34.3)
MCHC RBC AUTO-ENTMCNC: 31.3 G/DL (ref 31.4–37.4)
MCV RBC AUTO: 99 FL (ref 82–98)
MONOCYTES # BLD AUTO: 1.08 THOUSAND/ÂΜL (ref 0.17–1.22)
MONOCYTES NFR BLD AUTO: 15 % (ref 4–12)
NEUTROPHILS # BLD AUTO: 3.89 THOUSANDS/ÂΜL (ref 1.85–7.62)
NEUTS SEG NFR BLD AUTO: 54 % (ref 43–75)
NRBC BLD AUTO-RTO: 0 /100 WBCS
PLATELET # BLD AUTO: 240 THOUSANDS/UL (ref 149–390)
PMV BLD AUTO: 9.4 FL (ref 8.9–12.7)
POTASSIUM SERPL-SCNC: 4.7 MMOL/L (ref 3.5–5.3)
PROT SERPL-MCNC: 6.4 G/DL (ref 6.4–8.4)
RBC # BLD AUTO: 3.94 MILLION/UL (ref 3.88–5.62)
SODIUM SERPL-SCNC: 138 MMOL/L (ref 135–147)
URATE SERPL-MCNC: 3.5 MG/DL (ref 3.5–8.5)
WBC # BLD AUTO: 7.26 THOUSAND/UL (ref 4.31–10.16)

## 2024-03-01 PROCEDURE — 99283 EMERGENCY DEPT VISIT LOW MDM: CPT

## 2024-03-01 PROCEDURE — 80053 COMPREHEN METABOLIC PANEL: CPT | Performed by: EMERGENCY MEDICINE

## 2024-03-01 PROCEDURE — 99284 EMERGENCY DEPT VISIT MOD MDM: CPT | Performed by: EMERGENCY MEDICINE

## 2024-03-01 PROCEDURE — 84550 ASSAY OF BLOOD/URIC ACID: CPT | Performed by: EMERGENCY MEDICINE

## 2024-03-01 PROCEDURE — 73660 X-RAY EXAM OF TOE(S): CPT

## 2024-03-01 PROCEDURE — 36415 COLL VENOUS BLD VENIPUNCTURE: CPT | Performed by: EMERGENCY MEDICINE

## 2024-03-01 PROCEDURE — 85025 COMPLETE CBC W/AUTO DIFF WBC: CPT | Performed by: EMERGENCY MEDICINE

## 2024-03-01 RX ORDER — TERBINAFINE HYDROCHLORIDE 250 MG/1
250 TABLET ORAL DAILY
Qty: 30 TABLET | Refills: 0 | Status: SHIPPED | OUTPATIENT
Start: 2024-03-01 | End: 2024-03-01

## 2024-03-01 RX ORDER — TERBINAFINE HYDROCHLORIDE 250 MG/1
250 TABLET ORAL DAILY
Qty: 30 TABLET | Refills: 0 | Status: SHIPPED | OUTPATIENT
Start: 2024-03-01 | End: 2024-03-31

## 2024-03-01 NOTE — ED PROVIDER NOTES
"History  Chief Complaint   Patient presents with    Toe Pain     Pt states 7 days ago \" My (left) toe is infected and didn't tell anyone.\" Denies any fevers and chills.      Hx from patient and medical records - pmh anxiety bipolar, chronic pain, stroke, MI, presents with concern for left first toe pain for about 1 week now.  Poor historian but worse with weight-bearing.  No injuries, didn't take anything yet for pain.        Prior to Admission Medications   Prescriptions Last Dose Informant Patient Reported? Taking?   ARIPiprazole (ABILIFY) 15 mg tablet  Self No No   Sig: Take 1 tablet (15 mg total) by mouth daily Do not start before November 23, 2022.   amiodarone 200 mg tablet  Self No No   Sig: Take 1 tablet (200 mg total) by mouth daily   apixaban (ELIQUIS) 5 mg  Self No No   Sig: Take 1 tablet (5 mg total) by mouth 2 (two) times a day   atorvastatin (LIPITOR) 40 mg tablet  Self No No   Sig: Take 1 tablet (40 mg total) by mouth daily with dinner   busPIRone (BUSPAR) 15 mg tablet  Self Yes No   busPIRone (BUSPAR) 7.5 mg tablet  Self No No   Sig: Take 1 tablet (7.5 mg total) by mouth 3 (three) times a day   Patient not taking: Reported on 1/9/2023   carbidopa-levodopa (SINEMET)  mg per tablet  Self No No   Sig: Take 1 tablet by mouth 4 (four) times a day   cholecalciferol (VITAMIN D3) 1,000 units tablet  Self No No   Sig: Take 1 tablet (1,000 Units total) by mouth daily Do not start before January 13, 2023.   ergocalciferol (VITAMIN D2) 50,000 units  Self No No   Sig: Take 1 capsule (50,000 Units total) by mouth once a week for 8 doses Do not start before November 24, 2022.   lamoTRIgine (LaMICtal) 100 mg tablet  Self No No   Sig: Take 1 tablet (100 mg total) by mouth daily at bedtime   melatonin 5 MG TABS  Self No No   Sig: Take 1 tablet (5 mg total) by mouth daily at bedtime   sertraline (ZOLOFT) 100 mg tablet  Self No No   Sig: Take 1 tablet (100 mg total) by mouth daily Do not start before November 23, " 2022.      Facility-Administered Medications: None       Past Medical History:   Diagnosis Date    Anxiety     Bipolar 1 disorder (HCC)     Bowel habit changes     Chronic pain disorder     Coronary artery disease     CVA (cerebral vascular accident) (HCC)     CVA (cerebral vascular accident) (HCC)     Depression     Family hx of colon cancer     father and paternal uncle    Myocardial infarction (HCC)     Psychiatric disorder     Psychiatric illness     Schizoaffective disorder, bipolar type (HCC)     Schizoaffective disorder, bipolar type (HCC)     Stroke (HCC) 12/2015    CVA x3. Pt states he is unaware of when he had first two CVA    Suicide attempt (HCC)        Past Surgical History:   Procedure Laterality Date    CARDIAC PACEMAKER PLACEMENT      COLONOSCOPY         Family History   Problem Relation Age of Onset    Heart disease Mother     Heart attack Father     Heart disease Father     No Known Problems Sister     No Known Problems Brother     No Known Problems Maternal Aunt     No Known Problems Maternal Uncle     No Known Problems Paternal Aunt     No Known Problems Paternal Uncle     No Known Problems Maternal Grandmother     No Known Problems Maternal Grandfather     No Known Problems Paternal Grandmother     No Known Problems Paternal Grandfather     No Known Problems Cousin     ADD / ADHD Neg Hx     Alcohol abuse Neg Hx     Anxiety disorder Neg Hx     Bipolar disorder Neg Hx     Completed Suicide  Neg Hx     Dementia Neg Hx     Depression Neg Hx     Drug abuse Neg Hx     OCD Neg Hx     Psychiatric Illness Neg Hx     Psychosis Neg Hx     Schizoaffective Disorder  Neg Hx     Schizophrenia Neg Hx     Self-Injury Neg Hx     Suicide Attempts Neg Hx      I have reviewed and agree with the history as documented.    E-Cigarette/Vaping    E-Cigarette Use Never User      E-Cigarette/Vaping Substances    Nicotine No     Flavoring No     Other No     Unknown No      Social History     Tobacco Use    Smoking status:  Every Day     Current packs/day: 0.50     Average packs/day: 0.5 packs/day for 15.0 years (7.5 ttl pk-yrs)     Types: Cigarettes    Smokeless tobacco: Never    Tobacco comments:     Wishes to quit   Vaping Use    Vaping status: Never Used   Substance Use Topics    Alcohol use: Never     Alcohol/week: 0.0 standard drinks of alcohol     Comment: pt denies    Drug use: No     Comment: pt denies       Review of Systems   Constitutional:  Negative for chills and fever.   HENT:  Negative for ear pain and sore throat.    Eyes:  Negative for pain and visual disturbance.   Respiratory:  Negative for cough and shortness of breath.    Cardiovascular:  Negative for chest pain and palpitations.   Gastrointestinal:  Negative for abdominal pain and vomiting.   Genitourinary:  Negative for dysuria and hematuria.   Musculoskeletal:  Negative for arthralgias and back pain.   Skin:  Negative for color change and rash.   Neurological:  Negative for seizures and syncope.   All other systems reviewed and are negative.      Physical Exam  Physical Exam  Vitals and nursing note reviewed.   Constitutional:       General: He is not in acute distress.     Appearance: He is well-developed.   HENT:      Head: Normocephalic and atraumatic.   Eyes:      Conjunctiva/sclera: Conjunctivae normal.   Cardiovascular:      Rate and Rhythm: Normal rate and regular rhythm.      Heart sounds: No murmur heard.  Pulmonary:      Effort: Pulmonary effort is normal. No respiratory distress.      Breath sounds: Normal breath sounds.   Abdominal:      Palpations: Abdomen is soft.      Tenderness: There is no abdominal tenderness.   Musculoskeletal:         General: No swelling.      Cervical back: Neck supple.      Right foot: Normal range of motion. No swelling or tenderness. Normal pulse.      Left foot: Normal range of motion. No swelling or tenderness. Normal pulse.      Comments: All nails are thick  - see media - no specific erythema or fluctuance noted    Skin:     General: Skin is warm and dry.      Capillary Refill: Capillary refill takes less than 2 seconds.   Neurological:      Mental Status: He is alert.   Psychiatric:         Mood and Affect: Mood normal.         Vital Signs  ED Triage Vitals [03/01/24 1237]   Temperature Pulse Respirations Blood Pressure SpO2   99 °F (37.2 °C) 65 20 109/73 98 %      Temp Source Heart Rate Source Patient Position - Orthostatic VS BP Location FiO2 (%)   Temporal Monitor Sitting Right arm --      Pain Score       4           Vitals:    03/01/24 1237 03/01/24 1430   BP: 109/73 119/73   Pulse: 65 60   Patient Position - Orthostatic VS: Sitting Sitting         Visual Acuity      ED Medications  Medications - No data to display    Diagnostic Studies  Results Reviewed       Procedure Component Value Units Date/Time    Comprehensive metabolic panel [[id number]] Collected: 03/01/24 1355    Lab Status: Final result Specimen: Blood from Arm, Left Updated: 03/01/24 1423     Sodium 138 mmol/L      Potassium 4.7 mmol/L      Chloride 106 mmol/L      CO2 28 mmol/L      ANION GAP 4 mmol/L      BUN 22 mg/dL      Creatinine 1.06 mg/dL      Glucose 80 mg/dL      Calcium 9.0 mg/dL      AST 18 U/L      ALT 10 U/L      Alkaline Phosphatase 71 U/L      Total Protein 6.4 g/dL      Albumin 3.6 g/dL      Total Bilirubin 0.58 mg/dL      eGFR 76 ml/min/1.73sq m     Narrative:      National Kidney Disease Foundation guidelines for Chronic Kidney Disease (CKD):     Stage 1 with normal or high GFR (GFR > 90 mL/min/1.73 square meters)    Stage 2 Mild CKD (GFR = 60-89 mL/min/1.73 square meters)    Stage 3A Moderate CKD (GFR = 45-59 mL/min/1.73 square meters)    Stage 3B Moderate CKD (GFR = 30-44 mL/min/1.73 square meters)    Stage 4 Severe CKD (GFR = 15-29 mL/min/1.73 square meters)    Stage 5 End Stage CKD (GFR <15 mL/min/1.73 square meters)  Note: GFR calculation is accurate only with a steady state creatinine    Uric acid [[id number]]  (Normal) Collected:  03/01/24 1355    Lab Status: Final result Specimen: Blood from Arm, Left Updated: 03/01/24 1423     Uric Acid 3.5 mg/dL     Narrative:      N-acetyl-p-benzoquinone imine (metabolite of Acetaminophen) will generate erroneously low results in samples for patients that have taken an overdose of Acetaminophen.    CBC and differential [[id number]]  (Abnormal) Collected: 03/01/24 1355    Lab Status: Final result Specimen: Blood from Arm, Left Updated: 03/01/24 1403     WBC 7.26 Thousand/uL      RBC 3.94 Million/uL      Hemoglobin 12.2 g/dL      Hematocrit 39.0 %      MCV 99 fL      MCH 31.0 pg      MCHC 31.3 g/dL      RDW 12.1 %      MPV 9.4 fL      Platelets 240 Thousands/uL      nRBC 0 /100 WBCs      Neutrophils Relative 54 %      Immat GRANS % 0 %      Lymphocytes Relative 27 %      Monocytes Relative 15 %      Eosinophils Relative 3 %      Basophils Relative 1 %      Neutrophils Absolute 3.89 Thousands/µL      Immature Grans Absolute 0.03 Thousand/uL      Lymphocytes Absolute 1.96 Thousands/µL      Monocytes Absolute 1.08 Thousand/µL      Eosinophils Absolute 0.25 Thousand/µL      Basophils Absolute 0.05 Thousands/µL                    XR toe great min 2 views LEFT   ED Interpretation by Kirby Engle DO (03/01 1450)   No fx, no dislocation, no osteomyelitis/ erosion.  Mild arthritis, interpreted by me                 Procedures  Procedures         ED Course                               SBIRT 22yo+      Flowsheet Row Most Recent Value   Initial Alcohol Screen: US AUDIT-C     1. How often do you have a drink containing alcohol? 0 Filed at: 03/01/2024 1345   2. How many drinks containing alcohol do you have on a typical day you are drinking?  0 Filed at: 03/01/2024 1345   3a. Male UNDER 65: How often do you have five or more drinks on one occasion? 0 Filed at: 03/01/2024 1345   3b. FEMALE Any Age, or MALE 65+: How often do you have 4 or more drinks on one occassion? 0 Filed at: 03/01/2024 1345   Audit-C Score 0 Filed  at: 03/01/2024 1345   HERB: How many times in the past year have you...    Used an illegal drug or used a prescription medication for non-medical reasons? Never Filed at: 03/01/2024 1345                      Medical Decision Making  Diff includes mild left first toe paronychia, but more concern is significant fungal infection of toenails.      Amount and/or Complexity of Data Reviewed  Labs: ordered.  Radiology: ordered and independent interpretation performed.    Risk  Prescription drug management.             Disposition  Final diagnoses:   Onychomycosis - bilateral feet   Ingrown left big toenail     Time reflects when diagnosis was documented in both MDM as applicable and the Disposition within this note       Time User Action Codes Description Comment    3/1/2024  2:45 PM Kirby Engle [B35.1] Tinea unguium     3/1/2024  2:46 PM Kirby Engle Modify [B35.1] Tinea unguium bilateral feet    3/1/2024  2:46 PM Kirby Engle Add [B35.1] Onychomycosis     3/1/2024  2:46 PM Kirby Engle Modify [B35.1] Onychomycosis     3/1/2024  2:46 PM Kirby Engle Remove [B35.1] Tinea unguium bilateral feet    3/1/2024  2:46 PM Kirby Engle Modify [B35.1] Onychomycosis bilateral feet    3/1/2024  3:00 PM Kirby Engle [L60.0] Ingrown left big toenail           ED Disposition       ED Disposition   Discharge    Condition   Stable    Date/Time   Fri Mar 1, 2024 1445    Comment   Eleazar Benítez discharge to home/self care.                   Follow-up Information       Follow up With Specialties Details Why Contact Info Additional Information    [facility] [place] [facility] Schedule an appointment as soon as possible for a visit   [facility] Cindy Garcia  [facility] [address]  [facility] [address] [phone] [facility] [place], [facility] [address], [place], [address] [phone]            Discharge Medication List as of 3/1/2024  3:04 PM        START taking these medications    Details   terbinafine (LamISIL)  250 mg tablet Take 1 tablet (250 mg total) by mouth daily, Starting Fri 3/1/2024, Until Sun 3/31/2024, Normal           CONTINUE these medications which have NOT CHANGED    Details   amiodarone 200 mg tablet Take 1 tablet (200 mg total) by mouth daily, Starting Tue 11/22/2022, Normal      apixaban (ELIQUIS) 5 mg Take 1 tablet (5 mg total) by mouth 2 (two) times a day, Starting Tue 11/22/2022, Normal      ARIPiprazole (ABILIFY) 15 mg tablet Take 1 tablet (15 mg total) by mouth daily Do not start before November 23, 2022., Starting Wed 11/23/2022, Normal      atorvastatin (LIPITOR) 40 mg tablet Take 1 tablet (40 mg total) by mouth daily with dinner, Starting Tue 11/22/2022, Normal      !! busPIRone (BUSPAR) 15 mg tablet Starting Wed 12/21/2022, Historical Med      !! busPIRone (BUSPAR) 7.5 mg tablet Take 1 tablet (7.5 mg total) by mouth 3 (three) times a day, Starting Tue 11/22/2022, Normal      carbidopa-levodopa (SINEMET)  mg per tablet Take 1 tablet by mouth 4 (four) times a day, Starting Tue 11/22/2022, Normal      cholecalciferol (VITAMIN D3) 1,000 units tablet Take 1 tablet (1,000 Units total) by mouth daily Do not start before January 13, 2023., Starting Fri 1/13/2023, Normal      ergocalciferol (VITAMIN D2) 50,000 units Take 1 capsule (50,000 Units total) by mouth once a week for 8 doses Do not start before November 24, 2022., Starting Thu 11/24/2022, Until Fri 1/13/2023, Normal      lamoTRIgine (LaMICtal) 100 mg tablet Take 1 tablet (100 mg total) by mouth daily at bedtime, Starting Tue 11/22/2022, Normal      melatonin 5 MG TABS Take 1 tablet (5 mg total) by mouth daily at bedtime, Starting Tue 11/22/2022, Normal      sertraline (ZOLOFT) 100 mg tablet Take 1 tablet (100 mg total) by mouth daily Do not start before November 23, 2022., Starting Wed 11/23/2022, Normal       !! - Potential duplicate medications found. Please discuss with provider.              PDMP Review       None            ED  Provider  Electronically Signed by             Kirby Engle DO  03/01/24 1784

## 2024-03-13 ENCOUNTER — HOSPITAL ENCOUNTER (OUTPATIENT)
Dept: NUCLEAR MEDICINE | Facility: HOSPITAL | Age: 59
Discharge: HOME | End: 2024-03-13
Payer: MEDICARE

## 2024-03-13 DIAGNOSIS — E06.9 THYROIDITIS, UNSPECIFIED: ICD-10-CM

## 2024-03-13 PROCEDURE — 78014 THYROID IMAGING W/BLOOD FLOW: CPT

## 2024-03-14 ENCOUNTER — HOSPITAL ENCOUNTER (OUTPATIENT)
Dept: NUCLEAR MEDICINE | Facility: HOSPITAL | Age: 59
Discharge: HOME | End: 2024-03-14
Payer: MEDICARE

## 2024-04-18 ENCOUNTER — IN-CLINIC DEVICE VISIT (OUTPATIENT)
Dept: CARDIOLOGY CLINIC | Facility: CLINIC | Age: 59
End: 2024-04-18
Payer: MEDICARE

## 2024-06-19 ENCOUNTER — TELEPHONE (OUTPATIENT)
Age: 59
End: 2024-06-19

## 2024-06-24 ENCOUNTER — TELEPHONE (OUTPATIENT)
Dept: CARDIOLOGY CLINIC | Facility: CLINIC | Age: 59
End: 2024-06-24

## 2024-06-24 NOTE — TELEPHONE ENCOUNTER
I called the home phone number, I left a message for the  to reschedule the pt's missed appt with Dr. Gleason 6/24/24 at 9:20am

## 2024-07-19 DIAGNOSIS — I47.20 VENTRICULAR TACHYCARDIA (HCC): ICD-10-CM

## 2024-07-19 PROCEDURE — 93295 DEV INTERROG REMOTE 1/2/MLT: CPT | Performed by: INTERNAL MEDICINE

## 2024-07-19 PROCEDURE — 93296 REM INTERROG EVL PM/IDS: CPT | Performed by: INTERNAL MEDICINE

## 2024-08-13 ENCOUNTER — HOSPITAL ENCOUNTER (OUTPATIENT)
Dept: ULTRASOUND IMAGING | Facility: HOSPITAL | Age: 59
Discharge: HOME | End: 2024-08-13
Payer: COMMERCIAL

## 2024-08-13 DIAGNOSIS — E04.2 NON-TOXIC MULTINODULAR GOITER: ICD-10-CM

## 2024-08-13 PROCEDURE — 76536 US EXAM OF HEAD AND NECK: CPT

## 2024-08-30 ENCOUNTER — TELEPHONE (OUTPATIENT)
Age: 59
End: 2024-08-30

## 2024-09-09 ENCOUNTER — HOSPITAL ENCOUNTER (OUTPATIENT)
Dept: RADIOLOGY | Facility: HOSPITAL | Age: 59
Discharge: HOME | End: 2024-09-09

## 2024-09-09 DIAGNOSIS — Z95.810 ICD (IMPLANTABLE CARDIOVERTER-DEFIBRILLATOR) IN PLACE: ICD-10-CM

## 2024-09-24 ENCOUNTER — HOSPITAL ENCOUNTER (OUTPATIENT)
Dept: MRI IMAGING | Facility: HOSPITAL | Age: 59
Discharge: HOME | End: 2024-09-24
Payer: MEDICARE

## 2024-09-24 VITALS — OXYGEN SATURATION: 95 % | HEART RATE: 60 BPM | RESPIRATION RATE: 20 BRPM

## 2024-09-24 DIAGNOSIS — F03.90 DEMENTIA, UNSPECIFIED DEMENTIA SEVERITY, UNSPECIFIED DEMENTIA TYPE, UNSPECIFIED WHETHER BEHAVIORAL, PSYCHOTIC, OR MOOD DISTURBANCE OR ANXIETY (HCC): ICD-10-CM

## 2024-09-24 PROCEDURE — 70551 MRI BRAIN STEM W/O DYE: CPT

## 2024-09-24 NOTE — NURSING NOTE
Outpatient arrived for MRI study of  brain accompanied by .Cardiology order for pacemaker programming change to MRI sure scan signed by RAMY Corley PA-C cardiology.       Baseline m2fx setting is an ICD. ICD turned off for mri scan by m2fx representative, Geovanna, present. Patient continuously monitored during MRI by RN. See vitals flow sheet.     Patient tolerated scan well. Post interrogation completed and device ICD turned back on by m2fx representative Geovanna, present. Order to be scanned into patient chart.

## 2024-10-07 ENCOUNTER — TELEPHONE (OUTPATIENT)
Age: 59
End: 2024-10-07

## 2024-10-07 NOTE — TELEPHONE ENCOUNTER
Kimmie the  [facility] called. This patient is a [facility] patient who has already been diagnosed with dementia.     Kimmie stated, the [facility] wants the patient to see a Dementia specialist doctor in [place]. She was hoping he may be able to establish with the [place] office to be seen with the provider for his care.     She stated they would reach out to the [facility] and have his records sent to us if this is possible. She wanted to know if someone could contact her with what the next steps should be.    Kimmie best contact, [phone]  M-F 9:00 am - 5:30 pm    Please advise, thank you.

## 2024-10-08 NOTE — TELEPHONE ENCOUNTER
Spoke with Kimmie at the [facility] ([phone]) regarding having patient seen in our office.     Advised Kimmie that we only see patient's 62 years or older and that she can reach out to Neurology and they should be able to see him there.

## 2024-10-18 ENCOUNTER — REMOTE DEVICE CLINIC VISIT (OUTPATIENT)
Dept: CARDIOLOGY CLINIC | Facility: CLINIC | Age: 59
End: 2024-10-18
Payer: COMMERCIAL

## 2024-10-18 DIAGNOSIS — I51.81 STRESS-INDUCED CARDIOMYOPATHY: Primary | ICD-10-CM

## 2024-10-18 DIAGNOSIS — Z95.810 PRESENCE OF IMPLANTABLE CARDIOVERTER-DEFIBRILLATOR (ICD): ICD-10-CM

## 2024-10-18 PROCEDURE — 93295 DEV INTERROG REMOTE 1/2/MLT: CPT | Performed by: INTERNAL MEDICINE

## 2024-10-18 PROCEDURE — 93296 REM INTERROG EVL PM/IDS: CPT | Performed by: INTERNAL MEDICINE

## 2024-10-18 NOTE — PROGRESS NOTES
Results for orders placed or performed in visit on 10/18/24   Cardiac EP device report    Narrative    BCI SINGLE ICD/ ACTIVE SYSTEM IS MRI CONDITIONAL  MRI Protection last programmed on Sep 24, 2024. Beeper is OFF.  LATITUDE TRANSMISSION:  BATTERY VOLTAGE ADEQUATE (12 YRS).  AP 0%  0%.  ALL AVAILABLE LEAD PARAMETERS WITHIN NORMAL LIMITS.  PT TAKING ELIQUIS, AMIO.  NORMAL DEVICE FUNCTION.  PAS

## 2025-01-17 ENCOUNTER — RESULTS FOLLOW-UP (OUTPATIENT)
Dept: CARDIOLOGY CLINIC | Facility: CLINIC | Age: 60
End: 2025-01-17

## 2025-01-17 ENCOUNTER — REMOTE DEVICE CLINIC VISIT (OUTPATIENT)
Dept: CARDIOLOGY CLINIC | Facility: CLINIC | Age: 60
End: 2025-01-17
Payer: COMMERCIAL

## 2025-01-17 DIAGNOSIS — I47.20 VENTRICULAR TACHYCARDIA (HCC): Primary | ICD-10-CM

## 2025-01-17 PROCEDURE — 93296 REM INTERROG EVL PM/IDS: CPT | Performed by: INTERNAL MEDICINE

## 2025-01-17 PROCEDURE — 93295 DEV INTERROG REMOTE 1/2/MLT: CPT | Performed by: INTERNAL MEDICINE

## 2025-02-11 ENCOUNTER — HOSPITAL ENCOUNTER (OUTPATIENT)
Dept: ULTRASOUND IMAGING | Facility: HOSPITAL | Age: 60
Discharge: HOME | End: 2025-02-11
Payer: COMMERCIAL

## 2025-02-11 DIAGNOSIS — E05.20 TMNG (TOXIC MULTINODULAR GOITER): ICD-10-CM

## 2025-02-11 PROCEDURE — 76536 US EXAM OF HEAD AND NECK: CPT

## 2025-04-23 ENCOUNTER — RESULTS FOLLOW-UP (OUTPATIENT)
Dept: CARDIOLOGY CLINIC | Facility: CLINIC | Age: 60
End: 2025-04-23

## 2025-04-23 ENCOUNTER — IN-CLINIC DEVICE VISIT (OUTPATIENT)
Dept: CARDIOLOGY CLINIC | Facility: CLINIC | Age: 60
End: 2025-04-23
Payer: MEDICARE

## 2025-04-23 DIAGNOSIS — Z95.810 AICD (AUTOMATIC CARDIOVERTER/DEFIBRILLATOR) PRESENT: Primary | ICD-10-CM

## 2025-04-23 PROCEDURE — 93282 PRGRMG EVAL IMPLANTABLE DFB: CPT | Performed by: INTERNAL MEDICINE

## 2025-04-23 NOTE — PROGRESS NOTES
Results for orders placed or performed in visit on 04/23/25   Cardiac EP device report    Narrative    BCI SINGLE ICD/ ACTIVE SYSTEM IS MRI CONDITIONAL  DEVICE INTERROGATED IN THE [place] OFFICE: PRESENTING EGRAM VS@ 58 BPM. BATTERY VOLTAGE ADEQUATE-12 YRS.  0%. ALL AVAILABLE LEAD PARAMETERS WITHIN NORMAL LIMITS. NO SIGNIFICANT HIGH RATE EPISODES. NO PROGRAMMING CHANGES MADE TO DEVICE PARAMETERS. NORMAL DEVICE FUNCTION. NC

## 2025-07-25 ENCOUNTER — REMOTE DEVICE CLINIC VISIT (OUTPATIENT)
Dept: CARDIOLOGY CLINIC | Facility: CLINIC | Age: 60
End: 2025-07-25
Payer: COMMERCIAL

## 2025-07-25 DIAGNOSIS — I47.20 VENTRICULAR TACHYCARDIA (HCC): Primary | ICD-10-CM

## 2025-07-25 PROCEDURE — 93295 DEV INTERROG REMOTE 1/2/MLT: CPT | Performed by: INTERNAL MEDICINE

## 2025-07-25 PROCEDURE — 93296 REM INTERROG EVL PM/IDS: CPT | Performed by: INTERNAL MEDICINE

## 2025-07-25 NOTE — PROGRESS NOTES
Results for orders placed or performed in visit on 07/25/25   Cardiac EP device report    Narrative    BCI SINGLE ICD/ ACTIVE SYSTEM IS MRI CONDITIONAL  MRI Protection last programmed on Sep 24, 2024. Beeper is OFF.  LATITUDE TRANSMISSION: BATTERY VOLTAGE ADEQUATE (12 YRS). : 0%. ALL AVAILABLE LEAD PARAMETERS WITHIN NORMAL LIMITS. NO SIGNIFICANT HIGH RATE EPISODES. NORMAL DEVICE FUNCTION. CH